# Patient Record
Sex: FEMALE | Race: OTHER | HISPANIC OR LATINO | Employment: OTHER | URBAN - METROPOLITAN AREA
[De-identification: names, ages, dates, MRNs, and addresses within clinical notes are randomized per-mention and may not be internally consistent; named-entity substitution may affect disease eponyms.]

---

## 2017-01-19 ENCOUNTER — OFFICE VISIT (OUTPATIENT)
Dept: OBSTETRICS AND GYNECOLOGY | Facility: CLINIC | Age: 57
End: 2017-01-19
Attending: OBSTETRICS & GYNECOLOGY
Payer: MEDICAID

## 2017-01-19 VITALS
SYSTOLIC BLOOD PRESSURE: 128 MMHG | BODY MASS INDEX: 48.69 KG/M2 | DIASTOLIC BLOOD PRESSURE: 76 MMHG | HEIGHT: 60 IN | WEIGHT: 248 LBS

## 2017-01-19 DIAGNOSIS — R35.0 URINARY FREQUENCY: ICD-10-CM

## 2017-01-19 DIAGNOSIS — R10.2 PELVIC PAIN IN FEMALE: ICD-10-CM

## 2017-01-19 DIAGNOSIS — Z12.39 BREAST CANCER SCREENING: ICD-10-CM

## 2017-01-19 DIAGNOSIS — Z78.0 POSTMENOPAUSE: ICD-10-CM

## 2017-01-19 DIAGNOSIS — N39.41 URGE INCONTINENCE OF URINE: ICD-10-CM

## 2017-01-19 DIAGNOSIS — N81.89 PELVIC RELAXATION: ICD-10-CM

## 2017-01-19 DIAGNOSIS — Z01.419 VISIT FOR GYNECOLOGIC EXAMINATION: Primary | ICD-10-CM

## 2017-01-19 LAB
BILIRUB UR QL STRIP: NEGATIVE
CLARITY UR REFRACT.AUTO: CLEAR
COLOR UR AUTO: YELLOW
GLUCOSE UR QL STRIP: NEGATIVE
HGB UR QL STRIP: ABNORMAL
KETONES UR QL STRIP: NEGATIVE
LEUKOCYTE ESTERASE UR QL STRIP: NEGATIVE
NITRITE UR QL STRIP: NEGATIVE
PH UR STRIP: >8 [PH] (ref 5–8)
PROT UR QL STRIP: ABNORMAL
SP GR UR STRIP: 1.02 (ref 1–1.03)
URN SPEC COLLECT METH UR: ABNORMAL
UROBILINOGEN UR STRIP-ACNC: NEGATIVE EU/DL

## 2017-01-19 PROCEDURE — 87077 CULTURE AEROBIC IDENTIFY: CPT

## 2017-01-19 PROCEDURE — 99999 PR PBB SHADOW E&M-EST. PATIENT-LVL V: CPT | Mod: PBBFAC,,, | Performed by: NURSE PRACTITIONER

## 2017-01-19 PROCEDURE — 87088 URINE BACTERIA CULTURE: CPT

## 2017-01-19 PROCEDURE — 99386 PREV VISIT NEW AGE 40-64: CPT | Mod: S$PBB,,, | Performed by: NURSE PRACTITIONER

## 2017-01-19 PROCEDURE — 99215 OFFICE O/P EST HI 40 MIN: CPT | Mod: PBBFAC | Performed by: NURSE PRACTITIONER

## 2017-01-19 PROCEDURE — 87086 URINE CULTURE/COLONY COUNT: CPT

## 2017-01-19 PROCEDURE — 87186 SC STD MICRODIL/AGAR DIL: CPT

## 2017-01-19 PROCEDURE — 81003 URINALYSIS AUTO W/O SCOPE: CPT

## 2017-01-19 PROCEDURE — 88175 CYTOPATH C/V AUTO FLUID REDO: CPT

## 2017-01-19 NOTE — PROGRESS NOTES
"HISTORY OF PRESENT ILLNESS:    Paras Smith is a 56 y.o. female , presents for a routine exam and c/o pelvic pain and urinary incontinence.  -c/o chronic abdominal - pelvic pain and "has been worked up by Gastro and they can't find anything".  -Pain is generalized, left side, then right side, stabbing with nausea, metal taste, heartburn, fatigue, bloating, belching, difficulty swallowing, radiating to her back, worse with activity, stress and worse with eating - can only eat a few bites and is full, but not losing weight; Bentyl helps..  -Difficulty swallowing started after thyroid cancer in  with radiation treatment.  -Has had NL abd US and UGI in  and NL EGD in ; Colonoscopy with benign polyps in .  -Also c/o urinary frequency, mixed incontinence, nocturia, pad use w/o fever, dysuria, hematuria.     Past Medical History   Diagnosis Date    Asthma     CAD (coronary artery disease)      stent     Cervical spine disease     Depression     Difficult intubation      POSSIBLE    GERD (gastroesophageal reflux disease)     Glaucoma      left eye    Helicobacter pylori (H. pylori) infection     History of stomach ulcers     HTN (hypertension)     Muscle weakness      LEFT    Neck problem      LIMITED ROM    Nuclear cataract 2014    Postsurgical hypothyroidism     Stroke      mini strokes    Thyroid cancer     Vitamin D deficiency disease        Past Surgical History   Procedure Laterality Date    Total thyroidectomy      Coronary angioplasty with stent placement       x 3    Cervical spine surgery       vocal cord damage    Thyroid surgery       total    Appendectomy      Patella surgery  1969    Cholecystectomy       section, low transverse       x3        MEDICATIONS AND ALLERGIES:      Current Outpatient Prescriptions:     albuterol (PROAIR HFA) 90 mcg/actuation inhaler, Inhale 2 puffs into the lungs every 6 (six) hours as needed., Disp: 8.5 g, Rfl: " 1    anthralin 1.2 % CmRR, , Disp: , Rfl: 0    diazepam (VALIUM) 10 MG Tab, Take 10 mg by mouth 2 (two) times daily as needed.  , Disp: , Rfl:     dicyclomine (BENTYL) 20 mg tablet, Take 1 tablet (20 mg total) by mouth 2 (two) times daily., Disp: 60 tablet, Rfl: 0    doxepin (SINEQUAN) 25 MG capsule, Take 25 mg by mouth every evening., Disp: , Rfl: 0    FLUVIRIN [id number], PF, 45 mcg (15 mcg x 3)/0.5 mL Syrg, , Disp: , Rfl: 0    gabapentin (NEURONTIN) 300 MG capsule, Take 600 mg by mouth 3 (three) times daily. , Disp: , Rfl: 0    lidocaine-prilocaine (EMLA) cream, , Disp: , Rfl: 0    lisinopril 10 MG tablet, Take 1 tablet (10 mg total) by mouth once daily., Disp: 30 tablet, Rfl: 6    LMX 4 4 % cream, , Disp: , Rfl: 0    naproxen (NAPROSYN) 375 MG tablet, take 1 tablet by mouth three times a day with food if needed, Disp: , Rfl: 0    nitroGLYCERIN 0.4 MG/HR TD PT24 (NITRODUR) 0.4 mg/hr, apply 1 patch once daily to SKIN, Disp: 30 patch, Rfl: 11    omeprazole magnesium 20 mg CpDR, Take 1 tablet by mouth 2 (two) times daily., Disp: 60 capsule, Rfl: 3    oxycodone (ROXICODONE) 30 MG Tab, Take 30 mg by mouth every 6 (six) hours as needed. , Disp: , Rfl:     promethazine (PHENERGAN) 25 MG tablet, take 1 tablet by mouth every 8 hours if needed for nausea and vomiting, Disp: , Rfl: 0    quetiapine (SEROQUEL) 100 MG Tab, , Disp: , Rfl: 0    risperidone (RISPERDAL) 2 MG tablet, Take 2 mg by mouth every evening., Disp: , Rfl: 0    sertraline (ZOLOFT) 100 MG tablet, Take 200 mg by mouth once daily., Disp: , Rfl: 0    sumatriptan (IMITREX) 100 MG tablet, take 1 tablet by mouth immediately may repeat after 2 hours if needed, Disp: , Rfl: 0    SYNTHROID 112 mcg tablet, Take 1 tablet (112 mcg total) by mouth before breakfast., Disp: 30 tablet, Rfl: 6    topiramate (TOPAMAX) 100 MG tablet, Take 100 mg by mouth 2 (two) times daily., Disp: , Rfl: 0    trazodone (DESYREL) 150 MG tablet, Take 150 mg by mouth every  evening., Disp: , Rfl: 0    VITAMIN D2 50,000 unit capsule, take 1 capsule by mouth EVERY 7 DAYS, Disp: 12 capsule, Rfl: 0    Review of patient's allergies indicates:   Allergen Reactions    Vioxx [rofecoxib] Rash       Family History   Problem Relation Age of Onset    Diabetes Father     Kidney failure Father     Cataracts Father     Asthma Father     Glaucoma Father     Heart disease Mother      has pacemaker    Hypertension Mother     Asthma Mother     Cancer Mother      lung stage 4    Hypertension Brother     Heart disease Brother     Cervical cancer Daughter     Scoliosis Son     Hypertension Son     Hypertension Daughter     Seizures Daughter     Lupus Daughter     Cervical cancer Daughter     Mental illness Son      bipolar    Cancer Sister     Liver disease Sister     No Known Problems Brother     No Known Problems Daughter     Stomach cancer Maternal Aunt     No Known Problems Maternal Uncle     No Known Problems Paternal Aunt     No Known Problems Paternal Uncle     Stomach cancer Maternal Grandmother     No Known Problems Maternal Grandfather     No Known Problems Paternal Grandmother     No Known Problems Paternal Grandfather     Stomach cancer Maternal Aunt     Colon cancer Neg Hx     Ovarian cancer Neg Hx        Social History     Social History    Marital status: Legally      Spouse name: N/A    Number of children: N/A    Years of education: N/A     Occupational History    Not on file.     Social History Main Topics    Smoking status: Never Smoker    Smokeless tobacco: Never Used      Comment: No cigarrettes, only marijuana occasionally    Alcohol use No      Comment: recovering alcoholic    Drug use: 6.00 per week     Special: Marijuana      Comment: ocasional    Sexual activity: Not Currently     Other Topics Concern    Not on file     Social History Narrative       OB HISTORY: Number of C/S:3    COMPREHENSIVE GYN HISTORY:  PAP History:  Denies  abnormal Paps. LAST PAP 3-1-13 NORMAL.  Infection History: Denies STDs. Denies PID.  Benign History: Denies uterine fibroids. Denies ovarian cysts. Denies endometriosis. Denies other conditions.  Cancer History: Denies cervical cancer. Denies uterine cancer or hyperplasia. Denies ovarian cancer. Denies vulvar cancer or pre-cancer. Denies vaginal cancer or pre-cancer. Denies breast cancer. Denies colon cancer.  Sexual Activity History: Reports currently being sexually active  Menstrual History: Denies menses. Pt is  not on HRT.     ROS:  GENERAL: + WEIGHT GAIN. + ABD BLOATING. + FATIGUE. No fever.  CARDIOVASCULAR: + OCC CHEST PAIN RELIEVED WITH NTG PATCH. No shortness of breath. No leg cramps. + CHRONIC PALPITATIONS.  NEUROLOGICAL: + CHRONIC HEADACHES.. No vision changes.  BREASTS: No pain. No lumps. No discharge.  ABDOMEN: + DIFFICULTY SWALLOWING, NAUSEA, HEARTBURN, EARLY SATIETY. No vomiting. No diarrhea. + CHRONIC CONSTIPATION.  REPRODUCTIVE: No abnormal bleeding.   VULVA: No pain. No lesions. No itching.  VAGINA: No relaxation. No itching. No odor. No discharge. No lesions.  URINARY: + U.I, PAD USE, NOCTURIA, FREQUENCY. No hematuria. No dysuria.    Visit Vitals    /76    Ht 5' (1.524 m)    Wt 112.5 kg (248 lb)    LMP 04/12/2014    BMI 48.43 kg/m2       PE:  APPEARANCE: Well nourished, well developed, in no acute distress.  AFFECT: WNL, alert and oriented x 3.  SKIN: No hirsutism or acne.  NECK: Neck symmetric without masses or thyromegaly.  NODES: No inguinal, cervical, axillary or femoral lymph node enlargement.  CHEST: Good respiratory effort.   ABDOMEN: Soft. There is GENERALIZED TENDERNESS without rebound or masses. No hepatosplenomegaly. No hernias.  BREASTS: Symmetrical, no skin changes or visible lesions. No palpable masses, nipple discharge bilaterally.  PELVIC: ATROPHIC EXTERNAL FEMALE GENITALIA without lesions. Normal hair distribution. Adequate perineal body, normal urethral meatus.  VAGINA DRY/ATROPHIC without lesions or discharge. CERVIX STENOTIC without lesions, discharge or tenderness. There is a GRADE 1 CYSTOCELE/ RECTOCELE present. Bimanual exam shows uterus to be normal size, regular, mobile and nontender. MILD UTERINE PROLAPSE PRESENT. Adnexa without masses or tenderness.  RECTAL: Rectovaginal exam confirms above with normal sphincter tone, no masses.  EXTREMITIES: No edema.    DIAGNOSIS:  1. Visit for gynecologic examination    2. Postmenopause    3. Chronic Abdominal - Pelvic pain in female    4. Urinary frequency    5. Urge incontinence of urine    6. Pelvic relaxation    7. Breast cancer screening        PLAN:    Orders Placed This Encounter    Urine culture    US Pelvis Comp with Transvag NON-OB (xpd    Mammo Digital Screening Bilat With CAD    Urinalysis    Ambulatory consult to Urogynecology    Liquid-based pap smear, screening       COUNSELING:  The patient was counseled today on:  -gyn and non gyn etiologies of CPP and will check a pelvic ultrasound as part of her work up;  -types of incontinence and management options including bladder training, timed voiding, Kegel exercises, and the avoidance of bladder irritants in managing mild symptoms conservatively as well as surgical options for correction of anatomic defects, the potential need for urodynamic testing by Dr. Villegas - appt made;  -osteoporosis prevention, calcium supplementation, regular weight bearing exercise;  -A.C.S. Pap and pelvic exam guidelines (pap every 3 years, no pap after age 65) and recommendations for yearly mammogram;  -to see her PCP for other health maintenance.    FOLLOW-UP with me pending test results and annually.

## 2017-01-21 LAB — BACTERIA UR CULT: NORMAL

## 2017-01-23 ENCOUNTER — TELEPHONE (OUTPATIENT)
Dept: OBSTETRICS AND GYNECOLOGY | Facility: CLINIC | Age: 57
End: 2017-01-23

## 2017-01-23 DIAGNOSIS — N39.0 URINARY TRACT INFECTION WITHOUT HEMATURIA, SITE UNSPECIFIED: Primary | ICD-10-CM

## 2017-01-23 RX ORDER — NITROFURANTOIN 25; 75 MG/1; MG/1
100 CAPSULE ORAL 2 TIMES DAILY
Qty: 14 CAPSULE | Refills: 0 | Status: SHIPPED | OUTPATIENT
Start: 2017-01-23 | End: 2017-01-30

## 2017-01-23 RX ORDER — PHENAZOPYRIDINE HYDROCHLORIDE 200 MG/1
200 TABLET, FILM COATED ORAL 3 TIMES DAILY PRN
Qty: 20 TABLET | Refills: 0 | Status: SHIPPED | OUTPATIENT
Start: 2017-01-23 | End: 2017-04-19

## 2017-01-23 NOTE — TELEPHONE ENCOUNTER
PHONE CALL: Advised of UTI and Rx x2 sent.   Discussed UTI prevention including   a. perineal hygiene, wipe front to back,  b. drink water prior to intercourse and urinate afterwards and avoid certain positions which could increase likelyhood of UTIs,  c. establish regular bladder habits,   d. avoid vulvovaginal irritants,   e. increase fluids and avoid caffeine and alcohol;  -signs of pylenephritis and to go to the ED if develops fever, chills, n/v, back pain, worsening dyuria, hematuria;   -pelvic rest until symptoms resolve.  -Discussed medication potential side effects.  -Discussed that if her symptoms resolve prior to the Urogynecologist's appt, should cancel.

## 2017-01-24 ENCOUNTER — HOSPITAL ENCOUNTER (OUTPATIENT)
Dept: RADIOLOGY | Facility: HOSPITAL | Age: 57
Discharge: HOME | End: 2017-01-24
Attending: NURSE PRACTITIONER
Payer: MEDICAID

## 2017-01-24 DIAGNOSIS — Z12.31 ENCOUNTER FOR SCREENING MAMMOGRAM FOR BREAST CANCER: ICD-10-CM

## 2017-01-24 DIAGNOSIS — R10.2 PELVIC PAIN IN FEMALE: ICD-10-CM

## 2017-01-24 PROCEDURE — 76856 US EXAM PELVIC COMPLETE: CPT | Mod: 26,,, | Performed by: RADIOLOGY

## 2017-01-24 PROCEDURE — 77067 SCR MAMMO BI INCL CAD: CPT | Mod: 26,,, | Performed by: RADIOLOGY

## 2017-01-24 PROCEDURE — 76856 US EXAM PELVIC COMPLETE: CPT | Mod: TC

## 2017-01-24 PROCEDURE — 76830 TRANSVAGINAL US NON-OB: CPT | Mod: 26,,, | Performed by: RADIOLOGY

## 2017-01-24 PROCEDURE — 77063 BREAST TOMOSYNTHESIS BI: CPT | Mod: 26,,, | Performed by: RADIOLOGY

## 2017-01-24 PROCEDURE — 77067 SCR MAMMO BI INCL CAD: CPT | Mod: TC

## 2017-02-01 ENCOUNTER — TELEPHONE (OUTPATIENT)
Dept: UROGYNECOLOGY | Facility: CLINIC | Age: 57
End: 2017-02-01

## 2017-02-01 NOTE — TELEPHONE ENCOUNTER
----- Message from Hernandez Davis sent at 2/1/2017  9:38 AM CST -----  Contact: pt  x_ 1st Request   _ 2nd Request   _ 3rd Request     Who: HAMILTON MIN [[id number]]    Why: pt is sick will not make consultation today.  She is requesting a call back to reschedule her appointment    What Number to Call Back: [phone]    When to Expect a call back: (Before the end of the day)   -- if call after 3:00 call back will be tomorrow.

## 2017-02-01 NOTE — TELEPHONE ENCOUNTER
Pt stated she needed to reschedule apt because she may have a stomach virus and have diarrhea. Apt reschedule to 2/7/17 at 9 am.

## 2017-02-07 ENCOUNTER — TELEPHONE (OUTPATIENT)
Dept: UROGYNECOLOGY | Facility: CLINIC | Age: 57
End: 2017-02-07

## 2017-02-22 DIAGNOSIS — C73 THYROID CANCER: ICD-10-CM

## 2017-02-22 DIAGNOSIS — E89.0 POSTSURGICAL HYPOTHYROIDISM: ICD-10-CM

## 2017-02-22 RX ORDER — LEVOTHYROXINE SODIUM 112 UG/1
112 TABLET ORAL
Qty: 30 TABLET | Refills: 6 | Status: SHIPPED | OUTPATIENT
Start: 2017-02-22 | End: 2017-04-20

## 2017-02-22 RX ORDER — LEVOTHYROXINE SODIUM 112 UG/1
112 TABLET ORAL
Qty: 30 TABLET | Refills: 6 | Status: SHIPPED | OUTPATIENT
Start: 2017-02-22 | End: 2017-02-22 | Stop reason: SDUPTHER

## 2017-03-14 ENCOUNTER — TELEPHONE (OUTPATIENT)
Dept: FAMILY MEDICINE | Facility: CLINIC | Age: 57
End: 2017-03-14

## 2017-03-14 NOTE — TELEPHONE ENCOUNTER
Spoke with patient. States that her grandson has the flu and would like to get prescription for Tamiflu sent to the pharmacy for herself to prevent her from getting the flu.     Advised that she can come in for flu vaccine or office visit for evaluation. Tamiflu does not prevent the flu.     Patient declined.     She has scheduled appointment for annual exam in April

## 2017-03-14 NOTE — TELEPHONE ENCOUNTER
----- Message from Isabel Posada sent at 3/13/2017  4:05 PM CDT -----  Contact: self  Pt is requesting a refill for tamiflu.please advise. [phone]

## 2017-03-16 RX ORDER — ALBUTEROL SULFATE 90 UG/1
2 AEROSOL, METERED RESPIRATORY (INHALATION) EVERY 6 HOURS PRN
Qty: 8.5 G | Refills: 0 | Status: ON HOLD | OUTPATIENT
Start: 2017-03-16 | End: 2017-08-23

## 2017-03-21 RX ORDER — ERGOCALCIFEROL 1.25 MG/1
50000 CAPSULE ORAL
Qty: 12 CAPSULE | Refills: 0 | Status: SHIPPED | OUTPATIENT
Start: 2017-03-21 | End: 2017-06-17 | Stop reason: SDUPTHER

## 2017-04-19 ENCOUNTER — OFFICE VISIT (OUTPATIENT)
Dept: FAMILY MEDICINE | Facility: CLINIC | Age: 57
End: 2017-04-19
Payer: MEDICAID

## 2017-04-19 ENCOUNTER — LAB VISIT (OUTPATIENT)
Dept: LAB | Facility: HOSPITAL | Age: 57
End: 2017-04-19
Payer: MEDICAID

## 2017-04-19 VITALS
SYSTOLIC BLOOD PRESSURE: 130 MMHG | RESPIRATION RATE: 17 BRPM | HEIGHT: 60 IN | DIASTOLIC BLOOD PRESSURE: 94 MMHG | BODY MASS INDEX: 47.61 KG/M2 | TEMPERATURE: 98 F | HEART RATE: 70 BPM | WEIGHT: 242.5 LBS | OXYGEN SATURATION: 96 %

## 2017-04-19 DIAGNOSIS — M54.50 ACUTE RIGHT-SIDED LOW BACK PAIN WITHOUT SCIATICA: ICD-10-CM

## 2017-04-19 DIAGNOSIS — I10 ESSENTIAL HYPERTENSION: ICD-10-CM

## 2017-04-19 DIAGNOSIS — E55.9 HYPOVITAMINOSIS D: ICD-10-CM

## 2017-04-19 DIAGNOSIS — R10.13 EPIGASTRIC PAIN: ICD-10-CM

## 2017-04-19 DIAGNOSIS — R35.0 URINARY FREQUENCY: ICD-10-CM

## 2017-04-19 DIAGNOSIS — F41.9 ANXIETY: ICD-10-CM

## 2017-04-19 DIAGNOSIS — N95.1 HOT FLASHES DUE TO MENOPAUSE: ICD-10-CM

## 2017-04-19 DIAGNOSIS — R10.13 EPIGASTRIC PAIN: Primary | ICD-10-CM

## 2017-04-19 DIAGNOSIS — E89.0 POST-SURGICAL HYPOTHYROIDISM: ICD-10-CM

## 2017-04-19 LAB
25(OH)D3+25(OH)D2 SERPL-MCNC: 26 NG/ML
ALBUMIN SERPL BCP-MCNC: 3.9 G/DL
ALP SERPL-CCNC: 119 U/L
ALT SERPL W/O P-5'-P-CCNC: 17 U/L
AMYLASE SERPL-CCNC: 36 U/L
ANION GAP SERPL CALC-SCNC: 9 MMOL/L
AST SERPL-CCNC: 20 U/L
BASOPHILS # BLD AUTO: 0.05 K/UL
BASOPHILS NFR BLD: 0.6 %
BILIRUB SERPL-MCNC: 0.6 MG/DL
BILIRUB SERPL-MCNC: NORMAL MG/DL
BILIRUB UR QL STRIP: NEGATIVE
BLOOD URINE, POC: 50
BUN SERPL-MCNC: 10 MG/DL
CALCIUM SERPL-MCNC: 9.4 MG/DL
CHLORIDE SERPL-SCNC: 106 MMOL/L
CHOLEST/HDLC SERPL: 3.5 {RATIO}
CLARITY UR: CLEAR
CO2 SERPL-SCNC: 26 MMOL/L
COLOR UR: YELLOW
COLOR, POC UA: YELLOW
CREAT SERPL-MCNC: 0.7 MG/DL
DIFFERENTIAL METHOD: ABNORMAL
EOSINOPHIL # BLD AUTO: 0.2 K/UL
EOSINOPHIL NFR BLD: 1.8 %
ERYTHROCYTE [DISTWIDTH] IN BLOOD BY AUTOMATED COUNT: 13.3 %
EST. GFR  (AFRICAN AMERICAN): >60 ML/MIN/1.73 M^2
EST. GFR  (NON AFRICAN AMERICAN): >60 ML/MIN/1.73 M^2
GLUCOSE SERPL-MCNC: 96 MG/DL
GLUCOSE UR QL STRIP: NEGATIVE
GLUCOSE UR QL STRIP: NORMAL
HCT VFR BLD AUTO: 44 %
HDL/CHOLESTEROL RATIO: 28.4 %
HDLC SERPL-MCNC: 208 MG/DL
HDLC SERPL-MCNC: 59 MG/DL
HGB BLD-MCNC: 14.9 G/DL
HGB UR QL STRIP: ABNORMAL
KETONES UR QL STRIP: NEGATIVE
KETONES UR QL STRIP: NORMAL
LDLC SERPL CALC-MCNC: 126.6 MG/DL
LEUKOCYTE ESTERASE UR QL STRIP: NEGATIVE
LEUKOCYTE ESTERASE URINE, POC: NORMAL
LIPASE SERPL-CCNC: 25 U/L
LYMPHOCYTES # BLD AUTO: 4 K/UL
LYMPHOCYTES NFR BLD: 48.8 %
MCH RBC QN AUTO: 30.8 PG
MCHC RBC AUTO-ENTMCNC: 33.9 %
MCV RBC AUTO: 91 FL
MICROSCOPIC COMMENT: NORMAL
MONOCYTES # BLD AUTO: 0.6 K/UL
MONOCYTES NFR BLD: 7.9 %
NEUTROPHILS # BLD AUTO: 3.3 K/UL
NEUTROPHILS NFR BLD: 40.8 %
NITRITE UR QL STRIP: NEGATIVE
NITRITE, POC UA: NORMAL
NONHDLC SERPL-MCNC: 149 MG/DL
PH UR STRIP: 6 [PH] (ref 5–8)
PH, POC UA: 6
PLATELET # BLD AUTO: 258 K/UL
PMV BLD AUTO: 12.5 FL
POTASSIUM SERPL-SCNC: 4.9 MMOL/L
PROT SERPL-MCNC: 7.5 G/DL
PROT UR QL STRIP: NEGATIVE
PROTEIN, POC: NORMAL
RBC # BLD AUTO: 4.83 M/UL
RBC #/AREA URNS HPF: 4 /HPF (ref 0–4)
SODIUM SERPL-SCNC: 141 MMOL/L
SP GR UR STRIP: 1.02 (ref 1–1.03)
SPECIFIC GRAVITY, POC UA: 1.01
SQUAMOUS #/AREA URNS HPF: 1 /HPF
T4 FREE SERPL-MCNC: 1.27 NG/DL
TRIGL SERPL-MCNC: 112 MG/DL
TSH SERPL DL<=0.005 MIU/L-ACNC: 0.02 UIU/ML
URN SPEC COLLECT METH UR: ABNORMAL
UROBILINOGEN UR STRIP-ACNC: NEGATIVE EU/DL
UROBILINOGEN, POC UA: 1
WBC # BLD AUTO: 8.13 K/UL
WBC #/AREA URNS HPF: 2 /HPF (ref 0–5)

## 2017-04-19 PROCEDURE — 80061 LIPID PANEL: CPT

## 2017-04-19 PROCEDURE — 99215 OFFICE O/P EST HI 40 MIN: CPT | Mod: S$PBB,,, | Performed by: NURSE PRACTITIONER

## 2017-04-19 PROCEDURE — 84443 ASSAY THYROID STIM HORMONE: CPT

## 2017-04-19 PROCEDURE — 83690 ASSAY OF LIPASE: CPT

## 2017-04-19 PROCEDURE — 85025 COMPLETE CBC W/AUTO DIFF WBC: CPT

## 2017-04-19 PROCEDURE — 99999 PR PBB SHADOW E&M-EST. PATIENT-LVL V: CPT | Mod: PBBFAC,,, | Performed by: NURSE PRACTITIONER

## 2017-04-19 PROCEDURE — 84439 ASSAY OF FREE THYROXINE: CPT

## 2017-04-19 PROCEDURE — 36415 COLL VENOUS BLD VENIPUNCTURE: CPT

## 2017-04-19 PROCEDURE — 80053 COMPREHEN METABOLIC PANEL: CPT

## 2017-04-19 PROCEDURE — 82150 ASSAY OF AMYLASE: CPT

## 2017-04-19 PROCEDURE — 82306 VITAMIN D 25 HYDROXY: CPT

## 2017-04-19 RX ORDER — HYDROXYZINE PAMOATE 50 MG/1
50 CAPSULE ORAL EVERY 6 HOURS PRN
Qty: 30 CAPSULE | Refills: 1 | Status: SHIPPED | OUTPATIENT
Start: 2017-04-19 | End: 2019-04-25 | Stop reason: SDUPTHER

## 2017-04-19 RX ORDER — ESCITALOPRAM OXALATE 10 MG/1
10 TABLET ORAL DAILY
Qty: 30 TABLET | Refills: 11 | Status: SHIPPED | OUTPATIENT
Start: 2017-04-19 | End: 2017-04-19

## 2017-04-19 RX ORDER — HYDROXYZINE PAMOATE 50 MG/1
CAPSULE ORAL
Refills: 0 | COMMUNITY
Start: 2017-03-20 | End: 2017-04-19 | Stop reason: SDUPTHER

## 2017-04-19 NOTE — PROGRESS NOTES
"Subjective:       Patient ID: Paras Smith is a 56 y.o. female.    Chief Complaint: Annual Exam    HPI Ms Smith is here for several reasons. She reports abdominal pain to the epigastric area, she has associated N/V, no diarrhea, no bloody or black stool, no fever, no constipation. She's been followed by GI who she states completed what sounds like an EDG and c-scope.  She's also had a CT in , without any real acute findings.  She also reports "kidney pain" to her back.  She is concerned because a family member  of kidney cancer.  She is also concerned because a different family member had "something to their abdominal area that just burst" (she points to her epigastric area where she also states she has pain. She is also followed by psychiatry for anxiety. She also reports menopausal symptoms. She also reports urinary frequency.  She also needs to be referred to a new endocrinologist because her provider no longer takes her insurance.  While reviewing her medications, she is concerned that she is on so many medications, but only takes them as needed.  She would also like something to "help her focus" like adderall or ritalin, though she does not want anything addictive, that is why she rarely takes Valium.  Review of Systems   Constitutional: Negative for fever.   Cardiovascular: Negative for chest pain.   Gastrointestinal: Positive for abdominal pain.   Genitourinary: Positive for frequency.   Musculoskeletal: Positive for back pain.       Objective:      Physical Exam   Constitutional: She is oriented to person, place, and time. She appears well-developed and well-nourished. She does not appear ill. No distress.   tangential  Morbidly obese   Cardiovascular: Normal rate, regular rhythm and normal heart sounds.  Exam reveals no friction rub.    No murmur heard.  Pulses:       Dorsalis pedis pulses are 2+ on the right side, and 2+ on the left side.        Posterior tibial pulses are 2+ on the right " side, and 2+ on the left side.   Pulmonary/Chest: Effort normal and breath sounds normal. No respiratory distress. She has no decreased breath sounds. She has no wheezes. She has no rhonchi. She has no rales.   Abdominal: Soft. Bowel sounds are normal. There is CVA tenderness.   Unable to complete abdominal exam, pt. States she cannot lie flat as she gets short of breath.  R CVA tenderness   Musculoskeletal: Normal range of motion. She exhibits no edema.   Neurological: She is alert and oriented to person, place, and time.   Skin: Skin is warm and dry. No erythema.   Psychiatric: Her mood appears anxious. Her speech is tangential. She is hyperactive. Thought content is not paranoid and not delusional. She expresses no homicidal and no suicidal ideation.   Vitals reviewed.      Assessment:       1. Epigastric pain    2. Acute right-sided low back pain without sciatica    3. Hypovitaminosis D    4. Post-surgical hypothyroidism    5. Essential hypertension    6. Hot flashes due to menopause    7. Anxiety    8. Urinary frequency        Plan:       Epigastric pain  -     CBC auto differential; Future; Expected date: 4/19/17  -     Comprehensive metabolic panel; Future; Expected date: 4/19/17  -     Lipase; Future; Expected date: 4/19/17  -     Amylase; Future; Expected date: 4/19/17  -     CT Abdomen Pelvis W Wo Contrast; Future; Expected date: 4/19/17    Acute right-sided low back pain without sciatica  -     POCT urinalysis, dipstick or tablet reag    Hypovitaminosis D  -     Vitamin D; Future; Expected date: 4/19/17    Post-surgical hypothyroidism  -     TSH; Future; Expected date: 4/19/17  -     Ambulatory referral to Endocrinology    Essential hypertension  -     CBC auto differential; Future; Expected date: 4/19/17  -     Comprehensive metabolic panel; Future; Expected date: 4/19/17  -     Lipid panel; Future; Expected date: 4/19/17    Hot flashes due to menopause  Black cohosh prn    Anxiety  -     hydrOXYzine  "pamoate (VISTARIL) 50 MG Cap; Take 1 capsule (50 mg total) by mouth every 6 (six) hours as needed (anxiety).  Dispense: 30 capsule; Refill: 1  -     Ambulatory referral to Psychiatry    Urinary frequency  -     Urinalysis  -     Urine culture    Ms Smith and I had a long discussion regarding her medications.  I have informed her that I will not write a stimulant medication for her, she has anxiety, if she can get this under control, she will be able to focus better.  She sees psychiatry every month, however, she states that the anti-anxiety medication that she was put on makes her more depressed, this includes Prozac, Zoloft, Celexa and Lexapro.  She takes multiple medications for insomnia, but she states the vistaril works best.  I will increase this for her to try during the day as well since she states she cannot tolerate SSRIs.    Regarding her abdominal issue, I have reassured her that her kidney functions have been normal for at least 2 years, also I'm unclear what she is describing in her family members that "burst".  Nonetheless, her pain is right to her epigastric area, and her Ct is almost 3 years old, I will get another one, however; ultimately, she may need to f/u with GI.  Also refer to establish with a new endocrine  Also catch up health maintenance.    F/u after testing complete  ER for new worse or concerning symptoms    Verbalized understanding        "

## 2017-04-19 NOTE — MR AVS SNAPSHOT
[facility]  [address] [place] [address]  Phone: [phone]                  Paras Gaines Sarah   2017 7:20 AM   Office Visit    Description:  Female : 1960   Provider:  Ekta Gaines, NP-C   Department:  [facility]           Reason for Visit     Annual Exam           Diagnoses this Visit        Comments    Epigastric pain    -  Primary     Acute right-sided low back pain without sciatica         Hypovitaminosis D         Post-surgical hypothyroidism         Essential hypertension         Hot flashes due to menopause         Anxiety         Urinary frequency                To Do List           Goals (5 Years of Data)     None       These Medications        Disp Refills Start End    hydrOXYzine pamoate (VISTARIL) 50 MG Cap 30 capsule 1 2017     Take 1 capsule (50 mg total) by mouth every 6 (six) hours as needed (anxiety). - Oral    Pharmacy: [facility] [place] [address]. - RAPHAEL CODY [address] Ph #: [phone]         [facility]     [facility] Nurse Care Line -  Assistance  Unless otherwise directed by your provider, please contact [facility], our nurse care line that is available for / assistance.     Registered nurses in the [facility] provide: appointment scheduling, clinical advisement, health education, and other advisory services.  Call: [phone] (toll free)               Medications           Message regarding Medications     Verify the changes and/or additions to your medication regime listed below are the same as discussed with your clinician today.  If any of these changes or additions are incorrect, please notify your healthcare provider.        START taking these NEW medications        Refills    hydrOXYzine pamoate (VISTARIL) 50 MG Cap 1    Sig: Take 1 capsule (50 mg total) by mouth every 6 (six) hours as needed (anxiety).    Class: Normal    Route: Oral      STOP  taking these medications     trazodone (DESYREL) 150 MG tablet Take 150 mg by mouth every evening.    sertraline (ZOLOFT) 100 MG tablet Take 200 mg by mouth once daily.    quetiapine (SEROQUEL) 100 MG Tab     phenazopyridine (PYRIDIUM) 200 MG tablet Take 1 tablet (200 mg total) by mouth 3 (three) times daily as needed for Pain.    FLUVIRIN [id number], PF, 45 mcg (15 mcg x 3)/0.5 mL Syrg     diazepam (VALIUM) 10 MG Tab Take 10 mg by mouth 2 (two) times daily as needed.             Verify that the below list of medications is an accurate representation of the medications you are currently taking.  If none reported, the list may be blank. If incorrect, please contact your healthcare provider. Carry this list with you in case of emergency.           Current Medications     albuterol (PROAIR HFA) 90 mcg/actuation inhaler Inhale 2 puffs into the lungs every 6 (six) hours as needed.    anthralin 1.2 % CmRR     dicyclomine (BENTYL) 20 mg tablet Take 1 tablet (20 mg total) by mouth 2 (two) times daily.    doxepin (SINEQUAN) 25 MG capsule Take 25 mg by mouth every evening.    ergocalciferol (VITAMIN D2) 50,000 unit Cap Take 1 capsule (50,000 Units total) by mouth every 7 days.    gabapentin (NEURONTIN) 300 MG capsule Take 600 mg by mouth 3 (three) times daily.     hydrOXYzine pamoate (VISTARIL) 50 MG Cap Take 1 capsule (50 mg total) by mouth every 6 (six) hours as needed (anxiety).    lidocaine-prilocaine (EMLA) cream     lisinopril 10 MG tablet Take 1 tablet (10 mg total) by mouth once daily.    LMX 4 4 % cream     naproxen (NAPROSYN) 375 MG tablet take 1 tablet by mouth three times a day with food if needed    nitroGLYCERIN 0.4 MG/HR TD PT24 (NITRODUR) 0.4 mg/hr apply 1 patch once daily to SKIN    omeprazole magnesium 20 mg CpDR Take 1 tablet by mouth 2 (two) times daily.    oxycodone (ROXICODONE) 30 MG Tab Take 30 mg by mouth every 6 (six) hours as needed.     promethazine (PHENERGAN) 25 MG tablet take 1 tablet by mouth  every 8 hours if needed for nausea and vomiting    risperidone (RISPERDAL) 2 MG tablet Take 2 mg by mouth every evening.    sumatriptan (IMITREX) 100 MG tablet take 1 tablet by mouth immediately may repeat after 2 hours if needed    SYNTHROID 112 mcg tablet Take 1 tablet (112 mcg total) by mouth before breakfast.    topiramate (TOPAMAX) 100 MG tablet Take 100 mg by mouth 2 (two) times daily.           Clinical Reference Information           Your Vitals Were     BP Pulse Temp Resp Height Weight    130/94 (BP Location: Right arm, Patient Position: Sitting, BP Method: Manual) 70 98 °F (36.7 °C) (Oral) 17 5' (1.524 m) 110 kg (242 lb 8.1 oz)    Last Period SpO2 BMI          04/12/2014 96% 47.36 kg/m2        Blood Pressure          Most Recent Value    BP  (!)  130/94      Allergies as of 4/19/2017     Vioxx [Rofecoxib]      Immunizations Administered on Date of Encounter - 4/19/2017     None      Orders Placed During Today's Visit      Normal Orders This Visit    Ambulatory referral to Endocrinology     Ambulatory referral to Psychiatry     POCT urinalysis, dipstick or tablet reag     Urinalysis     Urine culture     Future Labs/Procedures Expected by Expires    Amylase  4/19/2017 6/18/2018    CBC auto differential  4/19/2017 4/19/2018    Comprehensive metabolic panel  4/19/2017 4/19/2018    CT Abdomen Pelvis W Wo Contrast  4/19/2017 4/19/2018    Lipase  4/19/2017 4/19/2018    Lipid panel  4/19/2017 6/18/2018    TSH  4/19/2017 4/20/2018    Vitamin D  4/19/2017 6/18/2018 4/19/2017  8:19 AM - Haile Gongora MA      Component Results     Component    Color, UA    yellow    Comment:    dark    Spec Grav UA    1.015    pH, UA    6    WBC, UA    trace    Nitrite, UA    -    Protein    +    Glucose, UA    normal    Ketones, UA    -    Urobilinogen, UA    1    Bilirubin    -    Blood, UA    50            [facility] Sign-Up     Activating your [facility] account is as easy as 1-2-3!     1) Visit [email or web address], select Sign  "Up Now, enter this activation code and your date of birth, then select Next.  [email or web address]  Expires: 6/3/2017  8:06 AM      2) Create a username and password to use when you visit [facility] in the future and select a security question in case you lose your password and select Next.    3) Enter your e-mail address and click Sign Up!    Additional Information  If you have questions, please e-mail [email or web address] or call [phone] to talk to our [facility] staff. Remember, [facility] is NOT to be used for urgent needs. For medical emergencies, dial 911.         Instructions    Follow up in 6-12 months, sooner if necessary  ER for new worse or concerning symptoms    Eating Healthy on the Run     Many grocery stores stock pre-made salads for eating on the run.     Need to eat on the run? This often means grabbing "junk" or fast food full of fat, salt, sugar, and cholesterol. But being in a rush doesn't mean that you can't eat healthy.  "Fast" food made healthy  Try these ways to get good nutrition, fast.  · Go to a grocery or convenience market instead of a fast-food restaurant. Look for choices like sandwiches, yogurt, fresh fruit, and juices.  · Buy precut, prepackaged fresh or frozen fruits and vegetables. You can open the package for a snack, salad, smoothie, or stir-floyd.  · Microwave a frozen dinner that has less than 15 grams (g) of fat and less than 800 milligrams (mg) of sodium. Complete the meal with a wholegrain roll, vegetables, and fresh fruit.  · If you must have fast food, consider your options. Go for veggie burgers, broiled and skinless chicken breast sandwiches, or dinner salads with low-fat dressing. Avoid large (super-sized) portions.  · Blot the extra oil from food with a napkin before you eat it.  · Instead of french fries, choose a baked potato with salsa.  Tip  Fast-food restaurants often have printed nutrition information available. Ask for this information and look up your favorite " items before you order.   Date Last Reviewed: 6/2/2015  © 5418-5417 [employer]. [address] [place] [address], [place] [address]. All rights reserved. This information is not intended as a substitute for professional medical care. Always follow your healthcare professional's instructions.             Language Assistance Services     ATTENTION: Language assistance services are available, free of charge. Please call [phone].      ATENCIÓN: Si habla español, tiene a spaulding disposición servicios gratuitos de asistencia lingüística. Llame al [phone].     CHÚ Ý: N?u b?n nói Ti?ng Vi?t, có các d?ch v? h? tr? ngôn ng? mi?n phí dành cho b?n. G?i s? [phone].         [facility] complies with applicable Federal civil rights laws and does not discriminate on the basis of race, color, national origin, age, disability, or sex.

## 2017-04-19 NOTE — PATIENT INSTRUCTIONS
"Follow up in 6-12 months, sooner if necessary  ER for new worse or concerning symptoms    Eating Healthy on the Run     Many grocery stores stock pre-made salads for eating on the run.     Need to eat on the run? This often means grabbing "junk" or fast food full of fat, salt, sugar, and cholesterol. But being in a rush doesn't mean that you can't eat healthy.  "Fast" food made healthy  Try these ways to get good nutrition, fast.  · Go to a grocery or convenience market instead of a fast-food restaurant. Look for choices like sandwiches, yogurt, fresh fruit, and juices.  · Buy precut, prepackaged fresh or frozen fruits and vegetables. You can open the package for a snack, salad, smoothie, or stir-floyd.  · Microwave a frozen dinner that has less than 15 grams (g) of fat and less than 800 milligrams (mg) of sodium. Complete the meal with a wholegrain roll, vegetables, and fresh fruit.  · If you must have fast food, consider your options. Go for veggie burgers, broiled and skinless chicken breast sandwiches, or dinner salads with low-fat dressing. Avoid large (super-sized) portions.  · Blot the extra oil from food with a napkin before you eat it.  · Instead of french fries, choose a baked potato with salsa.  Tip  Fast-food restaurants often have printed nutrition information available. Ask for this information and look up your favorite items before you order.   Date Last Reviewed: 6/2/2015  © 7928-9690 [employer]. [address] [place] [address], [place] [address]. All rights reserved. This information is not intended as a substitute for professional medical care. Always follow your healthcare professional's instructions.        "

## 2017-04-20 ENCOUNTER — TELEPHONE (OUTPATIENT)
Dept: FAMILY MEDICINE | Facility: CLINIC | Age: 57
End: 2017-04-20

## 2017-04-20 DIAGNOSIS — E89.0 POSTSURGICAL HYPOTHYROIDISM: ICD-10-CM

## 2017-04-20 DIAGNOSIS — C73 THYROID CANCER: ICD-10-CM

## 2017-04-20 RX ORDER — LEVOTHYROXINE SODIUM 100 UG/1
100 TABLET ORAL DAILY
Qty: 30 TABLET | Refills: 1 | Status: SHIPPED | OUTPATIENT
Start: 2017-04-20 | End: 2017-06-20 | Stop reason: SDUPTHER

## 2017-04-20 NOTE — TELEPHONE ENCOUNTER
Her thyroid is off, I've changed her synthroid, repeat TSH in 2 months. all other labs wnl  I'll mail them all out

## 2017-04-20 NOTE — TELEPHONE ENCOUNTER
No answer at [phone]    Left message to voicemail [phone] to return call to clinic re: RESULTS

## 2017-04-20 NOTE — TELEPHONE ENCOUNTER
Spoke with patient and advised of results and recommendations below     Lab appointment scheduled 6-20-17    Appointment slip mailed to patient

## 2017-04-21 ENCOUNTER — TELEPHONE (OUTPATIENT)
Dept: FAMILY MEDICINE | Facility: CLINIC | Age: 57
End: 2017-04-21

## 2017-04-21 DIAGNOSIS — R31.9 URINARY TRACT INFECTION WITH HEMATURIA, SITE UNSPECIFIED: Primary | ICD-10-CM

## 2017-04-21 DIAGNOSIS — N39.0 URINARY TRACT INFECTION WITH HEMATURIA, SITE UNSPECIFIED: Primary | ICD-10-CM

## 2017-04-21 LAB — BACTERIA UR CULT: NORMAL

## 2017-04-21 RX ORDER — CIPROFLOXACIN 500 MG/1
500 TABLET ORAL 2 TIMES DAILY
Qty: 14 TABLET | Refills: 0 | Status: SHIPPED | OUTPATIENT
Start: 2017-04-21 | End: 2017-04-28

## 2017-04-21 NOTE — TELEPHONE ENCOUNTER
Orders and notes has been faxed to [facility] endocrinolgy department @ [phone].

## 2017-04-27 ENCOUNTER — TELEPHONE (OUTPATIENT)
Dept: FAMILY MEDICINE | Facility: CLINIC | Age: 57
End: 2017-04-27

## 2017-04-27 NOTE — TELEPHONE ENCOUNTER
Spoke with Ebony at [facility] and advised that Synthroid dose was changed to 100 mcg    See telephone note re: results  4-

## 2017-04-27 NOTE — TELEPHONE ENCOUNTER
----- Message from J Carlos Acosta sent at 4/27/2017  9:23 AM CDT -----  Contact: Ebony/[facility]/[phone]  Ebony has questions regarding patient's prescription for levothyroxine (SYNTHROID) 100 MCG tablet. She states that the patient informed her that the medication should've been increased.  Thank you.

## 2017-05-02 RX ORDER — ALBUTEROL SULFATE 90 UG/1
2 AEROSOL, METERED RESPIRATORY (INHALATION) EVERY 6 HOURS PRN
Qty: 18 G | Refills: 0 | Status: SHIPPED | OUTPATIENT
Start: 2017-05-02 | End: 2017-06-08 | Stop reason: SDUPTHER

## 2017-06-05 ENCOUNTER — NURSE TRIAGE (OUTPATIENT)
Dept: ADMINISTRATIVE | Facility: CLINIC | Age: 57
End: 2017-06-05

## 2017-06-05 NOTE — TELEPHONE ENCOUNTER
"    Reason for Disposition   [1] SEVERE asthma attack (e.g., very SOB at rest, speaks in single words, loud wheezes) AND [2] not resolved after 2 nebulizer or inhaler treatments    Answer Assessment - Initial Assessment Questions  1. RESPIRATORY STATUS: "Describe your breathing?" (e.g., wheezing, shortness of breath, unable to speak, severe coughing)      Asthma, heart disease, using pump for phlegm. SOB, Hx thyroid CA.   MDI helped some   2. ONSET: "When did this asthma attack begin?"      Coughing a lot on phone   3. TRIGGER: "What do you think triggered this attack?" (e.g., URI, exposure to pollen or other allergen, tobacco smoke)      Unsure   4. PEAK EXPIRATORY FLOW RATE (PEFR): "Do you use a peak flow meter?" If so, ask: "What's the current peak flow? What's your personal best peak flow?"      n/a  5. SEVERITY: "How bad is this attack?"     - MILD: No SOB at rest, mild SOB with walking, speaks normally in sentences, can lay down, no retractions, pulse < 100. (GREEN Zone: PEFR %)    - MODERATE: SOB at rest, SOB with minimal exertion and prefers to sit, cannot lie down flat, speaks in phrases, mild retractions, audible wheezing, pulse 100-120. (YELLOW Zone: PEFR 50-80%)     - SEVERE: Very SOB at rest, speaks in single words, struggling to breathe, sitting hunched forward, retractions, usually loud wheezing, sometimes minimal wheezing because of decreased air movement, pulse > 120. (RED Zone: PEFR < 50%).      Moderate SOB  6. MEDICATIONS (Inhaler or nebs): "What are your asthma medications?" and "What treatments have you given so far?"     - Quick-relief: albuterol, metaproterenol, salbutamol, or other inhaled or nebulized beta-agonist medicines    - Long-term-control: steroids, cromolyn, or other anti-inflammatory medicines.     MDI  7. OTHER SYMPTOMS: "Do you have any other symptoms? (e.g., runny nose, chest pain, fever)      Chest tightness    Protocols used: ST ASTHMA ATTACK-A-  pt denies severe " resp distress. Refused EMS. rec ED. Pt states that her son will take her to ED. Call back with questions.

## 2017-06-06 ENCOUNTER — HOSPITAL ENCOUNTER (EMERGENCY)
Facility: HOSPITAL | Age: 57
Discharge: HOME | End: 2017-06-07
Attending: EMERGENCY MEDICINE
Payer: MEDICAID

## 2017-06-06 DIAGNOSIS — F41.9 ANXIETY: ICD-10-CM

## 2017-06-06 DIAGNOSIS — J20.9 BRONCHITIS, ACUTE, WITH BRONCHOSPASM: Primary | ICD-10-CM

## 2017-06-06 DIAGNOSIS — R05.9 COUGH: ICD-10-CM

## 2017-06-06 LAB
BASOPHILS # BLD AUTO: 0.03 K/UL
BASOPHILS NFR BLD: 0.4 %
DIFFERENTIAL METHOD: ABNORMAL
EOSINOPHIL # BLD AUTO: 0.1 K/UL
EOSINOPHIL NFR BLD: 1.2 %
ERYTHROCYTE [DISTWIDTH] IN BLOOD BY AUTOMATED COUNT: 13 %
HCT VFR BLD AUTO: 42.1 %
HGB BLD-MCNC: 14.5 G/DL
LYMPHOCYTES # BLD AUTO: 1.7 K/UL
LYMPHOCYTES NFR BLD: 22.2 %
MCH RBC QN AUTO: 31.2 PG
MCHC RBC AUTO-ENTMCNC: 34.4 %
MCV RBC AUTO: 91 FL
MONOCYTES # BLD AUTO: 0.7 K/UL
MONOCYTES NFR BLD: 9 %
NEUTROPHILS # BLD AUTO: 5.1 K/UL
NEUTROPHILS NFR BLD: 67.1 %
PLATELET # BLD AUTO: 210 K/UL
PMV BLD AUTO: 11.8 FL
RBC # BLD AUTO: 4.65 M/UL
WBC # BLD AUTO: 7.58 K/UL

## 2017-06-06 PROCEDURE — 25000242 PHARM REV CODE 250 ALT 637 W/ HCPCS: Performed by: EMERGENCY MEDICINE

## 2017-06-06 PROCEDURE — 80053 COMPREHEN METABOLIC PANEL: CPT

## 2017-06-06 PROCEDURE — 96360 HYDRATION IV INFUSION INIT: CPT

## 2017-06-06 PROCEDURE — 83880 ASSAY OF NATRIURETIC PEPTIDE: CPT

## 2017-06-06 PROCEDURE — 99285 EMERGENCY DEPT VISIT HI MDM: CPT | Mod: 25

## 2017-06-06 PROCEDURE — 85025 COMPLETE CBC W/AUTO DIFF WBC: CPT

## 2017-06-06 PROCEDURE — 25000003 PHARM REV CODE 250: Performed by: EMERGENCY MEDICINE

## 2017-06-06 PROCEDURE — 93005 ELECTROCARDIOGRAM TRACING: CPT

## 2017-06-06 PROCEDURE — 84484 ASSAY OF TROPONIN QUANT: CPT

## 2017-06-06 PROCEDURE — 94640 AIRWAY INHALATION TREATMENT: CPT

## 2017-06-06 RX ORDER — IPRATROPIUM BROMIDE AND ALBUTEROL SULFATE 2.5; .5 MG/3ML; MG/3ML
3 SOLUTION RESPIRATORY (INHALATION)
Status: COMPLETED | OUTPATIENT
Start: 2017-06-06 | End: 2017-06-06

## 2017-06-06 RX ORDER — DIAZEPAM 10 MG/2ML
2 INJECTION INTRAMUSCULAR
Status: DISCONTINUED | OUTPATIENT
Start: 2017-06-06 | End: 2017-06-07 | Stop reason: HOSPADM

## 2017-06-06 RX ADMIN — SODIUM CHLORIDE 500 ML: 0.9 INJECTION, SOLUTION INTRAVENOUS at 11:06

## 2017-06-06 RX ADMIN — IPRATROPIUM BROMIDE AND ALBUTEROL SULFATE 3 ML: .5; 3 SOLUTION RESPIRATORY (INHALATION) at 11:06

## 2017-06-07 VITALS
TEMPERATURE: 99 F | SYSTOLIC BLOOD PRESSURE: 140 MMHG | HEIGHT: 66 IN | HEART RATE: 90 BPM | RESPIRATION RATE: 16 BRPM | OXYGEN SATURATION: 97 % | DIASTOLIC BLOOD PRESSURE: 75 MMHG | WEIGHT: 200 LBS | BODY MASS INDEX: 32.14 KG/M2

## 2017-06-07 LAB
ALBUMIN SERPL BCP-MCNC: 4 G/DL
ALP SERPL-CCNC: 109 U/L
ALT SERPL W/O P-5'-P-CCNC: 36 U/L
ANION GAP SERPL CALC-SCNC: 12 MMOL/L
AST SERPL-CCNC: 28 U/L
BILIRUB SERPL-MCNC: 0.8 MG/DL
BNP SERPL-MCNC: 25 PG/ML
BUN SERPL-MCNC: 9 MG/DL
CALCIUM SERPL-MCNC: 9.2 MG/DL
CHLORIDE SERPL-SCNC: 108 MMOL/L
CO2 SERPL-SCNC: 19 MMOL/L
CREAT SERPL-MCNC: 0.8 MG/DL
EST. GFR  (AFRICAN AMERICAN): >60 ML/MIN/1.73 M^2
EST. GFR  (NON AFRICAN AMERICAN): >60 ML/MIN/1.73 M^2
GLUCOSE SERPL-MCNC: 129 MG/DL
POTASSIUM SERPL-SCNC: 3.4 MMOL/L
PROT SERPL-MCNC: 7.5 G/DL
SODIUM SERPL-SCNC: 139 MMOL/L
TROPONIN I SERPL DL<=0.01 NG/ML-MCNC: <0.006 NG/ML

## 2017-06-07 PROCEDURE — 94640 AIRWAY INHALATION TREATMENT: CPT

## 2017-06-07 PROCEDURE — 25000003 PHARM REV CODE 250: Performed by: EMERGENCY MEDICINE

## 2017-06-07 PROCEDURE — 25000242 PHARM REV CODE 250 ALT 637 W/ HCPCS: Performed by: EMERGENCY MEDICINE

## 2017-06-07 RX ORDER — ACETAMINOPHEN 325 MG/1
650 TABLET ORAL
Status: COMPLETED | OUTPATIENT
Start: 2017-06-07 | End: 2017-06-07

## 2017-06-07 RX ORDER — PREDNISONE 20 MG/1
40 TABLET ORAL DAILY
Qty: 10 TABLET | Refills: 0 | Status: SHIPPED | OUTPATIENT
Start: 2017-06-07 | End: 2017-06-12

## 2017-06-07 RX ORDER — PROMETHAZINE HYDROCHLORIDE 6.25 MG/5ML
12.5 SYRUP ORAL
Status: COMPLETED | OUTPATIENT
Start: 2017-06-07 | End: 2017-06-07

## 2017-06-07 RX ORDER — BENZONATATE 100 MG/1
100 CAPSULE ORAL 3 TIMES DAILY PRN
Qty: 20 CAPSULE | Refills: 0 | Status: SHIPPED | OUTPATIENT
Start: 2017-06-07 | End: 2017-06-17

## 2017-06-07 RX ORDER — BENZONATATE 100 MG/1
100 CAPSULE ORAL 3 TIMES DAILY PRN
Status: DISCONTINUED | OUTPATIENT
Start: 2017-06-07 | End: 2017-06-07 | Stop reason: HOSPADM

## 2017-06-07 RX ORDER — PROMETHAZINE HYDROCHLORIDE AND CODEINE PHOSPHATE 6.25; 1 MG/5ML; MG/5ML
5 SOLUTION ORAL EVERY 4 HOURS PRN
Qty: 1 BOTTLE | Refills: 0 | Status: SHIPPED | OUTPATIENT
Start: 2017-06-07 | End: 2017-06-17

## 2017-06-07 RX ORDER — IPRATROPIUM BROMIDE AND ALBUTEROL SULFATE 2.5; .5 MG/3ML; MG/3ML
3 SOLUTION RESPIRATORY (INHALATION)
Status: COMPLETED | OUTPATIENT
Start: 2017-06-07 | End: 2017-06-07

## 2017-06-07 RX ADMIN — IPRATROPIUM BROMIDE AND ALBUTEROL SULFATE 3 ML: .5; 3 SOLUTION RESPIRATORY (INHALATION) at 01:06

## 2017-06-07 RX ADMIN — BENZONATATE 100 MG: 100 CAPSULE ORAL at 01:06

## 2017-06-07 RX ADMIN — PROMETHAZINE HYDROCHLORIDE 12.5 MG: 6.25 SOLUTION ORAL at 01:06

## 2017-06-07 RX ADMIN — ACETAMINOPHEN 650 MG: 325 TABLET, FILM COATED ORAL at 01:06

## 2017-06-07 RX ADMIN — IPRATROPIUM BROMIDE AND ALBUTEROL SULFATE 3 ML: .5; 3 SOLUTION RESPIRATORY (INHALATION) at 02:06

## 2017-06-07 NOTE — ED TRIAGE NOTES
Patient c/o shortness of breath and pain intermittent pain all over chest and back. Pain with breathing

## 2017-06-07 NOTE — ED TRIAGE NOTES
Pt's SOB, chest/back pain, and dry cough ongoing for 3 days; pain is with breathing and coughing; daughter at bedside; pt AAOx4; pt 99% on room air; no acute distress noted, pt lying calmly in bed; will continue to monitor.

## 2017-06-07 NOTE — ED PROVIDER NOTES
"Encounter Date: 6/6/2017    SCRIBE #1 NOTE: I, Qi Ryan, am scribing for, and in the presence of,  Lisa Garrett MD. I have scribed the following portions of the note - Other sections scribed: HPI, ROS and PE.       History     Chief Complaint   Patient presents with    Shortness of Breath     Chest cold x 3 days; hx COPD     Review of patient's allergies indicates:   Allergen Reactions    Vioxx [rofecoxib] Rash     CC: Shortness of Breath    HPI: 56 year old female with a PMHx of asthma, coronary artery disease, depression, GERD, H. Pylori infection, hypertension, stroke, thyroid cancer, coronary angioplasty with stent replacement (x3) and a total thyroidectomy presents to the ED via EMS c/o worsening shortness of breath. Patient reports associated rhinorrhea, dry cough, chest pain, low back pain, wheezing, and intermittent subjective fever and chills. Patient states symptoms started 3 days ago, but she "could hardly breathe when she laid on her side" tonight so she came to the ED because she "didn't want to die". Patient notes appetite loss due to difficulty swallowing. Patient reports she has taken Synthroid the past 4 days, but no other medication because she is "scared to die". Patient states she took Mucinex today. Patient reports receiving an unnamed steroid and albuterol en route with EMS. Patient denies sore throat. Patient notes similar symptoms before where she "passed out becauseI couldn't breathe".     Patient does not use oxygen at home, but is chronically short of breath on minimal exertion. Patient sleeps propped up with pillows due to chest pain and difficulty breathing. Patient has Albuterol at home. Patient states at her most recent visit with Dr. Ekta Gaines, her thyroid levels were low. Cardiologist is Dr. Patterson.               The history is provided by the patient. No  was used.     Past Medical History:   Diagnosis Date    Asthma     CAD (coronary artery " disease)     stent     Cervical spine disease     Depression     Difficult intubation     POSSIBLE    GERD (gastroesophageal reflux disease)     Glaucoma     left eye    Helicobacter pylori (H. pylori) infection     History of stomach ulcers     HTN (hypertension)     Muscle weakness     LEFT    Neck problem     LIMITED ROM    Nuclear cataract 2014    Postsurgical hypothyroidism     Stroke     mini strokes    Thyroid cancer     Vitamin D deficiency disease      Past Surgical History:   Procedure Laterality Date    APPENDECTOMY      CERVICAL SPINE SURGERY      vocal cord damage     SECTION, LOW TRANSVERSE      x3    CHOLECYSTECTOMY      CORONARY ANGIOPLASTY WITH STENT PLACEMENT      x 3    PATELLA SURGERY  1969    THYROID SURGERY      total    TOTAL THYROIDECTOMY       Family History   Problem Relation Age of Onset    Diabetes Father     Kidney failure Father     Cataracts Father     Asthma Father     Glaucoma Father     Heart disease Mother      has pacemaker    Hypertension Mother     Asthma Mother     Cancer Mother      lung stage 4    Hypertension Brother     Heart disease Brother     Cervical cancer Daughter     Scoliosis Son     Hypertension Son     Hypertension Daughter     Seizures Daughter     Lupus Daughter     Cervical cancer Daughter     Mental illness Son      bipolar    Cancer Sister     Liver disease Sister     No Known Problems Brother     No Known Problems Daughter     Stomach cancer Maternal Aunt     No Known Problems Maternal Uncle     No Known Problems Paternal Aunt     No Known Problems Paternal Uncle     Stomach cancer Maternal Grandmother     No Known Problems Maternal Grandfather     No Known Problems Paternal Grandmother     No Known Problems Paternal Grandfather     Stomach cancer Maternal Aunt     Colon cancer Neg Hx     Ovarian cancer Neg Hx      Social History   Substance Use Topics    Smoking status: Never Smoker     Smokeless tobacco: Never Used      Comment: No cigarrettes, only marijuana occasionally    Alcohol use No      Comment: recovering alcoholic     Review of Systems   Constitutional: Positive for appetite change (loss), chills and fever (subjective).   HENT: Positive for rhinorrhea. Negative for sore throat.    Eyes: Negative for pain.   Respiratory: Positive for cough (dry), shortness of breath and wheezing.    Cardiovascular: Positive for chest pain.   Gastrointestinal: Negative for abdominal pain, diarrhea and vomiting.   Genitourinary: Negative for dysuria.   Musculoskeletal: Positive for back pain (low).   Skin: Negative for rash.   Neurological: Negative for numbness and headaches.       Physical Exam     Initial Vitals [06/06/17 2307]   BP Pulse Resp Temp SpO2   (!) 150/78 78 (!) 22 97.9 °F (36.6 °C) 95 %     Physical Exam    Nursing note and vitals reviewed.  Constitutional: She appears well-developed and well-nourished. She is cooperative.  Non-toxic appearance. No distress.   HENT:   Head: Normocephalic and atraumatic.   Mouth/Throat: Oropharynx is clear and moist.   Eyes: Conjunctivae and EOM are normal. Pupils are equal, round, and reactive to light.   Neck: Normal range of motion and full passive range of motion without pain. Neck supple. No thyromegaly present. No JVD present.   Cardiovascular: Normal rate, regular rhythm, normal heart sounds and normal pulses.   Pulmonary/Chest: She is in respiratory distress.   Hyperventilating, faint L lower lung wheeze   Abdominal: Soft. Normal appearance and bowel sounds are normal. She exhibits no distension and no mass. There is no tenderness.   Musculoskeletal: Normal range of motion. She exhibits no edema.   Neurological: She is alert and oriented to person, place, and time. She has normal strength. No cranial nerve deficit or sensory deficit.   Skin: Skin is warm, dry and intact. No rash noted.   Psychiatric: Judgment normal. Her mood appears anxious. Her  "speech is tangential. Thought content is paranoid.         ED Course   Procedures  Labs Reviewed   CBC W/ AUTO DIFFERENTIAL - Abnormal; Notable for the following:        Result Value    MCH 31.2 (*)     All other components within normal limits   COMPREHENSIVE METABOLIC PANEL - Abnormal; Notable for the following:     Potassium 3.4 (*)     CO2 19 (*)     Glucose 129 (*)     All other components within normal limits   B-TYPE NATRIURETIC PEPTIDE   TROPONIN I   TROPONIN I     EKG Readings: (Independently Interpreted)   Initial Reading: No STEMI.   EKG nsr , nml axis, nml intervals, q waves in II, III, V4-v6 unchanged from NOV 2015       X-Rays:   Independently Interpreted Readings:   Chest X-Ray: Normal heart size.  No infiltrates.  No acute abnormalities.     Medical Decision Making:   Initial Assessment:     Emergency evaluation a 56-year-old female presenting with shortness of breath.  Patient has a history of thyroid dz now on Synthroid, cad, anxiety, and hypertension and a cough and worsening shortness of breath for last 3 days.  Pt reports worsening exercise intolerance and upon arising from laying position became acutely worse this evening. Also endorses decreased appetite and no meds x 4 days because "she's afraid of dying". No hx of known dvt/pe. On exam pt is hyperventilating, w diminished breath sound B lung bases, no tachycardia and minimal LE edema. Ddx pna, copd/asthma exaccerbation, new onset chf, anxiety attack, w lower suspicion for PE- no risk factors, nml VS- Wells 0        Clinical Tests:   Lab Tests: Ordered and Reviewed  Radiological Study: Ordered and Reviewed  Medical Tests: Ordered and Reviewed  ED Management:  1:49 AM  Pt treated with nebulizers, and steroids w improvement in symptoms. CXR without infiltrate or overt chf and labs without elevated trop nor acute anemia or bnp elevation.   Will plan to treat pt with steroids, likely bronchitis with reactive component with additional anxiety " reaction complicating her acute exacerbation.             Scribe Attestation:   Scribe #1: I performed the above scribed service and the documentation accurately describes the services I performed. I attest to the accuracy of the note.    Attending Attestation:           Physician Attestation for Scribe:  Physician Attestation Statement for Scribe #1: I, Lisa Garrett MD, reviewed documentation, as scribed by Qi Ryan in my presence, and it is both accurate and complete.                 ED Course     Clinical Impression:   The primary encounter diagnosis was Bronchitis, acute, with bronchospasm. Diagnoses of Cough and Anxiety were also pertinent to this visit.    Disposition:   Disposition: Discharged  Condition: Stable       Lisa Garrett MD  06/07/17 0155       Lisa Garrett MD  06/07/17 0158

## 2017-06-08 ENCOUNTER — OFFICE VISIT (OUTPATIENT)
Dept: FAMILY MEDICINE | Facility: CLINIC | Age: 57
End: 2017-06-08
Payer: MEDICAID

## 2017-06-08 VITALS
BODY MASS INDEX: 41.21 KG/M2 | RESPIRATION RATE: 16 BRPM | OXYGEN SATURATION: 98 % | HEART RATE: 74 BPM | WEIGHT: 241.38 LBS | DIASTOLIC BLOOD PRESSURE: 100 MMHG | HEIGHT: 64 IN | TEMPERATURE: 98 F | SYSTOLIC BLOOD PRESSURE: 140 MMHG

## 2017-06-08 DIAGNOSIS — J20.9 BRONCHITIS, ACUTE, WITH BRONCHOSPASM: Primary | ICD-10-CM

## 2017-06-08 DIAGNOSIS — F41.9 ANXIETY: ICD-10-CM

## 2017-06-08 DIAGNOSIS — C73 THYROID CANCER: ICD-10-CM

## 2017-06-08 DIAGNOSIS — E89.0 POSTSURGICAL HYPOTHYROIDISM: ICD-10-CM

## 2017-06-08 DIAGNOSIS — R10.13 EPIGASTRIC PAIN: ICD-10-CM

## 2017-06-08 PROCEDURE — 99999 PR PBB SHADOW E&M-EST. PATIENT-LVL IV: CPT | Mod: PBBFAC,,, | Performed by: NURSE PRACTITIONER

## 2017-06-08 PROCEDURE — 99214 OFFICE O/P EST MOD 30 MIN: CPT | Mod: PBBFAC,PN | Performed by: NURSE PRACTITIONER

## 2017-06-08 PROCEDURE — 99214 OFFICE O/P EST MOD 30 MIN: CPT | Mod: S$PBB,,, | Performed by: NURSE PRACTITIONER

## 2017-06-08 RX ORDER — DIAZEPAM 10 MG/1
10 TABLET ORAL 2 TIMES DAILY
Refills: 0 | COMMUNITY
Start: 2017-06-03 | End: 2018-07-03 | Stop reason: ALTCHOICE

## 2017-06-08 RX ORDER — SERTRALINE HYDROCHLORIDE 100 MG/1
200 TABLET, FILM COATED ORAL EVERY MORNING
Refills: 0 | COMMUNITY
Start: 2017-05-19 | End: 2018-02-21

## 2017-06-08 RX ORDER — BREXPIPRAZOLE 0.5 MG/1
0.5 TABLET ORAL DAILY
Refills: 0 | COMMUNITY
Start: 2017-05-22 | End: 2018-02-21

## 2017-06-08 RX ORDER — FLUTICASONE PROPIONATE 50 MCG
2 SPRAY, SUSPENSION (ML) NASAL DAILY
Qty: 1 BOTTLE | Refills: 2 | Status: SHIPPED | OUTPATIENT
Start: 2017-06-08 | End: 2017-07-08

## 2017-06-08 NOTE — PATIENT INSTRUCTIONS
Follow up if not improved  Go to ER for new worse or concerning symptoms  Drink plenty of fluids  Tylenol as needed for fever or pain  Delsym over the counter as needed    What Is Acute Bronchitis?  Acute or short-term bronchitis last for days or weeks. It occurs when the bronchial tubes (airways in the lungs) are irritated by a virus, bacteria, or allergen. This causes a cough that produces yellow or greenish mucus.  Inside healthy lungs    Air travels in and out of the lungs through the airways. The linings of these airways produce sticky mucus. This mucus traps particles that enter the lungs. Tiny structures called cilia then sweep the particles out of the airways.     Healthy airway: Airways are normally open. Air moves in and out easily.      Healthy cilia: Tiny, hairlike cilia sweep mucus and particles up and out of the airways.   Lungs with bronchitis  Bronchitis often occurs with a cold or the flu virus. The airways become inflamed (red and swollen). There is a deep hacking cough from the extra mucus. Other symptoms may include:  · Wheezing  · Chest discomfort  · Shortness of breath  · Mild fever  A second infection, this time due to bacteria, may then occur. And airways irritated by allergens or smoke are more likely to get infected.        Inflamed airway: Inflammation and extra mucus narrow the airway, causing shortness of breath.      Impaired cilia: Extra mucus impairs cilia, causing congestion and wheezing. Smoking makes the problem worse.   Making a diagnosis  A physical exam, health history, and certain tests help your healthcare provider make the diagnosis.  Health history  Your healthcare provider will ask you about your symptoms.  The exam  Your provider listens to your chest for signs of congestion. He or she may also check your ears, nose, and throat.  Possible tests  · A sputum test for bacteria. This requires a sample of mucus from the lungs.  · A nasal or throat swab for bacterial  infection.  · A chest X-ray if your healthcare provider thinks you have pneumonia.  · Tests to check for an underlying condition, such as allergies, asthma, or COPD. You may need to see a specialist for more lung function testing.  Treating a cough  The main treatment for bronchitis is easing symptoms. Avoiding smoke, allergens, and other things that trigger coughing can often help. If the infection is bacterial, you may be given antibiotics. During the illness, it's important to get plenty of sleep. To ease symptoms:  · Dont smoke, and avoid secondhand smoke.  · Use a humidifier, or breathe in steam from a hot shower. This may help loosen mucus.  · Drink a lot of water and juice. They can soothe the throat and may help thin mucus.  · Sit up or use extra pillows when in bed to help lessen coughing and congestion.  · Ask your provider about using cough medicine, pain and fever medicine, or a decongestant.  Antibiotics  Most cases of bronchitis are caused by cold or flu viruses. Antibiotics dont treat viral illness. Taking antibiotics when they are not needed increases your risk of getting an infection later that is antibiotic-resistant. Your provider will prescribe antibiotics if the infection is caused by bacteria. If they are prescribed:  · Take the medicine until it is used up, even if symptoms have improved. If you dont, the bronchitis may come back.  · Take them as directed. For instance, some medicines should be taken with food.  · Ask your provider or pharmacist what side effects are common, and what to do about them.  Follow-up care  You should see your provider again in 2 to 3 weeks. By this time, symptoms should have improved. An infection that lasts longer may mean you have a more serious problem.  Prevention  · Avoid tobacco smoke. If you smoke, quit. Stay away from smoky places. Ask friends and family not to smoke around you, or in your home or car.  · Get checked for allergies.  · Ask your provider  about getting a yearly flu shot, and pneumococcal or pneumonia shots.  · Wash your hands often. This helps reduce the chance of picking up viruses that cause colds and flu.  Call your healthcare provider if:  · Symptoms worsen, or new symptoms develop.  · Breathing problems worsen or  become severe.  · Symptoms dont get better within a week, or within 3 days of taking antibiotics.   Date Last Reviewed: 6/18/2014  © 2879-7589 [employer]. [address] [place] [address], [place] [address]. All rights reserved. This information is not intended as a substitute for professional medical care. Always follow your healthcare professional's instructions.
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
## 2017-06-16 ENCOUNTER — HOSPITAL ENCOUNTER (OUTPATIENT)
Dept: RADIOLOGY | Facility: HOSPITAL | Age: 57
Discharge: HOME | End: 2017-06-16
Attending: NURSE PRACTITIONER
Payer: MEDICAID

## 2017-06-16 DIAGNOSIS — C73 THYROID CANCER: ICD-10-CM

## 2017-06-16 DIAGNOSIS — E89.0 POSTSURGICAL HYPOTHYROIDISM: ICD-10-CM

## 2017-06-16 PROCEDURE — 74177 CT ABD & PELVIS W/CONTRAST: CPT | Mod: 26,,, | Performed by: RADIOLOGY

## 2017-06-16 PROCEDURE — 74177 CT ABD & PELVIS W/CONTRAST: CPT | Mod: TC

## 2017-06-16 PROCEDURE — 25500020 PHARM REV CODE 255: Performed by: NURSE PRACTITIONER

## 2017-06-16 RX ADMIN — IOHEXOL 15 ML: 300 INJECTION, SOLUTION INTRAVENOUS at 09:06

## 2017-06-16 RX ADMIN — IOHEXOL 100 ML: 350 INJECTION, SOLUTION INTRAVENOUS at 09:06

## 2017-06-19 ENCOUNTER — TELEPHONE (OUTPATIENT)
Dept: FAMILY MEDICINE | Facility: CLINIC | Age: 57
End: 2017-06-19

## 2017-06-19 DIAGNOSIS — K76.89 LIVER CYST: Primary | ICD-10-CM

## 2017-06-19 RX ORDER — LISINOPRIL 10 MG/1
TABLET ORAL
Qty: 30 TABLET | Refills: 6 | Status: SHIPPED | OUTPATIENT
Start: 2017-06-19 | End: 2017-10-25

## 2017-06-19 NOTE — TELEPHONE ENCOUNTER
It looks like she has a cyst in her liver, it may or may not be anything, but I'm referring her to GI.  She also has a kidney stone, but it looks like she's been having this.

## 2017-06-20 DIAGNOSIS — E89.0 POSTSURGICAL HYPOTHYROIDISM: ICD-10-CM

## 2017-06-20 RX ORDER — LEVOTHYROXINE SODIUM 100 UG/1
100 TABLET ORAL DAILY
Qty: 30 TABLET | Refills: 0 | Status: SHIPPED | OUTPATIENT
Start: 2017-06-20 | End: 2017-08-24 | Stop reason: SDUPTHER

## 2017-06-20 RX ORDER — ERGOCALCIFEROL 1.25 MG/1
CAPSULE ORAL
Qty: 12 CAPSULE | Refills: 0 | Status: SHIPPED | OUTPATIENT
Start: 2017-06-20 | End: 2017-09-09 | Stop reason: SDUPTHER

## 2017-06-20 NOTE — TELEPHONE ENCOUNTER
Component      Latest Ref Rng & Units 4/19/2017   TSH      0.400 - 4.000 uIU/mL 0.021 (L)     LOV 6-8-17

## 2017-06-20 NOTE — TELEPHONE ENCOUNTER
Left message to voicemail [phone] to return call to clinic re: RESULTS    No answer at [phone]

## 2017-06-21 ENCOUNTER — TELEPHONE (OUTPATIENT)
Dept: TRANSPLANT | Facility: CLINIC | Age: 57
End: 2017-06-21

## 2017-06-21 ENCOUNTER — TELEPHONE (OUTPATIENT)
Dept: FAMILY MEDICINE | Facility: CLINIC | Age: 57
End: 2017-06-21

## 2017-06-21 ENCOUNTER — DOCUMENTATION ONLY (OUTPATIENT)
Dept: TRANSPLANT | Facility: CLINIC | Age: 57
End: 2017-06-21

## 2017-06-21 DIAGNOSIS — K76.89 LIVER CYST: Primary | ICD-10-CM

## 2017-06-21 NOTE — TELEPHONE ENCOUNTER
----- Message from Jennifer Ledesma sent at 6/21/2017 11:33 AM CDT -----  Contact: pt  Calling to schedule appt please call

## 2017-06-21 NOTE — NURSING
Pt records reviewed.   Pt will be referred to Hepatology.    Initial referral received  from the [facility]que.   Referring Provider/diagnosis  Ekta Gaines NP-C  9mm hepatic cyst    Referral letter sent to provider and patient.

## 2017-06-21 NOTE — TELEPHONE ENCOUNTER
Spoke with patient. She will call [facility] to schedule with Hepatology. If they do not accept her insurance.

## 2017-06-21 NOTE — LETTER
June 21, 2017    Ekta Gaines, NP-C  [address] [facility] [address]      Dear Dr. Gaines    Patient: Paras Smith   MR Number: [id number]   YOB: 1960     Thank you for the referral of Paras Smith to the [facility]. An initial appointment will be scheduled for your patient with one of our Hepatologists.      Thank you again for your trust in our program.  If there is anything we can do for you or your staff, please feel free to contact us.        Sincerely,        [facility]  [address] [place] [id number]  [phone]

## 2017-06-21 NOTE — LETTER
June 21, 2017    Paras Smith  [address] [facility] [address]      Dear Paras Smith:    Your doctor has referred you to the [facility]. You will be contacted by our office and an initial appointment will then be scheduled for you.    We look forward to seeing you soon. If you have any further questions, please contact us at [phone].       Sincerely,        [facility]   [address] [place] [address]  [phone]

## 2017-06-21 NOTE — TELEPHONE ENCOUNTER
Spoke with patient and advised of results and recommendations below     Advised that she will be contacted re: appointment with Gastro

## 2017-07-25 ENCOUNTER — OFFICE VISIT (OUTPATIENT)
Dept: HEPATOLOGY | Facility: CLINIC | Age: 57
End: 2017-07-25
Payer: MEDICAID

## 2017-07-25 ENCOUNTER — TELEPHONE (OUTPATIENT)
Dept: HEPATOLOGY | Facility: CLINIC | Age: 57
End: 2017-07-25

## 2017-07-25 ENCOUNTER — LAB VISIT (OUTPATIENT)
Dept: LAB | Facility: HOSPITAL | Age: 57
End: 2017-07-25
Payer: MEDICAID

## 2017-07-25 ENCOUNTER — PROCEDURE VISIT (OUTPATIENT)
Dept: HEPATOLOGY | Facility: CLINIC | Age: 57
End: 2017-07-25
Attending: NURSE PRACTITIONER
Payer: MEDICAID

## 2017-07-25 VITALS
TEMPERATURE: 96 F | WEIGHT: 254.44 LBS | RESPIRATION RATE: 18 BRPM | BODY MASS INDEX: 43.44 KG/M2 | SYSTOLIC BLOOD PRESSURE: 124 MMHG | DIASTOLIC BLOOD PRESSURE: 76 MMHG | HEART RATE: 64 BPM | OXYGEN SATURATION: 97 % | HEIGHT: 64 IN

## 2017-07-25 DIAGNOSIS — K76.0 NAFLD (NONALCOHOLIC FATTY LIVER DISEASE): Primary | ICD-10-CM

## 2017-07-25 DIAGNOSIS — K76.89 LIVER CYST: ICD-10-CM

## 2017-07-25 DIAGNOSIS — K76.0 NAFLD (NONALCOHOLIC FATTY LIVER DISEASE): ICD-10-CM

## 2017-07-25 DIAGNOSIS — E66.01 MORBID OBESITY DUE TO EXCESS CALORIES: ICD-10-CM

## 2017-07-25 LAB
CERULOPLASMIN SERPL-MCNC: 27 MG/DL
FERRITIN SERPL-MCNC: 110 NG/ML
HBV CORE AB SERPL QL IA: NEGATIVE
HBV SURFACE AB SER-ACNC: NEGATIVE M[IU]/ML
HBV SURFACE AG SERPL QL IA: NEGATIVE
HEPATITIS A ANTIBODY, IGG: POSITIVE
IGG SERPL-MCNC: 1026 MG/DL
IRON SERPL-MCNC: 77 UG/DL
SATURATED IRON: 16 %
TOTAL IRON BINDING CAPACITY: 468 UG/DL
TRANSFERRIN SERPL-MCNC: 316 MG/DL

## 2017-07-25 PROCEDURE — 91200 LIVER ELASTOGRAPHY: CPT | Mod: PBBFAC | Performed by: NURSE PRACTITIONER

## 2017-07-25 PROCEDURE — 91200 LIVER ELASTOGRAPHY: CPT | Mod: 26,S$PBB,, | Performed by: NURSE PRACTITIONER

## 2017-07-25 PROCEDURE — 99214 OFFICE O/P EST MOD 30 MIN: CPT | Mod: S$PBB,,, | Performed by: NURSE PRACTITIONER

## 2017-07-25 PROCEDURE — 99999 PR PBB SHADOW E&M-EST. PATIENT-LVL V: CPT | Mod: PBBFAC,,, | Performed by: NURSE PRACTITIONER

## 2017-07-25 RX ORDER — PROPRANOLOL HYDROCHLORIDE 60 MG/1
60 TABLET ORAL DAILY
COMMUNITY
Start: 2017-07-17 | End: 2018-02-21

## 2017-07-25 RX ORDER — FLUTICASONE PROPIONATE 50 UG/1
SPRAY, METERED NASAL
COMMUNITY
Start: 2017-07-21 | End: 2018-05-15 | Stop reason: SDUPTHER

## 2017-07-25 NOTE — PROGRESS NOTES
"HEPATOLOGY CONSULTATION    Referring Physician: KARLIE VillaltaC  Current Corresponding Physician: KARLIE VillaltaC    Reason for Consultation: Consultation for evaluation of liver cyst    History of Present Illness: Paras Smith is a 56 y.o. femalewho presents for evaluation of   Chief Complaint   Patient presents with    liver cyst   Ms. Smith is a 57 yo female incidentally found to have a 9 mm cyst on imaging performed for c/o chronic RUQ pain. She was also found to have hepatic steatosis. She describes the pain is intermittent x 2+ years.  EGD in 10/2016 showed PUD.  She has additional PMH PUD, Hpylori, HTN, CAD, thyroid ca s/p thyroidectomy and surgical hypothyroidism, morbid obesity.     Denies previously known liver disease or abnormal serologies  Denies symptoms of decompensation  Denies IVDU, intranasal drug use, tattoos,   + blood transfusions 80s  Alcohol use, self described "alcoholic in the 80s", completed rehab, none now  Unknown family hx of liver disease    Review of available records reveal:  Intermittently mild elevation in transaminases    CT 6/16/17: There is a 9 mm cyst within the right lobe of the liver adjacent to the gallbladder fossa. The liver appears diffusely decreased in density suggestive of fatty changes of the liver. The gallbladder is surgically absent. There is no evidence for intrahepatic biliary dilatation. The spleen, stomach, and pancreas all appear grossly unremarkable. The adrenal glands are not enlarged    CT 6/2014: The liver enhances homogenously without evidence of focal enhancing lesions.  The gallbladder, adrenals, spleen, and pancreas are unremarkable. Small and large bowel is normal in appearance. The appendix is unremarkable. There is no free air or free   fluid.  The bladder and uterus are unremarkable    Other noted hx: HTN, CAD, thyroid cancer s/p surgical hypothyroidism, cataract, glaucoma, vit D difficiency, anxiety,    Past Medical History: "   Diagnosis Date    Asthma     CAD (coronary artery disease)     stent 2003    Cervical spine disease     Depression     Difficult intubation     POSSIBLE    GERD (gastroesophageal reflux disease)     Glaucoma     left eye    Helicobacter pylori (H. pylori) infection     History of stomach ulcers     HTN (hypertension)     Muscle weakness     LEFT    Neck problem     LIMITED ROM    Nuclear cataract 5/28/2014    Postsurgical hypothyroidism     Stroke     mini strokes    Thyroid cancer     Vitamin D deficiency disease      Outpatient Encounter Prescriptions as of 7/25/2017   Medication Sig Dispense Refill    albuterol (PROAIR HFA) 90 mcg/actuation inhaler Inhale 2 puffs into the lungs every 6 (six) hours as needed. 8.5 g 0    anthralin 1.2 % CmRR   0    diazePAM (VALIUM) 10 MG Tab Take 10 mg by mouth 2 (two) times daily.  0    dicyclomine (BENTYL) 20 mg tablet Take 1 tablet (20 mg total) by mouth 2 (two) times daily. 60 tablet 0    doxepin (SINEQUAN) 25 MG capsule Take 25 mg by mouth every evening.  0    ergocalciferol (ERGOCALCIFEROL) 50,000 unit Cap take 1 capsule by mouth EVERY 7 DAYS 12 capsule 0    FLONASE ALLERGY RELIEF 50 mcg/actuation nasal spray       gabapentin (NEURONTIN) 300 MG capsule Take 600 mg by mouth 3 (three) times daily.   0    hydrOXYzine pamoate (VISTARIL) 50 MG Cap Take 1 capsule (50 mg total) by mouth every 6 (six) hours as needed (anxiety). 30 capsule 1    levothyroxine (SYNTHROID) 100 MCG tablet Take 1 tablet (100 mcg total) by mouth once daily. 30 tablet 0    lidocaine-prilocaine (EMLA) cream   0    lisinopril 10 MG tablet take 1 tablet by mouth once daily 30 tablet 6    LMX 4 4 % cream   0    naproxen (NAPROSYN) 375 MG tablet take 1 tablet by mouth three times a day with food if needed  0    nitroGLYCERIN 0.4 MG/HR TD PT24 (NITRODUR) 0.4 mg/hr apply 1 patch once daily to SKIN 30 patch 11    oxycodone (ROXICODONE) 30 MG Tab Take 30 mg by mouth every 6 (six)  hours as needed.       promethazine (PHENERGAN) 25 MG tablet take 1 tablet by mouth every 8 hours if needed for nausea and vomiting  0    propranolol (INDERAL) 60 MG tablet Take 60 mg by mouth once daily.      REXULTI 0.5 mg Tab Take 0.5 mg by mouth once daily at 6am.  0    risperidone (RISPERDAL) 2 MG tablet Take 2 mg by mouth every evening.  0    sertraline (ZOLOFT) 100 MG tablet Take 200 mg by mouth every morning.  0    sumatriptan (IMITREX) 100 MG tablet take 1 tablet by mouth immediately may repeat after 2 hours if needed  0    topiramate (TOPAMAX) 100 MG tablet Take 100 mg by mouth 2 (two) times daily.  0    omeprazole magnesium 20 mg CpDR Take 1 tablet by mouth 2 (two) times daily. 60 capsule 3     No facility-administered encounter medications on file as of 7/25/2017.      Review of patient's allergies indicates:   Allergen Reactions    Vioxx [rofecoxib] Rash     Family History   Problem Relation Age of Onset    Diabetes Father     Kidney failure Father     Cataracts Father     Asthma Father     Glaucoma Father     Heart disease Mother      has pacemaker    Hypertension Mother     Asthma Mother     Cancer Mother      lung stage 4    Hypertension Brother     Heart disease Brother     Cervical cancer Daughter     Scoliosis Son     Hypertension Son     Hypertension Daughter     Seizures Daughter     Lupus Daughter     Cervical cancer Daughter     Mental illness Son      bipolar    Cancer Sister     Liver disease Sister     No Known Problems Brother     No Known Problems Daughter     Stomach cancer Maternal Aunt     No Known Problems Maternal Uncle     No Known Problems Paternal Aunt     No Known Problems Paternal Uncle     Stomach cancer Maternal Grandmother     No Known Problems Maternal Grandfather     No Known Problems Paternal Grandmother     No Known Problems Paternal Grandfather     Stomach cancer Maternal Aunt     Colon cancer Neg Hx     Ovarian cancer Neg Hx         Social History     Social History    Marital status: Legally      Spouse name: N/A    Number of children: N/A    Years of education: N/A     Occupational History    Not on file.     Social History Main Topics    Smoking status: Never Smoker    Smokeless tobacco: Never Used      Comment: No cigarrettes, only marijuana occasionally    Alcohol use No      Comment: recovering alcoholic    Drug use:      Frequency: 6.0 times per week     Types: Marijuana      Comment: ocasional    Sexual activity: Not Currently     Other Topics Concern    Not on file     Social History Narrative    No narrative on file     Review of Systems   Constitutional: Negative for activity change, appetite change, chills, diaphoresis, fatigue, fever and unexpected weight change.   HENT: Negative for facial swelling and nosebleeds.    Respiratory: Negative for cough, chest tightness and shortness of breath.    Cardiovascular: Negative for chest pain, palpitations and leg swelling.   Gastrointestinal: Negative for abdominal distention, abdominal pain, blood in stool, constipation, diarrhea, nausea and vomiting.   Musculoskeletal: Negative for neck pain and neck stiffness.   Skin: Negative for color change, pallor and rash.   Neurological: Negative for dizziness, tremors, syncope, weakness, light-headedness and headaches.   Hematological: Negative for adenopathy. Does not bruise/bleed easily.   Psychiatric/Behavioral: Negative for agitation, behavioral problems, confusion and decreased concentration.     Vitals:    07/25/17 0827   BP: 124/76   Pulse: 64   Resp: 18   Temp: 96.2 °F (35.7 °C)       Physical Exam   Constitutional: She is oriented to person, place, and time. She appears well-developed and well-nourished. No distress.   HENT:   Head: Normocephalic and atraumatic.   Eyes: Conjunctivae are normal. Pupils are equal, round, and reactive to light. No scleral icterus.   Neck: Normal range of motion. Neck supple.    Cardiovascular: Normal rate.    Pulmonary/Chest: Effort normal.   Abdominal: Soft. She exhibits no distension and no mass. There is no tenderness. There is no rebound and no guarding.   Musculoskeletal: Normal range of motion.   Neurological: She is alert and oriented to person, place, and time. No cranial nerve deficit. She exhibits normal muscle tone. Coordination normal.   No asterixis   Skin: Skin is warm and dry. No rash noted. She is not diaphoretic. No erythema. No pallor.   Psychiatric: She has a normal mood and affect. Her behavior is normal. Judgment and thought content normal.       Computed MELD-Na score unavailable. Necessary lab results were not found in the last year.  Computed MELD score unavailable. Necessary lab results were not found in the last year.    Lab Results   Component Value Date     (H) 06/06/2017    BUN 9 06/06/2017    CREATININE 0.8 06/06/2017    CALCIUM 9.2 06/06/2017     06/06/2017    K 3.4 (L) 06/06/2017     06/06/2017    PROT 7.5 06/06/2017    CO2 19 (L) 06/06/2017    ANIONGAP 12 06/06/2017    WBC 7.58 06/06/2017    RBC 4.65 06/06/2017    HGB 14.5 06/06/2017    HCT 42.1 06/06/2017    MCV 91 06/06/2017    MCH 31.2 (H) 06/06/2017    MCHC 34.4 06/06/2017     Lab Results   Component Value Date    RDW 13.0 06/06/2017     06/06/2017    MPV 11.8 06/06/2017    GRAN 5.1 06/06/2017    GRAN 67.1 06/06/2017    LYMPH 1.7 06/06/2017    LYMPH 22.2 06/06/2017    MONO 0.7 06/06/2017    MONO 9.0 06/06/2017    EOSINOPHIL 1.2 06/06/2017    BASOPHIL 0.4 06/06/2017    EOS 0.1 06/06/2017    BASO 0.03 06/06/2017    APTT 23.1 03/10/2014    BNP 25 06/06/2017    CHOL 208 (H) 04/19/2017    TRIG 112 04/19/2017    HDL 59 04/19/2017    CHOLHDL 28.4 04/19/2017    TOTALCHOLEST 3.5 04/19/2017    ALBUMIN 4.0 06/06/2017    AST 28 06/06/2017    ALT 36 06/06/2017    ALKPHOS 109 06/06/2017    LABPROT 9.8 06/10/2014    INR 0.9 06/10/2014       Assessment and Plan:  Patient seen in  collaboration with Dr. Wise  Liver cysts: benign, will repeat imaging with US in 6 months to verify stability  NAFLD: w/o clinical SS of advanced liver disease  -discussed diet and exercise for the goal of weight loss  -liver w/u serologies to r/o any additional underlying liver disease  -fibroscan today  -recommend Hep A/B vaccine, pending labs    Total duration of visit = 40 min, with > 50% spent counseling  RTC 4-6 weeks    Thank you for allowing me to participate in this patient's care.   Patient Active Problem List   Diagnosis    HTN (hypertension)    Postsurgical hypothyroidism    Thyroid cancer    CAD (coronary artery disease)    Chest pain, cardiac    Depression    Paronychia of fifth toe of left foot    Wound of toenail    Chest pain, atypical    Cervicalgia    Weakness of neck    Weakness of both arms    Stiffness of neck    Glaucoma suspect    Nuclear cataract    Dry eye syndrome    Vitamin D deficiency disease    Helicobacter pylori (H. pylori) infection    Dyspnea    Cholecystitis    Morbid obesity due to excess calories    Bronchitis, acute, with bronchospasm    Anxiety    NAFLD (nonalcoholic fatty liver disease)    Liver cyst     Paras Smith is a 56 y.o. female withliver cyst

## 2017-07-25 NOTE — LETTER
July 25, 2017      Ekta Gaines, NP-C  [address]  [place] [address]           [facility]  [address]  [place] [address]  Phone: [phone]  Fax: [phone]          Patient: Paras Smith   MR Number: [id number]   YOB: 1960   Date of Visit: 7/25/2017       Dear Ekta Gaines:    Thank you for referring aPras Smith to me for evaluation. Attached you will find relevant portions of my assessment and plan of care.    If you have questions, please do not hesitate to call me. I look forward to following Paras Smith along with you.    Sincerely,    Divine Crandall NP    Enclosure  CC:  No Recipients    If you would like to receive this communication electronically, please contact [email or web address] or [phone] to request more information on [employer] access.    For providers and/or their staff who would like to refer a patient to [facility], please contact us through our one-stop-shop provider referral line, [facility] , at [phone].    If you feel you have received this communication in error or would no longer like to receive these types of communications, please e-mail [email or web address]

## 2017-07-25 NOTE — PROCEDURES
Procedures   Fibroscan Procedure     Name: Paras Smith  Date of Procedure : 2017   :: Divine Crandall NP  Diagnosis: NAFLD    Probe: XL    Fibroscan readin.0 KPa    Fibrosis:F 0-1     CAP readin dB/m    Steatosis: :S2

## 2017-07-25 NOTE — TELEPHONE ENCOUNTER
----- Message from Divine Crandall NP sent at 7/25/2017 12:27 PM CDT -----  Notify that fibroscan was normal, no cirrhosis      thx

## 2017-07-25 NOTE — PROGRESS NOTES
I have personally performed a face to face diagnostic evaluation on this patient. I have reviewed and agree with today's findings and the care plan outlined by Divine Crandall NP      My findings are as follows:  Patient presents with an incidental hepatic cyst, measuring 9 mm. Also has fatty liver. Occasionally elevated liver enzymes, mild. Recommend serologic w/u for elevated liver tests and fibroscan. Cyst is nothing to worry about. She does have sever RUQ pain.      she will return to Divine Crandall NP  for follow-up.

## 2017-07-26 ENCOUNTER — OFFICE VISIT (OUTPATIENT)
Dept: GASTROENTEROLOGY | Facility: CLINIC | Age: 57
End: 2017-07-26
Payer: MEDICAID

## 2017-07-26 VITALS
HEART RATE: 66 BPM | BODY MASS INDEX: 44.92 KG/M2 | SYSTOLIC BLOOD PRESSURE: 115 MMHG | DIASTOLIC BLOOD PRESSURE: 77 MMHG | WEIGHT: 253.5 LBS | HEIGHT: 63 IN

## 2017-07-26 DIAGNOSIS — R19.5 DARK STOOLS: ICD-10-CM

## 2017-07-26 DIAGNOSIS — Z79.1 NSAID LONG-TERM USE: ICD-10-CM

## 2017-07-26 DIAGNOSIS — R10.9 ABDOMINAL PAIN, UNSPECIFIED LOCATION: ICD-10-CM

## 2017-07-26 DIAGNOSIS — Z12.11 SCREENING FOR COLORECTAL CANCER: ICD-10-CM

## 2017-07-26 DIAGNOSIS — R13.10 DYSPHAGIA, UNSPECIFIED TYPE: Primary | ICD-10-CM

## 2017-07-26 DIAGNOSIS — Z12.12 SCREENING FOR COLORECTAL CANCER: ICD-10-CM

## 2017-07-26 LAB
ANA SER QL IF: NORMAL
SMOOTH MUSCLE AB TITR SER IF: NORMAL {TITER}

## 2017-07-26 PROCEDURE — 99214 OFFICE O/P EST MOD 30 MIN: CPT | Mod: S$PBB,,, | Performed by: INTERNAL MEDICINE

## 2017-07-26 PROCEDURE — 99215 OFFICE O/P EST HI 40 MIN: CPT | Mod: PBBFAC | Performed by: INTERNAL MEDICINE

## 2017-07-26 PROCEDURE — 99999 PR PBB SHADOW E&M-EST. PATIENT-LVL V: CPT | Mod: PBBFAC,,, | Performed by: INTERNAL MEDICINE

## 2017-07-26 NOTE — PROGRESS NOTES
GASTROENTEROLOGY CLINIC NOTE    Reason for visit: The primary encounter diagnosis was Dysphagia, unspecified type. Diagnoses of Abdominal pain, unspecified location, Screening for colorectal cancer, NSAID long-term use, and Dark stools were also pertinent to this visit.  Referring provider/PCP: Marleen Steele MD    HPI:  Paras Smith is a 56 y.o. female. She is here for evaluation of multiple complaints. She has had EGD in 2016 with ulcers. No report available for review. She was also H.pylori positive and treated with abx (no documentation of eradication in our records). She had colonoscopy in 2010, fair prep, diverticulosis, no polyp.     Reflux: Denies, she reports not taking Omeprazole.   Dysphagia: Yes, worsening to over months, mostly solids. She has prior history of thyroid cancer and radiation to neck. Reports avoiding meat from the fear of stuck in her throat. Denies odynophagia.   Bowel habits - she denies having any change in her bowel habits. But reports dark stools over the weeks. No BRBPR.   NSAIDs: She takes Naproxen three times at least 4 times weekly.   Anticoagulation: No  Anti-platelet therapy: No  Abdominal pain - she reports chronic generalized abdominal pain but has recently worsening epigastric pain, no radiation, moderate in intensity, usually worse with eating. No associated nausea or vomiting.    Prior GI studies:  EGD: no prior EGD in our records.   Colonoscopy 2010.    Review of Systems:  Constitutional: no fever, chills.   Eyes: no visual changes    ENT: no sore throat or runny nose.   Respiratory: no cough or shortness of breath    Cardiovascular: no chest pain or palpitations    Gastrointestinal: as per HPI  Hematologic/Lymphatic: No petechia  Musculoskeletal: no arthralgias or myalgias    Neurological: no seizures, tremors or change in mental status  Behavioral/Psych: No suicidal ideation    Past Medical History: has a past medical history of Asthma; CAD (coronary artery  disease); Cervical spine disease; Depression; Difficult intubation; GERD (gastroesophageal reflux disease); Glaucoma; Helicobacter pylori (H. pylori) infection; History of stomach ulcers; HTN (hypertension); Muscle weakness; Neck problem; Nuclear cataract; Postsurgical hypothyroidism; Stroke; Thyroid cancer; and Vitamin D deficiency disease.    Past Surgical History: has a past surgical history that includes Total thyroidectomy; Coronary angioplasty with stent; Cervical spine surgery; Thyroid surgery; Appendectomy; Patella surgery (); Cholecystectomy; and  section, low transverse.    Family History:family history includes Asthma in her father and mother; Cancer in her mother and sister; Cataracts in her father; Cervical cancer in her daughter and daughter; Colon cancer in her maternal aunt; Diabetes in her father; Glaucoma in her father; Heart disease in her brother and mother; Hypertension in her brother, daughter, mother, and son; Kidney failure in her father; Liver disease in her sister; Lupus in her daughter; Mental illness in her son; No Known Problems in her brother, daughter, maternal grandfather, maternal uncle, paternal aunt, paternal grandfather, paternal grandmother, and paternal uncle; Scoliosis in her son; Seizures in her daughter; Stomach cancer in her maternal aunt, maternal aunt, and maternal grandmother.    Allergies: Reviewed in EPIC.     Social History: No smoking or alcohol. Lives with daughter.     Home medications:   Current Outpatient Prescriptions on File Prior to Visit   Medication Sig Dispense Refill    albuterol (PROAIR HFA) 90 mcg/actuation inhaler Inhale 2 puffs into the lungs every 6 (six) hours as needed. 8.5 g 0    anthralin 1.2 % CmRR   0    diazePAM (VALIUM) 10 MG Tab Take 10 mg by mouth 2 (two) times daily.  0    dicyclomine (BENTYL) 20 mg tablet Take 1 tablet (20 mg total) by mouth 2 (two) times daily. 60 tablet 0    doxepin (SINEQUAN) 25 MG capsule Take 25 mg by  "mouth every evening.  0    ergocalciferol (ERGOCALCIFEROL) 50,000 unit Cap take 1 capsule by mouth EVERY 7 DAYS 12 capsule 0    FLONASE ALLERGY RELIEF 50 mcg/actuation nasal spray       gabapentin (NEURONTIN) 300 MG capsule Take 600 mg by mouth 3 (three) times daily.   0    hydrOXYzine pamoate (VISTARIL) 50 MG Cap Take 1 capsule (50 mg total) by mouth every 6 (six) hours as needed (anxiety). 30 capsule 1    levothyroxine (SYNTHROID) 100 MCG tablet Take 1 tablet (100 mcg total) by mouth once daily. 30 tablet 0    lidocaine-prilocaine (EMLA) cream   0    lisinopril 10 MG tablet take 1 tablet by mouth once daily 30 tablet 6    LMX 4 4 % cream   0    naproxen (NAPROSYN) 375 MG tablet take 1 tablet by mouth three times a day with food if needed  0    nitroGLYCERIN 0.4 MG/HR TD PT24 (NITRODUR) 0.4 mg/hr apply 1 patch once daily to SKIN 30 patch 11    oxycodone (ROXICODONE) 30 MG Tab Take 30 mg by mouth every 6 (six) hours as needed.       promethazine (PHENERGAN) 25 MG tablet take 1 tablet by mouth every 8 hours if needed for nausea and vomiting  0    propranolol (INDERAL) 60 MG tablet Take 60 mg by mouth once daily.      REXULTI 0.5 mg Tab Take 0.5 mg by mouth once daily at 6am.  0    risperidone (RISPERDAL) 2 MG tablet Take 2 mg by mouth every evening.  0    sertraline (ZOLOFT) 100 MG tablet Take 200 mg by mouth every morning.  0    sumatriptan (IMITREX) 100 MG tablet take 1 tablet by mouth immediately may repeat after 2 hours if needed  0    topiramate (TOPAMAX) 100 MG tablet Take 100 mg by mouth 2 (two) times daily.  0    omeprazole magnesium 20 mg CpDR Take 1 tablet by mouth 2 (two) times daily. 60 capsule 3     No current facility-administered medications on file prior to visit.        Vital signs:  /77   Pulse 66   Ht 5' 3" (1.6 m)   Wt 115 kg (253 lb 8.5 oz)   LMP 04/12/2014   BMI 44.91 kg/m²     Physical exam:  General: NAD, obese  HEENT: Head: Normocephalic, atraumatic.   Eyes: " Sclera non-icteric  Neck: Supple  Heart: Regular rate and rythm  Lungs: Clear to auscultation  Abdomen: Bowel sounds normal, no organomegaly, masses, or hernia. No tenderness, no rebound or guarding.  Rectal: Deferred  Extremities: No deformities, no C/C/E  Neurologic: Nonfocal    Routine labs:  Lab Results   Component Value Date    WBC 7.58 06/06/2017    HGB 14.5 06/06/2017    HCT 42.1 06/06/2017    MCV 91 06/06/2017     06/06/2017     Lab Results   Component Value Date    INR 0.9 06/10/2014     Lab Results   Component Value Date    IRON 77 07/25/2017    FERRITIN 110 07/25/2017    TIBC 468 (H) 07/25/2017    FESATURATED 16 (L) 07/25/2017     Lab Results   Component Value Date     06/06/2017    K 3.4 (L) 06/06/2017     06/06/2017    CO2 19 (L) 06/06/2017    BUN 9 06/06/2017    CREATININE 0.8 06/06/2017     Lab Results   Component Value Date    ALBUMIN 4.0 06/06/2017    ALT 36 06/06/2017    AST 28 06/06/2017    ALKPHOS 109 06/06/2017    BILITOT 0.8 06/06/2017     No results found for: GLUCOSE    Imaging:  Imaging Results    None       Assessment:  Paras Smith is a 56 y.o. female with     # Dysphagia  # Abdominal pain  # Screening for colorectal cancer  # NSAID long-term use  # Dark stools    Recs:  EGD for evaluation of dysphagia and epigastric pain. Biopsies for celiac and H.pylori.   Colonoscopy - she is due for screening colonoscopy, last one in 2010 with fair prep.   She will stop taking Naproxen and start taking Omeprazole.   She will follow up with her endocrinologist for her thyroid eval.     The patient was advised that NSAID-type medications have two very important potential side effects: gastrointestinal irritation including hemorrhage and renal injuries. The patient expresses understanding of these issues and questions were answered.    We discussed the the long-term consequences of chronic PPI use include the potential increased risk of hypocalcemia, hypomagnesemia, Clostridium  difficile infections, pneumonia and dementia.      Pierre Wilson MD  Gastroenterology & Hepatology Fellow  Pager: [phone]

## 2017-07-26 NOTE — PATIENT INSTRUCTIONS
EGD and colonoscopy.   Stop taking Naproxen  Start taking Omeprazole 20 mg daily, prior to breakfast.

## 2017-07-27 ENCOUNTER — TELEPHONE (OUTPATIENT)
Dept: HEPATOLOGY | Facility: CLINIC | Age: 57
End: 2017-07-27

## 2017-07-27 LAB
A1AT PHENOTYP SERPL-IMP: NORMAL BANDS
A1AT SERPL NEPH-MCNC: 160 MG/DL

## 2017-07-27 RX ORDER — ALBUTEROL SULFATE 90 UG/1
2 AEROSOL, METERED RESPIRATORY (INHALATION) EVERY 6 HOURS PRN
Qty: 18 G | Refills: 0 | Status: ON HOLD | OUTPATIENT
Start: 2017-07-27 | End: 2017-08-23 | Stop reason: HOSPADM

## 2017-07-27 NOTE — TELEPHONE ENCOUNTER
----- Message from Divine Crandall NP sent at 7/27/2017  4:20 PM CDT -----  Notify labs did not show any additional underlying liver disease  Hep a antibody + which indicates vaccination and immunity

## 2017-07-28 NOTE — TELEPHONE ENCOUNTER
Spoke with pt results given. Explained to pt she does not need vaccine. Pt was told she need to get vaccine.     Clarified with Divine, pt is immune,  already vaccinated.   Message given to pt, She verbalized understanding.

## 2017-08-03 ENCOUNTER — TELEPHONE (OUTPATIENT)
Dept: GASTROENTEROLOGY | Facility: CLINIC | Age: 57
End: 2017-08-03

## 2017-08-03 NOTE — TELEPHONE ENCOUNTER
----- Message from Maria A Douglas sent at 8/2/2017  3:30 PM CDT -----  Contact: self - [phone]  steven - calling about her meds and her cscope and egd - please call patient at

## 2017-08-14 ENCOUNTER — OFFICE VISIT (OUTPATIENT)
Dept: CARDIOLOGY | Facility: CLINIC | Age: 57
End: 2017-08-14
Payer: MEDICAID

## 2017-08-14 VITALS
HEART RATE: 55 BPM | BODY MASS INDEX: 43.36 KG/M2 | WEIGHT: 254 LBS | DIASTOLIC BLOOD PRESSURE: 81 MMHG | OXYGEN SATURATION: 97 % | SYSTOLIC BLOOD PRESSURE: 133 MMHG | HEIGHT: 64 IN

## 2017-08-14 DIAGNOSIS — Z76.89 ENCOUNTER TO ESTABLISH CARE: ICD-10-CM

## 2017-08-14 DIAGNOSIS — R07.89 CHEST PAIN, ATYPICAL: ICD-10-CM

## 2017-08-14 DIAGNOSIS — I10 ESSENTIAL HYPERTENSION: ICD-10-CM

## 2017-08-14 DIAGNOSIS — R06.00 DYSPNEA, UNSPECIFIED TYPE: ICD-10-CM

## 2017-08-14 DIAGNOSIS — I25.10 CORONARY ARTERY DISEASE, ANGINA PRESENCE UNSPECIFIED, UNSPECIFIED VESSEL OR LESION TYPE, UNSPECIFIED WHETHER NATIVE OR TRANSPLANTED HEART: Primary | ICD-10-CM

## 2017-08-14 PROCEDURE — 93010 ELECTROCARDIOGRAM REPORT: CPT | Mod: S$PBB,,, | Performed by: INTERNAL MEDICINE

## 2017-08-14 PROCEDURE — 3079F DIAST BP 80-89 MM HG: CPT | Mod: ,,, | Performed by: INTERNAL MEDICINE

## 2017-08-14 PROCEDURE — 99214 OFFICE O/P EST MOD 30 MIN: CPT | Mod: S$PBB,25,, | Performed by: INTERNAL MEDICINE

## 2017-08-14 PROCEDURE — 93005 ELECTROCARDIOGRAM TRACING: CPT | Mod: PBBFAC | Performed by: INTERNAL MEDICINE

## 2017-08-14 PROCEDURE — 3008F BODY MASS INDEX DOCD: CPT | Mod: ,,, | Performed by: INTERNAL MEDICINE

## 2017-08-14 PROCEDURE — 99215 OFFICE O/P EST HI 40 MIN: CPT | Mod: PBBFAC,25 | Performed by: INTERNAL MEDICINE

## 2017-08-14 PROCEDURE — 3075F SYST BP GE 130 - 139MM HG: CPT | Mod: ,,, | Performed by: INTERNAL MEDICINE

## 2017-08-14 PROCEDURE — 99999 PR PBB SHADOW E&M-EST. PATIENT-LVL V: CPT | Mod: PBBFAC,,, | Performed by: INTERNAL MEDICINE

## 2017-08-14 RX ORDER — NITROGLYCERIN 0.4 MG/1
0.4 TABLET SUBLINGUAL EVERY 5 MIN PRN
Qty: 25 TABLET | Refills: 11 | Status: SHIPPED | OUTPATIENT
Start: 2017-08-14 | End: 2018-08-14 | Stop reason: SDUPTHER

## 2017-08-14 NOTE — PROGRESS NOTES
Subjective:    Patient ID:  Paras Smith is a 56 y.o. female who presents for follow-up of Pre-op Exam      HPI     Last saw me 4/2015  CP, CAD - stent 2003, HTN, depression, TIA    C 3/12/14 EDP 5, EF 60%, normal coronaries    Echo 10/31/13    1 - Normal left ventricular systolic function (EF 55-60%).     2 - Concentric remodeling.     3 - Trivial mitral regurgitation.     4 - Trivial tricuspid regurgitation.     Stress test 10/31/13  LVEF: 64 %  Impression: NORMAL MYOCARDIAL PERFUSION  1. The perfusion scan is free of evidence for myocardial ischemia or injury.   2. There is a mild intensity fixed defect in the anteroapical wall of the left ventricle, secondary to breast attenuation.   3. Resting wall motion is physiologic.   4. Resting LV function is normal.     Needs clearance prior to EGD and colonoscopy  Reports episodic sharp CP and some AGUIRRE  EKG NSR - ok    Review of Systems   Constitution: Negative for decreased appetite.   HENT: Negative for ear discharge.    Eyes: Negative for blurred vision.   Respiratory: Negative for hemoptysis.    Endocrine: Negative for polyphagia.   Hematologic/Lymphatic: Negative for adenopathy.   Skin: Negative for color change.   Musculoskeletal: Negative for joint swelling.   Neurological: Negative for brief paralysis.   Psychiatric/Behavioral: Negative for hallucinations.        Objective:    Physical Exam   Constitutional: She is oriented to person, place, and time. She appears well-developed and well-nourished.   HENT:   Head: Normocephalic and atraumatic.   Eyes: Conjunctivae are normal. Pupils are equal, round, and reactive to light.   Neck: Normal range of motion. Neck supple.   Cardiovascular: Normal rate, normal heart sounds and intact distal pulses.    Pulmonary/Chest: Effort normal and breath sounds normal.   Abdominal: Soft. Bowel sounds are normal.   Musculoskeletal: Normal range of motion.   Neurological: She is alert and oriented to person, place, and  time.   Skin: Skin is warm and dry.         Assessment:       1. Coronary artery disease, angina presence unspecified, unspecified vessel or lesion type, unspecified whether native or transplanted heart    2. Essential hypertension    3. Chest pain, atypical    4. Dyspnea, unspecified type         Plan:       Echo and lexiscan myoview - will clear for GI evaluation if ok

## 2017-08-16 ENCOUNTER — HOSPITAL ENCOUNTER (OUTPATIENT)
Dept: RADIOLOGY | Facility: HOSPITAL | Age: 57
Discharge: HOME | End: 2017-08-16
Attending: INTERNAL MEDICINE
Payer: MEDICAID

## 2017-08-16 ENCOUNTER — HOSPITAL ENCOUNTER (OUTPATIENT)
Dept: CARDIOLOGY | Facility: HOSPITAL | Age: 57
Discharge: HOME | End: 2017-08-16
Attending: INTERNAL MEDICINE
Payer: MEDICAID

## 2017-08-16 DIAGNOSIS — R07.89 CHEST PAIN, ATYPICAL: ICD-10-CM

## 2017-08-16 DIAGNOSIS — I10 ESSENTIAL HYPERTENSION: ICD-10-CM

## 2017-08-16 DIAGNOSIS — R06.00 DYSPNEA, UNSPECIFIED TYPE: ICD-10-CM

## 2017-08-16 DIAGNOSIS — I25.10 CORONARY ARTERY DISEASE, ANGINA PRESENCE UNSPECIFIED, UNSPECIFIED VESSEL OR LESION TYPE, UNSPECIFIED WHETHER NATIVE OR TRANSPLANTED HEART: ICD-10-CM

## 2017-08-16 LAB
DIASTOLIC DYSFUNCTION: NO
DIASTOLIC DYSFUNCTION: NO
GLOBAL PERICARDIAL EFFUSION: NORMAL
MITRAL VALVE MOBILITY: NORMAL
RETIRED EF AND QEF - SEE NOTES: 65 (ref 55–65)
TRICUSPID VALVE REGURGITATION: NORMAL

## 2017-08-16 PROCEDURE — A9502 TC99M TETROFOSMIN: HCPCS

## 2017-08-16 PROCEDURE — 93018 CV STRESS TEST I&R ONLY: CPT | Mod: ,,, | Performed by: INTERNAL MEDICINE

## 2017-08-16 PROCEDURE — 78452 HT MUSCLE IMAGE SPECT MULT: CPT | Mod: TC

## 2017-08-16 PROCEDURE — 63600175 PHARM REV CODE 636 W HCPCS

## 2017-08-16 PROCEDURE — 78452 HT MUSCLE IMAGE SPECT MULT: CPT | Mod: 26,,, | Performed by: INTERNAL MEDICINE

## 2017-08-16 PROCEDURE — 93306 TTE W/DOPPLER COMPLETE: CPT

## 2017-08-16 PROCEDURE — 93016 CV STRESS TEST SUPVJ ONLY: CPT | Mod: ,,, | Performed by: INTERNAL MEDICINE

## 2017-08-16 PROCEDURE — 93017 CV STRESS TEST TRACING ONLY: CPT

## 2017-08-16 PROCEDURE — 93306 TTE W/DOPPLER COMPLETE: CPT | Mod: 26,,, | Performed by: INTERNAL MEDICINE

## 2017-08-16 RX ORDER — REGADENOSON 0.08 MG/ML
INJECTION, SOLUTION INTRAVENOUS
Status: DISPENSED
Start: 2017-08-16 | End: 2017-08-16

## 2017-08-18 ENCOUNTER — OFFICE VISIT (OUTPATIENT)
Dept: CARDIOLOGY | Facility: CLINIC | Age: 57
End: 2017-08-18
Payer: MEDICAID

## 2017-08-18 VITALS
HEIGHT: 64 IN | SYSTOLIC BLOOD PRESSURE: 143 MMHG | HEART RATE: 56 BPM | OXYGEN SATURATION: 96 % | DIASTOLIC BLOOD PRESSURE: 66 MMHG | WEIGHT: 258.63 LBS | BODY MASS INDEX: 44.15 KG/M2

## 2017-08-18 DIAGNOSIS — I25.10 CORONARY ARTERY DISEASE, ANGINA PRESENCE UNSPECIFIED, UNSPECIFIED VESSEL OR LESION TYPE, UNSPECIFIED WHETHER NATIVE OR TRANSPLANTED HEART: Primary | ICD-10-CM

## 2017-08-18 DIAGNOSIS — R06.00 DYSPNEA, UNSPECIFIED TYPE: ICD-10-CM

## 2017-08-18 DIAGNOSIS — R07.89 CHEST PAIN, ATYPICAL: ICD-10-CM

## 2017-08-18 DIAGNOSIS — I10 ESSENTIAL HYPERTENSION: ICD-10-CM

## 2017-08-18 PROCEDURE — 99214 OFFICE O/P EST MOD 30 MIN: CPT | Mod: S$PBB,,, | Performed by: INTERNAL MEDICINE

## 2017-08-18 PROCEDURE — 3078F DIAST BP <80 MM HG: CPT | Mod: ,,, | Performed by: INTERNAL MEDICINE

## 2017-08-18 PROCEDURE — 3008F BODY MASS INDEX DOCD: CPT | Mod: ,,, | Performed by: INTERNAL MEDICINE

## 2017-08-18 PROCEDURE — 99999 PR PBB SHADOW E&M-EST. PATIENT-LVL III: CPT | Mod: PBBFAC,,, | Performed by: INTERNAL MEDICINE

## 2017-08-18 PROCEDURE — 99213 OFFICE O/P EST LOW 20 MIN: CPT | Mod: PBBFAC | Performed by: INTERNAL MEDICINE

## 2017-08-18 PROCEDURE — 3077F SYST BP >= 140 MM HG: CPT | Mod: ,,, | Performed by: INTERNAL MEDICINE

## 2017-08-18 RX ORDER — NAPROXEN SODIUM 220 MG/1
81 TABLET, FILM COATED ORAL DAILY
Refills: 0 | COMMUNITY
Start: 2017-08-18 | End: 2018-02-21

## 2017-08-18 RX ORDER — ISOSORBIDE MONONITRATE 30 MG/1
30 TABLET, EXTENDED RELEASE ORAL DAILY
Qty: 30 TABLET | Refills: 11 | Status: SHIPPED | OUTPATIENT
Start: 2017-08-18 | End: 2018-02-21

## 2017-08-18 RX ORDER — SODIUM CHLORIDE 9 MG/ML
INJECTION, SOLUTION INTRAVENOUS CONTINUOUS
Status: CANCELLED | OUTPATIENT
Start: 2017-08-18

## 2017-08-18 NOTE — H&P
[facility] - Cardiology  History & Physical    Subjective:      Chief Complaint/Reason for Admission: [facility]    Paras Smith is a 56 y.o. female.    CP, CAD - stent , HTN, depression, TIA     [facility] 3/12/14 EDP 5, EF 60%, normal coronaries     Echo 17    1 - Normal left ventricular systolic function (EF 60-65%).     2 - No wall motion abnormalities.      Stress test 17  LVEF: 65 %  Impression: ABNORMAL MYOCARDIAL PERFUSION  1. There is evidence for moderate myocardial ischemia in the anterior wall of the left ventricle.   2. The perfusion scan is free of evidence for myocardial injury.   3. Resting wall motion is physiologic.   4. Resting LV function is normal.   5. The ventricular volumes are normal at rest and stress.   6. The extracardiac distribution of radioactivity is normal.   7. When compared to the previous study from 10/31/2013, anterior ischemia now suggested.    Needs clearance prior to EGD and colonoscopy  Reports episodic sharp CP and some AGUIRRE  EKG NSR - ok        Past Medical History:   Diagnosis Date    Asthma     CAD (coronary artery disease)     stent     Cervical spine disease     Depression     Difficult intubation     POSSIBLE    GERD (gastroesophageal reflux disease)     Glaucoma     left eye    Helicobacter pylori (H. pylori) infection     History of stomach ulcers     HTN (hypertension)     Muscle weakness     LEFT    Neck problem     LIMITED ROM    Nuclear cataract 2014    Postsurgical hypothyroidism     Stroke     mini strokes    Thyroid cancer     Vitamin D deficiency disease      Past Surgical History:   Procedure Laterality Date    APPENDECTOMY      CERVICAL SPINE SURGERY      vocal cord damage     SECTION, LOW TRANSVERSE      x3    CHOLECYSTECTOMY      CORONARY ANGIOPLASTY WITH STENT PLACEMENT      x 3    PATELLA SURGERY  1969    THYROID SURGERY      total    TOTAL THYROIDECTOMY       Family History   Problem Relation Age of Onset     Diabetes Father     Kidney failure Father     Cataracts Father     Asthma Father     Glaucoma Father     Heart disease Mother      has pacemaker    Hypertension Mother     Asthma Mother     Cancer Mother      lung stage 4    Hypertension Brother     Heart disease Brother     Cervical cancer Daughter     Scoliosis Son     Hypertension Son     Hypertension Daughter     Seizures Daughter     Lupus Daughter     Cervical cancer Daughter     Mental illness Son      bipolar    Cancer Sister     Liver disease Sister     No Known Problems Brother     No Known Problems Daughter     Stomach cancer Maternal Aunt     Colon cancer Maternal Aunt     No Known Problems Maternal Uncle     No Known Problems Paternal Aunt     No Known Problems Paternal Uncle     Stomach cancer Maternal Grandmother     No Known Problems Maternal Grandfather     No Known Problems Paternal Grandmother     No Known Problems Paternal Grandfather     Stomach cancer Maternal Aunt     Ovarian cancer Neg Hx     Esophageal cancer Neg Hx      Social History   Substance Use Topics    Smoking status: Never Smoker    Smokeless tobacco: Never Used      Comment: No cigarrettes, only marijuana occasionally    Alcohol use No      Comment: recovering alcoholic         (Not in a hospital admission)  Review of patient's allergies indicates:   Allergen Reactions    Vioxx [rofecoxib] Rash        Review of Systems   All other systems reviewed and are negative.      Objective:      Vital Signs (Most Recent)  Pulse: (!) 56 (08/18/17 0941)  BP: (!) 143/66 (08/18/17 0941)  SpO2: 96 % (08/18/17 0941)    Vital Signs Range (Last 24H):  [unfilled]    Physical Exam   Constitutional: She is oriented to person, place, and time. She appears well-developed and well-nourished.   HENT:   Head: Normocephalic and atraumatic.   Eyes: EOM are normal. Pupils are equal, round, and reactive to light.   Neck: Normal range of motion. Neck supple.    Cardiovascular: Normal rate and regular rhythm.    Pulmonary/Chest: Effort normal and breath sounds normal.   Abdominal: Soft. Bowel sounds are normal.   Musculoskeletal: Normal range of motion.   Neurological: She is alert and oriented to person, place, and time.   Skin: Skin is warm and dry.       Data Review:    CBC:   Lab Results   Component Value Date    WBC 7.58 06/06/2017    RBC 4.65 06/06/2017    HGB 14.5 06/06/2017    HCT 42.1 06/06/2017     06/06/2017     BMP:   Lab Results   Component Value Date     (H) 06/06/2017     06/06/2017    K 3.4 (L) 06/06/2017     06/06/2017    CO2 19 (L) 06/06/2017    BUN 9 06/06/2017    CREATININE 0.8 06/06/2017    CALCIUM 9.2 06/06/2017      ECG: normal sinus rhythm, no blocks or conduction defects, no ischemic changes.     Assessment:      There are no hospital problems to display for this patient.    CP with stress test showing moderate anterior ischemia - on 2 anti-anginal medications    Plan:        [facility]

## 2017-08-18 NOTE — PROGRESS NOTES
Subjective:    Patient ID:  Paras Smith is a 56 y.o. female who presents for follow-up of Results      HPI     CP, CAD - stent 2003, HTN, depression, TIA     [facility] 3/12/14 EDP 5, EF 60%, normal coronaries     Echo 8/16/17    1 - Normal left ventricular systolic function (EF 60-65%).     2 - No wall motion abnormalities.      Stress test 8/16/17  LVEF: 65 %  Impression: ABNORMAL MYOCARDIAL PERFUSION  1. There is evidence for moderate myocardial ischemia in the anterior wall of the left ventricle.   2. The perfusion scan is free of evidence for myocardial injury.   3. Resting wall motion is physiologic.   4. Resting LV function is normal.   5. The ventricular volumes are normal at rest and stress.   6. The extracardiac distribution of radioactivity is normal.   7. When compared to the previous study from 10/31/2013, anterior ischemia now suggested.    Needs clearance prior to EGD and colonoscopy  Reports episodic sharp CP and some AGUIRRE  EKG NSR - ok    Review of Systems   Constitution: Negative for decreased appetite.   HENT: Negative for ear discharge.    Eyes: Negative for blurred vision.   Respiratory: Negative for hemoptysis.    Endocrine: Negative for polyphagia.   Hematologic/Lymphatic: Negative for adenopathy.   Skin: Negative for color change.   Musculoskeletal: Negative for joint swelling.   Neurological: Negative for brief paralysis.   Psychiatric/Behavioral: Negative for hallucinations.        Objective:    Physical Exam   Constitutional: She is oriented to person, place, and time. She appears well-developed and well-nourished.   HENT:   Head: Normocephalic and atraumatic.   Eyes: Conjunctivae are normal. Pupils are equal, round, and reactive to light.   Neck: Normal range of motion. Neck supple.   Cardiovascular: Normal rate, normal heart sounds and intact distal pulses.    Pulmonary/Chest: Effort normal and breath sounds normal.   Abdominal: Soft. Bowel sounds are normal.   Musculoskeletal: Normal  range of motion.   Neurological: She is alert and oriented to person, place, and time.   Skin: Skin is warm and dry.         Assessment:       1. Coronary artery disease, angina presence unspecified, unspecified vessel or lesion type, unspecified whether native or transplanted heart    2. Essential hypertension    3. Chest pain, atypical    4. Dyspnea, unspecified type         Plan:       Positive stress test with CP  [facility] - risks/benefits explained and she agrees to proceed

## 2017-08-20 ENCOUNTER — HOSPITAL ENCOUNTER (EMERGENCY)
Facility: HOSPITAL | Age: 57
Discharge: HOME | End: 2017-08-20
Attending: EMERGENCY MEDICINE
Payer: MEDICAID

## 2017-08-20 VITALS
BODY MASS INDEX: 43.54 KG/M2 | RESPIRATION RATE: 18 BRPM | WEIGHT: 255 LBS | DIASTOLIC BLOOD PRESSURE: 72 MMHG | SYSTOLIC BLOOD PRESSURE: 122 MMHG | OXYGEN SATURATION: 98 % | TEMPERATURE: 97 F | HEIGHT: 64 IN | HEART RATE: 70 BPM

## 2017-08-20 DIAGNOSIS — M79.604 RIGHT LEG PAIN: Primary | ICD-10-CM

## 2017-08-20 DIAGNOSIS — M79.18 MUSCULOSKELETAL PAIN: ICD-10-CM

## 2017-08-20 DIAGNOSIS — M79.604 LEG PAIN, POSTERIOR, RIGHT: ICD-10-CM

## 2017-08-20 LAB
ALBUMIN SERPL BCP-MCNC: 3.2 G/DL
ALP SERPL-CCNC: 97 U/L
ALT SERPL W/O P-5'-P-CCNC: 23 U/L
ANION GAP SERPL CALC-SCNC: 8 MMOL/L
APTT BLDCRRT: 26.5 SEC
AST SERPL-CCNC: 17 U/L
BASOPHILS # BLD AUTO: 0.03 K/UL
BASOPHILS NFR BLD: 0.4 %
BILIRUB SERPL-MCNC: 0.5 MG/DL
BUN SERPL-MCNC: 23 MG/DL
CALCIUM SERPL-MCNC: 8.8 MG/DL
CHLORIDE SERPL-SCNC: 109 MMOL/L
CO2 SERPL-SCNC: 24 MMOL/L
CREAT SERPL-MCNC: 0.7 MG/DL
DIFFERENTIAL METHOD: ABNORMAL
EOSINOPHIL # BLD AUTO: 0.2 K/UL
EOSINOPHIL NFR BLD: 2.5 %
ERYTHROCYTE [DISTWIDTH] IN BLOOD BY AUTOMATED COUNT: 12.9 %
EST. GFR  (AFRICAN AMERICAN): >60 ML/MIN/1.73 M^2
EST. GFR  (NON AFRICAN AMERICAN): >60 ML/MIN/1.73 M^2
GLUCOSE SERPL-MCNC: 88 MG/DL
HCT VFR BLD AUTO: 38.2 %
HGB BLD-MCNC: 12.8 G/DL
INR PPP: 0.9
LYMPHOCYTES # BLD AUTO: 2.5 K/UL
LYMPHOCYTES NFR BLD: 36.8 %
MCH RBC QN AUTO: 31.4 PG
MCHC RBC AUTO-ENTMCNC: 33.5 G/DL
MCV RBC AUTO: 94 FL
MONOCYTES # BLD AUTO: 0.7 K/UL
MONOCYTES NFR BLD: 9.7 %
NEUTROPHILS # BLD AUTO: 3.4 K/UL
NEUTROPHILS NFR BLD: 50.6 %
PLATELET # BLD AUTO: 210 K/UL
PMV BLD AUTO: 11.4 FL
POTASSIUM SERPL-SCNC: 4.4 MMOL/L
PROT SERPL-MCNC: 6.4 G/DL
PROTHROMBIN TIME: 9.8 SEC
RBC # BLD AUTO: 4.07 M/UL
SODIUM SERPL-SCNC: 141 MMOL/L
WBC # BLD AUTO: 6.8 K/UL

## 2017-08-20 PROCEDURE — 85025 COMPLETE CBC W/AUTO DIFF WBC: CPT

## 2017-08-20 PROCEDURE — 85610 PROTHROMBIN TIME: CPT

## 2017-08-20 PROCEDURE — 99284 EMERGENCY DEPT VISIT MOD MDM: CPT

## 2017-08-20 PROCEDURE — 85730 THROMBOPLASTIN TIME PARTIAL: CPT

## 2017-08-20 PROCEDURE — 80053 COMPREHEN METABOLIC PANEL: CPT

## 2017-08-20 RX ORDER — METHOCARBAMOL 500 MG/1
1000 TABLET, FILM COATED ORAL 3 TIMES DAILY
Qty: 30 TABLET | Refills: 0 | Status: SHIPPED | OUTPATIENT
Start: 2017-08-20 | End: 2017-08-20

## 2017-08-20 RX ORDER — METHOCARBAMOL 500 MG/1
1000 TABLET, FILM COATED ORAL 3 TIMES DAILY
Qty: 30 TABLET | Refills: 0 | Status: SHIPPED | OUTPATIENT
Start: 2017-08-20 | End: 2017-08-25

## 2017-08-20 NOTE — DISCHARGE INSTRUCTIONS
Please use crutches.  Use a heating pad.  Please use ibuprofen and Robaxin.  Please follow-up with your primary care doctor this week.

## 2017-08-20 NOTE — ED PROVIDER NOTES
Encounter Date: 8/20/2017    SCRIBE #1 NOTE: I, Garcia Santo, am scribing for, and in the presence of, Beau Roth MD. Other sections scribed: HPI, ROS.       History     Chief Complaint   Patient presents with    Leg Pain     R calf pain and swelling R foot. States she cannot bear weight on R leg and she fell this a.m. trying to walk. Injured neck when she fell against her bed this a.m.     CC: Leg Pain  HPI: This 56 y.o. female with Hx of HTN, CAD s/p stent x3, thyroid cancer s/p thyroidectomy, asthma, cervical spine disease, depression, glaucoma, GERD and Hx of TIA, H. Pylori, cervical spine surgery, appendectomy, cholecystectomy, presents to the ED for evaluation s/p fall getting out of bed this morning. Pt states that she woke up with severe pain in R calf, and her leg became weak and numb and gave out on her when she attempted to stand up and bear weight on the leg, causing her to fall forward and hit her chin. Pt c/o severe pain in her R calf and severe pain on the sides of her neck. She reports diarrhea and gassiness for the past few days, 4 episodes of diarrhea in the past day. Pt reports that she had an angiogram done 5 days ago, and is scheduled to have an angiogram done in 3 days. Pt denies fever, cough, SOB, chest pain, abdominal pain, N/V, arm pain, back pain.        The history is provided by the patient.     Review of patient's allergies indicates:   Allergen Reactions    Vioxx [rofecoxib] Rash     Past Medical History:   Diagnosis Date    Asthma     CAD (coronary artery disease)     stent 2003    Cervical spine disease     Depression     Difficult intubation     POSSIBLE    GERD (gastroesophageal reflux disease)     Glaucoma     left eye    Helicobacter pylori (H. pylori) infection     History of stomach ulcers     HTN (hypertension)     Muscle weakness     LEFT    Neck problem     LIMITED ROM    Nuclear cataract 5/28/2014    Postsurgical hypothyroidism     Stroke     mini  strokes    Thyroid cancer     Vitamin D deficiency disease      Past Surgical History:   Procedure Laterality Date    APPENDECTOMY      CERVICAL SPINE SURGERY      vocal cord damage     SECTION, LOW TRANSVERSE      x3    CHOLECYSTECTOMY      CORONARY ANGIOPLASTY WITH STENT PLACEMENT      x 3    PATELLA SURGERY  1969    THYROID SURGERY      total    TOTAL THYROIDECTOMY       Family History   Problem Relation Age of Onset    Diabetes Father     Kidney failure Father     Cataracts Father     Asthma Father     Glaucoma Father     Heart disease Mother      has pacemaker    Hypertension Mother     Asthma Mother     Cancer Mother      lung stage 4    Hypertension Brother     Heart disease Brother     Cervical cancer Daughter     Scoliosis Son     Hypertension Son     Hypertension Daughter     Seizures Daughter     Lupus Daughter     Cervical cancer Daughter     Mental illness Son      bipolar    Cancer Sister     Liver disease Sister     No Known Problems Brother     No Known Problems Daughter     Stomach cancer Maternal Aunt     Colon cancer Maternal Aunt     No Known Problems Maternal Uncle     No Known Problems Paternal Aunt     No Known Problems Paternal Uncle     Stomach cancer Maternal Grandmother     No Known Problems Maternal Grandfather     No Known Problems Paternal Grandmother     No Known Problems Paternal Grandfather     Stomach cancer Maternal Aunt     Ovarian cancer Neg Hx     Esophageal cancer Neg Hx      Social History   Substance Use Topics    Smoking status: Never Smoker    Smokeless tobacco: Never Used      Comment: No cigarrettes, only marijuana occasionally    Alcohol use No      Comment: recovering alcoholic     Review of Systems   Constitutional: Negative for chills and fever.   HENT: Negative for congestion, ear pain and sore throat.    Eyes: Negative for pain and visual disturbance.   Respiratory: Negative for cough and shortness of breath.     Cardiovascular: Negative for chest pain.   Gastrointestinal: Positive for diarrhea. Negative for abdominal pain, nausea and vomiting.   Genitourinary: Negative for difficulty urinating and dysuria.   Musculoskeletal: Positive for myalgias (R calf) and neck pain. Negative for back pain.   Skin: Negative for rash and wound.   Neurological: Positive for weakness (R leg) and numbness (R leg, resolved). Negative for syncope and headaches.       Physical Exam     Initial Vitals [08/20/17 1516]   BP Pulse Resp Temp SpO2   121/74 69 18 97.8 °F (36.6 °C) 95 %      MAP       89.67         Physical Exam  The patient was examined specifically for the following:   General:No significant distress, Good color, Warm and dry. Head and neck:Scalp atraumatic, Neck supple. Neurological:Appropriate conversation, Gross motor deficits. Eyes:Conjugate gaze, Clear corneas. ENT: No epistaxis. Cardiac: Regular rate and rhythm, Grossly normal heart tones. Pulmonary: Wheezing, Rales. Gastrointestinal: Abdominal tenderness, Abdominal distention. Musculoskeletal: Extremity deformity, Apparent pain with range of motion of the joints. Skin: Rash.   The findings on examination were normal except for the following: Patient has tenderness to the posterior calf of the right side.  Distal neurovascular and tendon function are intact.  The patient is a brisk 2008 his pulse.  There is no midline cervical tenderness.  There is some lateral neck tenderness bilaterally.  The patient has a third shin.  The mandible is stable to is no pain with range of motion of the lower jaw.  The abdomen is soft.  The patient is morbidly obese.  The lungs are clear.  The heart tones are normal.  ED Course   Procedures  Labs Reviewed   CBC W/ AUTO DIFFERENTIAL - Abnormal; Notable for the following:        Result Value    MCH 31.4 (*)     All other components within normal limits   COMPREHENSIVE METABOLIC PANEL - Abnormal; Notable for the following:     BUN, Bld 23 (*)      Albumin 3.2 (*)     All other components within normal limits   APTT   PROTIME-INR          X-Rays:   Independently Interpreted Readings:   Other Readings:  Ultrasound of the right lower extremity failed to reveal any significant abnormalities.    Medical decision making: Given the above, this patient presented emergency room getting out of bed and trying to bear weight on the right lower extremity.  The patient had numbness and pain and she collapsed the floor striking again.  The patient has no posterior neck tenderness I doubt cervical spine injuries.  She did not appear as there is no loss of consciousness there is no history of being weak or dizzy.  The patient has had persistent pain in the right leg.  Ultrasound fails to reveal any evidence of deep venous thrombosis.  The patient will be discharged to follow-up with primary care.  I will treat her with crutches.  I will treat her with Robaxin.             Scribe Attestation:   Scribe #1: I performed the above scribed service and the documentation accurately describes the services I performed. I attest to the accuracy of the note.    Attending Attestation:           Physician Attestation for Scribe:  Physician Attestation Statement for Scribe #1: I, Beau Roth MD, reviewed documentation, as scribed by Garcia Blanchard in my presence, and it is both accurate and complete.                 ED Course     Clinical Impression:   The primary encounter diagnosis was Right leg pain. Diagnoses of Leg pain, posterior, right and Musculoskeletal pain were also pertinent to this visit.                           Beau Roth MD  08/20/17 1953

## 2017-08-20 NOTE — ED TRIAGE NOTES
Pt having pain to the Rt lower leg.  Pt c/o numbness to the Rt lower leg.  Noted swelling to the Rt lower leg

## 2017-08-22 ENCOUNTER — HOSPITAL ENCOUNTER (OUTPATIENT)
Dept: PREADMISSION TESTING | Facility: HOSPITAL | Age: 57
Discharge: HOME | End: 2017-08-22
Attending: INTERNAL MEDICINE
Payer: MEDICAID

## 2017-08-22 VITALS
HEART RATE: 55 BPM | BODY MASS INDEX: 44.11 KG/M2 | OXYGEN SATURATION: 99 % | DIASTOLIC BLOOD PRESSURE: 74 MMHG | RESPIRATION RATE: 16 BRPM | WEIGHT: 258.38 LBS | SYSTOLIC BLOOD PRESSURE: 120 MMHG | HEIGHT: 64 IN | TEMPERATURE: 97 F

## 2017-08-22 DIAGNOSIS — R07.89 CHEST PAIN, ATYPICAL: ICD-10-CM

## 2017-08-22 DIAGNOSIS — I10 ESSENTIAL HYPERTENSION: ICD-10-CM

## 2017-08-22 DIAGNOSIS — R06.00 DYSPNEA, UNSPECIFIED TYPE: ICD-10-CM

## 2017-08-22 DIAGNOSIS — I25.10 CORONARY ARTERY DISEASE, ANGINA PRESENCE UNSPECIFIED, UNSPECIFIED VESSEL OR LESION TYPE, UNSPECIFIED WHETHER NATIVE OR TRANSPLANTED HEART: ICD-10-CM

## 2017-08-22 LAB
ANION GAP SERPL CALC-SCNC: 6 MMOL/L
APTT BLDCRRT: 26 SEC
BUN SERPL-MCNC: 17 MG/DL
CALCIUM SERPL-MCNC: 9.3 MG/DL
CHLORIDE SERPL-SCNC: 105 MMOL/L
CO2 SERPL-SCNC: 29 MMOL/L
CREAT SERPL-MCNC: 0.7 MG/DL
ERYTHROCYTE [DISTWIDTH] IN BLOOD BY AUTOMATED COUNT: 13 %
EST. GFR  (AFRICAN AMERICAN): >60 ML/MIN/1.73 M^2
EST. GFR  (NON AFRICAN AMERICAN): >60 ML/MIN/1.73 M^2
GLUCOSE SERPL-MCNC: 92 MG/DL
HCT VFR BLD AUTO: 41.1 %
HGB BLD-MCNC: 14 G/DL
INR PPP: 0.9
MCH RBC QN AUTO: 31.4 PG
MCHC RBC AUTO-ENTMCNC: 34.1 G/DL
MCV RBC AUTO: 92 FL
PLATELET # BLD AUTO: 253 K/UL
PMV BLD AUTO: 11.5 FL
POTASSIUM SERPL-SCNC: 4.7 MMOL/L
PROTHROMBIN TIME: 9.7 SEC
RBC # BLD AUTO: 4.46 M/UL
SODIUM SERPL-SCNC: 140 MMOL/L
WBC # BLD AUTO: 7.49 K/UL

## 2017-08-22 PROCEDURE — 85027 COMPLETE CBC AUTOMATED: CPT

## 2017-08-22 PROCEDURE — 36415 COLL VENOUS BLD VENIPUNCTURE: CPT

## 2017-08-22 PROCEDURE — 80048 BASIC METABOLIC PNL TOTAL CA: CPT

## 2017-08-22 PROCEDURE — 85730 THROMBOPLASTIN TIME PARTIAL: CPT

## 2017-08-22 PROCEDURE — 85610 PROTHROMBIN TIME: CPT

## 2017-08-22 NOTE — PRE-PROCEDURE INSTRUCTIONS
Pre-operative instructions, medication directives and pain scales reviewed with patient. Instructed to wash with Hibiclens prior to procedure. All questions the patient had  were answered. Re-assurance about surgical procedure and day of surgery routine given as needed. The patient verbalized understanding of the pre-op instructions.

## 2017-08-22 NOTE — DISCHARGE INSTRUCTIONS
Your surgery is scheduled for _Wednesday Aug 23, 2017___________________.    Please report to SAME DAY SURGERY UNIT on the 2nd FLOOR at _8:00__ a.m.  Use front door entrance. The doors open at 0530 am.          INSTRUCTIONS IMPORTANT!!!  ¨ Do not eat or drink after 12 midnight-including water. OK to brush teeth, no   gum, candy or mints!    ¨ Take only these medicines with a small swallow of water-morning of surgery.  Take Aspirin, Isosorbide, and Propanolol with swallow fo water morning of procedure.            _x___  Prep instructions:   SHOWER _  _x___  Please shower using Hibiclens soap the night before AND  the morning of  your surgery/procedure. Do not use Hibiclens on your face or genitals     _x___  No shaving of procedural area at least 4-5 days before surgery due to    increased risk of skin irritation and/or possible infection.  _x___  Do not wear makeup, including mascara. WEARING EYE MAKEUP MAY  LEAD TO SERIOUS EYE INJURY during surgery.  _x___  No powder, lotions or creams to your body.  _x___  You may wear only deodorant on the day of surgery.  _x___  Please remove all jewelry, including piercings and leave at home.  _x___  No money or valuables needed. Please leave at home.  You may bring your           cell phone.  ____  Please bring any documents given by your doctor.  __x__  If going home the same day, arrange for a ride home. You will not be able to drive if Anesthesia was used.  ____  Children under 18 years require a parent / guardian present the entire time   they are in surgery / recovery.  _x___  Wear loose fitting clothing. Allow for dressings, bandages.              You MAY use Tylenol/acetaminophen until day of surgery.  _x___  Call MD for temperature above 101 degrees.        ____ Stop taking any Fish Oil supplement or any Vitamins that contain Vitamin   E at least 5 days prior to surgery.          I have read or had read and explained to me, and understand the above  information.  Additional comments or instructions:Please call   [phone] if you have any questions regarding the instructions above.

## 2017-08-23 ENCOUNTER — SURGERY (OUTPATIENT)
Age: 57
End: 2017-08-23

## 2017-08-23 ENCOUNTER — HOSPITAL ENCOUNTER (OUTPATIENT)
Facility: HOSPITAL | Age: 57
Discharge: HOME | End: 2017-08-23
Attending: INTERNAL MEDICINE | Admitting: INTERNAL MEDICINE
Payer: MEDICAID

## 2017-08-23 VITALS
DIASTOLIC BLOOD PRESSURE: 72 MMHG | TEMPERATURE: 98 F | HEART RATE: 60 BPM | OXYGEN SATURATION: 95 % | SYSTOLIC BLOOD PRESSURE: 138 MMHG | WEIGHT: 258.38 LBS | HEIGHT: 64 IN | RESPIRATION RATE: 20 BRPM | BODY MASS INDEX: 44.11 KG/M2

## 2017-08-23 DIAGNOSIS — R07.89 CHEST PAIN, ATYPICAL: ICD-10-CM

## 2017-08-23 DIAGNOSIS — I10 ESSENTIAL HYPERTENSION: ICD-10-CM

## 2017-08-23 DIAGNOSIS — I25.10 CORONARY ARTERY DISEASE, ANGINA PRESENCE UNSPECIFIED, UNSPECIFIED VESSEL OR LESION TYPE, UNSPECIFIED WHETHER NATIVE OR TRANSPLANTED HEART: ICD-10-CM

## 2017-08-23 DIAGNOSIS — R06.00 DYSPNEA, UNSPECIFIED TYPE: ICD-10-CM

## 2017-08-23 PROCEDURE — 63600175 PHARM REV CODE 636 W HCPCS

## 2017-08-23 PROCEDURE — 25000003 PHARM REV CODE 250

## 2017-08-23 PROCEDURE — 25000003 PHARM REV CODE 250: Performed by: INTERNAL MEDICINE

## 2017-08-23 PROCEDURE — 99152 MOD SED SAME PHYS/QHP 5/>YRS: CPT | Mod: ,,, | Performed by: INTERNAL MEDICINE

## 2017-08-23 PROCEDURE — 99152 MOD SED SAME PHYS/QHP 5/>YRS: CPT

## 2017-08-23 PROCEDURE — C1760 CLOSURE DEV, VASC: HCPCS

## 2017-08-23 PROCEDURE — 93458 L HRT ARTERY/VENTRICLE ANGIO: CPT | Mod: 26,,, | Performed by: INTERNAL MEDICINE

## 2017-08-23 PROCEDURE — A4216 STERILE WATER/SALINE, 10 ML: HCPCS

## 2017-08-23 RX ORDER — ALBUTEROL SULFATE 90 UG/1
2 AEROSOL, METERED RESPIRATORY (INHALATION) EVERY 6 HOURS PRN
Qty: 8.5 G | Refills: 0 | Status: SHIPPED | OUTPATIENT
Start: 2017-08-23 | End: 2017-09-27 | Stop reason: SDUPTHER

## 2017-08-23 RX ORDER — SODIUM CHLORIDE 9 MG/ML
INJECTION, SOLUTION INTRAVENOUS CONTINUOUS
Status: DISCONTINUED | OUTPATIENT
Start: 2017-08-23 | End: 2017-08-24 | Stop reason: HOSPADM

## 2017-08-23 RX ORDER — SODIUM CHLORIDE 9 MG/ML
INJECTION, SOLUTION INTRAVENOUS CONTINUOUS
Status: ACTIVE | OUTPATIENT
Start: 2017-08-23 | End: 2017-08-23

## 2017-08-23 RX ORDER — NITROGLYCERIN 0.4 MG/1
0.4 TABLET SUBLINGUAL EVERY 5 MIN PRN
Status: DISCONTINUED | OUTPATIENT
Start: 2017-08-23 | End: 2017-08-24 | Stop reason: HOSPADM

## 2017-08-23 RX ORDER — ACETAMINOPHEN 325 MG/1
650 TABLET ORAL EVERY 4 HOURS PRN
Status: DISCONTINUED | OUTPATIENT
Start: 2017-08-23 | End: 2017-08-24 | Stop reason: HOSPADM

## 2017-08-23 RX ORDER — OXYCODONE HYDROCHLORIDE 5 MG/1
30 TABLET ORAL ONCE
Status: COMPLETED | OUTPATIENT
Start: 2017-08-23 | End: 2017-08-23

## 2017-08-23 RX ORDER — HYDROCODONE BITARTRATE AND ACETAMINOPHEN 5; 325 MG/1; MG/1
1 TABLET ORAL EVERY 4 HOURS PRN
Status: DISCONTINUED | OUTPATIENT
Start: 2017-08-23 | End: 2017-08-24 | Stop reason: HOSPADM

## 2017-08-23 RX ADMIN — SODIUM CHLORIDE: 9 INJECTION, SOLUTION INTRAVENOUS at 08:08

## 2017-08-23 RX ADMIN — OXYCODONE HYDROCHLORIDE 30 MG: 5 TABLET ORAL at 12:08

## 2017-08-23 NOTE — DISCHARGE SUMMARY
[facility]  Discharge Summary      Admit Date: 8/23/2017    Discharge Date and Time:  08/23/2017 11:04 AM    Attending Physician: Tae Patterson MD     Reason for Admission: [facility]    Procedures Performed: Procedure(s) (LRB):  HEART CATH-LEFT (Left)    Hospital Course (synopsis of major diagnoses, care, treatment, and services provided during the course of the hospital stay):        8/23/17 - [facility] - EDP 8, normal coronaries  Medical Rx, angioseal  Home today    Consults: none    Significant Diagnostic Studies: Labs:   BMP:   Recent Labs  Lab 08/22/17  0859   GLU 92      K 4.7      CO2 29   BUN 17   CREATININE 0.7   CALCIUM 9.3    and CMP   Recent Labs  Lab 08/22/17  0859      K 4.7      CO2 29   GLU 92   BUN 17   CREATININE 0.7   CALCIUM 9.3   ANIONGAP 6*   ESTGFRAFRICA >60   EGFRNONAA >60       Final Diagnoses:    Principal Problem: <principal problem not specified>   Secondary Diagnoses:   Active Hospital Problems    Diagnosis  POA    Coronary artery disease [I25.10]  Yes      Resolved Hospital Problems    Diagnosis Date Resolved POA   No resolved problems to display.       Discharged Condition: good    Disposition: Home or Self Care    Follow Up/Patient Instructions:     Medications:  Reconciled Home Medications:   Current Discharge Medication List      CONTINUE these medications which have CHANGED    Details   albuterol (PROAIR HFA) 90 mcg/actuation inhaler Inhale 2 puffs into the lungs every 6 (six) hours as needed.  Qty: 8.5 g, Refills: 0         CONTINUE these medications which have NOT CHANGED    Details   anthralin 1.2 % CmRR Refills: 0      aspirin 81 MG Chew Take 1 tablet (81 mg total) by mouth once daily.  Refills: 0      diazePAM (VALIUM) 10 MG Tab Take 10 mg by mouth 2 (two) times daily.  Refills: 0      dicyclomine (BENTYL) 20 mg tablet Take 1 tablet (20 mg total) by mouth 2 (two) times daily.  Qty: 60 tablet, Refills: 0    Associated Diagnoses: Right upper  quadrant abdominal pain      doxepin (SINEQUAN) 25 MG capsule Take 25 mg by mouth every evening.  Refills: 0      ergocalciferol (ERGOCALCIFEROL) 50,000 unit Cap take 1 capsule by mouth EVERY 7 DAYS  Qty: 12 capsule, Refills: 0      FLONASE ALLERGY RELIEF 50 mcg/actuation nasal spray       gabapentin (NEURONTIN) 300 MG capsule Take 600 mg by mouth 3 (three) times daily.   Refills: 0      hydrOXYzine pamoate (VISTARIL) 50 MG Cap Take 1 capsule (50 mg total) by mouth every 6 (six) hours as needed (anxiety).  Qty: 30 capsule, Refills: 1    Associated Diagnoses: Anxiety      isosorbide mononitrate (IMDUR) 30 MG 24 hr tablet Take 1 tablet (30 mg total) by mouth once daily.  Qty: 30 tablet, Refills: 11      levothyroxine (SYNTHROID) 100 MCG tablet Take 1 tablet (100 mcg total) by mouth once daily.  Qty: 30 tablet, Refills: 0    Associated Diagnoses: Postsurgical hypothyroidism      lidocaine-prilocaine (EMLA) cream Refills: 0      lisinopril 10 MG tablet take 1 tablet by mouth once daily  Qty: 30 tablet, Refills: 6      LMX 4 4 % cream Refills: 0      methocarbamol (ROBAXIN) 500 MG Tab Take 2 tablets (1,000 mg total) by mouth 3 (three) times daily.  Qty: 30 tablet, Refills: 0      naproxen (NAPROSYN) 375 MG tablet take 1 tablet by mouth three times a day with food if needed  Refills: 0      omeprazole magnesium 20 mg CpDR Take 1 tablet by mouth 2 (two) times daily.  Qty: 60 capsule, Refills: 3    Associated Diagnoses: Right upper quadrant abdominal pain      oxycodone (ROXICODONE) 30 MG Tab Take 30 mg by mouth every 6 (six) hours as needed.       promethazine (PHENERGAN) 25 MG tablet take 1 tablet by mouth every 8 hours if needed for nausea and vomiting  Refills: 0      propranolol (INDERAL) 60 MG tablet Take 60 mg by mouth once daily.      risperidone (RISPERDAL) 2 MG tablet Take 2 mg by mouth every evening.  Refills: 0      sertraline (ZOLOFT) 100 MG tablet Take 200 mg by mouth every morning.  Refills: 0       sumatriptan (IMITREX) 100 MG tablet take 1 tablet by mouth immediately may repeat after 2 hours if needed  Refills: 0      topiramate (TOPAMAX) 100 MG tablet Take 100 mg by mouth 2 (two) times daily.  Refills: 0      nitroGLYCERIN (NITROSTAT) 0.4 MG SL tablet Place 1 tablet (0.4 mg total) under the tongue every 5 (five) minutes as needed.  Qty: 25 tablet, Refills: 11      nitroGLYCERIN 0.4 MG/HR TD PT24 (NITRODUR) 0.4 mg/hr apply 1 patch once daily to SKIN  Qty: 30 patch, Refills: 11      REXULTI 0.5 mg Tab Take 0.5 mg by mouth once daily at 6am.  Refills: 0             Discharge Procedure Orders  Diet general     Call MD for:  temperature >100.4     Call MD for:  severe uncontrolled pain     Call MD for:  difficulty breathing, headache or visual disturbances     Call MD for:  redness, tenderness, or signs of infection (pain, swelling, redness, odor or green/yellow discharge around incision site)     Call MD for:  hives     Activity - no heavy lifting 4 days  Continue home medications  Cardiac diet  OV Dr Patterson 1 week

## 2017-08-23 NOTE — H&P
[facility] [place] - Cardiology  History & Physical    Subjective:      Chief Complaint/Reason for Admission: [facility]    Paras Smith is a 56 y.o. female.    CP, CAD - stent , HTN, depression, TIA     [facility] 3/12/14 EDP 5, EF 60%, normal coronaries     Echo 17    1 - Normal left ventricular systolic function (EF 60-65%).     2 - No wall motion abnormalities.      Stress test 17  LVEF: 65 %  Impression: ABNORMAL MYOCARDIAL PERFUSION  1. There is evidence for moderate myocardial ischemia in the anterior wall of the left ventricle.   2. The perfusion scan is free of evidence for myocardial injury.   3. Resting wall motion is physiologic.   4. Resting LV function is normal.   5. The ventricular volumes are normal at rest and stress.   6. The extracardiac distribution of radioactivity is normal.   7. When compared to the previous study from 10/31/2013, anterior ischemia now suggested.    Needs clearance prior to EGD and colonoscopy  Reports episodic sharp CP and some AGUIRRE  EKG NSR - ok        Past Medical History:   Diagnosis Date    Asthma     CAD (coronary artery disease)     stent     Cervical spine disease     Depression     Difficult intubation     POSSIBLE    GERD (gastroesophageal reflux disease)     Glaucoma     left eye    Helicobacter pylori (H. pylori) infection     History of stomach ulcers     HTN (hypertension)     Muscle weakness     LEFT    Neck problem     LIMITED ROM    Nuclear cataract 2014    Postsurgical hypothyroidism     Stroke     mini strokes    Thyroid cancer     Vitamin D deficiency disease      Past Surgical History:   Procedure Laterality Date    APPENDECTOMY      CERVICAL SPINE SURGERY      vocal cord damage     SECTION, LOW TRANSVERSE      x3    CHOLECYSTECTOMY      CORONARY ANGIOPLASTY WITH STENT PLACEMENT      x 3    PATELLA SURGERY  1969    THYROID SURGERY      total    TOTAL THYROIDECTOMY       Family History   Problem Relation Age of Onset     Diabetes Father     Kidney failure Father     Cataracts Father     Asthma Father     Glaucoma Father     Heart disease Mother      has pacemaker    Hypertension Mother     Asthma Mother     Cancer Mother      lung stage 4    Hypertension Brother     Heart disease Brother     Cervical cancer Daughter     Scoliosis Son     Hypertension Son     Hypertension Daughter     Seizures Daughter     Lupus Daughter     Cervical cancer Daughter     Mental illness Son      bipolar    Cancer Sister     Liver disease Sister     No Known Problems Brother     No Known Problems Daughter     Stomach cancer Maternal Aunt     Colon cancer Maternal Aunt     No Known Problems Maternal Uncle     No Known Problems Paternal Aunt     No Known Problems Paternal Uncle     Stomach cancer Maternal Grandmother     No Known Problems Maternal Grandfather     No Known Problems Paternal Grandmother     No Known Problems Paternal Grandfather     Stomach cancer Maternal Aunt     Ovarian cancer Neg Hx     Esophageal cancer Neg Hx      Social History   Substance Use Topics    Smoking status: Never Smoker    Smokeless tobacco: Never Used      Comment: No cigarrettes, only marijuana occasionally    Alcohol use No      Comment: recovering alcoholic       PTA Medications   Medication Sig    albuterol (PROAIR HFA) 90 mcg/actuation inhaler Inhale 2 puffs into the lungs every 6 (six) hours as needed.    albuterol 90 mcg/actuation inhaler Inhale 2 puffs into the lungs every 6 (six) hours as needed for Wheezing. Rescue    anthralin 1.2 % CmRR     aspirin 81 MG Chew Take 1 tablet (81 mg total) by mouth once daily.    diazePAM (VALIUM) 10 MG Tab Take 10 mg by mouth 2 (two) times daily.    dicyclomine (BENTYL) 20 mg tablet Take 1 tablet (20 mg total) by mouth 2 (two) times daily.    doxepin (SINEQUAN) 25 MG capsule Take 25 mg by mouth every evening.    ergocalciferol (ERGOCALCIFEROL) 50,000 unit Cap take 1 capsule by  mouth EVERY 7 DAYS    FLONASE ALLERGY RELIEF 50 mcg/actuation nasal spray     gabapentin (NEURONTIN) 300 MG capsule Take 600 mg by mouth 3 (three) times daily.     hydrOXYzine pamoate (VISTARIL) 50 MG Cap Take 1 capsule (50 mg total) by mouth every 6 (six) hours as needed (anxiety).    isosorbide mononitrate (IMDUR) 30 MG 24 hr tablet Take 1 tablet (30 mg total) by mouth once daily.    levothyroxine (SYNTHROID) 100 MCG tablet Take 1 tablet (100 mcg total) by mouth once daily.    lidocaine-prilocaine (EMLA) cream     lisinopril 10 MG tablet take 1 tablet by mouth once daily    LMX 4 4 % cream     methocarbamol (ROBAXIN) 500 MG Tab Take 2 tablets (1,000 mg total) by mouth 3 (three) times daily.    naproxen (NAPROSYN) 375 MG tablet take 1 tablet by mouth three times a day with food if needed    nitroGLYCERIN (NITROSTAT) 0.4 MG SL tablet Place 1 tablet (0.4 mg total) under the tongue every 5 (five) minutes as needed.    nitroGLYCERIN 0.4 MG/HR TD PT24 (NITRODUR) 0.4 mg/hr apply 1 patch once daily to SKIN    omeprazole magnesium 20 mg CpDR Take 1 tablet by mouth 2 (two) times daily.    oxycodone (ROXICODONE) 30 MG Tab Take 30 mg by mouth every 6 (six) hours as needed.     promethazine (PHENERGAN) 25 MG tablet take 1 tablet by mouth every 8 hours if needed for nausea and vomiting    propranolol (INDERAL) 60 MG tablet Take 60 mg by mouth once daily.    REXULTI 0.5 mg Tab Take 0.5 mg by mouth once daily at 6am.    risperidone (RISPERDAL) 2 MG tablet Take 2 mg by mouth every evening.    sertraline (ZOLOFT) 100 MG tablet Take 200 mg by mouth every morning.    sumatriptan (IMITREX) 100 MG tablet take 1 tablet by mouth immediately may repeat after 2 hours if needed    topiramate (TOPAMAX) 100 MG tablet Take 100 mg by mouth 2 (two) times daily.     Review of patient's allergies indicates:   Allergen Reactions    Vioxx [rofecoxib] Rash        Review of Systems   All other systems reviewed and are  negative.      Objective:      Vital Signs (Most Recent)       Vital Signs Range (Last 24H):  [unfilled]    Physical Exam   Constitutional: She is oriented to person, place, and time. She appears well-developed and well-nourished.   HENT:   Head: Normocephalic and atraumatic.   Eyes: EOM are normal. Pupils are equal, round, and reactive to light.   Neck: Normal range of motion. Neck supple.   Cardiovascular: Normal rate and regular rhythm.    Pulmonary/Chest: Effort normal and breath sounds normal.   Abdominal: Soft. Bowel sounds are normal.   Musculoskeletal: Normal range of motion.   Neurological: She is alert and oriented to person, place, and time.   Skin: Skin is warm and dry.       Data Review:    CBC:   Lab Results   Component Value Date    WBC 7.49 08/22/2017    RBC 4.46 08/22/2017    HGB 14.0 08/22/2017    HCT 41.1 08/22/2017     08/22/2017     BMP:   Lab Results   Component Value Date    GLU 92 08/22/2017     08/22/2017    K 4.7 08/22/2017     08/22/2017    CO2 29 08/22/2017    BUN 17 08/22/2017    CREATININE 0.7 08/22/2017    CALCIUM 9.3 08/22/2017      ECG: normal sinus rhythm, no blocks or conduction defects, no ischemic changes.     Assessment:      There are no hospital problems to display for this patient.    CP with stress test showing moderate anterior ischemia - on 2 anti-anginal medications    Plan:        [facility]

## 2017-08-23 NOTE — DISCHARGE INSTRUCTIONS
ANGIOGRAM INSTRUCTIONS                                           Drink plenty of fluids for the next 48 hours and follow your doctor's diet orders.    Rest for the next 72 hours.   Try not to keep the injected leg bent for a long period of time.  Remove the dressing in 48 hours, and you may shower. Clean the area with soap and water, and apply a band aid for the next 5 days.                                                                 No  Lifting over 5-10 lbs., that is, not more than 1 gallon of water, or straining for 72 hours.    No driving, no drinking alcohol, and no signing legal documents for the next 24 hours.    Call your doctor for elevated temperature, shortness of breath, chest pain, or cold discolored foot or leg.    If oozing occurs at the injections site, lie down.  Apply pressure with a clean wash cloth for 20 to 30 minutes and call your doctor.    If severe bleeding occurs, lie down, apply pressure.  Call 911 and request an ambulance to take you to the nearest hospital emergency room.    Continue to take your regular medications as instructed.              Follow up with the doctor that ordered the procedure.    Follow the instructions in the handout given to you.     Fall Prevention  Millions of people fall every year and injure themselves. You may have had anesthesia or sedation which may increase your risk of falling. You may have health issues that put you at an increased risk of falling.     Here are ways to reduce your risk of falling.  ·   · Make your home safe by keeping walkways clear of objects you may trip over.  · Use non-slip pads under rugs. Do not use area rugs or small throw rugs.  · Use non-slip mats in bathtubs and showers.  · Install handrails and lights on staircases.  · Do not walk in poorly lit areas.  · Do not stand on chairs or wobbly ladders.  · Use caution when reaching overhead or looking upward. This position can cause a loss of balance.  · Be sure your shoes fit  properly, have non-slip bottoms and are in good condition.   · Wear shoes both inside and out. Avoid going barefoot or wearing slippers.  · Be cautious when going up and down stairs, curbs, and when walking on uneven sidewalks.  · If your balance is poor, consider using a cane or walker.  · If your fall was related to alcohol use, stop or limit alcohol intake.   · If your fall was related to use of sleeping medicines, talk to your doctor about this. You may need to reduce your dosage at bedtime if you awaken during the night to go to the bathroom.    · To reduce the need for nighttime bathroom trips:  ¨ Avoid drinking fluids for several hours before going to bed  ¨ Empty your bladder before going to bed  ¨ Men can keep a urinal at the bedside  · Stay as active as you can. Balance, flexibility, strength, and endurance all come from exercise. They all play a role in preventing falls. Ask your healthcare provider which types of activity are right for you.  · Get your vision checked on a regular basis.  · If you have pets, know where they are before you stand up or walk so you don't trip over them.  · Use night lights.    You got  Pain medicine at 12:30 pm, Oxycodone.

## 2017-08-24 DIAGNOSIS — E89.0 POSTSURGICAL HYPOTHYROIDISM: ICD-10-CM

## 2017-08-24 RX ORDER — LEVOTHYROXINE SODIUM 100 UG/1
100 TABLET ORAL DAILY
Qty: 30 TABLET | Refills: 0 | Status: SHIPPED | OUTPATIENT
Start: 2017-08-24 | End: 2017-09-28 | Stop reason: SDUPTHER

## 2017-09-06 ENCOUNTER — CLINICAL SUPPORT (OUTPATIENT)
Dept: INFECTIOUS DISEASES | Facility: CLINIC | Age: 57
End: 2017-09-06
Payer: MEDICAID

## 2017-09-06 ENCOUNTER — OFFICE VISIT (OUTPATIENT)
Dept: HEPATOLOGY | Facility: CLINIC | Age: 57
End: 2017-09-06
Payer: MEDICAID

## 2017-09-06 VITALS
WEIGHT: 254.44 LBS | BODY MASS INDEX: 43.44 KG/M2 | HEART RATE: 58 BPM | HEIGHT: 64 IN | OXYGEN SATURATION: 97 % | SYSTOLIC BLOOD PRESSURE: 139 MMHG | DIASTOLIC BLOOD PRESSURE: 66 MMHG | RESPIRATION RATE: 18 BRPM

## 2017-09-06 DIAGNOSIS — K76.0 HEPATIC STEATOSIS: Primary | ICD-10-CM

## 2017-09-06 DIAGNOSIS — K76.0 HEPATIC STEATOSIS: ICD-10-CM

## 2017-09-06 PROCEDURE — 99999 PR PBB SHADOW E&M-EST. PATIENT-LVL III: CPT | Mod: PBBFAC,,, | Performed by: INTERNAL MEDICINE

## 2017-09-06 PROCEDURE — 3075F SYST BP GE 130 - 139MM HG: CPT | Mod: ,,, | Performed by: INTERNAL MEDICINE

## 2017-09-06 PROCEDURE — 99213 OFFICE O/P EST LOW 20 MIN: CPT | Mod: PBBFAC | Performed by: INTERNAL MEDICINE

## 2017-09-06 PROCEDURE — 90746 HEPB VACCINE 3 DOSE ADULT IM: CPT | Mod: PBBFAC

## 2017-09-06 PROCEDURE — 90471 IMMUNIZATION ADMIN: CPT | Mod: PBBFAC

## 2017-09-06 PROCEDURE — 3008F BODY MASS INDEX DOCD: CPT | Mod: ,,, | Performed by: INTERNAL MEDICINE

## 2017-09-06 PROCEDURE — 3078F DIAST BP <80 MM HG: CPT | Mod: ,,, | Performed by: INTERNAL MEDICINE

## 2017-09-06 PROCEDURE — 99214 OFFICE O/P EST MOD 30 MIN: CPT | Mod: S$PBB,,, | Performed by: INTERNAL MEDICINE

## 2017-09-06 NOTE — PROGRESS NOTES
Pt received the first dose of the Hepatitis B vaccination series. Pt already has return appts for the rest of the series. Pt tolerated injection well. Pt left unit in NAD. Vaccination handout provided.

## 2017-09-06 NOTE — PATIENT INSTRUCTIONS
You have fatty liver but there is a limited amount of scar tissue.    It is important to modify risk factors that include weight loss, diabetes, high blood pressure and cholesterol.    Your abdominal pain is not related to your liver.    Recommend hepatitis B vaccine and first injection today.    Return to clinic in 1 year.

## 2017-09-06 NOTE — PROGRESS NOTES
Hepatology Follow-up Note    Chief complaint:   Chief Complaint   Patient presents with    Follow-up       HPI:  Paras Smith is a 56 y.o. female that presents to hepatology clinic for consultation of hepatic steatosis.  She is accompanied by daughter and grandson.    Patient recently seen in hepatology clinic on 7/2017 with diagnosis of hepatic steatosis and incidentally noted liver cyst.  She was reassured regarding cyst.  Serologic w/u for other etiologies of liver disease along with fibroscan performed for fibrosis staging.  Serologic work-up was negative and fibroscan with F0-1 fibrosis.      Clinically the patient has no symptoms of chronic liver disease.  She is interested in taking a weight loss supplement and brings it into clinic today for review.     She continues to have abdominal pain and scheduled for EGD/colonoscopy.        Patient Active Problem List   Diagnosis    HTN (hypertension)    Postsurgical hypothyroidism    Thyroid cancer    CAD (coronary artery disease)    Chest pain, cardiac    Depression    Paronychia of fifth toe of left foot    Wound of toenail    Chest pain, atypical    Cervicalgia    Weakness of neck    Weakness of both arms    Stiffness of neck    Glaucoma suspect    Nuclear cataract    Dry eye syndrome    Vitamin D deficiency disease    Helicobacter pylori (H. pylori) infection    Dyspnea    Cholecystitis    Morbid obesity due to excess calories    Bronchitis, acute, with bronchospasm    Anxiety    NAFLD (nonalcoholic fatty liver disease)    Liver cyst    Coronary artery disease       Past Medical History:   Diagnosis Date    Asthma     CAD (coronary artery disease)     stent 2003    Cervical spine disease     Depression     Difficult intubation     POSSIBLE    GERD (gastroesophageal reflux disease)     Glaucoma     left eye    Helicobacter pylori (H. pylori) infection     History of stomach ulcers     HTN (hypertension)     Muscle  weakness     LEFT    Neck problem     LIMITED ROM    Nuclear cataract 2014    Postsurgical hypothyroidism     Stroke     mini strokes    Thyroid cancer     Vitamin D deficiency disease        Past Surgical History:   Procedure Laterality Date    APPENDECTOMY      CERVICAL SPINE SURGERY      vocal cord damage     SECTION, LOW TRANSVERSE      x3    CHOLECYSTECTOMY      CORONARY ANGIOPLASTY WITH STENT PLACEMENT      x 3    PATELLA SURGERY  1969    THYROID SURGERY      total    TOTAL THYROIDECTOMY         Family History   Problem Relation Age of Onset    Diabetes Father     Kidney failure Father     Cataracts Father     Asthma Father     Glaucoma Father     Heart disease Mother      has pacemaker    Hypertension Mother     Asthma Mother     Cancer Mother      lung stage 4    Hypertension Brother     Heart disease Brother     Cervical cancer Daughter     Scoliosis Son     Hypertension Son     Hypertension Daughter     Seizures Daughter     Lupus Daughter     Cervical cancer Daughter     Mental illness Son      bipolar    Cancer Sister     Liver disease Sister     No Known Problems Brother     No Known Problems Daughter     Stomach cancer Maternal Aunt     Colon cancer Maternal Aunt     No Known Problems Maternal Uncle     No Known Problems Paternal Aunt     No Known Problems Paternal Uncle     Stomach cancer Maternal Grandmother     No Known Problems Maternal Grandfather     No Known Problems Paternal Grandmother     No Known Problems Paternal Grandfather     Stomach cancer Maternal Aunt     Ovarian cancer Neg Hx     Esophageal cancer Neg Hx        Social History     Social History    Marital status: Legally      Spouse name: N/A    Number of children: N/A    Years of education: N/A     Social History Main Topics    Smoking status: Never Smoker    Smokeless tobacco: Never Used      Comment: No cigarrettes, only marijuana occasionally    Alcohol  use No      Comment: recovering alcoholic    Drug use: No      Comment: ocasional    Sexual activity: Not Currently     Partners: Male     Other Topics Concern    Not on file     Social History Narrative    No narrative on file       Current Outpatient Prescriptions   Medication Sig Dispense Refill    albuterol (PROAIR HFA) 90 mcg/actuation inhaler Inhale 2 puffs into the lungs every 6 (six) hours as needed. 8.5 g 0    anthralin 1.2 % CmRR   0    aspirin 81 MG Chew Take 1 tablet (81 mg total) by mouth once daily.  0    diazePAM (VALIUM) 10 MG Tab Take 10 mg by mouth 2 (two) times daily.  0    dicyclomine (BENTYL) 20 mg tablet Take 1 tablet (20 mg total) by mouth 2 (two) times daily. 60 tablet 0    doxepin (SINEQUAN) 25 MG capsule Take 25 mg by mouth every evening.  0    ergocalciferol (ERGOCALCIFEROL) 50,000 unit Cap take 1 capsule by mouth EVERY 7 DAYS 12 capsule 0    FLONASE ALLERGY RELIEF 50 mcg/actuation nasal spray       gabapentin (NEURONTIN) 300 MG capsule Take 600 mg by mouth 3 (three) times daily.   0    hydrOXYzine pamoate (VISTARIL) 50 MG Cap Take 1 capsule (50 mg total) by mouth every 6 (six) hours as needed (anxiety). 30 capsule 1    isosorbide mononitrate (IMDUR) 30 MG 24 hr tablet Take 1 tablet (30 mg total) by mouth once daily. 30 tablet 11    levothyroxine (SYNTHROID) 100 MCG tablet Take 1 tablet (100 mcg total) by mouth once daily. 30 tablet 0    lidocaine-prilocaine (EMLA) cream   0    lisinopril 10 MG tablet take 1 tablet by mouth once daily 30 tablet 6    LMX 4 4 % cream   0    naproxen (NAPROSYN) 375 MG tablet take 1 tablet by mouth three times a day with food if needed  0    nitroGLYCERIN (NITROSTAT) 0.4 MG SL tablet Place 1 tablet (0.4 mg total) under the tongue every 5 (five) minutes as needed. 25 tablet 11    nitroGLYCERIN 0.4 MG/HR TD PT24 (NITRODUR) 0.4 mg/hr apply 1 patch once daily to SKIN 30 patch 11    oxycodone (ROXICODONE) 30 MG Tab Take 30 mg by mouth every 6  "(six) hours as needed.       promethazine (PHENERGAN) 25 MG tablet take 1 tablet by mouth every 8 hours if needed for nausea and vomiting  0    propranolol (INDERAL) 60 MG tablet Take 60 mg by mouth once daily.      REXULTI 0.5 mg Tab Take 0.5 mg by mouth once daily at 6am.  0    risperidone (RISPERDAL) 2 MG tablet Take 2 mg by mouth every evening.  0    sertraline (ZOLOFT) 100 MG tablet Take 200 mg by mouth every morning.  0    sumatriptan (IMITREX) 100 MG tablet take 1 tablet by mouth immediately may repeat after 2 hours if needed  0    topiramate (TOPAMAX) 100 MG tablet Take 100 mg by mouth 2 (two) times daily.  0    omeprazole magnesium 20 mg CpDR Take 1 tablet by mouth 2 (two) times daily. 60 capsule 3     No current facility-administered medications for this visit.        Review of patient's allergies indicates:   Allergen Reactions    Vioxx [rofecoxib] Rash       Review of Systems   Constitutional: Negative for chills, fever, malaise/fatigue and weight loss.   Eyes: Negative.    Respiratory: Negative for cough and shortness of breath.    Cardiovascular: Negative for chest pain and leg swelling.   Gastrointestinal: Positive for abdominal pain. Negative for heartburn, nausea and vomiting.   Musculoskeletal: Negative for joint pain and myalgias.   Skin: Negative for itching and rash.   Neurological: Negative for dizziness, focal weakness and headaches.   Endo/Heme/Allergies: Does not bruise/bleed easily.   Psychiatric/Behavioral: Negative for depression.       Vitals:    09/06/17 0754   BP: 139/66   Pulse: (!) 58   Resp: 18   SpO2: 97%   Weight: 115.4 kg (254 lb 6.6 oz)   Height: 5' 4" (1.626 m)       Physical Exam   Constitutional: She is oriented to person, place, and time. She appears well-developed and well-nourished. No distress.   HENT:   Head: Normocephalic and atraumatic.   Mouth/Throat: Oropharynx is clear and moist.   Eyes: EOM are normal. Pupils are equal, round, and reactive to light. No " scleral icterus.   Neck: Normal range of motion. Neck supple. No thyromegaly present.   Cardiovascular: Normal rate, regular rhythm and normal heart sounds.  Exam reveals no gallop and no friction rub.    No murmur heard.  Pulmonary/Chest: Effort normal. No respiratory distress. She has no wheezes. She has no rales.   Abdominal: Soft. Bowel sounds are normal. She exhibits no distension. There is tenderness.   Musculoskeletal: Normal range of motion. She exhibits no edema.   Lymphadenopathy:     She has no cervical adenopathy.   Neurological: She is alert and oriented to person, place, and time.   Skin: Skin is warm and dry. No rash noted.   Psychiatric: She has a normal mood and affect. Her behavior is normal.   Vitals reviewed.      Lab Results   Component Value Date    ALT 23 08/20/2017    AST 17 08/20/2017    ALKPHOS 97 08/20/2017    BILITOT 0.5 08/20/2017       Lab Results   Component Value Date    WBC 7.49 08/22/2017    HGB 14.0 08/22/2017    HCT 41.1 08/22/2017    MCV 92 08/22/2017     08/22/2017       Lab Results   Component Value Date     08/22/2017    K 4.7 08/22/2017     08/22/2017    CO2 29 08/22/2017    BUN 17 08/22/2017    CREATININE 0.7 08/22/2017    CALCIUM 9.3 08/22/2017    ANIONGAP 6 (L) 08/22/2017    ESTGFRAFRICA >60 08/22/2017    EGFRNONAA >60 08/22/2017     Imaging: none   Fibroscan: 4.0 kPa F0-1     Assessment:  56 y.o. female presenting with hepatic steatosis and normal liver tests with negative serologic work-up most consistent with NAFLD.      Fatty liver:  Discussion with patient and daughter regarding the presence of risk factors for WRIGHT.  Needs increased physical activity and discussed use of a natural supplement for weight loss which was reviewed in clinic and acceptable.  Excessive alcohol use is remote and fibroscan reassuring.  Therefore encouraged healthy lifestyle measures for weight loss and modification of risk factors.  Initiation of hepatitis B vaccination for  immunity.  Hepatitis A immune.    Reassured that abdominal pain is not related to fatty liver and continuing to undergo GI evaluation.      RTC in 1 year if patient has improvement in risk factors and continued normalization of liver tests, may be followed by PCP

## 2017-09-08 ENCOUNTER — LAB VISIT (OUTPATIENT)
Dept: LAB | Facility: HOSPITAL | Age: 57
End: 2017-09-08
Payer: MEDICAID

## 2017-09-08 DIAGNOSIS — C73 THYROID CANCER: ICD-10-CM

## 2017-09-08 DIAGNOSIS — E89.0 POSTSURGICAL HYPOTHYROIDISM: ICD-10-CM

## 2017-09-08 LAB
T4 FREE SERPL-MCNC: 1.21 NG/DL
TSH SERPL DL<=0.005 MIU/L-ACNC: 0.24 UIU/ML

## 2017-09-08 PROCEDURE — 86800 THYROGLOBULIN ANTIBODY: CPT

## 2017-09-08 PROCEDURE — 84443 ASSAY THYROID STIM HORMONE: CPT

## 2017-09-08 PROCEDURE — 36415 COLL VENOUS BLD VENIPUNCTURE: CPT | Mod: PN

## 2017-09-08 PROCEDURE — 84439 ASSAY OF FREE THYROXINE: CPT

## 2017-09-09 RX ORDER — ERGOCALCIFEROL 1.25 MG/1
CAPSULE ORAL
Qty: 12 CAPSULE | Refills: 0 | Status: SHIPPED | OUTPATIENT
Start: 2017-09-09 | End: 2018-03-01 | Stop reason: SDUPTHER

## 2017-09-11 LAB
THRYOGLOBULIN INTERPRETATION: NORMAL
THYROGLOB AB SERPL-ACNC: <1.8 IU/ML
THYROGLOB SERPL-MCNC: <0.1 NG/ML

## 2017-09-12 ENCOUNTER — OFFICE VISIT (OUTPATIENT)
Dept: CARDIOLOGY | Facility: CLINIC | Age: 57
End: 2017-09-12
Payer: MEDICAID

## 2017-09-12 VITALS
SYSTOLIC BLOOD PRESSURE: 128 MMHG | HEIGHT: 64 IN | DIASTOLIC BLOOD PRESSURE: 73 MMHG | WEIGHT: 255.06 LBS | OXYGEN SATURATION: 95 % | HEART RATE: 89 BPM | BODY MASS INDEX: 43.54 KG/M2

## 2017-09-12 DIAGNOSIS — K76.0 NAFLD (NONALCOHOLIC FATTY LIVER DISEASE): ICD-10-CM

## 2017-09-12 DIAGNOSIS — Z12.11 SPECIAL SCREENING FOR MALIGNANT NEOPLASMS, COLON: Primary | ICD-10-CM

## 2017-09-12 DIAGNOSIS — I25.10 CORONARY ARTERY DISEASE, ANGINA PRESENCE UNSPECIFIED, UNSPECIFIED VESSEL OR LESION TYPE, UNSPECIFIED WHETHER NATIVE OR TRANSPLANTED HEART: ICD-10-CM

## 2017-09-12 DIAGNOSIS — R07.89 CHEST PAIN, ATYPICAL: Primary | ICD-10-CM

## 2017-09-12 DIAGNOSIS — I10 ESSENTIAL HYPERTENSION: ICD-10-CM

## 2017-09-12 PROCEDURE — 99215 OFFICE O/P EST HI 40 MIN: CPT | Mod: PBBFAC | Performed by: INTERNAL MEDICINE

## 2017-09-12 PROCEDURE — 3078F DIAST BP <80 MM HG: CPT | Mod: ,,, | Performed by: INTERNAL MEDICINE

## 2017-09-12 PROCEDURE — 3008F BODY MASS INDEX DOCD: CPT | Mod: ,,, | Performed by: INTERNAL MEDICINE

## 2017-09-12 PROCEDURE — 3074F SYST BP LT 130 MM HG: CPT | Mod: ,,, | Performed by: INTERNAL MEDICINE

## 2017-09-12 PROCEDURE — 99999 PR PBB SHADOW E&M-EST. PATIENT-LVL V: CPT | Mod: PBBFAC,,, | Performed by: INTERNAL MEDICINE

## 2017-09-12 PROCEDURE — 99213 OFFICE O/P EST LOW 20 MIN: CPT | Mod: S$PBB,,, | Performed by: INTERNAL MEDICINE

## 2017-09-12 RX ORDER — SODIUM, POTASSIUM,MAG SULFATES 17.5-3.13G
SOLUTION, RECONSTITUTED, ORAL ORAL
Qty: 1 BOTTLE | Refills: 0 | Status: ON HOLD | OUTPATIENT
Start: 2017-09-12 | End: 2018-12-31 | Stop reason: HOSPADM

## 2017-09-12 NOTE — PROGRESS NOTES
Subjective:    Patient ID:  Paras Smith is a 56 y.o. female who presents for follow-up of Post-op Evaluation      HPI     CP, CAD - stent 2003, HTN, depression, TIA     8/23/17 - LHC - EDP 8, normal coronaries     LHC 3/12/14 EDP 5, EF 60%, normal coronaries     Echo 8/16/17    1 - Normal left ventricular systolic function (EF 60-65%).     2 - No wall motion abnormalities.      Stress test 8/16/17  LVEF: 65 %  Impression: ABNORMAL MYOCARDIAL PERFUSION  1. There is evidence for moderate myocardial ischemia in the anterior wall of the left ventricle.   2. The perfusion scan is free of evidence for myocardial injury.   3. Resting wall motion is physiologic.   4. Resting LV function is normal.   5. The ventricular volumes are normal at rest and stress.   6. The extracardiac distribution of radioactivity is normal.   7. When compared to the previous study from 10/31/2013, anterior ischemia now suggested.     Needs clearance prior to EGD and colonoscopy  Reports episodic sharp CP and some AGUIRRE    Review of Systems   Constitution: Negative for decreased appetite.   HENT: Negative for ear discharge.    Eyes: Negative for blurred vision.   Respiratory: Negative for hemoptysis.    Endocrine: Negative for polyphagia.   Hematologic/Lymphatic: Negative for adenopathy.   Skin: Negative for color change.   Musculoskeletal: Negative for joint swelling.   Neurological: Negative for brief paralysis.   Psychiatric/Behavioral: Negative for hallucinations.        Objective:    Physical Exam   Constitutional: She is oriented to person, place, and time. She appears well-developed and well-nourished.   HENT:   Head: Normocephalic and atraumatic.   Eyes: Conjunctivae are normal. Pupils are equal, round, and reactive to light.   Neck: Normal range of motion. Neck supple.   Cardiovascular: Normal rate, normal heart sounds and intact distal pulses.    Pulmonary/Chest: Effort normal and breath sounds normal.   Abdominal: Soft. Bowel  sounds are normal.   Musculoskeletal: Normal range of motion.   Neurological: She is alert and oriented to person, place, and time.   Skin: Skin is warm and dry.         Assessment:       1. Chest pain, atypical    2. NAFLD (nonalcoholic fatty liver disease)    3. Coronary artery disease, angina presence unspecified, unspecified vessel or lesion type, unspecified whether native or transplanted heart    4. Essential hypertension         Plan:       CP is non-cardiac  Cleared for GI evaluation at  Low risk  OV 6 months

## 2017-09-27 RX ORDER — ALBUTEROL SULFATE 90 UG/1
AEROSOL, METERED RESPIRATORY (INHALATION)
Qty: 18 INHALER | Refills: 0 | Status: SHIPPED | OUTPATIENT
Start: 2017-09-27 | End: 2017-11-27 | Stop reason: SDUPTHER

## 2017-09-28 DIAGNOSIS — E89.0 POSTSURGICAL HYPOTHYROIDISM: ICD-10-CM

## 2017-09-28 RX ORDER — LEVOTHYROXINE SODIUM 100 UG/1
100 TABLET ORAL DAILY
Qty: 30 TABLET | Refills: 0 | Status: SHIPPED | OUTPATIENT
Start: 2017-09-28 | End: 2017-11-27 | Stop reason: SDUPTHER

## 2017-10-09 ENCOUNTER — TELEPHONE (OUTPATIENT)
Dept: FAMILY MEDICINE | Facility: CLINIC | Age: 57
End: 2017-10-09

## 2017-10-09 DIAGNOSIS — E89.0 POST-SURGICAL HYPOTHYROIDISM: Primary | ICD-10-CM

## 2017-10-09 NOTE — TELEPHONE ENCOUNTER
Patient states Dr. Watts is no longer apart of [facility] and that we were suppose to find her another thyroid doctor,but no one never got in contact with her.

## 2017-10-09 NOTE — TELEPHONE ENCOUNTER
----- Message from Yessenia Gasca sent at 10/9/2017 12:13 PM CDT -----  Contact: self  [phone]  Pt is requesting lab results. Lab were done on 9-8-17. Pls call pt [phone]. Thanks......[place]

## 2017-10-09 NOTE — TELEPHONE ENCOUNTER
Attempted to call patient re: referrals but daughter answered. Left message to have patient call our office.

## 2017-10-13 ENCOUNTER — CLINICAL SUPPORT (OUTPATIENT)
Dept: INFECTIOUS DISEASES | Facility: CLINIC | Age: 57
End: 2017-10-13
Payer: MEDICAID

## 2017-10-13 PROCEDURE — 90746 HEPB VACCINE 3 DOSE ADULT IM: CPT | Mod: PBBFAC

## 2017-10-13 PROCEDURE — 90471 IMMUNIZATION ADMIN: CPT | Mod: PBBFAC

## 2017-10-25 ENCOUNTER — OFFICE VISIT (OUTPATIENT)
Dept: FAMILY MEDICINE | Facility: CLINIC | Age: 57
End: 2017-10-25
Payer: MEDICAID

## 2017-10-25 VITALS
SYSTOLIC BLOOD PRESSURE: 144 MMHG | HEIGHT: 64 IN | RESPIRATION RATE: 20 BRPM | WEIGHT: 252.19 LBS | BODY MASS INDEX: 43.05 KG/M2 | DIASTOLIC BLOOD PRESSURE: 82 MMHG | HEART RATE: 75 BPM | OXYGEN SATURATION: 97 % | TEMPERATURE: 98 F

## 2017-10-25 DIAGNOSIS — R30.0 DYSURIA: Primary | ICD-10-CM

## 2017-10-25 DIAGNOSIS — R19.7 DIARRHEA OF PRESUMED INFECTIOUS ORIGIN: ICD-10-CM

## 2017-10-25 DIAGNOSIS — R06.00 DYSPNEA, UNSPECIFIED TYPE: ICD-10-CM

## 2017-10-25 DIAGNOSIS — E89.0 POSTSURGICAL HYPOTHYROIDISM: ICD-10-CM

## 2017-10-25 DIAGNOSIS — R05.8 PRODUCTIVE COUGH: ICD-10-CM

## 2017-10-25 DIAGNOSIS — I10 ESSENTIAL HYPERTENSION: ICD-10-CM

## 2017-10-25 LAB
BILIRUB SERPL-MCNC: NEGATIVE MG/DL
BLOOD URINE, POC: 50
COLOR, POC UA: YELLOW
GLUCOSE UR QL STRIP: NORMAL
KETONES UR QL STRIP: NORMAL
LEUKOCYTE ESTERASE URINE, POC: ABNORMAL
NITRITE, POC UA: NEGATIVE
PH, POC UA: 5
PROTEIN, POC: NEGATIVE
SPECIFIC GRAVITY, POC UA: 1.02
UROBILINOGEN, POC UA: ABNORMAL

## 2017-10-25 PROCEDURE — 81002 URINALYSIS NONAUTO W/O SCOPE: CPT | Mod: PBBFAC,PN | Performed by: NURSE PRACTITIONER

## 2017-10-25 PROCEDURE — 94640 AIRWAY INHALATION TREATMENT: CPT | Mod: PBBFAC,PN

## 2017-10-25 PROCEDURE — 99999 PR PBB SHADOW E&M-EST. PATIENT-LVL V: CPT | Mod: PBBFAC,,, | Performed by: NURSE PRACTITIONER

## 2017-10-25 PROCEDURE — 99215 OFFICE O/P EST HI 40 MIN: CPT | Mod: PBBFAC,PN | Performed by: NURSE PRACTITIONER

## 2017-10-25 PROCEDURE — 99214 OFFICE O/P EST MOD 30 MIN: CPT | Mod: S$PBB,,, | Performed by: NURSE PRACTITIONER

## 2017-10-25 RX ORDER — GUAIFENESIN 1200 MG/1
1200 TABLET, EXTENDED RELEASE ORAL EVERY 12 HOURS
COMMUNITY
Start: 2017-10-25 | End: 2017-10-25 | Stop reason: SDUPTHER

## 2017-10-25 RX ORDER — ESCITALOPRAM OXALATE 10 MG/1
10 TABLET ORAL
Refills: 1 | COMMUNITY
Start: 2017-09-27 | End: 2018-02-21

## 2017-10-25 RX ORDER — GUAIFENESIN 1200 MG/1
1200 TABLET, EXTENDED RELEASE ORAL EVERY 12 HOURS
Qty: 60 TABLET | Refills: 0 | Status: SHIPPED | OUTPATIENT
Start: 2017-10-25 | End: 2017-11-04

## 2017-10-25 RX ORDER — CIPROFLOXACIN 500 MG/1
500 TABLET ORAL 2 TIMES DAILY
Qty: 14 TABLET | Refills: 0 | Status: SHIPPED | OUTPATIENT
Start: 2017-10-25 | End: 2017-11-01

## 2017-10-25 RX ORDER — ALBUTEROL SULFATE 0.83 MG/ML
2.5 SOLUTION RESPIRATORY (INHALATION)
Status: COMPLETED | OUTPATIENT
Start: 2017-10-25 | End: 2017-10-25

## 2017-10-25 RX ORDER — AMLODIPINE BESYLATE 5 MG/1
5 TABLET ORAL DAILY
Qty: 30 TABLET | Refills: 1 | Status: SHIPPED | OUTPATIENT
Start: 2017-10-25 | End: 2018-01-03 | Stop reason: SDUPTHER

## 2017-10-25 RX ORDER — PREDNISONE 20 MG/1
20 TABLET ORAL DAILY
Qty: 10 TABLET | Refills: 0 | Status: SHIPPED | OUTPATIENT
Start: 2017-10-25 | End: 2018-02-21

## 2017-10-25 RX ADMIN — ALBUTEROL SULFATE 2.5 MG: 2.5 SOLUTION RESPIRATORY (INHALATION) at 09:10

## 2017-10-25 RX ADMIN — METHYLPREDNISOLONE SODIUM SUCCINATE 125 MG: 125 INJECTION, POWDER, FOR SOLUTION INTRAMUSCULAR; INTRAVENOUS at 09:10

## 2017-10-25 NOTE — PROGRESS NOTES
Patient tolerated Albuterol 2.5 mg/ 3 ml nebulizer treatment with no complications.Patient tolerate Solu- Medrol 125 mg IM injection . Patient advise to wait 15 min after injection  for assessment of any posssible side effects.

## 2017-10-25 NOTE — PATIENT INSTRUCTIONS
Follow up with primary care provider if not improved  Go to ER for new, worse or concerning symptoms  Drink plenty of fluids    What Is Acute Bronchitis?  Acute bronchitis is when the airways in your lungs (bronchial tubes) become red and swollen (inflamed). It is usually caused by a viral infection. But it can also occur because of a bacteria or allergen. Symptoms include a cough that produces yellow or greenish mucus and can last for days or sometimes weeks.  Inside healthy lungs    Air travels in and out of the lungs through the airways. The linings of these airways produce sticky mucus. This mucus traps particles that enter the lungs. Tiny structures called cilia then sweep the particles out of the airways.     Healthy airway: Airways are normally open. Air moves in and out easily.      Healthy cilia: Tiny, hairlike cilia sweep mucus and particles up and out of the airways.   Lungs with bronchitis  Bronchitis often occurs with a cold or the flu virus. The airways become inflamed (red and swollen). There is a deep hacking cough from the extra mucus. Other symptoms may include:  · Wheezing  · Chest discomfort  · Shortness of breath  · Mild fever  A second infection, this time due to bacteria, may then occur. And airways irritated by allergens or smoke are more likely to get infected.        Inflamed airway: Inflammation and extra mucus narrow the airway, causing shortness of breath.      Impaired cilia: Extra mucus impairs cilia, causing congestion and wheezing. Smoking makes the problem worse.   Making a diagnosis  A physical exam, health history, and certain tests help your healthcare provider make the diagnosis.  Health history  Your healthcare provider will ask you about your symptoms.  The exam  Your provider listens to your chest for signs of congestion. He or she may also check your ears, nose, and throat.  Possible tests  · A sputum test for bacteria. This requires a sample of mucus from your lungs.  · A  nasal or throat swab. This tests to see if you have a bacterial infection.  · A chest X-ray. This is done if your healthcare provider thinks you have pneumonia.  · Tests to check for an underlying condition. Other tests may be done to check for things such as allergies, asthma, or COPD (chronic obstructive pulmonary disease). You may need to see a specialist for more lung function testing.  Treating a cough  The main treatment for bronchitis is easing symptoms. Avoiding smoke, allergens, and other things that trigger coughing can often help. If the infection is bacterial, you may be given antibiotics. During the illness, it's important to get plenty of sleep. To ease symptoms:  · Dont smoke. Also avoid secondhand smoke.  · Use a humidifier. Or try breathing in steam from a hot shower. This may help loosen mucus.  · Drink a lot of water and juice. They can soothe the throat and may help thin mucus.  · Sit up or use extra pillows when in bed. This helps to lessen coughing and congestion.  · Ask your provider about using medicine. Ask about using cough medicine, pain and fever medicine, or a decongestant.  Antibiotics  Most cases of bronchitis are caused by cold or flu viruses. They dont need antibiotics to treat them, even if your mucus is thick and green or yellow. Antibiotics dont treat viral illness and antibiotics have not been shown to have any benefit in cases of acute bronchitis. Taking antibiotics when they are not needed increases your risk of getting an infection later that is antibiotic-resistant. Antibiotics can also cause severe cases of diarrhea that require other antibiotics to treat.  It is important that you accept your healthcare provider's opinion to not use antibiotics. Your provider will prescribe antibiotics if the infection is caused by bacteria. If they are prescribed:  · Take all of the medicine. Take the medicine until it is used up, even if symptoms have improved. If you dont, the  bronchitis may come back.  · Take the medicines as directed. For instance, some medicines should be taken with food.  · Ask about side effects. Ask your provider or pharmacist what side effects are common, and what to do about them.  Follow-up care  You should see your provider again in 2 to 3 weeks. By this time, symptoms should have improved. An infection that lasts longer may mean you have a more serious problem.  Prevention  · Avoid tobacco smoke. If you smoke, quit. Stay away from smoky places. Ask friends and family not to smoke around you, or in your home or car.  · Get checked for allergies.  · Ask your provider about getting a yearly flu shot. Also ask about pneumococcal or pneumonia shots.  · Wash your hands often. This helps reduce the chance of picking up viruses that cause colds and flu.  Call your healthcare provider if:  · Symptoms worsen, or you have new symptoms  · Breathing problems worsen or  become severe  · Symptoms dont get better within a week, or within 3 days of taking antibiotics   Date Last Reviewed: 2/1/2017  © 4955-3631 [employer] [address] [place] [address], [place] [address]. All rights reserved. This information is not intended as a substitute for professional medical care. Always follow your healthcare professional's instructions.

## 2017-10-25 NOTE — PROGRESS NOTES
"Subjective:       Patient ID: Paras Smtih is a 56 y.o. female.    Chief Complaint: Cough; Diarrhea; Fever (101.4 temp yesterday ); Shortness of Breath; Wheezing; Thyroid Problem; Dysuria; and Allergic Reaction (Lisinopril )    HPI Ms Smith is here for a few reasons.  She has had the following symptoms for about 4-5 days:  Diarrhea, SOB, nausea, vomiting, productive cough, fever 101 yesterday, weakness, chest congestion, "kidney pain" and dysuria.  Her grandson recently had a URI, she denies any food factors, she has a GI appt with EGD, but does not want to go because her "thyroid is not right".  SHe was previously seen by endocrine, needs to be referred to another endocrine MD.  She also reports chest pain, but "from her breathing, not her heart".    Review of Systems   Constitutional: Positive for fever.   Respiratory: Positive for cough and shortness of breath.    Cardiovascular: Positive for chest pain.   Gastrointestinal: Positive for diarrhea and vomiting.       Objective:      Physical Exam   Constitutional: She is oriented to person, place, and time. She appears well-developed and well-nourished. She appears ill. No distress.   HENT:   Head: Normocephalic and atraumatic.   Cardiovascular: Normal rate, regular rhythm and normal heart sounds.  Exam reveals no friction rub.    No murmur heard.  Pulmonary/Chest: Effort normal. No respiratory distress. She has decreased breath sounds. She has no wheezes. She has no rhonchi. She has rales.   Scattered rales throughout lungs, overall decreased.   Musculoskeletal: Normal range of motion.   Neurological: She is alert and oriented to person, place, and time.   Skin: Skin is warm and dry.   Psychiatric: She has a normal mood and affect. Her behavior is normal.   Vitals reviewed.      Assessment:       1. Dysuria    2. Productive cough    3. Essential hypertension    4. Dyspnea, unspecified type    5. Postsurgical hypothyroidism    6. Diarrhea of presumed " infectious origin        Plan:       Dysuria  -     POCT URINE DIPSTICK WITHOUT MICROSCOPE  -     Urinalysis  -     Urine culture  -     ciprofloxacin HCl (CIPRO) 500 MG tablet; Take 1 tablet (500 mg total) by mouth 2 (two) times daily.  Dispense: 14 tablet; Refill: 0  poct with trace WBC only    Productive cough  -     methylPREDNISolone sod suc(PF) injection 125 mg; Inject 125 mg into the muscle one time.  -     albuterol nebulizer solution 2.5 mg; Take 3 mLs (2.5 mg total) by nebulization one time.  -     ciprofloxacin HCl (CIPRO) 500 MG tablet; Take 1 tablet (500 mg total) by mouth 2 (two) times daily.  Dispense: 14 tablet; Refill: 0  -     predniSONE (DELTASONE) 20 MG tablet; Take 1 tablet (20 mg total) by mouth once daily.  Dispense: 10 tablet; Refill: 0  -     Discontinue: guaiFENesin 1,200 mg Ta12; Take 1,200 mg by mouth every 12 (twelve) hours.  -     guaiFENesin 1,200 mg Ta12; Take 1,200 mg by mouth every 12 (twelve) hours.  Dispense: 60 tablet; Refill: 0  Presumed infectious, will treat with Cipro to cover UTI and infectious diarrhea    Essential hypertension  -     amLODIPine (NORVASC) 5 MG tablet; Take 1 tablet (5 mg total) by mouth once daily.  Dispense: 30 tablet; Refill: 1  She states she had an allergic reaction to lisinopril, d/c and begin amlodipine.    Dyspnea, unspecified type  Prednisone as direceted    Postsurgical hypothyroidism  -     TSH; Future; Expected date: 10/25/2017  -     Ambulatory Referral to Endocrinology  We reviewed her most recent TSH last month, was low, will repeat next month    Diarrhea of presumed infectious origin  Encouraged to keep her appt with GI, she will not have general anesthesia for EGD, only twilight, this should not interfere with her thyroid.    F/u if not improved  ER for new worse or concerning symptoms

## 2017-11-02 ENCOUNTER — TELEPHONE (OUTPATIENT)
Dept: FAMILY MEDICINE | Facility: CLINIC | Age: 57
End: 2017-11-02

## 2017-11-02 NOTE — TELEPHONE ENCOUNTER
Ok to schedule.  Please obtain [facility] records before visit.

## 2017-11-02 NOTE — TELEPHONE ENCOUNTER
----- Message from Maria A Johns sent at 11/2/2017  2:31 PM CDT -----  Contact: self  Pt states she was diagnosed with Pneumonia which is not getting any better and a blood clot on her stomach by Buddy York. She is asking for an appointment tomorrow 11/03 as advised by Buddy York. There are no available appts until next week with any provider. Pt has Medicaid. Please contact pt call back at [phone] to discuss possibly adding her to the schedule.

## 2017-11-02 NOTE — TELEPHONE ENCOUNTER
Spoke to patient ,she states she was diagnosed with pneumonia and not getting any better.patient also states that a blood clot was found on her stomach, all diagnosed by [facility].Patient states she was told by [facility] to follow up 11/3/17. I advised patient that I didn't see where she had been seen by any of the providers in the [place] [facility], but she has been seen at the [facility].I advised  that there are no available apppointments until January . I advised her that I will forward her message to her PCP and staff and someone would return her call.

## 2017-11-03 ENCOUNTER — TELEPHONE (OUTPATIENT)
Dept: FAMILY MEDICINE | Facility: CLINIC | Age: 57
End: 2017-11-03

## 2017-11-03 ENCOUNTER — OFFICE VISIT (OUTPATIENT)
Dept: FAMILY MEDICINE | Facility: CLINIC | Age: 57
End: 2017-11-03
Payer: MEDICAID

## 2017-11-03 ENCOUNTER — APPOINTMENT (OUTPATIENT)
Dept: RADIOLOGY | Facility: HOSPITAL | Age: 57
End: 2017-11-03
Attending: INTERNAL MEDICINE
Payer: MEDICAID

## 2017-11-03 VITALS
WEIGHT: 264.56 LBS | DIASTOLIC BLOOD PRESSURE: 88 MMHG | TEMPERATURE: 98 F | HEART RATE: 64 BPM | OXYGEN SATURATION: 96 % | SYSTOLIC BLOOD PRESSURE: 134 MMHG | HEIGHT: 64 IN | BODY MASS INDEX: 45.17 KG/M2 | RESPIRATION RATE: 17 BRPM

## 2017-11-03 DIAGNOSIS — R30.0 DYSURIA: ICD-10-CM

## 2017-11-03 DIAGNOSIS — R63.5 WEIGHT GAIN: ICD-10-CM

## 2017-11-03 DIAGNOSIS — E66.01 MORBID OBESITY: ICD-10-CM

## 2017-11-03 DIAGNOSIS — R06.02 SOB (SHORTNESS OF BREATH): ICD-10-CM

## 2017-11-03 DIAGNOSIS — R06.02 SOB (SHORTNESS OF BREATH): Primary | ICD-10-CM

## 2017-11-03 DIAGNOSIS — E89.0 POSTSURGICAL HYPOTHYROIDISM: ICD-10-CM

## 2017-11-03 DIAGNOSIS — J20.9 ACUTE BRONCHITIS, UNSPECIFIED ORGANISM: Primary | ICD-10-CM

## 2017-11-03 LAB
BILIRUB SERPL-MCNC: NORMAL MG/DL
BLOOD URINE, POC: 50
COLOR, POC UA: NORMAL
GLUCOSE UR QL STRIP: NORMAL
KETONES UR QL STRIP: NORMAL
LEUKOCYTE ESTERASE URINE, POC: NORMAL
NITRITE, POC UA: NORMAL
PH, POC UA: 7
PROTEIN, POC: NORMAL
SPECIFIC GRAVITY, POC UA: 1015
UROBILINOGEN, POC UA: NORMAL

## 2017-11-03 PROCEDURE — 99999 PR PBB SHADOW E&M-EST. PATIENT-LVL III: CPT | Mod: PBBFAC,,, | Performed by: INTERNAL MEDICINE

## 2017-11-03 PROCEDURE — 99213 OFFICE O/P EST LOW 20 MIN: CPT | Mod: PBBFAC,25,PN | Performed by: INTERNAL MEDICINE

## 2017-11-03 PROCEDURE — 99214 OFFICE O/P EST MOD 30 MIN: CPT | Mod: S$PBB,,, | Performed by: INTERNAL MEDICINE

## 2017-11-03 PROCEDURE — 71020 XR CHEST PA AND LATERAL: CPT | Mod: TC,PN

## 2017-11-03 PROCEDURE — 81002 URINALYSIS NONAUTO W/O SCOPE: CPT | Mod: PBBFAC,PN | Performed by: INTERNAL MEDICINE

## 2017-11-03 PROCEDURE — 71020 XR CHEST PA AND LATERAL: CPT | Mod: 26,,, | Performed by: RADIOLOGY

## 2017-11-03 RX ORDER — DOXYCYCLINE HYCLATE 100 MG
100 TABLET ORAL 2 TIMES DAILY
Qty: 14 TABLET | Refills: 0 | Status: SHIPPED | OUTPATIENT
Start: 2017-11-03 | End: 2017-11-13

## 2017-11-03 NOTE — TELEPHONE ENCOUNTER
Notify the patient that her xray does not show pneumonia.  I believe she has bronchitis.  Will send script for doxycycline.  This will help with the inflammation in her lungs.  She should continue Mucinex.  Will call when her lab results are available.

## 2017-11-03 NOTE — PROGRESS NOTES
Subjective:       Patient ID: Paras Smith is a 56 y.o. female.    Chief Complaint: Pneumonia and Follow-up    She presents today for follow-up.  She complains of continued shortness of breath and dry cough as well as dysuria.  She was in emergency room 6 days ago and reports that she was so she has pneumonia however her discharge papers note contusion and no pneumonia.  She does report a bruise on her left flank.  She does not recall any recent injuries but did have a fall a month ago.  She also complains of continued burning with urination.  No frequency.  She has taken her medications as prescribed at her previous visit including prednisone and Cipro which she is almost completed.  She is also taking Mucinex DM Max.  She also has concerns about her weight gain.  She believes this is due to medications.  She reports that she does not eat much but is still gaining weight.  She will establish with a new endocrinologist next month.      Review of Systems   Constitutional: Positive for fever and unexpected weight change.   HENT: Positive for congestion. Negative for ear pain and sore throat.    Respiratory: Positive for cough and shortness of breath. Negative for wheezing.    Gastrointestinal: Negative for abdominal pain.   Genitourinary: Positive for dysuria. Negative for difficulty urinating and frequency.       Objective:      Physical Exam   Constitutional: She is oriented to person, place, and time. She appears well-developed and well-nourished. No distress.   HENT:   Head: Normocephalic and atraumatic.   Right Ear: Tympanic membrane, external ear and ear canal normal.   Left Ear: Tympanic membrane, external ear and ear canal normal.   Mouth/Throat: Oropharynx is clear and moist. No oropharyngeal exudate.   Eyes: Conjunctivae and EOM are normal. Pupils are equal, round, and reactive to light. No scleral icterus.   Cardiovascular: Normal rate, regular rhythm and normal heart sounds.  Exam reveals no gallop  and no friction rub.    No murmur heard.  Pulmonary/Chest: Effort normal and breath sounds normal. No respiratory distress. She has no wheezes. She has no rales.   Neurological: She is alert and oriented to person, place, and time. No cranial nerve deficit.   Skin: Skin is warm and dry.   Psychiatric: She has a normal mood and affect.   Vitals reviewed.      Assessment:       1. SOB (shortness of breath)    2. Dysuria    3. Weight gain    4. Morbid obesity    5. Postsurgical hypothyroidism        Plan:       Paras was seen today for pneumonia and follow-up.    Diagnoses and all orders for this visit:    SOB (shortness of breath) - c/o continued cough and sob since her previous visit 10 days ago.  She has completed her prednisone taper.  Continues to use her albuterol. She did present to  ER 6 days ago and reports she was diagnosed with pneumonia.  However, she did not have a CXR or receive any medications.  Her discharge papers only note a contusion.  Will obtain her records.  CXR today given her continued shortness of breath.  Of note clear on exam today.  She has had weight gain since her previous visit.  I did consider CHF.  Reviewed her echo from 2 months ago which was normal.  She has no other signs or symptoms consistent with CHF exacerbation.  -     CBC auto differential; Future  -     Comprehensive metabolic panel; Future  -     Brain natriuretic peptide; Future  -     X-Ray Chest PA And Lateral; Future    Dysuria - she has completed a course of Cipro with complaints of continued symptoms.  We'll follow-up her culture.  Her dip is negative.  -     Comprehensive metabolic panel; Future  -     Urine culture; Future  -     Urinalysis; Future  -     POCT URINE DIPSTICK WITHOUT MICROSCOPE    Weight gain - she has gained a significant amount of weight over the past couple of years.  TSH has been actually low.  Repeating today.  -     TSH; Future  -     Hemoglobin A1c; Future    Morbid obesity - encouraged  proper diet and exercise  -     Hemoglobin A1c; Future    Postsurgical hypothyroidism - she will establish with endocrinology next month.  We'll adjust her levothyroxine in the meantime as needed    -     TSH; Future        follow-up when necessary

## 2017-11-06 ENCOUNTER — TELEPHONE (OUTPATIENT)
Dept: FAMILY MEDICINE | Facility: CLINIC | Age: 57
End: 2017-11-06

## 2017-11-07 ENCOUNTER — CLINICAL SUPPORT (OUTPATIENT)
Dept: OPHTHALMOLOGY | Facility: CLINIC | Age: 57
End: 2017-11-07
Payer: MEDICAID

## 2017-11-07 DIAGNOSIS — H40.023 OAG (OPEN ANGLE GLAUCOMA) SUSPECT, HIGH RISK, BILATERAL: Primary | ICD-10-CM

## 2017-11-07 DIAGNOSIS — H40.023 OAG (OPEN ANGLE GLAUCOMA) SUSPECT, HIGH RISK, BILATERAL: ICD-10-CM

## 2017-11-07 PROCEDURE — 92083 EXTENDED VISUAL FIELD XM: CPT | Mod: 26,S$PBB,, | Performed by: OPTOMETRIST

## 2017-11-07 PROCEDURE — 92133 CPTRZD OPH DX IMG PST SGM ON: CPT | Mod: PBBFAC,PO

## 2017-11-07 PROCEDURE — 92133 CPTRZD OPH DX IMG PST SGM ON: CPT | Mod: 26,S$PBB,, | Performed by: OPTOMETRIST

## 2017-11-07 PROCEDURE — 92083 EXTENDED VISUAL FIELD XM: CPT | Mod: PBBFAC,PO

## 2017-11-09 ENCOUNTER — TELEPHONE (OUTPATIENT)
Dept: FAMILY MEDICINE | Facility: CLINIC | Age: 57
End: 2017-11-09

## 2017-11-27 DIAGNOSIS — E89.0 POSTSURGICAL HYPOTHYROIDISM: ICD-10-CM

## 2017-11-27 RX ORDER — ALBUTEROL SULFATE 90 UG/1
AEROSOL, METERED RESPIRATORY (INHALATION)
Qty: 18 INHALER | Refills: 0 | Status: SHIPPED | OUTPATIENT
Start: 2017-11-27 | End: 2018-01-30 | Stop reason: SDUPTHER

## 2017-11-28 ENCOUNTER — OFFICE VISIT (OUTPATIENT)
Dept: ENDOCRINOLOGY | Facility: CLINIC | Age: 57
End: 2017-11-28
Payer: MEDICAID

## 2017-11-28 VITALS
BODY MASS INDEX: 44.83 KG/M2 | WEIGHT: 262.56 LBS | RESPIRATION RATE: 16 BRPM | HEART RATE: 60 BPM | HEIGHT: 64 IN | SYSTOLIC BLOOD PRESSURE: 118 MMHG | DIASTOLIC BLOOD PRESSURE: 64 MMHG

## 2017-11-28 DIAGNOSIS — E89.0 POSTSURGICAL HYPOTHYROIDISM: Primary | ICD-10-CM

## 2017-11-28 DIAGNOSIS — C73 THYROID CANCER: ICD-10-CM

## 2017-11-28 DIAGNOSIS — E66.01 MORBID OBESITY DUE TO EXCESS CALORIES: ICD-10-CM

## 2017-11-28 PROCEDURE — 99999 PR PBB SHADOW E&M-EST. PATIENT-LVL IV: CPT | Mod: PBBFAC,,, | Performed by: INTERNAL MEDICINE

## 2017-11-28 PROCEDURE — 99214 OFFICE O/P EST MOD 30 MIN: CPT | Mod: PBBFAC | Performed by: INTERNAL MEDICINE

## 2017-11-28 PROCEDURE — 99214 OFFICE O/P EST MOD 30 MIN: CPT | Mod: S$PBB,,, | Performed by: INTERNAL MEDICINE

## 2017-11-28 RX ORDER — LEVOTHYROXINE SODIUM 100 UG/1
TABLET ORAL
Qty: 30 TABLET | Refills: 3 | Status: SHIPPED | OUTPATIENT
Start: 2017-11-28 | End: 2018-04-10 | Stop reason: SDUPTHER

## 2017-11-28 NOTE — PROGRESS NOTES
Subjective:      Patient ID: Paras Smith is a 57 y.o. female.    Chief Complaint:  Hypothyroidism    History of Present Illness  Ms. Smith returns to clinic today for Hypothyroidism follow up     She is a prior patient of Dr. Correa with last documented office visit in 11/2016     She is s/p total thyroidectomy for thyroid cancer  in 2008 followed by I-131 tx with 100 mCi on 11/18/2008     Post treatment scan showed no evidence of distant metastatic disease     Follow up TBS performed in 2010 and 2012 noted increase uptake within the neck which could represent residual thyroid tissue or malignancy. There is no evidence of distant metastatic disease     Thyroglobulin levels have been undetectable over time     Currently, she is taking Levothyroxine 100 mcg once daily   Patient states dosage adjusted per PCP in 09/2017     She endorses fatigue, constipation, dry skin and difficulty losing weight    Review of Systems   Constitutional: Positive for fatigue and unexpected weight change.   HENT: Positive for postnasal drip and trouble swallowing.    Eyes: Positive for visual disturbance.   Respiratory: Positive for shortness of breath.         + AGUIRRE   Cardiovascular: Positive for chest pain and palpitations. Negative for leg swelling.        Occasional   Gastrointestinal: Negative for constipation and diarrhea.   Endocrine: Negative for cold intolerance and heat intolerance.   Musculoskeletal: Positive for arthralgias and back pain.   Neurological: Positive for headaches.   Hematological: Bruises/bleeds easily.   Psychiatric/Behavioral: Positive for dysphoric mood and sleep disturbance.   All other systems reviewed and are negative.      Objective:   Physical Exam   Constitutional: She is oriented to person, place, and time. She appears well-developed. No distress.   Obese     Eyes: EOM are normal. No scleral icterus.   Neck: Normal range of motion. Neck supple.   No thyroid tissue palpable. No nodules or masses  palpated.    Cardiovascular: Normal rate and regular rhythm.    No murmur heard.  Pulmonary/Chest: Effort normal.   Musculoskeletal: Normal range of motion. She exhibits no edema.   Lymphadenopathy:     She has no cervical adenopathy.        Right: No supraclavicular adenopathy present.        Left: No supraclavicular adenopathy present.   Neurological: She is alert and oriented to person, place, and time. She has normal reflexes.   Skin: Skin is warm and dry.   Psychiatric: She has a normal mood and affect.   Nursing note and vitals reviewed.    Lab Review:  Results for HAMILTON MIN (MRN [id number]) as of 11/28/2017 15:32   Ref. Range 9/8/2017 09:42 11/3/2017 07:57   TSH Latest Ref Range: 0.400 - 4.000 uIU/mL 0.241 (L) 1.308   Free T4 Latest Ref Range: 0.71 - 1.51 ng/dL 1.21    Thyroglobulin Interpretation Unknown SEE BELOW    Thyroglobulin Antibody Screen Latest Ref Range: <4.0 IU/mL <1.8    Thyroglobulin, Tumor Marker Latest Units: ng/mL <0.1      Results for HAMILTON MIN (MRN [id number]) as of 11/28/2017 15:32   Ref. Range 11/3/2017 07:57   Hemoglobin A1C Latest Ref Range: 4.0 - 5.6 % 5.5   Estimated Avg Glucose Latest Ref Range: 68 - 131 mg/dL 111     Results for HAMILTON MIN (MRN [id number]) as of 11/28/2017 15:32   Ref. Range 11/3/2017 07:57   Sodium Latest Ref Range: 136 - 145 mmol/L 140   Potassium Latest Ref Range: 3.5 - 5.1 mmol/L 4.6   Chloride Latest Ref Range: 95 - 110 mmol/L 103   CO2 Latest Ref Range: 23 - 29 mmol/L 29   Anion Gap Latest Ref Range: 8 - 16 mmol/L 8   BUN, Bld Latest Ref Range: 6 - 20 mg/dL 19   Creatinine Latest Ref Range: 0.5 - 1.4 mg/dL 0.7   eGFR if non African American Latest Ref Range: >60 mL/min/1.73 m^2 >60   eGFR if  Latest Ref Range: >60 mL/min/1.73 m^2 >60   Glucose Latest Ref Range: 70 - 110 mg/dL 81   Calcium Latest Ref Range: 8.7 - 10.5 mg/dL 9.1   Alkaline Phosphatase Latest Ref Range: 55 - 135 U/L 101   Total Protein Latest Ref Range: 6.0  - 8.4 g/dL 7.1   Albumin Latest Ref Range: 3.5 - 5.2 g/dL 3.5   Total Bilirubin Latest Ref Range: 0.1 - 1.0 mg/dL 0.7   AST Latest Ref Range: 10 - 40 U/L 25   ALT Latest Ref Range: 10 - 44 U/L 37     Assessment:     1. Postsurgical hypothyroidism  TSH   2. Thyroid cancer  TSH   3. Morbid obesity due to excess calories  Ambulatory Referral to Bariatric Medicine      Plan:     1 and 2. Post surgical hypothyroidism / thyroid cancer - with undetectable thyroglobulin level   -- goal is low normal TSH   -- adjust Levothyroxine 100 mcg - take 1.5 tabs on Sunday and 1 tab the rest of the week   -- repeat TFTs in 6 weeks   -- reinforced to take as prescribed, on an empty stomach ideally an hour before eating or taking other medications   -- recommend yearly TG levels   -- neck ultrasound for surveillance due to prior history of elevated TG antibody     3. Morbid obesity   -- refer to bariatric medicine for further evaluation   -- continue dietary and lifestyle modifications   -- increase exercise as tolerated     Case discussed in consultation with Dr. Vincent   Recommendations were discussed with the patient in detail  The patient verbalized understanding and agrees with the treatment plan as outlined above.

## 2017-12-22 RX ORDER — NITROGLYCERIN 80 MG/1
PATCH TRANSDERMAL
Qty: 30 PATCH | Refills: 11 | Status: SHIPPED | OUTPATIENT
Start: 2017-12-22 | End: 2018-12-18 | Stop reason: SDUPTHER

## 2017-12-26 ENCOUNTER — OFFICE VISIT (OUTPATIENT)
Dept: OPTOMETRY | Facility: CLINIC | Age: 57
End: 2017-12-26
Payer: MEDICAID

## 2017-12-26 DIAGNOSIS — H52.203 HYPEROPIA WITH ASTIGMATISM AND PRESBYOPIA, BILATERAL: ICD-10-CM

## 2017-12-26 DIAGNOSIS — H25.13 NUCLEAR SCLEROSIS, BILATERAL: ICD-10-CM

## 2017-12-26 DIAGNOSIS — H52.4 HYPEROPIA WITH ASTIGMATISM AND PRESBYOPIA, BILATERAL: ICD-10-CM

## 2017-12-26 DIAGNOSIS — H40.023 OAG (OPEN ANGLE GLAUCOMA) SUSPECT, HIGH RISK, BILATERAL: Primary | ICD-10-CM

## 2017-12-26 DIAGNOSIS — R51.9 HEADACHE AROUND THE EYES: ICD-10-CM

## 2017-12-26 DIAGNOSIS — H52.03 HYPEROPIA WITH ASTIGMATISM AND PRESBYOPIA, BILATERAL: ICD-10-CM

## 2017-12-26 PROCEDURE — 92133 CPTRZD OPH DX IMG PST SGM ON: CPT | Mod: PBBFAC,PO | Performed by: OPTOMETRIST

## 2017-12-26 PROCEDURE — 92133 CPTRZD OPH DX IMG PST SGM ON: CPT | Mod: 26,S$PBB,, | Performed by: OPTOMETRIST

## 2017-12-26 PROCEDURE — 92014 COMPRE OPH EXAM EST PT 1/>: CPT | Mod: S$PBB,,, | Performed by: OPTOMETRIST

## 2017-12-26 PROCEDURE — 99999 PR PBB SHADOW E&M-EST. PATIENT-LVL IV: CPT | Mod: PBBFAC,,, | Performed by: OPTOMETRIST

## 2017-12-26 PROCEDURE — 99214 OFFICE O/P EST MOD 30 MIN: CPT | Mod: PBBFAC,PO | Performed by: OPTOMETRIST

## 2017-12-26 PROCEDURE — 92015 DETERMINE REFRACTIVE STATE: CPT | Mod: ,,, | Performed by: OPTOMETRIST

## 2017-12-26 NOTE — PROGRESS NOTES
HPI      is here for 6mo DFE. Pt sts pressure around eyes and headaches   OU.  History of open angle glaucoma no meds     (-)Flashes (-)Floaters  (+)Itch, (+)tear, (+)burn, (+)Dryness. (+) OTC Drops Systane  (-)Photophobia  (-)Glare (-)diplopia (--) headaches          Last edited by BAILEE Lott on 12/26/2017  1:53 PM. (History)            Assessment /Plan     For exam results, see Encounter Report.    OAG (open angle glaucoma) suspect, high risk, bilateral  -     OCT, Optic Nerve - OU - Both Eyes  -Superior borderline thinning OD only  -FHx father, normal IOP  -monitor as high risk    Headache around the eyes  -frontal HA associated with uncorrected Hyperopia  -strongly recommended sRx as FTW    Nuclear sclerosis, bilateral  -Educated patient on presence of cataracts at today's exam, monitor at annual dilated fundus exam. 5+ years surgical estimate.    Hyperopia with astigmatism and presbyopia, bilateral  Eyeglass Final Rx     Eyeglass Final Rx       Sphere Cylinder Axis Dist VA Add    Right +0.75 +0.75 010 20/25 +2.25    Left +0.75 +0.75 165 20/20 +2.25    Type:  PAL    Expiration Date:  12/27/2018                  RTC 1 yr

## 2018-01-03 DIAGNOSIS — I10 ESSENTIAL HYPERTENSION: ICD-10-CM

## 2018-01-04 RX ORDER — AMLODIPINE BESYLATE 5 MG/1
5 TABLET ORAL DAILY
Qty: 30 TABLET | Refills: 1 | Status: SHIPPED | OUTPATIENT
Start: 2018-01-04 | End: 2018-03-01 | Stop reason: SDUPTHER

## 2018-01-11 ENCOUNTER — TELEPHONE (OUTPATIENT)
Dept: FAMILY MEDICINE | Facility: CLINIC | Age: 58
End: 2018-01-11

## 2018-01-11 NOTE — TELEPHONE ENCOUNTER
----- Message from J Carlos Acosta sent at 1/10/2018  3:29 PM CST -----  Contact: Self/[phone]  Patient called stating that she's experiencing SOB because of chest congestion. Patient would like to be seen ASAP. Thank you.

## 2018-01-12 ENCOUNTER — OFFICE VISIT (OUTPATIENT)
Dept: FAMILY MEDICINE | Facility: CLINIC | Age: 58
End: 2018-01-12
Payer: MEDICAID

## 2018-01-12 VITALS
WEIGHT: 253.31 LBS | OXYGEN SATURATION: 94 % | DIASTOLIC BLOOD PRESSURE: 80 MMHG | BODY MASS INDEX: 43.25 KG/M2 | SYSTOLIC BLOOD PRESSURE: 126 MMHG | HEART RATE: 65 BPM | RESPIRATION RATE: 16 BRPM | HEIGHT: 64 IN | TEMPERATURE: 98 F

## 2018-01-12 DIAGNOSIS — J06.9 VIRAL URI WITH COUGH: ICD-10-CM

## 2018-01-12 DIAGNOSIS — H92.09 OTALGIA, UNSPECIFIED LATERALITY: ICD-10-CM

## 2018-01-12 DIAGNOSIS — R19.7 DIARRHEA, UNSPECIFIED TYPE: Primary | ICD-10-CM

## 2018-01-12 PROCEDURE — 99213 OFFICE O/P EST LOW 20 MIN: CPT | Mod: PBBFAC,PN | Performed by: NURSE PRACTITIONER

## 2018-01-12 PROCEDURE — 99999 PR PBB SHADOW E&M-EST. PATIENT-LVL III: CPT | Mod: PBBFAC,,, | Performed by: NURSE PRACTITIONER

## 2018-01-12 PROCEDURE — 99214 OFFICE O/P EST MOD 30 MIN: CPT | Mod: S$PBB,,, | Performed by: NURSE PRACTITIONER

## 2018-01-12 RX ORDER — BENZONATATE 200 MG/1
200 CAPSULE ORAL 3 TIMES DAILY PRN
Qty: 30 CAPSULE | Refills: 0 | Status: SHIPPED | OUTPATIENT
Start: 2018-01-12 | End: 2018-01-22

## 2018-01-12 RX ORDER — NEOMYCIN SULFATE, POLYMYXIN B SULFATE AND HYDROCORTISONE 10; 3.5; 1 MG/ML; MG/ML; [USP'U]/ML
3 SUSPENSION/ DROPS AURICULAR (OTIC) 3 TIMES DAILY
Qty: 10 ML | Refills: 0 | Status: SHIPPED | OUTPATIENT
Start: 2018-01-12 | End: 2020-06-08

## 2018-01-12 NOTE — PATIENT INSTRUCTIONS
Follow up with primary care provider if not improved  Go to ER for new, worse or concerning symptoms  Clear liquids advance to bland diet as tolerated  Use your inhaler as needed  Continue FLonase daily  Viral Upper Respiratory Illness (Adult)  You have a viral upper respiratory illness (URI), which is another term for the common cold. This illness is contagious during the first few days. It is spread through the air by coughing and sneezing. It may also be spread by direct contact (touching the sick person and then touching your own eyes, nose, or mouth). Frequent handwashing will decrease risk of spread. Most viral illnesses go away within 7 to 10 days with rest and simple home remedies. Sometimes the illness may last for several weeks. Antibiotics will not kill a virus, and they are generally not prescribed for this condition.    Home care  · If symptoms are severe, rest at home for the first 2 to 3 days. When you resume activity, don't let yourself get too tired.  · Avoid being exposed to cigarette smoke (yours or others).  · You may use acetaminophen or ibuprofen to control pain and fever, unless another medicine was prescribed. (Note: If you have chronic liver or kidney disease, have ever had a stomach ulcer or gastrointestinal bleeding, or are taking blood-thinning medicines, talk with your healthcare provider before using these medicines.) Aspirin should never be given to anyone under 18 years of age who is ill with a viral infection or fever. It may cause severe liver or brain damage.  · Your appetite may be poor, so a light diet is fine. Avoid dehydration by drinking 6 to 8 glasses of fluids per day (water, soft drinks, juices, tea, or soup). Extra fluids will help loosen secretions in the nose and lungs.  · Over-the-counter cold medicines will not shorten the length of time youre sick, but they may be helpful for the following symptoms: cough, sore throat, and nasal and sinus congestion. (Note: Do not  use decongestants if you have high blood pressure.)  Follow-up care  Follow up with your healthcare provider, or as advised.  When to seek medical advice  Call your healthcare provider right away if any of these occur:  · Cough with lots of colored sputum (mucus)  · Severe headache; face, neck, or ear pain  · Difficulty swallowing due to throat pain  · Fever of 100.4°F (38°C)  Call 911, or get immediate medical care  Call emergency services right away if any of these occur:  · Chest pain, shortness of breath, wheezing, or difficulty breathing  · Coughing up blood  · Inability to swallow due to throat pain  Date Last Reviewed: 9/13/2015  © 8813-1466 [employer]. [address] [place] [address], [place] [address]. All rights reserved. This information is not intended as a substitute for professional medical care. Always follow your healthcare professional's instructions.

## 2018-01-12 NOTE — PROGRESS NOTES
Subjective:       Patient ID: Paras Smith is a 57 y.o. female.    Chief Complaint: Diarrhea; Fatigue; and Chest Congestion    HPI Ms Smith is here for 2 weeks of diarrhea, NP cough, and subjective fever. She took mucinex and robitussin.  Denies any recent abx use, no bloody stool, her  has been sick.  She has been seen by GI, she has to have a colonocopy, but has been too sick.  She does not know what they told her about the diarrhea.    Review of Systems   Constitutional: Positive for fever.   Respiratory: Positive for cough.    Gastrointestinal: Positive for diarrhea.       Objective:      Physical Exam   Constitutional: She is oriented to person, place, and time. She appears well-developed and well-nourished. She does not appear ill. No distress.   HENT:   Head: Normocephalic and atraumatic.   Right Ear: A middle ear effusion is present.   Left Ear: A middle ear effusion is present.   Nose: Nose normal. Right sinus exhibits no maxillary sinus tenderness and no frontal sinus tenderness. Left sinus exhibits no maxillary sinus tenderness and no frontal sinus tenderness.   Mouth/Throat: Uvula is midline, oropharynx is clear and moist and mucous membranes are normal.   Cardiovascular: Normal rate, regular rhythm and normal heart sounds.  Exam reveals no friction rub.    No murmur heard.  Pulmonary/Chest: Effort normal. No respiratory distress. She has no decreased breath sounds. She has wheezes. She has no rhonchi. She has no rales.   Bibasilar wheezing noted.   Abdominal: There is no hepatosplenomegaly. There is generalized tenderness.   Musculoskeletal: Normal range of motion.   Neurological: She is alert and oriented to person, place, and time.   Skin: Skin is warm and dry.   Psychiatric: She has a normal mood and affect. Her behavior is normal.   Vitals reviewed.      Assessment:       1. Diarrhea, unspecified type    2. Viral URI with cough    3. Otalgia, unspecified laterality        Plan:        Diarrhea, unspecified type  -     Occult blood x 1, stool; Future; Expected date: 01/12/2018  -     H. pylori antigen, stool; Future; Expected date: 01/12/2018  -     Stool culture; Future; Expected date: 01/13/2018  Had same symptoms in October, will get sample, she was encouraged to f/u with GI.    Viral URI with cough  -     benzonatate (TESSALON) 200 MG capsule; Take 1 capsule (200 mg total) by mouth 3 (three) times daily as needed for Cough.  Dispense: 30 capsule; Refill: 0  Continue robitussin    Otalgia, unspecified laterality  -     neomycin-polymyxin-hydrocortisone (CORTISPORIN) 3.5-10,000-1 mg/mL-unit/mL-% otic suspension; Place 3 drops into both ears 3 (three) times daily.  Dispense: 10 mL; Refill: 0    Continue flonase, no red flags on exam.   F/u after stool studies

## 2018-01-18 ENCOUNTER — TELEPHONE (OUTPATIENT)
Dept: HEPATOLOGY | Facility: CLINIC | Age: 58
End: 2018-01-18

## 2018-01-18 DIAGNOSIS — Z12.31 SCREENING MAMMOGRAM, ENCOUNTER FOR: Primary | ICD-10-CM

## 2018-01-18 NOTE — TELEPHONE ENCOUNTER
MA called patient back schedule her follow up visit to see liver specialist. She accepted 2/21 mailed appt reminder to patient.NINI

## 2018-01-18 NOTE — TELEPHONE ENCOUNTER
----- Message from Breana Auguste sent at 1/18/2018 10:19 AM CST -----  Contact: Pt  Pt received a recall letter to schedule an appt    Pt contact number [phone]  Thanks

## 2018-01-19 ENCOUNTER — TELEPHONE (OUTPATIENT)
Dept: ENDOCRINOLOGY | Facility: CLINIC | Age: 58
End: 2018-01-19

## 2018-01-19 NOTE — TELEPHONE ENCOUNTER
----- Message from Marquita Hunt sent at 1/18/2018 10:07 AM CST -----  Contact: Paras   tel:   [phone]  Pt.says she and her family has the FLU and cannot keep her appt. .       Pls call w/another date for her to come in for this.  As per caller.

## 2018-01-23 RX ORDER — LISINOPRIL 10 MG/1
TABLET ORAL
Qty: 30 TABLET | Refills: 6 | Status: SHIPPED | OUTPATIENT
Start: 2018-01-23 | End: 2018-09-17 | Stop reason: SDUPTHER

## 2018-01-24 ENCOUNTER — TELEPHONE (OUTPATIENT)
Dept: FAMILY MEDICINE | Facility: CLINIC | Age: 58
End: 2018-01-24

## 2018-01-24 DIAGNOSIS — J06.9 VIRAL URI WITH COUGH: ICD-10-CM

## 2018-01-24 RX ORDER — BENZONATATE 200 MG/1
200 CAPSULE ORAL 3 TIMES DAILY PRN
Qty: 30 CAPSULE | Refills: 0 | Status: CANCELLED | OUTPATIENT
Start: 2018-01-24 | End: 2018-02-03

## 2018-01-25 ENCOUNTER — HOSPITAL ENCOUNTER (OUTPATIENT)
Dept: RADIOLOGY | Facility: HOSPITAL | Age: 58
Discharge: HOME | End: 2018-01-25
Attending: NURSE PRACTITIONER
Payer: MEDICAID

## 2018-01-25 ENCOUNTER — TELEPHONE (OUTPATIENT)
Dept: FAMILY MEDICINE | Facility: CLINIC | Age: 58
End: 2018-01-25

## 2018-01-25 VITALS — BODY MASS INDEX: 43.19 KG/M2 | WEIGHT: 253 LBS | HEIGHT: 64 IN

## 2018-01-25 DIAGNOSIS — Z12.31 SCREENING MAMMOGRAM, ENCOUNTER FOR: ICD-10-CM

## 2018-01-25 PROCEDURE — 77067 SCR MAMMO BI INCL CAD: CPT | Mod: 26,,, | Performed by: RADIOLOGY

## 2018-01-25 PROCEDURE — 77067 SCR MAMMO BI INCL CAD: CPT | Mod: TC

## 2018-01-25 PROCEDURE — 77063 BREAST TOMOSYNTHESIS BI: CPT | Mod: 26,,, | Performed by: RADIOLOGY

## 2018-01-30 NOTE — TELEPHONE ENCOUNTER
----- Message from Andrew Quintana sent at 1/30/2018  4:00 PM CST -----  Contact: self  Refill: benzonatate (TESSALON) 200 MG capsule            VENTOLIN HFA 90 mcg/actuation inhaler    Send to [facility] [address] [place], [place] [address] [id number] [phone]    Contact pt at [phone].    Thanks-

## 2018-01-31 RX ORDER — ALBUTEROL SULFATE 90 UG/1
AEROSOL, METERED RESPIRATORY (INHALATION)
Qty: 18 INHALER | Refills: 0 | Status: SHIPPED | OUTPATIENT
Start: 2018-01-31 | End: 2018-10-22 | Stop reason: SDUPTHER

## 2018-02-19 ENCOUNTER — TELEPHONE (OUTPATIENT)
Dept: FAMILY MEDICINE | Facility: CLINIC | Age: 58
End: 2018-02-19

## 2018-02-20 ENCOUNTER — TELEPHONE (OUTPATIENT)
Dept: ENDOCRINOLOGY | Facility: CLINIC | Age: 58
End: 2018-02-20

## 2018-02-20 DIAGNOSIS — C73 THYROID CANCER: Primary | ICD-10-CM

## 2018-02-20 DIAGNOSIS — E89.0 POSTSURGICAL HYPOTHYROIDISM: ICD-10-CM

## 2018-02-20 NOTE — TELEPHONE ENCOUNTER
----- Message from Marquita Hunt sent at 2/20/2018  9:32 AM CST -----  Contact: Praas     tel:   [phone]  Pt says she needs to speak to you ref. Getting in sooner.   Due to Throat pain.    Pls call to move up her testing date and to speak to a nurse, as per caller.

## 2018-02-20 NOTE — TELEPHONE ENCOUNTER
Patient notified via phone. She stated she is experiencing sore throat pain with mouth ulcers/blisters. She did not notify her PCP. She denies fever at this time. Advised to follow up with PCP / nearest urgent care center for further evaluation. Will arrange neck ultrasound for surveillance.   Pt voiced understanding of the above information

## 2018-02-21 ENCOUNTER — OFFICE VISIT (OUTPATIENT)
Dept: HEPATOLOGY | Facility: CLINIC | Age: 58
End: 2018-02-21
Payer: MEDICAID

## 2018-02-21 VITALS
RESPIRATION RATE: 18 BRPM | OXYGEN SATURATION: 95 % | HEART RATE: 112 BPM | TEMPERATURE: 98 F | DIASTOLIC BLOOD PRESSURE: 72 MMHG | SYSTOLIC BLOOD PRESSURE: 125 MMHG | WEIGHT: 255.94 LBS | HEIGHT: 61 IN | BODY MASS INDEX: 48.32 KG/M2

## 2018-02-21 DIAGNOSIS — K76.0 HEPATIC STEATOSIS: Primary | ICD-10-CM

## 2018-02-21 PROCEDURE — 3008F BODY MASS INDEX DOCD: CPT | Mod: ,,, | Performed by: INTERNAL MEDICINE

## 2018-02-21 PROCEDURE — 99999 PR PBB SHADOW E&M-EST. PATIENT-LVL IV: CPT | Mod: PBBFAC,,, | Performed by: INTERNAL MEDICINE

## 2018-02-21 PROCEDURE — 99214 OFFICE O/P EST MOD 30 MIN: CPT | Mod: PBBFAC | Performed by: INTERNAL MEDICINE

## 2018-02-21 PROCEDURE — 99214 OFFICE O/P EST MOD 30 MIN: CPT | Mod: S$PBB,,, | Performed by: INTERNAL MEDICINE

## 2018-02-21 NOTE — PATIENT INSTRUCTIONS
Please schedule liver US for same day as thyroid US next week.      [place] [facility] [place]    Patient reassured that abdominal pain not related to liver disease.    Return to clinic in 12 months

## 2018-02-21 NOTE — PROGRESS NOTES
"Hepatology Follow-up Note    Chief complaint:   Chief Complaint   Patient presents with    Hepatic Steatosis       HPI:  Paras Smith is a 57 y.o. female that presents to hepatology clinic for follow-up of hepatic steatosis.  She is alone.    The patient was last seen on 9/6/2017.  She has a history of hepatic steatosis and abdominal pain.  During prior evaluation, reassured that steatosis and liver cyst not likely associated with pain.  She returns for appointment due to worsening pain and contacted by reminder.    The patient states that she is continuing to have a "tearing" abdominal pain in the right upper quadrant.  It is intermittent and aggravated by po intake.  She reports particular foods make it worse including red meat, fried foods and breads.  She notes that adjustments in her diet do improvement pain but when indiscretions occur, pain can be significant.  She is planned for E/C for routine surveillance and family history of gastric cancer.  She has been holding on the procedure due to recent influenza infection.     The patient denies symptoms of chronic liver disease.        Patient Active Problem List   Diagnosis    HTN (hypertension)    Postsurgical hypothyroidism    Thyroid cancer    CAD (coronary artery disease)    Chest pain, cardiac    Depression    Paronychia of fifth toe of left foot    Wound of toenail    Chest pain, atypical    Cervicalgia    Weakness of neck    Weakness of both arms    Stiffness of neck    Glaucoma suspect    Nuclear cataract    Dry eye syndrome    Vitamin D deficiency disease    Helicobacter pylori (H. pylori) infection    Dyspnea    Cholecystitis    Morbid obesity due to excess calories    Bronchitis, acute, with bronchospasm    Anxiety    NAFLD (nonalcoholic fatty liver disease)    Liver cyst    Coronary artery disease    Diarrhea of presumed infectious origin       Past Medical History:   Diagnosis Date    Asthma     CAD (coronary " artery disease)     stent     Cervical spine disease     Depression     Difficult intubation     POSSIBLE    GERD (gastroesophageal reflux disease)     Glaucoma     left eye    Helicobacter pylori (H. pylori) infection     History of stomach ulcers     HTN (hypertension)     Muscle weakness     LEFT    Neck problem     LIMITED ROM    Nuclear cataract 2014    Postsurgical hypothyroidism     Stroke     mini strokes    Thyroid cancer     Vitamin D deficiency disease        Past Surgical History:   Procedure Laterality Date    APPENDECTOMY      CERVICAL SPINE SURGERY      vocal cord damage     SECTION, LOW TRANSVERSE      x3    CHOLECYSTECTOMY      CORONARY ANGIOPLASTY WITH STENT PLACEMENT      x 3    PATELLA SURGERY  1969    THYROID SURGERY      total    TOTAL THYROIDECTOMY         Family History   Problem Relation Age of Onset    Diabetes Father     Kidney failure Father     Cataracts Father     Asthma Father     Glaucoma Father     Heart disease Mother      has pacemaker    Hypertension Mother     Asthma Mother     Cancer Mother      lung stage 4    Ovarian cancer Mother     Breast cancer Mother     Hypertension Brother     Heart disease Brother     Cervical cancer Daughter     Scoliosis Son     Hypertension Son     Hypertension Daughter     Seizures Daughter     Lupus Daughter     Cervical cancer Daughter     Mental illness Son      bipolar    Cancer Sister     Liver disease Sister     No Known Problems Brother     No Known Problems Daughter     Stomach cancer Maternal Aunt     Breast cancer Maternal Aunt     No Known Problems Maternal Uncle     No Known Problems Paternal Aunt     No Known Problems Paternal Uncle     Stomach cancer Maternal Grandmother     Ovarian cancer Maternal Grandfather     No Known Problems Paternal Grandmother     No Known Problems Paternal Grandfather     Stomach cancer Maternal Aunt     Esophageal cancer Neg Hx         Social History     Social History    Marital status: Legally      Spouse name: N/A    Number of children: N/A    Years of education: N/A     Social History Main Topics    Smoking status: Never Smoker    Smokeless tobacco: Never Used      Comment: No cigarrettes, only marijuana occasionally    Alcohol use No      Comment: recovering alcoholic    Drug use: No      Comment: ocasional    Sexual activity: Not Currently     Partners: Male     Other Topics Concern    None     Social History Narrative    None       Current Outpatient Prescriptions   Medication Sig Dispense Refill    albuterol (VENTOLIN HFA) 90 mcg/actuation inhaler inhale 2 puffs by mouth every 6 hours if needed 18 Inhaler 0    anthralin 1.2 % CmRR   0    diazePAM (VALIUM) 10 MG Tab Take 10 mg by mouth 2 (two) times daily.  0    dicyclomine (BENTYL) 20 mg tablet Take 1 tablet (20 mg total) by mouth 2 (two) times daily. 60 tablet 0    doxepin (SINEQUAN) 25 MG capsule Take 25 mg by mouth every evening.  0    FLONASE ALLERGY RELIEF 50 mcg/actuation nasal spray       gabapentin (NEURONTIN) 300 MG capsule Take 600 mg by mouth 3 (three) times daily.   0    hydrOXYzine pamoate (VISTARIL) 50 MG Cap Take 1 capsule (50 mg total) by mouth every 6 (six) hours as needed (anxiety). 30 capsule 1    levothyroxine (SYNTHROID) 100 MCG tablet take 1 tablet by mouth once daily 30 tablet 3    lidocaine-prilocaine (EMLA) cream   0    lisinopril 10 MG tablet take 1 tablet by mouth once daily 30 tablet 6    LMX 4 4 % cream   0    nitroGLYCERIN (NITROSTAT) 0.4 MG SL tablet Place 1 tablet (0.4 mg total) under the tongue every 5 (five) minutes as needed. 25 tablet 11    nitroGLYCERIN 0.4 MG/HR TD PT24 (NITRODUR) 0.4 mg/hr apply 1 patch ONTO THE SKIN ONCE DAILY 30 patch 11    oxycodone (ROXICODONE) 30 MG Tab Take 30 mg by mouth every 6 (six) hours as needed.       promethazine (PHENERGAN) 25 MG tablet take 1 tablet by mouth every 8 hours if  "needed for nausea and vomiting  0    sodium,potassium,mag sulfates (SUPREP BOWEL PREP KIT) 17.5-3.13-1.6 gram SolR As directed, dispense 1 kit. 1 Bottle 0    topiramate (TOPAMAX) 100 MG tablet Take 100 mg by mouth 2 (two) times daily.  0    VITAMIN D2 50,000 unit capsule take 1 capsule by mouth every week (EVERY 7 DAYS) 12 capsule 0    amLODIPine (NORVASC) 5 MG tablet Take 1 tablet (5 mg total) by mouth once daily. 30 tablet 1    neomycin-polymyxin-hydrocortisone (CORTISPORIN) 3.5-10,000-1 mg/mL-unit/mL-% otic suspension Place 3 drops into both ears 3 (three) times daily. 10 mL 0    omeprazole magnesium 20 mg CpDR Take 1 tablet by mouth 2 (two) times daily. 60 capsule 3     No current facility-administered medications for this visit.        Review of patient's allergies indicates:   Allergen Reactions    Vioxx [rofecoxib] Rash       Review of Systems   Constitutional: Negative for chills, fever, malaise/fatigue and weight loss.   Eyes: Negative.    Respiratory: Negative for cough and shortness of breath.    Cardiovascular: Negative for chest pain and leg swelling.   Gastrointestinal: Positive for abdominal pain. Negative for heartburn, nausea and vomiting.   Musculoskeletal: Negative for joint pain and myalgias.   Skin: Negative for itching and rash.   Neurological: Negative for dizziness, focal weakness and headaches.   Endo/Heme/Allergies: Does not bruise/bleed easily.   Psychiatric/Behavioral: Negative for depression.       Vitals:    02/21/18 1424   BP: 125/72   Pulse: (!) 112   Resp: 18   Temp: 97.5 °F (36.4 °C)   TempSrc: Oral   SpO2: 95%   Weight: 116.1 kg (255 lb 15.3 oz)   Height: 5' 1" (1.549 m)       Physical Exam   Constitutional: She is oriented to person, place, and time. She appears well-developed and well-nourished. No distress.   HENT:   Head: Normocephalic and atraumatic.   Mouth/Throat: Oropharynx is clear and moist.   Eyes: EOM are normal. Pupils are equal, round, and reactive to light. No " scleral icterus.   Neck: Normal range of motion. Neck supple. No thyromegaly present.   Cardiovascular: Normal rate, regular rhythm and normal heart sounds.  Exam reveals no gallop and no friction rub.    No murmur heard.  Pulmonary/Chest: Effort normal. No respiratory distress. She has no wheezes. She has no rales.   Abdominal: Soft. Bowel sounds are normal. She exhibits no distension. There is tenderness.   Musculoskeletal: Normal range of motion. She exhibits no edema.   Lymphadenopathy:     She has no cervical adenopathy.   Neurological: She is alert and oriented to person, place, and time.   Skin: Skin is warm and dry. No rash noted.   Psychiatric: She has a normal mood and affect. Her behavior is normal.   Vitals reviewed.      Lab Results   Component Value Date    ALT 37 11/03/2017    AST 25 11/03/2017    ALKPHOS 101 11/03/2017    BILITOT 0.7 11/03/2017       Lab Results   Component Value Date    WBC 11.58 11/03/2017    HGB 14.6 11/03/2017    HCT 43.3 11/03/2017    MCV 95 11/03/2017     11/03/2017       Lab Results   Component Value Date     11/03/2017    K 4.6 11/03/2017     11/03/2017    CO2 29 11/03/2017    BUN 19 11/03/2017    CREATININE 0.7 11/03/2017    CALCIUM 9.1 11/03/2017    ANIONGAP 8 11/03/2017    ESTGFRAFRICA >60 11/03/2017    EGFRNONAA >60 11/03/2017     Imaging: none   Fibroscan: 4.0 kPa F0-1     Assessment:  57 y.o. female presenting with hepatic steatosis and normal liver tests with negative serologic work-up most consistent with benign NAFLD.      Fatty liver:  The patient was once again reassured that the presence of benign fatty liver is unlikely to be related to chronic abdominal pain.  We will perform liver US to rule out any detrimental changes in hepatic cyst or biliary issues.  If this is normal, the patient was encouraged to follow-up with GI and E/C for further assessment and management of pain.  Will also obtain repeat liver tests and if remain normal in the  setting of no fibrosis, the patient will been followed up in 1 year and then realized to PCP.  Lifestyle modifications for weight loss should be attempted to prevent progression from NAFLD to WRIGHT over time.     RTC in 12 months

## 2018-02-26 ENCOUNTER — TELEPHONE (OUTPATIENT)
Dept: FAMILY MEDICINE | Facility: CLINIC | Age: 58
End: 2018-02-26

## 2018-02-26 DIAGNOSIS — G89.29 OTHER CHRONIC PAIN: Primary | ICD-10-CM

## 2018-02-26 NOTE — TELEPHONE ENCOUNTER
----- Message from Agustin Wilkinson sent at 2/26/2018 10:34 AM CST -----  Contact: [phone]/PT  Prev pt of Dr Steele and calling to get establish care soon appt with Dr Cooper to get medication refill.  Pt will go into seizure if med dont get refill sooner than next avail appt ,. Pt medicaid ins

## 2018-02-26 NOTE — TELEPHONE ENCOUNTER
Referral sent for pain management.  She should f/u with PCP in the meantime, I am not licensed to treat chronic pain.  Go to ER if she has a seizure.

## 2018-02-26 NOTE — TELEPHONE ENCOUNTER
Patient informed that medications will be addressed during appointment with PCP that her medication for Gabapentin can't be addressed until then.

## 2018-02-26 NOTE — TELEPHONE ENCOUNTER
Patient called stating her Pain management doctor office closed down on Friday and she is requesting a sooner appt to get refills on her pain medication and a referral to another pain doctor.

## 2018-02-28 ENCOUNTER — HOSPITAL ENCOUNTER (OUTPATIENT)
Dept: RADIOLOGY | Facility: HOSPITAL | Age: 58
Discharge: HOME | End: 2018-02-28
Attending: INTERNAL MEDICINE
Payer: MEDICAID

## 2018-02-28 ENCOUNTER — TELEPHONE (OUTPATIENT)
Dept: FAMILY MEDICINE | Facility: CLINIC | Age: 58
End: 2018-02-28

## 2018-02-28 ENCOUNTER — TELEPHONE (OUTPATIENT)
Dept: ENDOCRINOLOGY | Facility: CLINIC | Age: 58
End: 2018-02-28

## 2018-02-28 DIAGNOSIS — K76.0 HEPATIC STEATOSIS: ICD-10-CM

## 2018-02-28 DIAGNOSIS — C73 THYROID CANCER: ICD-10-CM

## 2018-02-28 DIAGNOSIS — E89.0 POSTSURGICAL HYPOTHYROIDISM: ICD-10-CM

## 2018-02-28 PROCEDURE — 76536 US EXAM OF HEAD AND NECK: CPT | Mod: TC

## 2018-02-28 PROCEDURE — 93975 VASCULAR STUDY: CPT | Mod: 26,,, | Performed by: RADIOLOGY

## 2018-02-28 PROCEDURE — 76705 ECHO EXAM OF ABDOMEN: CPT | Mod: 26,59,, | Performed by: RADIOLOGY

## 2018-02-28 PROCEDURE — 76536 US EXAM OF HEAD AND NECK: CPT | Mod: 26,,, | Performed by: RADIOLOGY

## 2018-02-28 PROCEDURE — 93975 VASCULAR STUDY: CPT | Mod: TC

## 2018-03-01 ENCOUNTER — TELEPHONE (OUTPATIENT)
Dept: HEPATOLOGY | Facility: CLINIC | Age: 58
End: 2018-03-01

## 2018-03-01 DIAGNOSIS — I10 ESSENTIAL HYPERTENSION: ICD-10-CM

## 2018-03-01 NOTE — TELEPHONE ENCOUNTER
Last office visit 1/12/18 with blood pressure reading of 126/80. Last Vit D level 4/19/17 with reading of 26.

## 2018-03-01 NOTE — TELEPHONE ENCOUNTER
----- Message from Luma Gaines MD sent at 2/28/2018  5:46 PM CST -----  Please inform patient that liver ultrasound remains stable.  There is evidence of fatty liver and a cyst but these are not causes of pain and liver tests remain normal.  Recommend following up with GI for ongoing abdominal pain.  Return to liver clinic in 1 year as previously recommended.

## 2018-03-06 ENCOUNTER — CLINICAL SUPPORT (OUTPATIENT)
Dept: INFECTIOUS DISEASES | Facility: CLINIC | Age: 58
End: 2018-03-06
Payer: MEDICAID

## 2018-03-06 DIAGNOSIS — Z23 NEED FOR VACCINATION: Primary | ICD-10-CM

## 2018-03-06 PROCEDURE — 90471 IMMUNIZATION ADMIN: CPT | Mod: PBBFAC

## 2018-03-06 NOTE — PROGRESS NOTES
Pt received her final dose of her Hepatitis B vaccination. Pt tolerated the injection well. Pt left the unit in NAD.

## 2018-03-07 RX ORDER — AMLODIPINE BESYLATE 5 MG/1
5 TABLET ORAL DAILY
Qty: 30 TABLET | Refills: 1 | Status: SHIPPED | OUTPATIENT
Start: 2018-03-07 | End: 2018-05-10 | Stop reason: SDUPTHER

## 2018-03-07 RX ORDER — ERGOCALCIFEROL 1.25 MG/1
CAPSULE ORAL
Qty: 12 CAPSULE | Refills: 0 | Status: SHIPPED | OUTPATIENT
Start: 2018-03-07 | End: 2018-06-29 | Stop reason: SDUPTHER

## 2018-03-23 ENCOUNTER — LAB VISIT (OUTPATIENT)
Dept: LAB | Facility: HOSPITAL | Age: 58
End: 2018-03-23
Attending: INTERNAL MEDICINE
Payer: MEDICAID

## 2018-03-23 DIAGNOSIS — K76.0 HEPATIC STEATOSIS: ICD-10-CM

## 2018-03-23 LAB
ALBUMIN SERPL BCP-MCNC: 3.8 G/DL
ALP SERPL-CCNC: 132 U/L
ALT SERPL W/O P-5'-P-CCNC: 25 U/L
AST SERPL-CCNC: 21 U/L
BILIRUB DIRECT SERPL-MCNC: 0.2 MG/DL
BILIRUB SERPL-MCNC: 0.4 MG/DL
HCV AB SERPL QL IA: NEGATIVE
PROT SERPL-MCNC: 7.1 G/DL

## 2018-03-23 PROCEDURE — 86803 HEPATITIS C AB TEST: CPT

## 2018-03-23 PROCEDURE — 80076 HEPATIC FUNCTION PANEL: CPT

## 2018-03-23 PROCEDURE — 36415 COLL VENOUS BLD VENIPUNCTURE: CPT | Mod: PO

## 2018-03-26 ENCOUNTER — TELEPHONE (OUTPATIENT)
Dept: HEPATOLOGY | Facility: CLINIC | Age: 58
End: 2018-03-26

## 2018-03-26 NOTE — TELEPHONE ENCOUNTER
----- Message from Luma Gaines MD sent at 3/24/2018  8:24 AM CDT -----  Please inform patient, liver tests remain normal.  Hepatitis C antibody is negative.  No concerns and plan for 1 year f/u as discussed.

## 2018-03-28 ENCOUNTER — TELEPHONE (OUTPATIENT)
Dept: ENDOCRINOLOGY | Facility: CLINIC | Age: 58
End: 2018-03-28

## 2018-03-28 NOTE — TELEPHONE ENCOUNTER
----- Message from Jak Hernandez sent at 3/28/2018 11:38 AM CDT -----  Contact: Pt  Pt would like to be called back regarding having an error pt states she did not cancel her appt. For to  3/29/18 for 9:45am    Pt can be reached at [phone].    Thank You.

## 2018-04-10 DIAGNOSIS — E89.0 POSTSURGICAL HYPOTHYROIDISM: ICD-10-CM

## 2018-04-10 RX ORDER — LEVOTHYROXINE SODIUM 100 UG/1
100 TABLET ORAL DAILY
Qty: 30 TABLET | Refills: 3 | Status: SHIPPED | OUTPATIENT
Start: 2018-04-10 | End: 2018-08-16 | Stop reason: SDUPTHER

## 2018-04-11 ENCOUNTER — TELEPHONE (OUTPATIENT)
Dept: NEUROLOGY | Facility: CLINIC | Age: 58
End: 2018-04-11

## 2018-04-11 ENCOUNTER — OFFICE VISIT (OUTPATIENT)
Dept: NEUROLOGY | Facility: CLINIC | Age: 58
End: 2018-04-11
Payer: MEDICAID

## 2018-04-11 VITALS
BODY MASS INDEX: 48.93 KG/M2 | SYSTOLIC BLOOD PRESSURE: 125 MMHG | HEIGHT: 62 IN | DIASTOLIC BLOOD PRESSURE: 88 MMHG | HEART RATE: 67 BPM | WEIGHT: 265.88 LBS

## 2018-04-11 DIAGNOSIS — G62.82 POLYNEUROPATHY DUE TO RADIATION: Primary | ICD-10-CM

## 2018-04-11 DIAGNOSIS — E55.9 VITAMIN D DEFICIENCY DISEASE: ICD-10-CM

## 2018-04-11 DIAGNOSIS — G44.89 OTHER HEADACHE SYNDROME: ICD-10-CM

## 2018-04-11 DIAGNOSIS — G40.209 PARTIAL SYMPTOMATIC EPILEPSY WITH COMPLEX PARTIAL SEIZURES, NOT INTRACTABLE, WITHOUT STATUS EPILEPTICUS: ICD-10-CM

## 2018-04-11 DIAGNOSIS — C73 THYROID CANCER: ICD-10-CM
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
## 2018-04-11 DIAGNOSIS — G43.019 INTRACTABLE MIGRAINE WITHOUT AURA AND WITHOUT STATUS MIGRAINOSUS: ICD-10-CM

## 2018-04-11 DIAGNOSIS — G44.40 MEDICATION OVERUSE HEADACHE: ICD-10-CM

## 2018-04-11 DIAGNOSIS — F41.9 ANXIETY: ICD-10-CM

## 2018-04-11 PROBLEM — G40.909 NONINTRACTABLE EPILEPSY WITHOUT STATUS EPILEPTICUS: Status: ACTIVE | Noted: 2018-04-11

## 2018-04-11 PROCEDURE — 99999 PR PBB SHADOW E&M-EST. PATIENT-LVL V: CPT | Mod: PBBFAC,,, | Performed by: STUDENT IN AN ORGANIZED HEALTH CARE EDUCATION/TRAINING PROGRAM

## 2018-04-11 PROCEDURE — 99205 OFFICE O/P NEW HI 60 MIN: CPT | Mod: S$PBB,,, | Performed by: STUDENT IN AN ORGANIZED HEALTH CARE EDUCATION/TRAINING PROGRAM

## 2018-04-11 PROCEDURE — 99215 OFFICE O/P EST HI 40 MIN: CPT | Mod: PBBFAC | Performed by: STUDENT IN AN ORGANIZED HEALTH CARE EDUCATION/TRAINING PROGRAM

## 2018-04-11 RX ORDER — PROPRANOLOL HYDROCHLORIDE 60 MG/1
60 TABLET ORAL
Refills: 0 | COMMUNITY
Start: 2018-02-25 | End: 2019-08-09

## 2018-04-11 RX ORDER — CHLORPROMAZINE HYDROCHLORIDE 25 MG/1
TABLET, FILM COATED ORAL
Qty: 90 TABLET | Refills: 3 | Status: SHIPPED | OUTPATIENT
Start: 2018-04-11 | End: 2019-04-23

## 2018-04-11 RX ORDER — NAPROXEN 375 MG/1
375 TABLET ORAL
Refills: 0 | Status: ON HOLD | COMMUNITY
Start: 2018-04-05 | End: 2018-04-25 | Stop reason: HOSPADM

## 2018-04-11 RX ORDER — BREXPIPRAZOLE 0.5 MG/1
0.5 TABLET ORAL
Refills: 0 | COMMUNITY
Start: 2018-04-06 | End: 2023-05-11 | Stop reason: CLARIF

## 2018-04-11 NOTE — ASSESSMENT & PLAN NOTE
Please see intractable migraine, above, for abortives wean.    Overall goal:  No more than 2-3 doses/week.

## 2018-04-11 NOTE — ASSESSMENT & PLAN NOTE
Patient is currently on Topamax 100mg bid without relief and with S/E (depression, anxiety, abdominal discomfort), and is taking approximately 12 doses of BC Powder/ibuprofen a week.      - Wean topamax OFF:   Week one: 50mg daily + 100mg qhs   Week two: 50mg bid   Week three: 50mg qhs   Week four: STOP    - Wean abortive medications:   First week (starting now): 8 doses/week   Second week: 6 doses/week   Third week: 4 doses/week   Fourth week: 2 doses/week   Fifth week and beyond: stop    - MRI Brain WWO (done also given new, suspected complex seizures); with contrast 2/2 h/o thryoid CA    If severe HA, can use thorazine: 25mg pills, 1-2 at a time, qhs prn.    Botox approval pending / application started    Will reevaluate abortives at next visit.  Avoid triptans, given cardiac hx.

## 2018-04-11 NOTE — ASSESSMENT & PLAN NOTE
Suspected, given onset of symptoms shortly after radiation in approx 2010, as well as diffuse and severe presence.  Last EMG, in 2013, was normal.  Likely component of carpal tunnel bilaterally, L>R    - On DDx is severe cervical stenosis causing atrophy, weakness, numbness/tingling, and pain.  Cspine MRI WWO ordered.  Consider Lumbar spine WWO in the future, given severe (but less severe) back and BLE pain, numbness, and tingling.      - Repeat EMG.  If again normal, consider muscle biopsy   If EMG is consistent with carpal tunnel, will refer to ortho for possible decompression versus injections.  In the meantime, I recommended wrist splints qhs as well as during the day, if she is able to tolerate them.    - Patient is currently on gabapentin 300mg tid.  She feels that it helps, but she does not like the S/E, including drowsiness.  Ok to stop.  However, options discussed, including taking 2-3 pills qhs before bed.  Will need pain management - referral placed.

## 2018-04-11 NOTE — ASSESSMENT & PLAN NOTE
?cardiac in etiology  Not thought to represent seizures, but EEG has been ordered for other episodes thought to be consistent with complex partial seizures    F/u with cardiology - patient has appointment this month.  Consider Holter +/- tilt
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
## 2018-04-11 NOTE — PATIENT INSTRUCTIONS
1. Labs today  2. MRI Brain and Cspine  3. EEG  4. Wean topamax as follows:   First week (starting now): 1/2 pill in the am, 1 pill pm   Second week: 1/2 pill twice a day   Third week: 1/2 pill at night only   Fourth week and beyond: stop  5. Wean abortive medications.  Currently taking approximately 12 doses/week of BC Powder/ibuprofen   First week (starting now): 8 doses/week   Second week: 6 doses/week   Third week: 4 doses/week   Fourth week: 2 doses/week   Fifth week and beyond: stop  6. F/u with cardiology.  Please discuss falling episodes.  7. F/u with psychiatry.  Consider increasing (versus changing) antidepressant medication.  However, your mood may improve as topamax is weaned.    8. Please obtain wrist splints.  Wear them on both arms at night, every night.  Consider wearing them during the day too, as tolerated.    9. EMG  10. Seizure precautions: no driving x6 mo.  Avoid heights, cooking alone, bathing alone (showers are ok), operating heavy machinery.

## 2018-04-11 NOTE — ASSESSMENT & PLAN NOTE
"Patient reports Zoloft ("25 or 50"mg daily), and psychiatrist, whom she has a great relationship with, and whom she is seeing Friday.   The possibility of increasing Zoloft versus switching to alternative medication was discussed.  However, would hold off for now, given that Topamax, with time, can also cause depression/teariness, and we are weaning this medication, starting now.    F/u with psychiatry.  "

## 2018-04-12 NOTE — PROGRESS NOTES
I have personally seen and evaluated this patient. I have confirmed key portions of the history and examination. I concurred with the residents findings and documentation

## 2018-04-13 ENCOUNTER — TELEPHONE (OUTPATIENT)
Dept: FAMILY MEDICINE | Facility: CLINIC | Age: 58
End: 2018-04-13

## 2018-04-13 NOTE — TELEPHONE ENCOUNTER
Patient very upset that original appointment with Dr Cooper was cancelled and she was pushed to 5/1/18 with Dr Lizarraga and she is in pain and also claims she saw a Neurologist that refused to refill Rx. Patients has referrals to radiology/psych/endo/pain mgmt and cardiology and none have been addressed.After speaking with patient she expressed she is really upset and in pain and I explained that the NP can't prescribe chronic pain medication and that she needs to keep all appointments to diagnosis and treat her symptoms but haste and understanding she released call.

## 2018-04-18 ENCOUNTER — HOSPITAL ENCOUNTER (OUTPATIENT)
Dept: RADIOLOGY | Facility: HOSPITAL | Age: 58
Discharge: HOME | End: 2018-04-18
Attending: STUDENT IN AN ORGANIZED HEALTH CARE EDUCATION/TRAINING PROGRAM
Payer: MEDICAID

## 2018-04-18 ENCOUNTER — HOSPITAL ENCOUNTER (OUTPATIENT)
Dept: NEUROLOGY | Facility: CLINIC | Age: 58
Discharge: HOME | End: 2018-04-18
Payer: MEDICAID

## 2018-04-18 DIAGNOSIS — G40.209 PARTIAL SYMPTOMATIC EPILEPSY WITH COMPLEX PARTIAL SEIZURES, NOT INTRACTABLE, WITHOUT STATUS EPILEPTICUS: ICD-10-CM

## 2018-04-18 DIAGNOSIS — G62.82 POLYNEUROPATHY DUE TO RADIATION: ICD-10-CM

## 2018-04-18 PROCEDURE — 95816 EEG AWAKE AND DROWSY: CPT | Mod: PBBFAC | Performed by: PSYCHIATRY & NEUROLOGY

## 2018-04-18 PROCEDURE — 25500020 PHARM REV CODE 255: Performed by: STUDENT IN AN ORGANIZED HEALTH CARE EDUCATION/TRAINING PROGRAM

## 2018-04-18 PROCEDURE — 72156 MRI NECK SPINE W/O & W/DYE: CPT | Mod: TC

## 2018-04-18 PROCEDURE — 95816 EEG AWAKE AND DROWSY: CPT | Mod: 26,S$PBB,, | Performed by: PSYCHIATRY & NEUROLOGY

## 2018-04-18 PROCEDURE — 70553 MRI BRAIN STEM W/O & W/DYE: CPT | Mod: 26,,, | Performed by: RADIOLOGY

## 2018-04-18 PROCEDURE — 95816 PR EEG,W/AWAKE & DROWSY RECORD: ICD-10-PCS | Mod: 26,S$PBB,, | Performed by: PSYCHIATRY & NEUROLOGY

## 2018-04-18 PROCEDURE — A9585 GADOBUTROL INJECTION: HCPCS | Performed by: STUDENT IN AN ORGANIZED HEALTH CARE EDUCATION/TRAINING PROGRAM

## 2018-04-18 PROCEDURE — 72156 MRI NECK SPINE W/O & W/DYE: CPT | Mod: 26,,, | Performed by: RADIOLOGY

## 2018-04-18 PROCEDURE — 70553 MRI BRAIN STEM W/O & W/DYE: CPT | Mod: TC

## 2018-04-18 RX ORDER — GADOBUTROL 604.72 MG/ML
10 INJECTION INTRAVENOUS
Status: COMPLETED | OUTPATIENT
Start: 2018-04-18 | End: 2018-04-18

## 2018-04-18 RX ADMIN — GADOBUTROL 10 ML: 604.72 INJECTION INTRAVENOUS at 08:04

## 2018-04-18 NOTE — PROCEDURES
DATE OF SERVICE:  04/18/2018    EEG NUMBER:  OC     REQUESTING PHYSICIAN:  Dr. Aundrea Pathak.    ELECTROENCEPHALOGRAM REPORT     METHODOLOGY:  Electroencephalographic (EEG) recording is recorded with   electrodes placed according to the International 10-20 placement system.  Thirty   two (32) channels of digital signal (sampling rate of 512/sec), including T1   and T2, were simultaneously recorded from the scalp and may include EKG, EMG,   and/or eye monitors.  Recording band pass was 0.1 to 512 Hz.  Digital video   recording of the patient is simultaneously recorded with the EEG.  The patient   is instructed to report clinical symptoms which may occur during the recording   session.  EEG and video recording are stored and archived in digital format.    Activation procedures, which include photic stimulation, hyperventilation and   instructing patients to perform simple tasks, are done in selected patients  The EEG is displayed on a monitor screen and can be reviewed using different   montages.  Computer assisted-analysis is employed to detect spike and   electrographic seizure activity.   The entire record is submitted for computer   analysis.  The entire recording is visually reviewed, and the times identified   by computer analysis as being spikes or seizures are reviewed again.    Compressed spectral analysis (CSA) is also performed on the activity recorded   from each individual channel.  This is displayed as a power display of   frequencies from 0 to 30 Hz over time.   The CSA is reviewed looking for   asymmetries in power between homologous areas of the scalp, then compared with   the original EEG recording.    Sma[employer] software was also utilized in the review of this study.  This software   suite analyzes the EEG recording in multiple domains.  Coherence and rhythmicity   are computed to identify EEG sections which may contain organized seizures.    Each channel undergoes analysis to detect the  presence of spike and sharp waves   which have special and morphological characteristics of epileptic activity.  The   routine EEG recording is converted from special into frequency domain.  This is   then displayed comparing homologous areas to identify areas of significant   asymmetry.  Algorithm to identify non-cortically generated artifact is used to   separate artifact from the EEG.        EEG FINDINGS:  The patient was awake throughout the recording.  With the eyes   closed and resting, very rhythmic posterior dominant rhythm of 9 Hz was noted in   the occipital lead with an occasional spread to the parietal posterior temporal   area.  Low voltage irregular beta was present diffusely.  Posterior dominant   rhythm attenuated on a few occasions, but the patient did not go into the   sleeping state.  Background was symmetrical throughout and no lateralized or   focal findings were noted and no spike or sharp wave activity was seen.    Intermittent photic stimulation was carried out, which produced moderate driving   response, but no pathological discharges were provoked.  Hyperventilation was   not performed.    IMPRESSION:  Normal waking EEG.    CLINICAL CORRELATION:  The patient is a 57-year-old female with history of   perioral numbness and tingling associated with aphasia, which lasts seconds and   followed by confusion.  This tracing shows no evidence for either cortical   dysfunction nor an epileptic process.      RR/HN  dd: 04/18/2018 13:15:38 (CDT)  td: 04/18/2018 13:38:08 (CDT)  Doc ID   #[id number]  Job ID #[id number]    CC:

## 2018-04-20 ENCOUNTER — TELEPHONE (OUTPATIENT)
Dept: NEUROLOGY | Facility: CLINIC | Age: 58
End: 2018-04-20

## 2018-04-20 NOTE — TELEPHONE ENCOUNTER
Nayely orders on your desk please sign and give them to me so I can have them mailed off----- Message from Vanessa Gonzalez sent at 4/19/2018  3:30 PM CDT -----  Contact: self @ [phone]  Pt is calling to see if she can get a referral from Dr Pathak to see an outside pain management Dr.  She says she is not able to wait until her appt here on 7-5-18.  Pls call.

## 2018-04-24 PROBLEM — G95.20 CERVICAL SPINAL CORD COMPRESSION: Status: ACTIVE | Noted: 2018-04-24

## 2018-04-25 ENCOUNTER — HOSPITAL ENCOUNTER (OUTPATIENT)
Facility: HOSPITAL | Age: 58
Discharge: HOME | DRG: 093 | End: 2018-04-25
Attending: PSYCHIATRY & NEUROLOGY | Admitting: HOSPITALIST
Payer: MEDICAID

## 2018-04-25 VITALS
HEIGHT: 64 IN | OXYGEN SATURATION: 95 % | BODY MASS INDEX: 45.24 KG/M2 | SYSTOLIC BLOOD PRESSURE: 131 MMHG | DIASTOLIC BLOOD PRESSURE: 72 MMHG | TEMPERATURE: 98 F | HEART RATE: 55 BPM | RESPIRATION RATE: 16 BRPM | WEIGHT: 265 LBS

## 2018-04-25 DIAGNOSIS — C80.1 MALIGNANT NEOPLASM METASTATIC TO CERVICAL VERTEBRAL COLUMN WITH UNKNOWN PRIMARY SITE: Primary | ICD-10-CM

## 2018-04-25 DIAGNOSIS — G43.019 INTRACTABLE MIGRAINE WITHOUT AURA AND WITHOUT STATUS MIGRAINOSUS: ICD-10-CM

## 2018-04-25 DIAGNOSIS — G62.82 POLYNEUROPATHY DUE TO RADIATION: ICD-10-CM

## 2018-04-25 DIAGNOSIS — G95.20 CERVICAL SPINAL CORD COMPRESSION: ICD-10-CM

## 2018-04-25 DIAGNOSIS — C79.51 MALIGNANT NEOPLASM METASTATIC TO CERVICAL VERTEBRAL COLUMN WITH UNKNOWN PRIMARY SITE: Primary | ICD-10-CM

## 2018-04-25 DIAGNOSIS — N72 CERVICAL INFLAMMATION: Primary | ICD-10-CM

## 2018-04-25 PROBLEM — G89.29 CHRONIC PAIN: Status: ACTIVE | Noted: 2018-04-25

## 2018-04-25 PROBLEM — G95.89 CERVICAL SPINAL MASS: Status: ACTIVE | Noted: 2018-04-24

## 2018-04-25 LAB
ALBUMIN SERPL BCP-MCNC: 3.7 G/DL
ALP SERPL-CCNC: 109 U/L
ALT SERPL W/O P-5'-P-CCNC: 30 U/L
ANION GAP SERPL CALC-SCNC: 11 MMOL/L
AST SERPL-CCNC: 26 U/L
BILIRUB SERPL-MCNC: 1 MG/DL
BUN SERPL-MCNC: 14 MG/DL
CALCIUM SERPL-MCNC: 9.1 MG/DL
CHLORIDE SERPL-SCNC: 105 MMOL/L
CO2 SERPL-SCNC: 22 MMOL/L
CREAT SERPL-MCNC: 0.7 MG/DL
EST. GFR  (AFRICAN AMERICAN): >60 ML/MIN/1.73 M^2
EST. GFR  (NON AFRICAN AMERICAN): >60 ML/MIN/1.73 M^2
GLUCOSE SERPL-MCNC: 104 MG/DL
INR PPP: 0.9
MAGNESIUM SERPL-MCNC: 2 MG/DL
PHOSPHATE SERPL-MCNC: 4 MG/DL
POTASSIUM SERPL-SCNC: 3.9 MMOL/L
PROT SERPL-MCNC: 6.9 G/DL
PROTHROMBIN TIME: 9.8 SEC
SODIUM SERPL-SCNC: 138 MMOL/L
T4 FREE SERPL-MCNC: 1.24 NG/DL
TSH SERPL DL<=0.005 MIU/L-ACNC: 0.07 UIU/ML

## 2018-04-25 PROCEDURE — G8978 MOBILITY CURRENT STATUS: HCPCS | Mod: CI

## 2018-04-25 PROCEDURE — 25000003 PHARM REV CODE 250: Performed by: INTERNAL MEDICINE

## 2018-04-25 PROCEDURE — 97162 PT EVAL MOD COMPLEX 30 MIN: CPT

## 2018-04-25 PROCEDURE — 84443 ASSAY THYROID STIM HORMONE: CPT

## 2018-04-25 PROCEDURE — 11000001 HC ACUTE MED/SURG PRIVATE ROOM

## 2018-04-25 PROCEDURE — 85610 PROTHROMBIN TIME: CPT

## 2018-04-25 PROCEDURE — G0378 HOSPITAL OBSERVATION PER HR: HCPCS

## 2018-04-25 PROCEDURE — 84439 ASSAY OF FREE THYROXINE: CPT

## 2018-04-25 PROCEDURE — G0379 DIRECT REFER HOSPITAL OBSERV: HCPCS

## 2018-04-25 PROCEDURE — 25500020 PHARM REV CODE 255: Performed by: HOSPITALIST

## 2018-04-25 PROCEDURE — 83735 ASSAY OF MAGNESIUM: CPT

## 2018-04-25 PROCEDURE — G8979 MOBILITY GOAL STATUS: HCPCS | Mod: CI

## 2018-04-25 PROCEDURE — 36415 COLL VENOUS BLD VENIPUNCTURE: CPT

## 2018-04-25 PROCEDURE — 97530 THERAPEUTIC ACTIVITIES: CPT

## 2018-04-25 PROCEDURE — 84432 ASSAY OF THYROGLOBULIN: CPT

## 2018-04-25 PROCEDURE — 84100 ASSAY OF PHOSPHORUS: CPT

## 2018-04-25 PROCEDURE — 80053 COMPREHEN METABOLIC PANEL: CPT

## 2018-04-25 PROCEDURE — 97165 OT EVAL LOW COMPLEX 30 MIN: CPT

## 2018-04-25 PROCEDURE — 99219 PR INITIAL OBSERVATION CARE,LEVL II: CPT | Mod: ,,, | Performed by: HOSPITALIST

## 2018-04-25 PROCEDURE — G8980 MOBILITY D/C STATUS: HCPCS | Mod: CI

## 2018-04-25 RX ORDER — ALBUTEROL SULFATE 90 UG/1
2 AEROSOL, METERED RESPIRATORY (INHALATION) EVERY 4 HOURS PRN
Status: DISCONTINUED | OUTPATIENT
Start: 2018-04-25 | End: 2018-04-25 | Stop reason: HOSPADM

## 2018-04-25 RX ORDER — NITROGLYCERIN 0.4 MG/1
0.4 TABLET SUBLINGUAL EVERY 5 MIN PRN
Status: DISCONTINUED | OUTPATIENT
Start: 2018-04-25 | End: 2018-04-25 | Stop reason: HOSPADM

## 2018-04-25 RX ORDER — TOPIRAMATE 25 MG/1
50 TABLET ORAL NIGHTLY
Status: DISCONTINUED | OUTPATIENT
Start: 2018-04-25 | End: 2018-04-25 | Stop reason: HOSPADM

## 2018-04-25 RX ORDER — IBUPROFEN 800 MG/1
800 TABLET ORAL 3 TIMES DAILY
Qty: 15 TABLET | Refills: 0 | Status: SHIPPED | OUTPATIENT
Start: 2018-04-25 | End: 2018-04-30

## 2018-04-25 RX ORDER — LEVOTHYROXINE SODIUM 100 UG/1
100 TABLET ORAL DAILY
Status: DISCONTINUED | OUTPATIENT
Start: 2018-04-25 | End: 2018-04-25 | Stop reason: HOSPADM

## 2018-04-25 RX ORDER — AMLODIPINE BESYLATE 5 MG/1
5 TABLET ORAL DAILY
Status: DISCONTINUED | OUTPATIENT
Start: 2018-04-25 | End: 2018-04-25 | Stop reason: HOSPADM

## 2018-04-25 RX ORDER — PROPRANOLOL HYDROCHLORIDE 20 MG/1
60 TABLET ORAL 2 TIMES DAILY
Status: DISCONTINUED | OUTPATIENT
Start: 2018-04-25 | End: 2018-04-25 | Stop reason: HOSPADM

## 2018-04-25 RX ORDER — SODIUM CHLORIDE 0.9 % (FLUSH) 0.9 %
5 SYRINGE (ML) INJECTION
Status: DISCONTINUED | OUTPATIENT
Start: 2018-04-25 | End: 2018-04-25 | Stop reason: HOSPADM

## 2018-04-25 RX ORDER — LISINOPRIL 10 MG/1
10 TABLET ORAL DAILY
Status: DISCONTINUED | OUTPATIENT
Start: 2018-04-25 | End: 2018-04-25 | Stop reason: HOSPADM

## 2018-04-25 RX ORDER — ERGOCALCIFEROL 1.25 MG/1
50000 CAPSULE ORAL
Status: DISCONTINUED | OUTPATIENT
Start: 2018-04-29 | End: 2018-04-25 | Stop reason: HOSPADM

## 2018-04-25 RX ORDER — HYDROXYZINE PAMOATE 25 MG/1
50 CAPSULE ORAL EVERY 6 HOURS PRN
Status: DISCONTINUED | OUTPATIENT
Start: 2018-04-25 | End: 2018-04-25 | Stop reason: HOSPADM

## 2018-04-25 RX ORDER — IBUPROFEN 200 MG
24 TABLET ORAL
Status: DISCONTINUED | OUTPATIENT
Start: 2018-04-25 | End: 2018-04-25 | Stop reason: HOSPADM

## 2018-04-25 RX ORDER — GLUCAGON 1 MG
1 KIT INJECTION
Status: DISCONTINUED | OUTPATIENT
Start: 2018-04-25 | End: 2018-04-25 | Stop reason: HOSPADM

## 2018-04-25 RX ORDER — IPRATROPIUM BROMIDE AND ALBUTEROL SULFATE 2.5; .5 MG/3ML; MG/3ML
3 SOLUTION RESPIRATORY (INHALATION) EVERY 4 HOURS PRN
Status: DISCONTINUED | OUTPATIENT
Start: 2018-04-25 | End: 2018-04-25 | Stop reason: HOSPADM

## 2018-04-25 RX ORDER — OXYCODONE HYDROCHLORIDE 5 MG/1
30 TABLET ORAL EVERY 6 HOURS PRN
Status: DISCONTINUED | OUTPATIENT
Start: 2018-04-25 | End: 2018-04-25 | Stop reason: HOSPADM

## 2018-04-25 RX ORDER — RAMELTEON 8 MG/1
8 TABLET ORAL NIGHTLY PRN
Status: DISCONTINUED | OUTPATIENT
Start: 2018-04-25 | End: 2018-04-25 | Stop reason: HOSPADM

## 2018-04-25 RX ORDER — DOXEPIN HYDROCHLORIDE 25 MG/1
25 CAPSULE ORAL NIGHTLY
Status: DISCONTINUED | OUTPATIENT
Start: 2018-04-25 | End: 2018-04-25 | Stop reason: HOSPADM

## 2018-04-25 RX ORDER — DICYCLOMINE HYDROCHLORIDE 20 MG/1
20 TABLET ORAL 2 TIMES DAILY
Status: DISCONTINUED | OUTPATIENT
Start: 2018-04-25 | End: 2018-04-25 | Stop reason: HOSPADM

## 2018-04-25 RX ORDER — LIDOCAINE AND PRILOCAINE 25; 25 MG/G; MG/G
CREAM TOPICAL
Status: DISCONTINUED | OUTPATIENT
Start: 2018-04-25 | End: 2018-04-25 | Stop reason: HOSPADM

## 2018-04-25 RX ORDER — GABAPENTIN 100 MG/1
300 CAPSULE ORAL NIGHTLY
Status: DISCONTINUED | OUTPATIENT
Start: 2018-04-25 | End: 2018-04-25 | Stop reason: HOSPADM

## 2018-04-25 RX ORDER — IBUPROFEN 200 MG
16 TABLET ORAL
Status: DISCONTINUED | OUTPATIENT
Start: 2018-04-25 | End: 2018-04-25 | Stop reason: HOSPADM

## 2018-04-25 RX ORDER — ACETAMINOPHEN 325 MG/1
650 TABLET ORAL EVERY 4 HOURS PRN
Status: DISCONTINUED | OUTPATIENT
Start: 2018-04-25 | End: 2018-04-25 | Stop reason: HOSPADM

## 2018-04-25 RX ORDER — PROMETHAZINE HYDROCHLORIDE 25 MG/1
25 TABLET ORAL EVERY 6 HOURS PRN
Status: DISCONTINUED | OUTPATIENT
Start: 2018-04-25 | End: 2018-04-25 | Stop reason: HOSPADM

## 2018-04-25 RX ORDER — CHLORPROMAZINE HYDROCHLORIDE 25 MG/1
25 TABLET, FILM COATED ORAL 3 TIMES DAILY PRN
Status: DISCONTINUED | OUTPATIENT
Start: 2018-04-25 | End: 2018-04-25 | Stop reason: HOSPADM

## 2018-04-25 RX ADMIN — CHLORPROMAZINE HYDROCHLORIDE 25 MG: 25 TABLET, SUGAR COATED ORAL at 01:04

## 2018-04-25 RX ADMIN — AMLODIPINE BESYLATE 5 MG: 5 TABLET ORAL at 09:04

## 2018-04-25 RX ADMIN — DICYCLOMINE HYDROCHLORIDE 20 MG: 20 TABLET ORAL at 09:04

## 2018-04-25 RX ADMIN — IOHEXOL 100 ML: 350 INJECTION, SOLUTION INTRAVENOUS at 08:04

## 2018-04-25 RX ADMIN — LISINOPRIL 10 MG: 10 TABLET ORAL at 09:04

## 2018-04-25 RX ADMIN — PROPRANOLOL HYDROCHLORIDE 60 MG: 20 TABLET ORAL at 09:04

## 2018-04-25 RX ADMIN — LEVOTHYROXINE SODIUM 100 MCG: 100 TABLET ORAL at 09:04

## 2018-04-25 NOTE — PT/OT/SLP EVAL
Physical Therapy Evaluation    Patient Name:  Paras Smith   MRN:  [id number]    Recommendations:     Discharge Recommendations:  outpatient PT   Discharge Equipment Recommendations:  (quad cane)   Barriers to discharge: None    Assessment:     Paras Smith is a 57 y.o. female admitted with a medical diagnosis of Cervical spinal mass.  She presents with the following impairments/functional limitations:  impaired sensation Patient tolerated evaluation  well. Patient limited at this time by impaired sensation and reported episodes of falling/unsteady gait. Pt reported feeling neck pain, having difficulty with speech, nauseated, dizziness, metallic taste in her mouth, tremors, facial and UE numbness and tingling. Pt close to functional baseline at this time but would benefit from monitoring from PT 2/2 symptoms described above and h/x of weakness/falls. Pt required supervision with all mobility at this time but noted increase numbness/tinglingin RLE compared to LLE   Patient will continue to require skilled PT services to address the above impairments to return to prior level of function as independent as possible. D/c home with OP per pt progression of medical status.       Rehab Prognosis:  good; patient would benefit from acute skilled PT services to address these deficits and reach maximum level of function.      Recent Surgery: * No surgery found *      Plan:     During this hospitalization, patient to be seen 2 x/week to address the above listed problems via gait training, therapeutic activities, therapeutic exercises, neuromuscular re-education  · Plan of Care Expires:  05/25/18   Plan of Care Reviewed with: patient    Subjective     Communicated with RN prior to session.  Patient found with HOB elevated upon PT entry to room, agreeable to evaluation.      Chief Complaint: Pt reported feeling neck pain, having difficulty with speech, nauseated, dizziness, metallic taste in her mouth, tremors, facial  "and UE numbness and tingling  Patient comments/goals: pt reports that she does not want any more medications and just " wants to be fixed"; pt also reports having several falls as well and can usually tell when they are coming on.   Pain/Comfort:  · Pain Rating 1:  (no value given)  · Location - Side 1: Left  · Location - Orientation 1:  (thumb/wrist)  · Pain Addressed 1: Distraction    Patients cultural, spiritual, Yarsani conflicts given the current situation:      Living Environment:  Pt lives with 17 y/o daughter and grandchildren. Pt adult children live close and can help as need.   Prior to admission, patients level of function was ( I ) on disability, caring for grand children.  Patient has the following equipment:  .  DME owned (not currently used): quad cane.  Upon discharge, patient will have assistance from daughter  .    Objective:     Patient found with:  (all lines intact)     General Precautions: Standard, fall   Orthopedic Precautions:N/A   Braces: N/A     Exams:  Cognition:  Patient is Oriented X 4, Alert and Cooperative  Patient Follows multistep  commands 100 % of the time.       Coordination  · WFL  · Other noted coordination deficits: none    Sensation  · Patient's sensation was Diminished to light touch a left LE to all LE dermatomes  · Pt tender to palpation at C1-C7 transverse process and paraspinals (B )    Skin integrity    · -       Skin integrity: Visible skin intact    Posture  · -       Rounded shoulders  · -       Forward head  · -       Posterior pelvic tilt  · -       Kyphosis  · -       Dowager hump at C7    Strength and ROM    LLE ROM RLE ROM   WFL WFL   LLE Strength RLE Strength   WFL except 3+/5 hip flexion BLE WFL except 3+/5  hip flexion       Functional Mobilty    Bed Mobility:  Rolling Left:  supervision  Scooting: supervision  Supine to Sit: supervision  Sit to Supine: supervision  Transfers:  Sit to Stand:  supervision with rolling walker  Toilet Transfer: supervision " with  rolling walker  using  Stand Pivot    Gait  ·  Patient ambulated 86  ft  Supervision or Set-up Assistance with no AD x 65 feet supervsion, remaining distance with no AD, no increase in gait abnormalities or LOB.  · Gait deviations noted: pt stated thats she had RLE numbness and tingling with extended gait      Sitting Balance Static  Dynamic     GOOD-: Takes MODERATE challenges from all directions but inconsistently GOOD: Maintains balance through MODERATE excursions of active trunk movement   Standing Balance GOOD-: Takes MODERATE challenges from all directions inconsistently GOOD-: Needs SUPERVISION only during gait and able to self right with moderate            AM-PAC 6 CLICK MOBILITY  Total Score:23       Therapeutic Activities and Exercises:   Patient education  · Patient educated on the role of PT and POC  · Whiteboard updated in patients room to current assistance level  · All of patients questions were answered within the scope of PT  · Patient educated on importance of out of bed activity while in the hosptial per tolerance  · Patient educated on safe transfers with nursing as appropriate    Therapeutic activity  See above for TA performed        Patient left up in chair with all lines intact and call button in reach.    GOALS:    Physical Therapy Goals        Problem: Physical Therapy Goal    Goal Priority Disciplines Outcome Goal Variances Interventions   Physical Therapy Goal     PT/OT, PT Ongoing (interventions implemented as appropriate)     Description:  Goals to be met by: 18     Patient will increase functional independence with mobility by performin. Gait  x 150 feet with Supervision using LRD with no episodes of knee buckling or increased sway.   2. Lower extremity exercise program x30 reps per handout, with independence                      History:     Past Medical History:   Diagnosis Date    Asthma     CAD (coronary artery disease)     stent     Cervical spine disease      Depression     Difficult intubation     POSSIBLE    Falls 2018    GERD (gastroesophageal reflux disease)     Glaucoma     left eye    Helicobacter pylori (H. pylori) infection     History of stomach ulcers     HTN (hypertension)     Muscle weakness     LEFT    Neck problem     LIMITED ROM    Nuclear cataract 2014    Postsurgical hypothyroidism     Stroke     mini strokes    Thyroid cancer     Vitamin D deficiency disease        Past Surgical History:   Procedure Laterality Date    APPENDECTOMY      CERVICAL SPINE SURGERY      vocal cord damage     SECTION, LOW TRANSVERSE      x3    CHOLECYSTECTOMY      CORONARY ANGIOPLASTY WITH STENT PLACEMENT      x 3    PATELLA SURGERY  1969    THYROID SURGERY      total    TOTAL THYROIDECTOMY         Clinical Decision Making:     History  Co-morbidities and personal factors that may impact the plan of care Examination  Body Structures and Functions, activity limitations and participation restrictions that may impact the plan of care Clinical Presentation   Decision Making/ Complexity Score   Co-morbidities:   [] Time since onset of injury / illness / exacerbation  [x] Status of current condition  []Patient's cognitive status and safety concerns    [x] Multiple Medical Problems (see med hx)  Personal Factors:   [] Patient's age  [] Prior Level of function   [] Patient's home situation (environment and family support)  [] Patient's level of motivation  [] Expected progression of patient      HISTORY:(criteria)    [] 92665 - no personal factors/history    [x] 02398 - has 1-2 personal factor/comorbidity     [] 40742 - has >3 personal factor/comorbidity     Body Regions:  [] Objective examination findings  [] Head     [x]  Neck  [] Trunk   [x] Upper Extremity  [x] Lower Extremity    Body Systems:  [x] For communication ability, affect, cognition, language, and learning style: the assessment of the ability to make needs known, consciousness,  orientation (person, place, and time), expected emotional /behavioral responses, and learning preferences (eg, learning barriers, education  needs)  [x] For the neuromuscular system: a general assessment of gross coordinated movement (eg, balance, gait, locomotion, transfers, and transitions) and motor function  (motor control and motor learning)  [x] For the musculoskeletal system: the assessment of gross symmetry, gross range of motion, gross strength, height, and weight  [] For the integumentary system: the assessment of pliability(texture), presence of scar formation, skin color, and skin integrity  [] For cardiovascular/pulmonary system: the assessment of heart rate, respiratory rate, blood pressure, and edema     Activity limitations:    [] Patient's cognitive status and saf ety concerns          [] Status of current condition      [] Weight bearing restriction  [] Cardiopulmunary Restriction    Participation Restrictions:   [] Goals and goal agreement with the patient     [] Rehab potential (prognosis) and probable outcome      Examination of Body System: (criteria)    [] 57784 - addressing 1-2 elements    [x] 15991 - addressing a total of 3 or more elements     [] 85531 -  Addressing a total of 4 or more elements         Clinical Presentation: (criteria)  Evolving - 95269     On examination of body system using standardized tests and measures patient presents with 1-2 elements from any of the following: body structures and functions, activity limitations, and/or participation restrictions.  Leading to a clinical presentation that is considered evolving with changing characteristics                              Clinical Decision Making  (Eval Complexity):  Moderate - 04950     Time Tracking:     PT Received On: 04/25/18  PT Start Time: 1000     PT Stop Time: 1036  PT Total Time (min): 36 min     Billable Minutes: Evaluation 15 min and Therapeutic Activity 15 min      Feroz Chopra, PT  04/25/2018

## 2018-04-25 NOTE — CONSULTS
"Inpatient Radiology Consult Note    History of Present Illness:  Paras Smith is a 57 y.o. female patient with history of follicular thyroid CA s/p radiation and thyroidectomy was admitted to hospital with concern for spinal cord compression related to C7 spinous process lesion seen on MRI 18.    She has chronic numbness and weakness for which she sees neurology as an outpatient.  She states this has not worsened recently.  She does complain of pain in the back of her neck which she has had for several months.  She reports hitting her head this past December.    She is worried that this "spot" on the back of her neck is from her thyroid cancer and would like to pursue a biopsy, though she believes she does not need to be in the hospital for this.      Admission H&P reviewed.  Past Medical History:   Diagnosis Date    Asthma     CAD (coronary artery disease)     stent     Cervical spine disease     Depression     Difficult intubation     POSSIBLE    Falls 2018    GERD (gastroesophageal reflux disease)     Glaucoma     left eye    Helicobacter pylori (H. pylori) infection     History of stomach ulcers     HTN (hypertension)     Muscle weakness     LEFT    Neck problem     LIMITED ROM    Nuclear cataract 2014    Postsurgical hypothyroidism     Stroke     mini strokes    Thyroid cancer     Vitamin D deficiency disease      Past Surgical History:   Procedure Laterality Date    APPENDECTOMY      CERVICAL SPINE SURGERY      vocal cord damage     SECTION, LOW TRANSVERSE      x3    CHOLECYSTECTOMY      CORONARY ANGIOPLASTY WITH STENT PLACEMENT      x 3    PATELLA SURGERY  1969    THYROID SURGERY      total    TOTAL THYROIDECTOMY         Review of Systems:   As documented in primary team H&P    Home Meds:   Prior to Admission medications    Medication Sig Start Date End Date Taking? Authorizing Provider   albuterol (VENTOLIN HFA) 90 mcg/actuation inhaler inhale 2 " puffs by mouth every 6 hours if needed 1/31/18   ANSLEY Villalta   amLODIPine (NORVASC) 5 MG tablet Take 1 tablet (5 mg total) by mouth once daily. 3/7/18 3/7/19  ANSLEY Villalta   anthralin 1.2 % CmRR  7/18/14   Historical Provider, MD   chlorproMAZINE (THORAZINE) 25 MG tablet Ok to take 1-2 pills qhs prn. 4/11/18   Aundrea Pathak MD   diazePAM (VALIUM) 10 MG Tab Take 10 mg by mouth 2 (two) times daily. 6/3/17   Historical Provider, MD   dicyclomine (BENTYL) 20 mg tablet Take 1 tablet (20 mg total) by mouth 2 (two) times daily. 3/7/16   Marleen Steele MD   doxepin (SINEQUAN) 25 MG capsule Take 25 mg by mouth every evening. 9/12/14   Historical Provider, MD   ergocalciferol (VITAMIN D2) 50,000 unit Cap take 1 capsule by mouth every week (EVERY 7 DAYS) 3/7/18   ANSLEY Villalta   FLONASE ALLERGY RELIEF 50 mcg/actuation nasal spray  7/21/17   Historical Provider, MD   gabapentin (NEURONTIN) 300 MG capsule Take 600 mg by mouth 3 (three) times daily.  4/29/14   Historical Provider, MD   hydrOXYzine pamoate (VISTARIL) 50 MG Cap Take 1 capsule (50 mg total) by mouth every 6 (six) hours as needed (anxiety). 4/19/17   ANSLEY Villalta   ibuprofen (ADVIL,MOTRIN) 800 MG tablet Take 1 tablet (800 mg total) by mouth 3 (three) times daily. 4/25/18 4/30/18  Abdulaziz Gardiner MD   levothyroxine (SYNTHROID) 100 MCG tablet Take 1 tablet (100 mcg total) by mouth once daily. 4/10/18   ANSLEY Villalta   lidocaine-prilocaine (EMLA) cream  10/5/15   Historical Provider, MD   lisinopril 10 MG tablet take 1 tablet by mouth once daily 1/23/18   Chelsie Oneill MD   LMX 4 4 % cream  5/13/14   Historical Provider, MD   neomycin-polymyxin-hydrocortisone (CORTISPORIN) 3.5-10,000-1 mg/mL-unit/mL-% otic suspension Place 3 drops into both ears 3 (three) times daily. 1/12/18   Ekta Gaines NP-C   nitroGLYCERIN (NITROSTAT) 0.4 MG SL tablet Place 1 tablet (0.4 mg total) under the tongue every 5 (five)  minutes as needed. 8/14/17 8/14/18  Tae Patterson MD   nitroGLYCERIN 0.4 MG/HR TD PT24 (NITRODUR) 0.4 mg/hr apply 1 patch ONTO THE SKIN ONCE DAILY 12/22/17   Tae Patterson MD   omeprazole magnesium 20 mg CpDR Take 1 tablet by mouth 2 (two) times daily. 3/7/16 1/12/18  Marleen Steele MD   oxycodone (ROXICODONE) 30 MG Tab Take 30 mg by mouth every 6 (six) hours as needed.     Historical Provider, MD   promethazine (PHENERGAN) 25 MG tablet take 1 tablet by mouth every 8 hours if needed for nausea and vomiting 9/6/16   Historical Provider, MD   propranolol (INDERAL) 60 MG tablet 60 mg. 2/25/18   Historical Provider, MD   REXULTI 0.5 mg Tab 0.5 mg. 4/6/18   Historical Provider, MD   sodium,potassium,mag sulfates (SUPREP BOWEL PREP KIT) 17.5-3.13-1.6 gram SolR As directed, dispense 1 kit. 9/12/17   Chavo Wray MD   topiramate (TOPAMAX) 100 MG tablet Take 100 mg by mouth 2 (two) times daily. 12/5/16   Historical Provider, MD   naproxen (NAPROSYN) 375 MG tablet 375 mg. 4/5/18 4/25/18  Historical Provider, MD     Scheduled Meds:    amLODIPine  5 mg Oral Daily    dicyclomine  20 mg Oral BID    doxepin  25 mg Oral QHS    [START ON 4/29/2018] ergocalciferol  50,000 Units Oral Q7 Days    gabapentin  300 mg Oral QHS    levothyroxine  100 mcg Oral Daily    lisinopril  10 mg Oral Daily    propranolol  60 mg Oral BID    topiramate  50 mg Oral QHS     Continuous Infusions:   PRN Meds:acetaminophen, albuterol, albuterol-ipratropium 2.5mg-0.5mg/3mL, chlorproMAZINE, dextrose 50%, dextrose 50%, glucagon (human recombinant), glucose, glucose, hydrOXYzine pamoate, lidocaine-prilocaine, nitroGLYCERIN, oxyCODONE, promethazine, ramelteon, sodium chloride 0.9%  Anticoagulants/Antiplatelets: no anticoagulation    Allergies:   Review of patient's allergies indicates:   Allergen Reactions    Lisinopril Hives    Vioxx [rofecoxib] Rash     Sedation Hx: have not been any systemic reactions    Labs:    Recent Labs  Lab  04/25/18  0640   INR 0.9     No results for input(s): WBC, HGB, HCT, MCV, PLT in the last 168 hours.   Recent Labs  Lab 04/25/18  0640         K 3.9      CO2 22*   BUN 14   CREATININE 0.7   CALCIUM 9.1   MG 2.0   ALT 30   AST 26   ALBUMIN 3.7   BILITOT 1.0         Vitals:  Temp: 97.8 °F (36.6 °C) (04/25/18 1101)  Pulse: (!) 55 (04/25/18 1410)  Resp: 16 (04/25/18 1410)  BP: 131/72 (04/25/18 1410)  SpO2: 95 % (04/25/18 1410)     Physical Exam:  ASA: 3  Mallampati: 2    General: no acute distress  Mental Status: alert and oriented to person, place and time  HEENT: normocephalic, atraumatic  Chest: unlabored breathing  Heart: regular heart rate  Abdomen: nondistended  Extremity: moves all extremities    Review of imaging - MRI C SPINE: No cord compression.  ACDF C6-7. Inflammatory appearing lesion C7 spinous process.    Assessment / Plan: 57 year old female with history of follicular thyroid cancer and inflammatory appearing lesion of the C7 spinous process.    Patient has no MRI evidence of cervical spinal cord compression on recent MRI 4/18/18 and no new symptoms since that time.  From IR standpoint patient does not need to be in hospital for this malignancy workup.    Will tentatively plan on biopsy of C7 spinous process as an outpatient.    Matt Marquez MD  Neurointerventional Fellow  Department of Radiology  [phone]

## 2018-04-25 NOTE — ASSESSMENT & PLAN NOTE
- history of follicular thyroid cancer s/p radiation and thyroidectomy concerning for malignancy   - needs tissue diagnosis pending consult to neurosurgery  - no signs of spinal cord compression noted on imaging so will hold off on steroids at this time  - pending CT chest and abdomen   - pending thyroglobulin

## 2018-04-25 NOTE — ASSESSMENT & PLAN NOTE
- currently on topamax with plans to de-escalate as per neurology given concerns that this may be driving her headache symptoms. Has weaned down to 1/2 tab daily  - will consult neuro for further management   - neuro recs: Wean topamax as follows:              First week (starting now): 1/2 pill in the am, 1 pill pm              Second week: 1/2 pill twice a day              Third week: 1/2 pill at night only              Fourth week and beyond: stop  Wean abortive medications.  Currently taking approximately 12 doses/week of BC Powder/ibuprofen              First week (starting now): 8 doses/week              Second week: 6 doses/week              Third week: 4 doses/week              Fourth week: 2 doses/week              Fifth week and beyond: stop

## 2018-04-25 NOTE — H&P
[facility] Hospital Medicine  History & Physical    Patient Name: Paras Smith  MRN: [id number]  Admission Date: 4/25/2018  Attending Physician: Yesika Mayo MD   Primary Care Provider: Ekta Gaines NP-TAWNY    [facility] Medicine Team: [facility] 2 Taylor Neves MD     Patient information was obtained from patient and past medical records.     Subjective:     Principal Problem:Cervical spinal mass    Chief Complaint: No chief complaint on file.       HPI: Ms. Smith is a 56 yo F with a h/o follicular thyroid CA s/p radiation and thyroidectomy, HTN, CAD, anxiety, and NAFLD who was sent from neurology clinic after discovering a mass within her cervical spine. She was seen by Dr. Pathak for worsening numbness and weakness in her extremities.  Regarding her pain/numbness/tingling, she feels that she has been experiencing these symptoms since her radiation (approx 2010) but has worsened acutely over the last 6 months or so.  It is worse in her LUE, but is present LUE>RUE>BLE.  She cannot feel anything with her fingertips at all, and has baseline numbness that radiates up past her elbow on her left, and to midline forearm on the right. She underwent MRI of the spine which revealed an abnormal signal and enhancement within the posterior C7 spinous process with surrounding abnormal signal in the adjacent paraspinous soft tissues concerning for osseous metastasis in this patient with a history of thyroid carcinoma status post thyroidectomy. MRI of the brain was unremarkable as was an awake & drowsy EEG. She has an EMG scheduled in the future.     She was directly admitted by Dr. Pathak in neurology for inpatient work up of cervical mass given concerns for compression and malignancy.     Past Medical History:   Diagnosis Date    Asthma     CAD (coronary artery disease)     stent 2003    Cervical spine disease     Depression     Difficult intubation     POSSIBLE    Falls 4/11/2018     GERD (gastroesophageal reflux disease)     Glaucoma     left eye    Helicobacter pylori (H. pylori) infection     History of stomach ulcers     HTN (hypertension)     Muscle weakness     LEFT    Neck problem     LIMITED ROM    Nuclear cataract 2014    Postsurgical hypothyroidism     Stroke     mini strokes    Thyroid cancer     Vitamin D deficiency disease        Past Surgical History:   Procedure Laterality Date    APPENDECTOMY      CERVICAL SPINE SURGERY      vocal cord damage     SECTION, LOW TRANSVERSE      x3    CHOLECYSTECTOMY      CORONARY ANGIOPLASTY WITH STENT PLACEMENT      x 3    PATELLA SURGERY  1969    THYROID SURGERY      total    TOTAL THYROIDECTOMY         Review of patient's allergies indicates:   Allergen Reactions    Lisinopril Hives    Vioxx [rofecoxib] Rash       No current facility-administered medications on file prior to encounter.      Current Outpatient Prescriptions on File Prior to Encounter   Medication Sig    albuterol (VENTOLIN HFA) 90 mcg/actuation inhaler inhale 2 puffs by mouth every 6 hours if needed    amLODIPine (NORVASC) 5 MG tablet Take 1 tablet (5 mg total) by mouth once daily.    anthralin 1.2 % CmRR     chlorproMAZINE (THORAZINE) 25 MG tablet Ok to take 1-2 pills qhs prn.    diazePAM (VALIUM) 10 MG Tab Take 10 mg by mouth 2 (two) times daily.    dicyclomine (BENTYL) 20 mg tablet Take 1 tablet (20 mg total) by mouth 2 (two) times daily.    doxepin (SINEQUAN) 25 MG capsule Take 25 mg by mouth every evening.    ergocalciferol (VITAMIN D2) 50,000 unit Cap take 1 capsule by mouth every week (EVERY 7 DAYS)    FLONASE ALLERGY RELIEF 50 mcg/actuation nasal spray     gabapentin (NEURONTIN) 300 MG capsule Take 600 mg by mouth 3 (three) times daily.     hydrOXYzine pamoate (VISTARIL) 50 MG Cap Take 1 capsule (50 mg total) by mouth every 6 (six) hours as needed (anxiety).    levothyroxine (SYNTHROID) 100 MCG tablet Take 1 tablet (100 mcg  total) by mouth once daily.    lidocaine-prilocaine (EMLA) cream     lisinopril 10 MG tablet take 1 tablet by mouth once daily    LMX 4 4 % cream     naproxen (NAPROSYN) 375 MG tablet 375 mg.    neomycin-polymyxin-hydrocortisone (CORTISPORIN) 3.5-10,000-1 mg/mL-unit/mL-% otic suspension Place 3 drops into both ears 3 (three) times daily.    nitroGLYCERIN (NITROSTAT) 0.4 MG SL tablet Place 1 tablet (0.4 mg total) under the tongue every 5 (five) minutes as needed.    nitroGLYCERIN 0.4 MG/HR TD PT24 (NITRODUR) 0.4 mg/hr apply 1 patch ONTO THE SKIN ONCE DAILY    omeprazole magnesium 20 mg CpDR Take 1 tablet by mouth 2 (two) times daily.    oxycodone (ROXICODONE) 30 MG Tab Take 30 mg by mouth every 6 (six) hours as needed.     promethazine (PHENERGAN) 25 MG tablet take 1 tablet by mouth every 8 hours if needed for nausea and vomiting    propranolol (INDERAL) 60 MG tablet 60 mg.    REXULTI 0.5 mg Tab 0.5 mg.    sodium,potassium,mag sulfates (SUPREP BOWEL PREP KIT) 17.5-3.13-1.6 gram SolR As directed, dispense 1 kit.    topiramate (TOPAMAX) 100 MG tablet Take 100 mg by mouth 2 (two) times daily.     Family History     Problem Relation (Age of Onset)    Asthma Father, Mother    Breast cancer Mother, Maternal Aunt    Cancer Mother, Sister    Cataracts Father    Cervical cancer Daughter, Daughter    Diabetes Father    Glaucoma Father    Heart disease Mother, Brother    Hypertension Mother, Brother, Son, Daughter    Kidney failure Father    Liver disease Sister    Lupus Daughter    Mental illness Son    No Known Problems Brother, Daughter, Maternal Uncle, Paternal Aunt, Paternal Uncle, Paternal Grandmother, Paternal Grandfather    Ovarian cancer Mother, Maternal Grandfather    Scoliosis Son    Seizures Daughter    Stomach cancer Maternal Aunt, Maternal Grandmother, Maternal Aunt        Social History Main Topics    Smoking status: Never Smoker    Smokeless tobacco: Never Used      Comment: No cigarrettes, only  marijuana occasionally    Alcohol use No      Comment: recovering alcoholic    Drug use: No      Comment: ocasional    Sexual activity: Not Currently     Partners: Male     Review of Systems   Constitutional: Negative for chills and fever.   HENT: Negative for congestion.    Respiratory: Negative for cough and shortness of breath.    Cardiovascular: Negative for chest pain.   Gastrointestinal: Positive for abdominal pain (chronic), constipation, nausea and vomiting.   Genitourinary: Negative for dysuria.   Musculoskeletal: Negative for arthralgias.   Skin: Negative for rash.   Neurological: Negative for headaches.   Psychiatric/Behavioral: Negative for confusion.     Objective:     Vital Signs (Most Recent):    Vital Signs (24h Range):  BP: ()/()   Arterial Line BP: ()/()         There is no height or weight on file to calculate BMI.    Physical Exam   Constitutional: She is oriented to person, place, and time. She appears well-developed and well-nourished.   HENT:   Head: Normocephalic and atraumatic.   Mouth/Throat: No oropharyngeal exudate.   Eyes: Pupils are equal, round, and reactive to light.   Neck: Normal range of motion. No JVD present. No thyromegaly present.   Cardiovascular: Normal rate and regular rhythm.    Pulmonary/Chest: Effort normal.   Decreased breath sounds secondary to body habitus   Abdominal: Soft. Bowel sounds are normal. She exhibits distension. There is no tenderness.   Musculoskeletal: Normal range of motion. She exhibits no edema.   Lymphadenopathy:     She has no cervical adenopathy.   Neurological: She is alert and oriented to person, place, and time.   Skin: Skin is warm and dry.   Psychiatric: She has a normal mood and affect.   Somewhat tearful and anxious regarding medical diagnosis     Vitals reviewed.        CRANIAL NERVES     CN III, IV, VI   Pupils are equal, round, and reactive to light.       Significant Labs: All pertinent labs within the past 24 hours have been  reviewed.    Significant Imaging: I have reviewed all pertinent imaging results/findings within the past 24 hours.    Assessment/Plan:     * Cervical spinal mass    - history of follicular thyroid cancer s/p radiation and thyroidectomy concerning for malignancy   - needs tissue diagnosis pending consult to neurosurgery  - no signs of spinal cord compression noted on imaging so will hold off on steroids at this time  - pending CT chest and abdomen   - pending thyroglobulin        Chronic pain    - takes oxycodone 30 mg for cervical pain s/p thyroid radiation          Polyneuropathy due to radiation    - continue gabapentin 300 mg scheduled and as needed          Intractable migraine without aura and without status migrainosus    - currently on topamax with plans to de-escalate as per neurology given concerns that this may be driving her headache symptoms. Has weaned down to 1/2 tab daily  - will consult neuro for further management   - neuro recs: Wean topamax as follows:              First week (starting now): 1/2 pill in the am, 1 pill pm              Second week: 1/2 pill twice a day              Third week: 1/2 pill at night only              Fourth week and beyond: stop  Wean abortive medications.  Currently taking approximately 12 doses/week of BC Powder/ibuprofen              First week (starting now): 8 doses/week              Second week: 6 doses/week              Third week: 4 doses/week              Fourth week: 2 doses/week              Fifth week and beyond: stop          Anxiety    - given dependence on valium, will continue given possibility of withdrawal seizure  - continue prn hydroxyzine          Vitamin D deficiency disease    - continue ergocalciferol 50,000 weekly   - educated patient not to take daily as she has been since she received her lab results          Depression    - continue doxepin          Postsurgical hypothyroidism    - pending TSH  - continue levothyroxine 100 mcg          HTN  (hypertension)    - currently well controlled  - continue norvasc 5 mg and lisinopril 10 mg            VTE Risk Mitigation     None             Taylor Neves MD  Department of Hospital Medicine   [facility]

## 2018-04-25 NOTE — PROGRESS NOTES
Per my discussion with IR, patient approved for outpatient IR bone marrow biopsy but she will need to be referred back to neuro clinic before the biopsy will be done.

## 2018-04-25 NOTE — NURSING
Pt. Arrived on the unit direct admit, pt. Doctor called her and told her to admit herself   Through the ED for a suspicious mass on her spine ....

## 2018-04-25 NOTE — SUBJECTIVE & OBJECTIVE
Past Medical History:   Diagnosis Date    Asthma     CAD (coronary artery disease)     stent     Cervical spine disease     Depression     Difficult intubation     POSSIBLE    Falls 2018    GERD (gastroesophageal reflux disease)     Glaucoma     left eye    Helicobacter pylori (H. pylori) infection     History of stomach ulcers     HTN (hypertension)     Muscle weakness     LEFT    Neck problem     LIMITED ROM    Nuclear cataract 2014    Postsurgical hypothyroidism     Stroke     mini strokes    Thyroid cancer     Vitamin D deficiency disease        Past Surgical History:   Procedure Laterality Date    APPENDECTOMY      CERVICAL SPINE SURGERY      vocal cord damage     SECTION, LOW TRANSVERSE      x3    CHOLECYSTECTOMY      CORONARY ANGIOPLASTY WITH STENT PLACEMENT      x 3    PATELLA SURGERY  1969    THYROID SURGERY      total    TOTAL THYROIDECTOMY         Review of patient's allergies indicates:   Allergen Reactions    Lisinopril Hives    Vioxx [rofecoxib] Rash       No current facility-administered medications on file prior to encounter.      Current Outpatient Prescriptions on File Prior to Encounter   Medication Sig    albuterol (VENTOLIN HFA) 90 mcg/actuation inhaler inhale 2 puffs by mouth every 6 hours if needed    amLODIPine (NORVASC) 5 MG tablet Take 1 tablet (5 mg total) by mouth once daily.    anthralin 1.2 % CmRR     chlorproMAZINE (THORAZINE) 25 MG tablet Ok to take 1-2 pills qhs prn.    diazePAM (VALIUM) 10 MG Tab Take 10 mg by mouth 2 (two) times daily.    dicyclomine (BENTYL) 20 mg tablet Take 1 tablet (20 mg total) by mouth 2 (two) times daily.    doxepin (SINEQUAN) 25 MG capsule Take 25 mg by mouth every evening.    ergocalciferol (VITAMIN D2) 50,000 unit Cap take 1 capsule by mouth every week (EVERY 7 DAYS)    FLONASE ALLERGY RELIEF 50 mcg/actuation nasal spray     gabapentin (NEURONTIN) 300 MG capsule Take 600 mg by mouth 3 (three)  times daily.     hydrOXYzine pamoate (VISTARIL) 50 MG Cap Take 1 capsule (50 mg total) by mouth every 6 (six) hours as needed (anxiety).    levothyroxine (SYNTHROID) 100 MCG tablet Take 1 tablet (100 mcg total) by mouth once daily.    lidocaine-prilocaine (EMLA) cream     lisinopril 10 MG tablet take 1 tablet by mouth once daily    LMX 4 4 % cream     naproxen (NAPROSYN) 375 MG tablet 375 mg.    neomycin-polymyxin-hydrocortisone (CORTISPORIN) 3.5-10,000-1 mg/mL-unit/mL-% otic suspension Place 3 drops into both ears 3 (three) times daily.    nitroGLYCERIN (NITROSTAT) 0.4 MG SL tablet Place 1 tablet (0.4 mg total) under the tongue every 5 (five) minutes as needed.    nitroGLYCERIN 0.4 MG/HR TD PT24 (NITRODUR) 0.4 mg/hr apply 1 patch ONTO THE SKIN ONCE DAILY    omeprazole magnesium 20 mg CpDR Take 1 tablet by mouth 2 (two) times daily.    oxycodone (ROXICODONE) 30 MG Tab Take 30 mg by mouth every 6 (six) hours as needed.     promethazine (PHENERGAN) 25 MG tablet take 1 tablet by mouth every 8 hours if needed for nausea and vomiting    propranolol (INDERAL) 60 MG tablet 60 mg.    REXULTI 0.5 mg Tab 0.5 mg.    sodium,potassium,mag sulfates (SUPREP BOWEL PREP KIT) 17.5-3.13-1.6 gram SolR As directed, dispense 1 kit.    topiramate (TOPAMAX) 100 MG tablet Take 100 mg by mouth 2 (two) times daily.     Family History     Problem Relation (Age of Onset)    Asthma Father, Mother    Breast cancer Mother, Maternal Aunt    Cancer Mother, Sister    Cataracts Father    Cervical cancer Daughter, Daughter    Diabetes Father    Glaucoma Father    Heart disease Mother, Brother    Hypertension Mother, Brother, Son, Daughter    Kidney failure Father    Liver disease Sister    Lupus Daughter    Mental illness Son    No Known Problems Brother, Daughter, Maternal Uncle, Paternal Aunt, Paternal Uncle, Paternal Grandmother, Paternal Grandfather    Ovarian cancer Mother, Maternal Grandfather    Scoliosis Son    Seizures Daughter     Stomach cancer Maternal Aunt, Maternal Grandmother, Maternal Aunt        Social History Main Topics    Smoking status: Never Smoker    Smokeless tobacco: Never Used      Comment: No cigarrettes, only marijuana occasionally    Alcohol use No      Comment: recovering alcoholic    Drug use: No      Comment: ocasional    Sexual activity: Not Currently     Partners: Male     Review of Systems   Constitutional: Negative for chills and fever.   HENT: Negative for congestion.    Respiratory: Negative for cough and shortness of breath.    Cardiovascular: Negative for chest pain.   Gastrointestinal: Positive for abdominal pain (chronic), constipation, nausea and vomiting.   Genitourinary: Negative for dysuria.   Musculoskeletal: Negative for arthralgias.   Skin: Negative for rash.   Neurological: Negative for headaches.   Psychiatric/Behavioral: Negative for confusion.     Objective:     Vital Signs (Most Recent):    Vital Signs (24h Range):  BP: ()/()   Arterial Line BP: ()/()         There is no height or weight on file to calculate BMI.    Physical Exam   Constitutional: She is oriented to person, place, and time. She appears well-developed and well-nourished.   HENT:   Head: Normocephalic and atraumatic.   Mouth/Throat: No oropharyngeal exudate.   Eyes: Pupils are equal, round, and reactive to light.   Neck: Normal range of motion. No JVD present. No thyromegaly present.   Cardiovascular: Normal rate and regular rhythm.    Pulmonary/Chest: Effort normal.   Decreased breath sounds secondary to body habitus   Abdominal: Soft. Bowel sounds are normal. She exhibits distension. There is no tenderness.   Musculoskeletal: Normal range of motion. She exhibits no edema.   Lymphadenopathy:     She has no cervical adenopathy.   Neurological: She is alert and oriented to person, place, and time.   Skin: Skin is warm and dry.   Psychiatric: She has a normal mood and affect.   Somewhat tearful and anxious regarding medical  diagnosis     Vitals reviewed.        CRANIAL NERVES     CN III, IV, VI   Pupils are equal, round, and reactive to light.       Significant Labs: All pertinent labs within the past 24 hours have been reviewed.    Significant Imaging: I have reviewed all pertinent imaging results/findings within the past 24 hours.

## 2018-04-25 NOTE — PLAN OF CARE
Problem: Physical Therapy Goal  Goal: Physical Therapy Goal  Goals to be met by: 18     Patient will increase functional independence with mobility by performin. Gait  x 150 feet with Supervision using LRD with no episodes of knee buckling or increased sway.   2. Lower extremity exercise program x30 reps per handout, with independence    Outcome: Ongoing (interventions implemented as appropriate)  Patient evaluated today. All goals established are appropriate for patient progression at this time.   Feroz Chopra, PT  2018

## 2018-04-25 NOTE — PLAN OF CARE
Problem: Occupational Therapy Goal  Goal: Occupational Therapy Goal  Outcome: Outcome(s) achieved Date Met: 04/25/18  Eval and D/C - no needs.    WINNIE Thakkar

## 2018-04-25 NOTE — HPI
Ms. Smith is a 56 yo F with a h/o follicular thyroid CA s/p radiation and thyroidectomy, HTN, CAD, anxiety, and NAFLD who was sent from neurology clinic after discovering a mass within her cervical spine. She was seen by Dr. Pathak for worsening numbness and weakness in her extremities.  Regarding her pain/numbness/tingling, she feels that she has been experiencing these symptoms since her radiation (approx 2010) but has worsened acutely over the last 6 months or so.  It is worse in her LUE, but is present LUE>RUE>BLE.  She cannot feel anything with her fingertips at all, and has baseline numbness that radiates up past her elbow on her left, and to midline forearm on the right. She underwent MRI of the spine which revealed an abnormal signal and enhancement within the posterior C7 spinous process with surrounding abnormal signal in the adjacent paraspinous soft tissues concerning for osseous metastasis in this patient with a history of thyroid carcinoma status post thyroidectomy. MRI of the brain was unremarkable as was an awake & drowsy EEG. She has an EMG scheduled in the future.     She was directly admitted by Dr. Pathak in neurology for inpatient work up of cervical mass given concerns for compression and malignancy.

## 2018-04-25 NOTE — PT/OT/SLP EVAL
"Occupational Therapy   Evaluation and Discharge Note    Name: Paras Smith  MRN: [id number]  Admitting Diagnosis:  Cervical spinal mass      Recommendations:     Discharge Recommendations: home  Discharge Equipment Recommendations:  none  Barriers to discharge:  None    History:     Occupational Profile:  Living Environment: Pt lives with her 15 y/o daughter/grandkids in a H.  Previous level of function: (I) but with recent decline. Walks with a QC. Cares for grandkids.  Equipment Owned:  cane, quad  Assistance upon Discharge: Family - older daughter lives nearby.    Past Medical History:   Diagnosis Date    Asthma     CAD (coronary artery disease)     stent     Cervical spine disease     Depression     Difficult intubation     POSSIBLE    Falls 2018    GERD (gastroesophageal reflux disease)     Glaucoma     left eye    Helicobacter pylori (H. pylori) infection     History of stomach ulcers     HTN (hypertension)     Muscle weakness     LEFT    Neck problem     LIMITED ROM    Nuclear cataract 2014    Postsurgical hypothyroidism     Stroke     mini strokes    Thyroid cancer     Vitamin D deficiency disease        Past Surgical History:   Procedure Laterality Date    APPENDECTOMY      CERVICAL SPINE SURGERY      vocal cord damage     SECTION, LOW TRANSVERSE      x3    CHOLECYSTECTOMY      CORONARY ANGIOPLASTY WITH STENT PLACEMENT      x 3    PATELLA SURGERY  1969    THYROID SURGERY      total    TOTAL THYROIDECTOMY         Subjective     Patient/Family stated goals: "To get fixed."  Communicated with: RN prior to session.  Pain/Comfort:  · Pain Rating 1: 0/10    Patients cultural, spiritual, Hindu conflicts given the current situation:      Objective:     Patient found with:      General Precautions: Standard,     Orthopedic Precautions:    Braces:       Occupational Performance:    Bed Mobility:    · Patient completed Rolling/Turning to Left with  " "supervision  · Patient completed Scooting/Bridging with supervision  · Patient completed Supine to Sit with supervision    Functional Mobility/Transfers:  · Patient completed Sit <> Stand Transfer with supervision  with  rolling walker   · Patient completed Bed <> Chair Transfer using Step Transfer technique with supervision with rolling walker  · Patient completed Toilet Transfer Step Transfer technique with supervision with  rolling walker  · Functional Mobility: SBA with a RW.    Activities of Daily Living:  · UB Dressing: supervision   · LB Dressing: supervision     Cognitive/Visual Perceptual:  Cognitive/Psychosocial Skills:     -       Oriented to: Person, Place, Time and Situation   -       Safety awareness/insight to disability: intact     Physical Exam:  Sensation:    -       Impaired  light/touch L hand > R hand  Upper Extremity Range of Motion:  -       Right Upper Extremity: WNL  -       Left Upper Extremity: WNL  Upper Extremity Strength:    -       Right Upper Extremity: WNL  -       Left Upper Extremity: WNL    Patient left up in chair with all lines intact and call button in reach    [facility] 6 Click:  [facility] Total Score: 24    Treatment & Education:  UE ROM/MMT  Bed mobility training / assessment  Functional mobility assessment  Sit/standing balance assessment  Discussed D/C of OT  Education:    Assessment:     Paras Smith is a 57 y.o. female with a medical diagnosis of Cervical spinal mass. At this time, patient is functioning at their prior level of function and does not require further acute OT services. Of note, pt has c/o change in her speech, seeing black dots, sensation changes in her hands/feet and a fall 3 weeks ago. PT to follow for gait.     Clinical Decision Makin.  OT Low:  "Pt evaluation falls under low complexity for evaluation coding due to performance deficits noted in 1-3 areas as stated above and 0 co-morbities affecting current functional status. Data obtained from " "problem focused assessments. No modifications or assistance was required for completion of evaluation. Only brief occupational profile and history review completed."     Plan:     During this hospitalization, patient does not require further acute OT services.  Please re-consult if situation changes.    · Plan of Care Reviewed with: patient    This Plan of care has been discussed with the patient who was involved in its development and understands and is in agreement with the identified goals and treatment plan    GOALS:    Occupational Therapy Goals     Not on file          Multidisciplinary Problems (Resolved)        Problem: Occupational Therapy Goal    Goal Priority Disciplines Outcome Interventions   Occupational Therapy Goal   (Resolved)     OT, PT/OT Outcome(s) achieved                    Time Tracking:     OT Date of Treatment: 04/25/18  OT Start Time: 1003  OT Stop Time: 1036  OT Total Time (min): 33 min    Billable Minutes:Evaluation 20  Therapeutic Activity 13    WINNIE Thakkar  4/25/2018    "

## 2018-04-25 NOTE — ASSESSMENT & PLAN NOTE
- given dependence on valium, will continue given possibility of withdrawal seizure  - continue prn hydroxyzine

## 2018-04-25 NOTE — PLAN OF CARE
Problem: Patient Care Overview  Goal: Plan of Care Review  Outcome: Ongoing (interventions implemented as appropriate)  Current plan of care reviewed with pt. , pt. Allowed to ask questions, pt. Verbalized understanding of information   Given

## 2018-04-25 NOTE — ASSESSMENT & PLAN NOTE
- continue ergocalciferol 50,000 weekly   - educated patient not to take daily as she has been since she received her lab results

## 2018-04-25 NOTE — TELEPHONE ENCOUNTER
Patient called, and MRI results discussed.  MRI Cspine shows lesion suspicious for possible mets, particularly given h/o known thryoid CA (years ago, now s/p radiation).  Options of close PCP f/u versus inpatient w/u discussed; patient wishes to be admitted for expedited workup, particularly given stressors at home.    Patient was last seen by me on 4/11 for progressive weakness and paresthesias (BUE>BLE), as well as possible complex partial seizures and HA.  EEG wnl.  MRI Brain WWO wnl.      Admit office called for report; awaiting callback.    I recommend admission to hospital medicine.    - Consider CT C/A/P to look for further lesions  - Consider NSGY consult for biopsy/surgery  - H/O consult  - Seizure precautions  - Continue topamax wean.  (Started for HA, but patient is now experiencing S/E without significant benefits.)  Of note, outpatient Botox pending.    Above plan briefly discussed with Dr. Spencer, neurology attending, via phone.    Aundrea Pathak MD  [facility]  PGY-3

## 2018-04-25 NOTE — NURSING
Discharge instructions given to and reviewed with patient and patients daughter. Patient and patients daughter verbalized understanding. Patients IV removed free of complications. Patient awaiting transportation. Will continue to monitor.

## 2018-04-26 ENCOUNTER — TELEPHONE (OUTPATIENT)
Dept: NEUROLOGY | Facility: CLINIC | Age: 58
End: 2018-04-26

## 2018-04-26 NOTE — TELEPHONE ENCOUNTER
Spoke to the patient and she states you were going to order a biopsy. It looks like is needs another EMG and if normal she would have a biopsy? Please advise.

## 2018-04-26 NOTE — ASSESSMENT & PLAN NOTE
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  - pending thyroglobulin  - pt MRI was reviewed by IR lesions found on C7 correlate with inflammation, and recommended to have bone biopsy done as an out pt.

## 2018-04-26 NOTE — DISCHARGE SUMMARY
[facility] Hospital Medicine  Discharge Summary      Patient Name: Paras Smith  MRN: [id number]  Admission Date: 4/25/2018  Hospital Length of Stay: 0 days  Discharge Date and Time:  04/26/2018 3:46 PM  Attending Physician: No att. providers found   Discharging Provider: Abdulaziz Gardiner MD  Primary Care Provider: Ekta Gaines NP-C  Hospital Medicine Team: [facility] Abdulaziz Gardiner MD    HPI:   Ms. Smith is a 56 yo F with a h/o follicular thyroid CA s/p radiation and thyroidectomy, HTN, CAD, anxiety, and NAFLD who was sent from neurology clinic after discovering a mass within her cervical spine. She was seen by Dr. Pathak for worsening numbness and weakness in her extremities.  Regarding her pain/numbness/tingling, she feels that she has been experiencing these symptoms since her radiation (approx 2010) but has worsened acutely over the last 6 months or so.  It is worse in her LUE, but is present LUE>RUE>BLE.  She cannot feel anything with her fingertips at all, and has baseline numbness that radiates up past her elbow on her left, and to midline forearm on the right. She underwent MRI of the spine which revealed an abnormal signal and enhancement within the posterior C7 spinous process with surrounding abnormal signal in the adjacent paraspinous soft tissues concerning for osseous metastasis in this patient with a history of thyroid carcinoma status post thyroidectomy. MRI of the brain was unremarkable as was an awake & drowsy EEG. She has an EMG scheduled in the future.     She was directly admitted by Dr. Pathak in neurology for inpatient work up of cervical mass given concerns for compression and malignancy.     * No surgery found *      Hospital Course:   No notes on file     Consults:   Consults         Status Ordering Provider     Inpatient consult to Interventional Radiology  Once     Provider:  (Not yet assigned)    Completed RAFAELA DON AUGUST      Inpatient consult to Neurology  Once     Provider:  (Not yet assigned)    Completed MAKENZIE RUIZ          * Cervical spinal mass    - history of follicular thyroid cancer s/p radiation and thyroidectomy concerning for malignancy   - no signs of spinal cord compression noted on imaging so will hold off on steroids at this time  - CT chest and abdomen done did not show any masses or mets.  - pending thyroglobulin  - pt MRI was reviewed by IR lesions found on C7 correlate with inflammation, and recommended to have bone biopsy done as an out pt.        Chronic pain    - takes oxycodone 30 mg for cervical pain s/p thyroid radiation          Polyneuropathy due to radiation    - continue gabapentin 300 mg scheduled and as needed          Intractable migraine without aura and without status migrainosus    - currently on topamax with plans to de-escalate as per neurology given concerns that this may be driving her headache symptoms. Has weaned down to 1/2 tab daily  - neuro recs: Wean topamax as follows:              First week (starting now): 1/2 pill in the am, 1 pill pm              Second week: 1/2 pill twice a day              Third week: 1/2 pill at night only              Fourth week and beyond: stop  Wean abortive medications.  Currently taking approximately 12 doses/week of BC Powder/ibuprofen              First week (starting now): 8 doses/week              Second week: 6 doses/week              Third week: 4 doses/week              Fourth week: 2 doses/week              Fifth week and beyond: stop          Anxiety    - given dependence on valium, will continue given possibility of withdrawal seizure  - continue prn hydroxyzine          Vitamin D deficiency disease    - continue ergocalciferol 50,000 weekly   - educated patient not to take daily as she has been since she received her lab results          Depression    - continue doxepin          Postsurgical hypothyroidism    - continue levothyroxine 100 mcg             Final Active Diagnoses:    Diagnosis Date Noted POA    PRINCIPAL PROBLEM:  Cervical spinal mass [G95.9] 04/24/2018 Yes    Chronic pain [G89.29] 04/25/2018 Yes    Polyneuropathy due to radiation [G62.82] 04/11/2018 Yes    Intractable migraine without aura and without status migrainosus [G43.019] 04/11/2018 Yes    Anxiety [F41.9] 06/08/2017 Yes    Morbid obesity due to excess calories [E66.01] 07/11/2016 Yes    Vitamin D deficiency disease [E55.9] 06/27/2014 Yes    Weakness of both arms [R29.898] 04/25/2014 Yes    Depression [F32.9] 11/14/2013 Yes    HTN (hypertension) [I10]  Yes    Postsurgical hypothyroidism [E89.0]  Yes      Problems Resolved During this Admission:    Diagnosis Date Noted Date Resolved POA    CAD (coronary artery disease) [I25.10]  04/25/2018 Yes       Discharged Condition: good    Disposition: Home or Self Care    Follow Up:    Patient Instructions:     Ambulatory Referral to Neurology   Referral Priority: Urgent Referral Type: Consultation   Referral Reason: Specialty Services Required    Referred to Provider: VINNIE VASQUEZ Requested Specialty: Neurology   Number of Visits Requested: 1          Significant Diagnostic Studies: Labs:   CMP   Recent Labs  Lab 04/25/18  0640      K 3.9      CO2 22*      BUN 14   CREATININE 0.7   CALCIUM 9.1   PROT 6.9   ALBUMIN 3.7   BILITOT 1.0   ALKPHOS 109   AST 26   ALT 30   ANIONGAP 11   ESTGFRAFRICA >60.0   EGFRNONAA >60.0   , CBC No results for input(s): WBC, HGB, HCT, PLT in the last 48 hours., INR   Lab Results   Component Value Date    INR 0.9 04/25/2018    INR 0.9 08/22/2017    INR 0.9 08/20/2017   , Troponin No results for input(s): TROPONINI in the last 168 hours., A1C:   Recent Labs  Lab 11/03/17  0757 04/11/18  1115   HGBA1C 5.5 5.4    and All labs within the past 24 hours have been reviewed  Radiology: CT scan: CT ABDOMEN PELVIS WITH CONTRAST:   Results for orders placed or performed during the hospital  encounter of 04/25/18   CT Abdomen Pelvis With Contrast    Narrative    EXAMINATION:  CT ABDOMEN PELVIS WITH CONTRAST    CLINICAL HISTORY:  hx of thyroid cancer with new C7 mass concerning for mets;    TECHNIQUE:  Axial images of the chest, abdomen, and pelvis were acquired after the use of 100 cc Rnic432 IV contrast. No oral contrast was given.  Coronal and sagittal reconstructions were also obtained.  Delayed phase images of the abdomen and pelvis were also obtained.    COMPARISON:  Liver ultrasound 02/20/2018, CT abdomen pelvis 06/16/2017    FINDINGS:  Thoracic soft tissues: Postsurgical changes status post total thyroidectomy.    Aorta: Normal in caliber, course, and contour.  Aorta displays normal 3 branch vessel pattern.  Minimal calcific atherosclerosis of the arch.    Heart: Normal in size. No pericardial effusion.    Roshni/Mediastinum: No significant lymphadenopathy    Lungs: Well aerated without consolidation, pleural effusion, or mass.  Bandlike opacity within the left lung base likely representing subsegmental atelectasis.    Liver: Mild hepatomegaly without focal hepatic lesions.    Gallbladder: Surgically absent.    Bile Ducts: No evidence of dilated ducts.    Pancreas: No mass or peripancreatic fat stranding.    Spleen: Unremarkable. Small splenule present.    Adrenals: Unremarkable.    Kidneys/ Ureters: Normal in size and location. Normal concentration and excretion of contrast.  Punctate calcification within the inferior aspect of the left kidney likely representing nonobstructive nephrolith versus vascular calcification.  No ureteral dilatation.    Bladder: Not fully evaluated secondary to suboptimal distension.    Reproductive organs: Unremarkable.    GI Tract/Mesentery: No evidence of bowel obstruction or inflammation.    Peritoneal Space: No ascites. No free air.    Retroperitoneum:  No significant adenopathy.    Abdominal wall:  Unremarkable.    Vasculature: No significant atherosclerosis or  aneurysm.    Bones: Partially visualized C6-C7 anterior fusion.  Mild degenerative changes without acute fracture or destructive osseous process.  Previously identified lesion within C7 spinous process not well appreciated on today's examination.      Impression    In this patient with history of thyroid cancer status post total thyroidectomy, there is no evidence of recurrence or metastatic disease.    Mild hepatomegaly.    Postsurgical changes status post cholecystectomy and C6-C7 anterior fusion.    Punctate calcification within the inferior aspect of the left kidney likely representing nonobstructive nephrolith versus vascular calcification.    There are no measurable lesions per RECIST criteria.    Electronically signed by resident: Brock Shah  Date:    04/25/2018  Time:    08:38    Electronically signed by: Beau Lopes MD  Date:    04/25/2018  Time:    09:47    and CT ABDOMEN PELVIS WITHOUT CONTRAST: No results found for this visit on 04/25/18.    Pending Diagnostic Studies:     Procedure Component Value Units Date/Time    CT Chest With Contrast [[id number]] Updated:  04/25/18 0812    Order Status:  Sent Lab Status:  In process          Medications:  Reconciled Home Medications:      Medication List      START taking these medications    ibuprofen 800 MG tablet  Commonly known as:  ADVIL,MOTRIN  Take 1 tablet (800 mg total) by mouth 3 (three) times daily.        CONTINUE taking these medications    albuterol 90 mcg/actuation inhaler  Commonly known as:  VENTOLIN HFA  inhale 2 puffs by mouth every 6 hours if needed     amLODIPine 5 MG tablet  Commonly known as:  NORVASC  Take 1 tablet (5 mg total) by mouth once daily.     anthralin 1.2 % Cmrr     chlorproMAZINE 25 MG tablet  Commonly known as:  THORAZINE  Ok to take 1-2 pills qhs prn.     diazePAM 10 MG Tab  Commonly known as:  VALIUM  Take 10 mg by mouth 2 (two) times daily.     dicyclomine 20 mg tablet  Commonly known as:  BENTYL  Take 1 tablet (20 mg total)  by mouth 2 (two) times daily.     doxepin 25 MG capsule  Commonly known as:  SINEQUAN  Take 25 mg by mouth every evening.     ergocalciferol 50,000 unit Cap  Commonly known as:  VITAMIN D2  take 1 capsule by mouth every week (EVERY 7 DAYS)     FLONASE ALLERGY RELIEF 50 mcg/actuation nasal spray  Generic drug:  fluticasone     gabapentin 300 MG capsule  Commonly known as:  NEURONTIN  Take 600 mg by mouth 3 (three) times daily.     hydrOXYzine pamoate 50 MG Cap  Commonly known as:  VISTARIL  Take 1 capsule (50 mg total) by mouth every 6 (six) hours as needed (anxiety).     levothyroxine 100 MCG tablet  Commonly known as:  SYNTHROID  Take 1 tablet (100 mcg total) by mouth once daily.     lidocaine-prilocaine cream  Commonly known as:  EMLA     lisinopril 10 MG tablet  take 1 tablet by mouth once daily     LMX 4 4 % cream  Generic drug:  lidocaine     neomycin-polymyxin-hydrocortisone 3.5-10,000-1 mg/mL-unit/mL-% otic suspension  Commonly known as:  CORTISPORIN  Place 3 drops into both ears 3 (three) times daily.     * nitroGLYCERIN 0.4 MG SL tablet  Commonly known as:  NITROSTAT  Place 1 tablet (0.4 mg total) under the tongue every 5 (five) minutes as needed.     * nitroGLYCERIN 0.4 MG/HR TD PT24 0.4 mg/hr  Commonly known as:  NITRODUR  apply 1 patch ONTO THE SKIN ONCE DAILY     omeprazole magnesium 20 mg Cpdr  Take 1 tablet by mouth 2 (two) times daily.     oxyCODONE 30 MG Tab  Commonly known as:  ROXICODONE  Take 30 mg by mouth every 6 (six) hours as needed.     promethazine 25 MG tablet  Commonly known as:  PHENERGAN  take 1 tablet by mouth every 8 hours if needed for nausea and vomiting     propranolol 60 MG tablet  Commonly known as:  INDERAL  60 mg.     REXULTI 0.5 mg Tab  Generic drug:  brexpiprazole  0.5 mg.     sodium,potassium,mag sulfates 17.5-3.13-1.6 gram Solr  Commonly known as:  SUPREP BOWEL PREP KIT  As directed, dispense 1 kit.     topiramate 100 MG tablet  Commonly known as:  TOPAMAX  Take 100 mg by  mouth 2 (two) times daily.        * This list has 2 medication(s) that are the same as other medications prescribed for you. Read the directions carefully, and ask your doctor or other care provider to review them with you.            STOP taking these medications    naproxen 375 MG tablet  Commonly known as:  NAPROSYN            Indwelling Lines/Drains at time of discharge:   Lines/Drains/Airways          No matching active lines, drains, or airways               Hind Aide Gardiner MD  Department of Hospital Medicine  [facility]

## 2018-04-26 NOTE — ASSESSMENT & PLAN NOTE
- currently on topamax with plans to de-escalate as per neurology given concerns that this may be driving her headache symptoms. Has weaned down to 1/2 tab daily  - neuro recs: Wean topamax as follows:              First week (starting now): 1/2 pill in the am, 1 pill pm              Second week: 1/2 pill twice a day              Third week: 1/2 pill at night only              Fourth week and beyond: stop  Wean abortive medications.  Currently taking approximately 12 doses/week of BC Powder/ibuprofen              First week (starting now): 8 doses/week              Second week: 6 doses/week              Third week: 4 doses/week              Fourth week: 2 doses/week              Fifth week and beyond: stop

## 2018-04-26 NOTE — TELEPHONE ENCOUNTER
----- Message from Korina Davila sent at 4/26/2018 12:52 PM CDT -----  Contact: Pt. [phone]  The patient would like to speak to someone regarding her biopsy. Please contact the patient to discuss further.

## 2018-05-01 ENCOUNTER — LAB VISIT (OUTPATIENT)
Dept: LAB | Facility: HOSPITAL | Age: 58
End: 2018-05-01
Attending: INTERNAL MEDICINE
Payer: MEDICAID

## 2018-05-01 ENCOUNTER — OFFICE VISIT (OUTPATIENT)
Dept: FAMILY MEDICINE | Facility: CLINIC | Age: 58
End: 2018-05-01
Payer: MEDICAID

## 2018-05-01 VITALS
WEIGHT: 257.94 LBS | HEIGHT: 64 IN | BODY MASS INDEX: 44.04 KG/M2 | DIASTOLIC BLOOD PRESSURE: 76 MMHG | SYSTOLIC BLOOD PRESSURE: 110 MMHG | HEART RATE: 62 BPM | RESPIRATION RATE: 17 BRPM | TEMPERATURE: 98 F | OXYGEN SATURATION: 96 %

## 2018-05-01 DIAGNOSIS — F11.90 CHRONIC, CONTINUOUS USE OF OPIOIDS: ICD-10-CM

## 2018-05-01 DIAGNOSIS — G95.89 CERVICAL SPINAL MASS: ICD-10-CM

## 2018-05-01 DIAGNOSIS — Z98.1 S/P CERVICAL SPINAL FUSION: ICD-10-CM

## 2018-05-01 DIAGNOSIS — M54.2 CHRONIC NECK PAIN: Primary | ICD-10-CM

## 2018-05-01 DIAGNOSIS — G89.29 CHRONIC NECK PAIN: Primary | ICD-10-CM

## 2018-05-01 LAB
ANION GAP SERPL CALC-SCNC: 7 MMOL/L
APTT BLDCRRT: 25.3 SEC
BASOPHILS # BLD AUTO: 0.03 K/UL
BASOPHILS NFR BLD: 0.4 %
BUN SERPL-MCNC: 18 MG/DL
CALCIUM SERPL-MCNC: 9.7 MG/DL
CHLORIDE SERPL-SCNC: 106 MMOL/L
CO2 SERPL-SCNC: 28 MMOL/L
CREAT SERPL-MCNC: 0.8 MG/DL
DIFFERENTIAL METHOD: NORMAL
EOSINOPHIL # BLD AUTO: 0.2 K/UL
EOSINOPHIL NFR BLD: 3 %
ERYTHROCYTE [DISTWIDTH] IN BLOOD BY AUTOMATED COUNT: 12.9 %
EST. GFR  (AFRICAN AMERICAN): >60 ML/MIN/1.73 M^2
EST. GFR  (NON AFRICAN AMERICAN): >60 ML/MIN/1.73 M^2
GLUCOSE SERPL-MCNC: 104 MG/DL
HCT VFR BLD AUTO: 42.1 %
HGB BLD-MCNC: 14.6 G/DL
INR PPP: 0.9
LYMPHOCYTES # BLD AUTO: 2.4 K/UL
LYMPHOCYTES NFR BLD: 34.7 %
MCH RBC QN AUTO: 31 PG
MCHC RBC AUTO-ENTMCNC: 34.7 G/DL
MCV RBC AUTO: 89 FL
MONOCYTES # BLD AUTO: 0.7 K/UL
MONOCYTES NFR BLD: 10 %
NEUTROPHILS # BLD AUTO: 3.6 K/UL
NEUTROPHILS NFR BLD: 51.6 %
PLATELET # BLD AUTO: 254 K/UL
PMV BLD AUTO: 12.2 FL
POTASSIUM SERPL-SCNC: 4.8 MMOL/L
PROTHROMBIN TIME: 9.5 SEC
RBC # BLD AUTO: 4.71 M/UL
SODIUM SERPL-SCNC: 141 MMOL/L
WBC # BLD AUTO: 6.91 K/UL

## 2018-05-01 PROCEDURE — 80048 BASIC METABOLIC PNL TOTAL CA: CPT

## 2018-05-01 PROCEDURE — 99214 OFFICE O/P EST MOD 30 MIN: CPT | Mod: S$PBB,,, | Performed by: INTERNAL MEDICINE

## 2018-05-01 PROCEDURE — 85610 PROTHROMBIN TIME: CPT

## 2018-05-01 PROCEDURE — 85025 COMPLETE CBC W/AUTO DIFF WBC: CPT

## 2018-05-01 PROCEDURE — 36415 COLL VENOUS BLD VENIPUNCTURE: CPT | Mod: PN

## 2018-05-01 PROCEDURE — 99214 OFFICE O/P EST MOD 30 MIN: CPT | Mod: PBBFAC,PN | Performed by: INTERNAL MEDICINE

## 2018-05-01 PROCEDURE — 85730 THROMBOPLASTIN TIME PARTIAL: CPT

## 2018-05-01 PROCEDURE — 99999 PR PBB SHADOW E&M-EST. PATIENT-LVL IV: CPT | Mod: PBBFAC,,, | Performed by: INTERNAL MEDICINE

## 2018-05-01 RX ORDER — AMITRIPTYLINE HYDROCHLORIDE 25 MG/1
25 TABLET, FILM COATED ORAL NIGHTLY PRN
Qty: 30 TABLET | Refills: 0 | Status: SHIPPED | OUTPATIENT
Start: 2018-05-01 | End: 2018-06-20 | Stop reason: SDUPTHER

## 2018-05-01 RX ORDER — METHOCARBAMOL 500 MG/1
500 TABLET, FILM COATED ORAL EVERY 8 HOURS
Qty: 60 TABLET | Refills: 0 | Status: SHIPPED | OUTPATIENT
Start: 2018-05-01 | End: 2018-05-11

## 2018-05-01 NOTE — Clinical Note
Please contact [facility] interventional radiology department and ensure referral has been received (had been sent previously while patient was inpatient)

## 2018-05-01 NOTE — PROGRESS NOTES
"Subjective:       Patient ID: Paras Smith is a 57 y.o. female.    Chief Complaint: Establish Care    HPI  Review of Systems    Objective:     Vitals:    05/01/18 0851   BP: 110/76   BP Location: Right arm   Patient Position: Sitting   BP Method: Large (Manual)   Pulse: 62   Resp: 17   Temp: 97.8 °F (36.6 °C)   TempSrc: Oral   SpO2: 96%   Weight: 117 kg (257 lb 15 oz)   Height: 5' 4" (1.626 m)          Physical Exam    Assessment and Plan   1. Chronic neck pain  ***  - methocarbamol (ROBAXIN) 500 MG Tab; Take 1 tablet (500 mg total) by mouth every 8 (eight) hours.  Dispense: 60 tablet; Refill: 0  - amitriptyline (ELAVIL) 25 MG tablet; Take 1 tablet (25 mg total) by mouth nightly as needed for Insomnia.  Dispense: 30 tablet; Refill: 0    2. Cervical spinal mass  ***  - Ambulatory Referral to Interventional Radiology  - Protime-INR; Future  - APTT; Future  - CBC auto differential; Future  - Basic metabolic panel; Future    3. S/P cervical spinal fusion  ***  - methocarbamol (ROBAXIN) 500 MG Tab; Take 1 tablet (500 mg total) by mouth every 8 (eight) hours.  Dispense: 60 tablet; Refill: 0    4. Chronic, continuous use of opioids  ***  "

## 2018-05-02 ENCOUNTER — TELEPHONE (OUTPATIENT)
Dept: NEUROLOGY | Facility: CLINIC | Age: 58
End: 2018-05-02

## 2018-05-02 NOTE — TELEPHONE ENCOUNTER
IR biopsy was ordered by hospital medicine, but has not yet been scheduled.  Ambulatory referral to IR has been ordered by patient's PCP.  The biopsy is more important than the EMG at this point.  I am not sure why the biopsy has not yet been scheduled, but perhaps we could help her with setting this up; message to Anabel/Dom sent.    Aundrea Pathak MD  [facility]  PGY-3

## 2018-05-02 NOTE — PT/OT/SLP DISCHARGE
Physical Therapy Discharge Summary    Name: Paras Smith  MRN: [id number]   Principal Problem: Cervical spinal mass     Patient Discharged from acute Physical Therapy on 18 .  Please refer to prior PT noted date on 18 for functional status.     Assessment:     Patient was discharged unexpectedly.  Information required to complete an accurate discharge summary is unknown.  Refer to therapy initial evaluation and last progress note for initial and most recent functional status and goal achievement.  Recommendations made may be found in medical record.    Objective:     GOALS:    Physical Therapy Goals        Problem: Physical Therapy Goal    Goal Priority Disciplines Outcome Goal Variances Interventions   Physical Therapy Goal     PT/OT, PT Ongoing (interventions implemented as appropriate)     Description:  Goals to be met by: 18     Patient will increase functional independence with mobility by performin. Gait  x 150 feet with Supervision using LRD with no episodes of knee buckling or increased sway.   2. Lower extremity exercise program x30 reps per handout, with independence                      Reasons for Discontinuation of Therapy Services  Transfer to alternate level of care.      Plan:     Patient Discharged to: home.    Feroz Chopra, PT  2018

## 2018-05-03 ENCOUNTER — TELEPHONE (OUTPATIENT)
Dept: FAMILY MEDICINE | Facility: CLINIC | Age: 58
End: 2018-05-03

## 2018-05-03 ENCOUNTER — TELEPHONE (OUTPATIENT)
Dept: INTERVENTIONAL RADIOLOGY/VASCULAR | Facility: HOSPITAL | Age: 58
End: 2018-05-03

## 2018-05-03 DIAGNOSIS — G95.89 CERVICAL SPINAL MASS: Primary | ICD-10-CM

## 2018-05-03 NOTE — TELEPHONE ENCOUNTER
----- Message from Yessenia Gasca sent at 5/3/2018  8:37 AM CDT -----  Contact: daughter Meliza   [phone]  Mercedez Haider is requesting to speak to you ASAP regarding the doctor could not do a biopsy because of the location the mass is. Pls call mercedez Haider [phone]. Thanks.......[place]

## 2018-05-07 DIAGNOSIS — C80.1 MALIGNANT NEOPLASM METASTATIC TO CERVICAL VERTEBRAL COLUMN WITH UNKNOWN PRIMARY SITE: Primary | ICD-10-CM

## 2018-05-07 DIAGNOSIS — C79.51 MALIGNANT NEOPLASM METASTATIC TO CERVICAL VERTEBRAL COLUMN WITH UNKNOWN PRIMARY SITE: Primary | ICD-10-CM

## 2018-05-10 DIAGNOSIS — I10 ESSENTIAL HYPERTENSION: ICD-10-CM

## 2018-05-10 RX ORDER — AMLODIPINE BESYLATE 5 MG/1
5 TABLET ORAL DAILY
Qty: 30 TABLET | Refills: 1 | Status: SHIPPED | OUTPATIENT
Start: 2018-05-10 | End: 2018-06-18 | Stop reason: SDUPTHER

## 2018-05-14 ENCOUNTER — SURGERY (OUTPATIENT)
Age: 58
End: 2018-05-14

## 2018-05-14 ENCOUNTER — HOSPITAL ENCOUNTER (OUTPATIENT)
Facility: HOSPITAL | Age: 58
Discharge: HOME | End: 2018-05-14
Attending: RADIOLOGY | Admitting: RADIOLOGY
Payer: MEDICAID

## 2018-05-14 VITALS
TEMPERATURE: 98 F | HEIGHT: 62 IN | BODY MASS INDEX: 48.76 KG/M2 | WEIGHT: 265 LBS | DIASTOLIC BLOOD PRESSURE: 64 MMHG | HEART RATE: 58 BPM | OXYGEN SATURATION: 100 % | RESPIRATION RATE: 18 BRPM | SYSTOLIC BLOOD PRESSURE: 106 MMHG

## 2018-05-14 DIAGNOSIS — C80.1 MALIGNANT NEOPLASM METASTATIC TO CERVICAL VERTEBRAL COLUMN WITH UNKNOWN PRIMARY SITE: ICD-10-CM

## 2018-05-14 DIAGNOSIS — C79.51 MALIGNANT NEOPLASM METASTATIC TO CERVICAL VERTEBRAL COLUMN WITH UNKNOWN PRIMARY SITE: ICD-10-CM

## 2018-05-14 PROCEDURE — 88311 DECALCIFY TISSUE: CPT | Mod: 26,,, | Performed by: PATHOLOGY

## 2018-05-14 PROCEDURE — 88305 TISSUE EXAM BY PATHOLOGIST: CPT | Performed by: PATHOLOGY

## 2018-05-14 PROCEDURE — 88305 TISSUE EXAM BY PATHOLOGIST: CPT | Mod: 26,,, | Performed by: PATHOLOGY

## 2018-05-14 PROCEDURE — 63600175 PHARM REV CODE 636 W HCPCS: Performed by: FAMILY MEDICINE

## 2018-05-14 PROCEDURE — 63600175 PHARM REV CODE 636 W HCPCS: Performed by: RADIOLOGY

## 2018-05-14 RX ORDER — FENTANYL CITRATE 50 UG/ML
INJECTION, SOLUTION INTRAMUSCULAR; INTRAVENOUS CODE/TRAUMA/SEDATION MEDICATION
Status: COMPLETED | OUTPATIENT
Start: 2018-05-14 | End: 2018-05-14

## 2018-05-14 RX ORDER — MIDAZOLAM HYDROCHLORIDE 1 MG/ML
INJECTION INTRAMUSCULAR; INTRAVENOUS CODE/TRAUMA/SEDATION MEDICATION
Status: COMPLETED | OUTPATIENT
Start: 2018-05-14 | End: 2018-05-14

## 2018-05-14 RX ORDER — SODIUM CHLORIDE 9 MG/ML
500 INJECTION, SOLUTION INTRAVENOUS ONCE
Status: DISCONTINUED | OUTPATIENT
Start: 2018-05-14 | End: 2018-05-14 | Stop reason: HOSPADM

## 2018-05-14 RX ORDER — ONDANSETRON 2 MG/ML
INJECTION INTRAMUSCULAR; INTRAVENOUS CODE/TRAUMA/SEDATION MEDICATION
Status: DISCONTINUED | OUTPATIENT
Start: 2018-05-14 | End: 2018-05-14 | Stop reason: HOSPADM

## 2018-05-14 RX ORDER — MIDAZOLAM HYDROCHLORIDE 1 MG/ML
1 INJECTION INTRAMUSCULAR; INTRAVENOUS
Status: DISCONTINUED | OUTPATIENT
Start: 2018-05-14 | End: 2018-05-14 | Stop reason: HOSPADM

## 2018-05-14 RX ORDER — FENTANYL CITRATE 50 UG/ML
50 INJECTION, SOLUTION INTRAMUSCULAR; INTRAVENOUS
Status: DISCONTINUED | OUTPATIENT
Start: 2018-05-14 | End: 2018-05-14 | Stop reason: HOSPADM

## 2018-05-14 RX ADMIN — MIDAZOLAM HYDROCHLORIDE 1 MG: 1 INJECTION, SOLUTION INTRAMUSCULAR; INTRAVENOUS at 08:05

## 2018-05-14 RX ADMIN — ONDANSETRON 4 MG: 2 INJECTION INTRAMUSCULAR; INTRAVENOUS at 08:05

## 2018-05-14 RX ADMIN — FENTANYL CITRATE 50 MCG: 50 INJECTION, SOLUTION INTRAMUSCULAR; INTRAVENOUS at 08:05

## 2018-05-14 NOTE — PROGRESS NOTES
Ok to discharge per Dr KRYSTINA Guevara. Discharge instructions reviewed with patient and family. Understanding verbalized. Dressing CDI. VSS. IV discontinued with cannula intact, dressing applied. Patient refused transport. Patient to garage via wheelchair by family.

## 2018-05-14 NOTE — DISCHARGE SUMMARY
Radiology Discharge Summary      Hospital Course: No complications    Admit Date: 5/14/2018  Discharge Date: 05/14/2018     Instructions Given to Patient: Yes  Diet: Resume prior diet  Activity: activity as tolerated    Description of Condition on Discharge: Stable  Vital Signs (Most Recent): Temp: 97.6 °F (36.4 °C) (05/14/18 0754)  Pulse: (!) 57 (05/14/18 0853)  Resp: 17 (05/14/18 0853)  BP: (!) 155/70 (05/14/18 0853)  SpO2: 98 % (05/14/18 0853)    Discharge Disposition: Home    Discharge Diagnosis: C7 spinous process abnormal signal on MRI.    No lytic or sclerotic lesion on CT. 2 core sample biopsies were obtained.      Follow-up: per primary team.

## 2018-05-14 NOTE — H&P
Radiology History & Physical      SUBJECTIVE:     Chief Complaint: soft tissue mass in post aspect of C7 vertebrae.     History of Present Illness:  Paras Smith is a 57 y.o. female who presents for biopsy of ST mass on C7 vertebrae.     Past Medical History:   Diagnosis Date    Asthma     CAD (coronary artery disease)     stent     Cervical spine disease     Depression     Difficult intubation     POSSIBLE    Falls 2018    GERD (gastroesophageal reflux disease)     Glaucoma     left eye    Helicobacter pylori (H. pylori) infection     History of stomach ulcers     HTN (hypertension)     Muscle weakness     LEFT    Neck problem     LIMITED ROM    Nuclear cataract 2014    Postsurgical hypothyroidism     Stroke     mini strokes    Thyroid cancer     Vitamin D deficiency disease      Past Surgical History:   Procedure Laterality Date    APPENDECTOMY      CERVICAL SPINE SURGERY      vocal cord damage     SECTION, LOW TRANSVERSE      x3    CHOLECYSTECTOMY      CORONARY ANGIOPLASTY WITH STENT PLACEMENT      x 3    PATELLA SURGERY  1969    THYROID SURGERY      total    TOTAL THYROIDECTOMY         Home Meds:   Prior to Admission medications    Medication Sig Start Date End Date Taking? Authorizing Provider   albuterol (VENTOLIN HFA) 90 mcg/actuation inhaler inhale 2 puffs by mouth every 6 hours if needed 18  Yes Ekta Gaines, NP-C   amitriptyline (ELAVIL) 25 MG tablet Take 1 tablet (25 mg total) by mouth nightly as needed for Insomnia. 18 Yes Guicho Lizarraga MD   amLODIPine (NORVASC) 5 MG tablet Take 1 tablet (5 mg total) by mouth once daily. 5/10/18 5/10/19 Yes Guicho Lizarraga MD   anthralin 1.2 % CmRR  14  Yes Historical Provider, MD   chlorproMAZINE (THORAZINE) 25 MG tablet Ok to take 1-2 pills qhs prn. 18  Yes Aundrea Pathak MD   diazePAM (VALIUM) 10 MG Tab Take 10 mg by mouth 2 (two) times daily. 6/3/17  Yes Historical  Provider, MD   dicyclomine (BENTYL) 20 mg tablet Take 1 tablet (20 mg total) by mouth 2 (two) times daily. 3/7/16  Yes Marleen Steele MD   ergocalciferol (VITAMIN D2) 50,000 unit Cap take 1 capsule by mouth every week (EVERY 7 DAYS) 3/7/18  Yes ANSLEY Villalta   FLONASE ALLERGY RELIEF 50 mcg/actuation nasal spray  7/21/17  Yes Historical Provider, MD   gabapentin (NEURONTIN) 300 MG capsule Take 600 mg by mouth 3 (three) times daily.  4/29/14  Yes Historical Provider, MD   hydrOXYzine pamoate (VISTARIL) 50 MG Cap Take 1 capsule (50 mg total) by mouth every 6 (six) hours as needed (anxiety). 4/19/17  Yes ANSLEY Villalta   levothyroxine (SYNTHROID) 100 MCG tablet Take 1 tablet (100 mcg total) by mouth once daily. 4/10/18  Yes ANSLEY Villalta   lidocaine-prilocaine (EMLA) cream  10/5/15  Yes Historical Provider, MD   lisinopril 10 MG tablet take 1 tablet by mouth once daily 1/23/18  Yes Chelsie Oneill MD   LMX 4 4 % cream  5/13/14  Yes Historical Provider, MD   neomycin-polymyxin-hydrocortisone (CORTISPORIN) 3.5-10,000-1 mg/mL-unit/mL-% otic suspension Place 3 drops into both ears 3 (three) times daily. 1/12/18  Yes ANSLEY Villalta   oxycodone (ROXICODONE) 30 MG Tab Take 30 mg by mouth every 6 (six) hours as needed.    Yes Historical Provider, MD   promethazine (PHENERGAN) 25 MG tablet take 1 tablet by mouth every 8 hours if needed for nausea and vomiting 9/6/16  Yes Historical Provider, MD   propranolol (INDERAL) 60 MG tablet 60 mg. 2/25/18  Yes Historical Provider, MD   REXULTI 0.5 mg Tab 0.5 mg. 4/6/18  Yes Historical Provider, MD   sodium,potassium,mag sulfates (SUPREP BOWEL PREP KIT) 17.5-3.13-1.6 gram SolR As directed, dispense 1 kit. 9/12/17  Yes Chavo Wray MD   topiramate (TOPAMAX) 100 MG tablet Take 50 mg by mouth once daily. 12/5/16  Yes Historical Provider, MD   nitroGLYCERIN (NITROSTAT) 0.4 MG SL tablet Place 1 tablet (0.4 mg total) under the tongue every 5 (five) minutes as  needed. 8/14/17 8/14/18  Tae Patterson MD   nitroGLYCERIN 0.4 MG/HR TD PT24 (NITRODUR) 0.4 mg/hr apply 1 patch ONTO THE SKIN ONCE DAILY 12/22/17   Tae Patterson MD   omeprazole magnesium 20 mg CpDR Take 1 tablet by mouth 2 (two) times daily. 3/7/16 1/12/18  Marleen Steele MD     Anticoagulants/Antiplatelets: no anticoagulation    Allergies:   Review of patient's allergies indicates:   Allergen Reactions    Lisinopril Hives    Vioxx [rofecoxib] Rash     Sedation History:  have not been any systemic reactions    Review of Systems:   Hematological: no known coagulopathies  Respiratory: no shortness of breath  Cardiovascular: no chest pain  Gastrointestinal: no abdominal pain  Genito-Urinary: no dysuria  Musculoskeletal: negative  Neurological: no TIA or stroke symptoms         OBJECTIVE:     Vital Signs (Most Recent)  Temp: 97.6 °F (36.4 °C) (05/14/18 0754)  Pulse: 81 (05/14/18 0754)  Resp: 16 (05/14/18 0754)  BP: 121/72 (05/14/18 0754)  SpO2: 95 % (05/14/18 0754)    Physical Exam:  ASA: 2  Mallampati: 2    General: no acute distress  Mental Status: alert and oriented to person, place and time  HEENT: normocephalic, atraumatic  Chest: unlabored breathing  Abdomen: nondistended  Extremity: moves all extremities    Laboratory  Lab Results   Component Value Date    INR 0.9 05/01/2018       Lab Results   Component Value Date    WBC 6.91 05/01/2018    HGB 14.6 05/01/2018    HCT 42.1 05/01/2018    MCV 89 05/01/2018     05/01/2018      Lab Results   Component Value Date     05/01/2018     05/01/2018    K 4.8 05/01/2018     05/01/2018    CO2 28 05/01/2018    BUN 18 05/01/2018    CREATININE 0.8 05/01/2018    CALCIUM 9.7 05/01/2018    MG 2.0 04/25/2018    ALT 30 04/25/2018    AST 26 04/25/2018    ALBUMIN 3.7 04/25/2018    BILITOT 1.0 04/25/2018    BILIDIR 0.2 03/23/2018       ASSESSMENT/PLAN:     Sedation Plan: Local  Patient will undergo biopsy of C7 vertebral mass incidentally found on  MRI.    Isreal Epps MD  PGY - 2  Department of Radiology

## 2018-05-14 NOTE — DISCHARGE INSTRUCTIONS
Please call with any questions or concerns.      Monday thru Friday 8:00 am - 4:30 pm    Interventional Radiology   [phone]    After Hours    Ask for the Radiology Intern on call  [phone]

## 2018-05-14 NOTE — PROCEDURES
Radiology Post-Procedure Note    Pre Op Diagnosis: C7 spinous process lesion    Post Op Diagnosis:  Same    Procedure: C7 spinous process bone biopsy    Procedure performed by: Lakia Carrera    Written Informed Consent Obtained: Yes    Specimen Removed: NO    Estimated Blood Loss: Minimal    Procedure:  Under CT guidance, an 11G needle was advanced and 2 core biopsy were obtained.     Specimen sent to pathology. Patient tolerated procedure well.    Caleb Carrera M.D. 9:04 AM 5/14/2018

## 2018-05-14 NOTE — PROGRESS NOTES
Pt arrived to [facility] bed 2 for 2 hour post bone biopsy recovery. Report received from LINDSAY Castro. Pt denies pain/discomfort. Dressing CDI. VSS. No acute events. Family to bedside. See flow sheets for post procedure monitoring.

## 2018-05-15 ENCOUNTER — OFFICE VISIT (OUTPATIENT)
Dept: FAMILY MEDICINE | Facility: CLINIC | Age: 58
End: 2018-05-15
Payer: MEDICAID

## 2018-05-15 VITALS
BODY MASS INDEX: 44.41 KG/M2 | WEIGHT: 260.13 LBS | RESPIRATION RATE: 17 BRPM | TEMPERATURE: 98 F | HEART RATE: 70 BPM | OXYGEN SATURATION: 98 % | DIASTOLIC BLOOD PRESSURE: 80 MMHG | HEIGHT: 64 IN | SYSTOLIC BLOOD PRESSURE: 110 MMHG

## 2018-05-15 DIAGNOSIS — E89.0 POSTSURGICAL HYPOTHYROIDISM: ICD-10-CM

## 2018-05-15 DIAGNOSIS — J30.89 NON-SEASONAL ALLERGIC RHINITIS DUE TO OTHER ALLERGIC TRIGGER: ICD-10-CM

## 2018-05-15 DIAGNOSIS — M43.6 STIFFNESS OF NECK: Primary | ICD-10-CM

## 2018-05-15 PROCEDURE — 99213 OFFICE O/P EST LOW 20 MIN: CPT | Mod: PBBFAC,PN | Performed by: INTERNAL MEDICINE

## 2018-05-15 PROCEDURE — 99214 OFFICE O/P EST MOD 30 MIN: CPT | Mod: S$PBB,,, | Performed by: INTERNAL MEDICINE

## 2018-05-15 PROCEDURE — 99999 PR PBB SHADOW E&M-EST. PATIENT-LVL III: CPT | Mod: PBBFAC,,, | Performed by: INTERNAL MEDICINE

## 2018-05-15 RX ORDER — NAPROXEN 375 MG/1
TABLET ORAL
Refills: 0 | COMMUNITY
Start: 2018-05-05 | End: 2023-06-21 | Stop reason: CLARIF

## 2018-05-15 RX ORDER — METHOCARBAMOL 500 MG/1
500 TABLET, FILM COATED ORAL 3 TIMES DAILY
Qty: 90 TABLET | Refills: 0 | Status: SHIPPED | OUTPATIENT
Start: 2018-05-15 | End: 2018-05-25

## 2018-05-15 RX ORDER — FLUTICASONE PROPIONATE 50 MCG
1 SPRAY, SUSPENSION (ML) NASAL 2 TIMES DAILY
Qty: 16 G | Refills: 3 | Status: SHIPPED | OUTPATIENT
Start: 2018-05-15 | End: 2019-01-26 | Stop reason: SDUPTHER

## 2018-05-15 NOTE — PROGRESS NOTES
Subjective:       Patient ID: Paras Smith is a 57 y.o. female.    Chief Complaint: Neck Pain and Follow-up    Neck Pain    This is a chronic problem. The current episode started more than 1 year ago. The problem occurs constantly. The problem has been unchanged. The pain is present in the midline. The quality of the pain is described as cramping. The pain is at a severity of 8/10. The pain is severe. The symptoms are aggravated by position. The pain is same all the time. Associated symptoms include numbness. Pertinent negatives include no chest pain, fever, headaches, pain with swallowing, photophobia, tingling, trouble swallowing, weakness or weight loss. She has tried heat, muscle relaxants and oral narcotics (since last visit started nightly TCA with improved sleep using muscle relaxer intermittently none in last two days) for the symptoms. The treatment provided mild relief.     Review of Systems   Constitutional: Negative for activity change, appetite change, fatigue, fever, unexpected weight change and weight loss.   HENT: Negative for ear pain, rhinorrhea, sore throat and trouble swallowing.    Eyes: Negative for photophobia, discharge and visual disturbance.   Respiratory: Negative for chest tightness, shortness of breath and wheezing.    Cardiovascular: Negative for chest pain, palpitations and leg swelling.   Gastrointestinal: Negative for abdominal pain, constipation and diarrhea.   Endocrine: Negative for cold intolerance and heat intolerance.   Genitourinary: Negative for dysuria and hematuria.   Musculoskeletal: Positive for neck pain. Negative for joint swelling and neck stiffness.   Skin: Negative for rash.   Neurological: Positive for numbness. Negative for dizziness, tingling, syncope, weakness and headaches.   Psychiatric/Behavioral: Negative for suicidal ideas.       Objective:     Vitals:    05/15/18 0916   BP: 110/80   BP Location: Right arm   Patient Position: Sitting   BP Method:  "Large (Manual)   Pulse: 70   Resp: 17   Temp: 97.7 °F (36.5 °C)   TempSrc: Oral   SpO2: 98%   Weight: 118 kg (260 lb 2.3 oz)   Height: 5' 4" (1.626 m)          Physical Exam   Constitutional: She is oriented to person, place, and time. She appears well-developed and well-nourished.   HENT:   Head: Normocephalic and atraumatic.   Eyes: Conjunctivae are normal. No scleral icterus.   Neck: Normal range of motion.   Cardiovascular: Normal rate and regular rhythm.  Exam reveals no gallop and no friction rub.    No murmur heard.  Pulmonary/Chest: Effort normal and breath sounds normal. She has no wheezes. She has no rales.   Abdominal: Soft. Bowel sounds are normal. There is no tenderness. There is no rebound and no guarding.   Musculoskeletal: Normal range of motion. She exhibits no edema or tenderness.   Neurological: She is alert and oriented to person, place, and time. No cranial nerve deficit.   Skin: Skin is warm and dry.   Psychiatric: She has a normal mood and affect.       Assessment and Plan   1. Stiffness of neck  Discussed using muscle relaxer more often up to three times per day. She is completely off opioids in light of chronic benzo use would not restart this. She has follow up with both neurology for EMG for her chronic parathesia and pain management. Continue TCA for pain modulation and to target improved sleep  - methocarbamol (ROBAXIN) 500 MG Tab; Take 1 tablet (500 mg total) by mouth 3 (three) times daily.  Dispense: 90 tablet; Refill: 0    2. Postsurgical hypothyroidism  On tsh at goal     3. Non-seasonal allergic rhinitis due to other allergic trigger  Restart nasal steroid  - fluticasone (FLONASE) 50 mcg/actuation nasal spray; 1 spray (50 mcg total) by Each Nare route 2 (two) times daily.  Dispense: 16 g; Refill: 3  "

## 2018-05-17 ENCOUNTER — TELEPHONE (OUTPATIENT)
Dept: FAMILY MEDICINE | Facility: CLINIC | Age: 58
End: 2018-05-17

## 2018-05-17 NOTE — TELEPHONE ENCOUNTER
"Patient contacted and ID confirmed by name and   Reviewed results of recent biopys negative for malignancy. Has increased muscle relaxer to twice per day some improvement in stiffness. She is concerned about taking it more frequently because she "doesn't want to get addicted". Discussed appropriate use. Importance of keep appointment for EMG/NCS. All questions answered  "

## 2018-05-31 ENCOUNTER — TELEPHONE (OUTPATIENT)
Dept: NEUROLOGY | Facility: CLINIC | Age: 58
End: 2018-05-31

## 2018-05-31 ENCOUNTER — PROCEDURE VISIT (OUTPATIENT)
Dept: NEUROLOGY | Facility: CLINIC | Age: 58
End: 2018-05-31
Payer: MEDICAID

## 2018-05-31 DIAGNOSIS — G62.82 POLYNEUROPATHY DUE TO RADIATION: ICD-10-CM

## 2018-05-31 PROCEDURE — 95912 NRV CNDJ TEST 11-12 STUDIES: CPT | Mod: PBBFAC | Performed by: PSYCHIATRY & NEUROLOGY

## 2018-05-31 PROCEDURE — 95912 NRV CNDJ TEST 11-12 STUDIES: CPT | Mod: 26,S$PBB,, | Performed by: PSYCHIATRY & NEUROLOGY

## 2018-06-05 ENCOUNTER — TELEPHONE (OUTPATIENT)
Dept: NEUROLOGY | Facility: CLINIC | Age: 58
End: 2018-06-05

## 2018-06-05 NOTE — TELEPHONE ENCOUNTER
----- Message from Tonio Garner sent at 6/5/2018 10:15 AM CDT -----  Needs Advice    Reason for call: Pt is calling to confirm if the doctor still needs to her tomorrow, due to her EMG coming back negative.      Communication Preference: [phone]  Additional Information:

## 2018-06-05 NOTE — TELEPHONE ENCOUNTER
Patient states her headaches are really bad. Peer to peer  Is needed for botox. Patient states she has had a headache for the past 2 days. Please advise the patient.

## 2018-06-06 ENCOUNTER — TELEPHONE (OUTPATIENT)
Dept: NEUROLOGY | Facility: CLINIC | Age: 58
End: 2018-06-06

## 2018-06-06 NOTE — TELEPHONE ENCOUNTER
----- Message from Vanessa Gonzalez sent at 6/6/2018  3:13 PM CDT -----  Contact: self @ [phone]  Pt missed her appt this morning.  She is calling to reschedule but there is nothing available.  Pls call pt with an appt.

## 2018-06-14 ENCOUNTER — TELEPHONE (OUTPATIENT)
Dept: GASTROENTEROLOGY | Facility: CLINIC | Age: 58
End: 2018-06-14

## 2018-06-14 LAB
HPV16+18+H RISK 12 DNA CVX-IMP: NOT DETECTED
PAP SMEAR: NORMAL

## 2018-06-14 NOTE — TELEPHONE ENCOUNTER
----- Message from Maria A Douglas sent at 6/14/2018  3:54 PM CDT -----  Contact: [phone]  [place] - calling to schedule upper and lower gi - please call patient at

## 2018-06-15 ENCOUNTER — TELEPHONE (OUTPATIENT)
Dept: ENDOSCOPY | Facility: HOSPITAL | Age: 58
End: 2018-06-15

## 2018-06-15 DIAGNOSIS — Z12.12 SCREENING FOR COLORECTAL CANCER: ICD-10-CM

## 2018-06-15 DIAGNOSIS — Z79.1 NSAID LONG-TERM USE: Primary | ICD-10-CM

## 2018-06-15 DIAGNOSIS — R10.9 ABDOMINAL PAIN, UNSPECIFIED ABDOMINAL LOCATION: ICD-10-CM

## 2018-06-15 DIAGNOSIS — Z12.11 SCREENING FOR COLORECTAL CANCER: ICD-10-CM

## 2018-06-15 DIAGNOSIS — R19.5 DARK STOOLS: ICD-10-CM

## 2018-06-15 DIAGNOSIS — R13.10 DYSPHAGIA, UNSPECIFIED TYPE: ICD-10-CM

## 2018-06-18 ENCOUNTER — TELEPHONE (OUTPATIENT)
Dept: ENDOSCOPY | Facility: HOSPITAL | Age: 58
End: 2018-06-18

## 2018-06-18 DIAGNOSIS — I10 ESSENTIAL HYPERTENSION: ICD-10-CM

## 2018-06-18 DIAGNOSIS — Z12.11 SPECIAL SCREENING FOR MALIGNANT NEOPLASMS, COLON: Primary | ICD-10-CM

## 2018-06-18 RX ORDER — AMLODIPINE BESYLATE 5 MG/1
TABLET ORAL
Qty: 30 TABLET | Refills: 1 | Status: SHIPPED | OUTPATIENT
Start: 2018-06-18 | End: 2018-09-18 | Stop reason: SDUPTHER

## 2018-06-18 RX ORDER — POLYETHYLENE GLYCOL 3350, SODIUM SULFATE ANHYDROUS, SODIUM BICARBONATE, SODIUM CHLORIDE, POTASSIUM CHLORIDE 236; 22.74; 6.74; 5.86; 2.97 G/4L; G/4L; G/4L; G/4L; G/4L
4 POWDER, FOR SOLUTION ORAL ONCE
Qty: 4000 ML | Refills: 0 | Status: SHIPPED | OUTPATIENT
Start: 2018-06-18 | End: 2018-06-18

## 2018-06-18 NOTE — TELEPHONE ENCOUNTER
Called patient and scheduled EGD/Colonoscopy on 7/31/18 @8:00am. Reviewed Gol[facility] prep instructions with patient. Patient verbalized understanding. Written instructions mailed.

## 2018-06-20 DIAGNOSIS — M54.2 CHRONIC NECK PAIN: ICD-10-CM

## 2018-06-20 DIAGNOSIS — G89.29 CHRONIC NECK PAIN: ICD-10-CM

## 2018-06-20 RX ORDER — AMITRIPTYLINE HYDROCHLORIDE 25 MG/1
TABLET, FILM COATED ORAL
Qty: 30 TABLET | Refills: 0 | Status: SHIPPED | OUTPATIENT
Start: 2018-06-20 | End: 2018-07-03 | Stop reason: SDUPTHER

## 2018-07-02 RX ORDER — ERGOCALCIFEROL 1.25 MG/1
CAPSULE ORAL
Qty: 12 CAPSULE | Refills: 0 | Status: SHIPPED | OUTPATIENT
Start: 2018-07-02 | End: 2018-12-13 | Stop reason: SDUPTHER

## 2018-07-03 ENCOUNTER — TELEPHONE (OUTPATIENT)
Dept: PAIN MEDICINE | Facility: CLINIC | Age: 58
End: 2018-07-03

## 2018-07-03 ENCOUNTER — OFFICE VISIT (OUTPATIENT)
Dept: FAMILY MEDICINE | Facility: CLINIC | Age: 58
End: 2018-07-03
Payer: MEDICAID

## 2018-07-03 VITALS
RESPIRATION RATE: 17 BRPM | OXYGEN SATURATION: 97 % | SYSTOLIC BLOOD PRESSURE: 130 MMHG | HEIGHT: 64 IN | HEART RATE: 65 BPM | TEMPERATURE: 98 F | WEIGHT: 271.19 LBS | DIASTOLIC BLOOD PRESSURE: 90 MMHG | BODY MASS INDEX: 46.3 KG/M2

## 2018-07-03 DIAGNOSIS — E66.01 MORBID OBESITY DUE TO EXCESS CALORIES: ICD-10-CM

## 2018-07-03 DIAGNOSIS — M54.2 CHRONIC NECK PAIN: ICD-10-CM

## 2018-07-03 DIAGNOSIS — M54.2 CERVICALGIA: Primary | ICD-10-CM

## 2018-07-03 DIAGNOSIS — E89.0 POSTSURGICAL HYPOTHYROIDISM: ICD-10-CM

## 2018-07-03 DIAGNOSIS — G89.29 CHRONIC NECK PAIN: ICD-10-CM

## 2018-07-03 PROCEDURE — 99213 OFFICE O/P EST LOW 20 MIN: CPT | Mod: PBBFAC,PN | Performed by: INTERNAL MEDICINE

## 2018-07-03 PROCEDURE — 99214 OFFICE O/P EST MOD 30 MIN: CPT | Mod: S$PBB,,, | Performed by: INTERNAL MEDICINE

## 2018-07-03 PROCEDURE — 99999 PR PBB SHADOW E&M-EST. PATIENT-LVL III: CPT | Mod: PBBFAC,,, | Performed by: INTERNAL MEDICINE

## 2018-07-03 RX ORDER — AMITRIPTYLINE HYDROCHLORIDE 50 MG/1
50 TABLET, FILM COATED ORAL NIGHTLY PRN
Qty: 30 TABLET | Refills: 5 | Status: SHIPPED | OUTPATIENT
Start: 2018-07-03 | End: 2019-01-26 | Stop reason: SDUPTHER

## 2018-07-03 RX ORDER — BACLOFEN 10 MG/1
10 TABLET ORAL 3 TIMES DAILY
Qty: 90 TABLET | Refills: 2 | Status: SHIPPED | OUTPATIENT
Start: 2018-07-03 | End: 2018-10-05 | Stop reason: SDUPTHER

## 2018-07-03 NOTE — TELEPHONE ENCOUNTER
Message left reminding patient of Pain Management appointment scheduled for Thursday at 10a with Dr. Blas.  Location information also included.

## 2018-07-03 NOTE — PROGRESS NOTES
"Subjective:       Patient ID: Paras Smith is a 57 y.o. female.    Chief Complaint: Neck Pain and Follow-up    F/u chronic neck pain and headaches    HPI: 58 y/o w/ h/o of cervical fusion surgery (Dr. Burt at [facility]) chronic neck and shoulder pain, migraine headaces morbid obesity here for follow up. Primary complaint today is right sided neck pain. Pain radiates to right toes and right fingers. Pain is constant. No relieving position. She admits to buying opioid medications on street ("15mgs:") which she has been taking one to two times per day for the last week. She uses marijuana daily for pain control as well. She has been taking nightly TCA which she feels does help with ehr sleep. At last visit we add a muscle relaxer (methocarbenol) she reports no improvement in her pain withthis medication. She tells me her psychiatrsit is no  Longer prescribing her valium because of her THC use. She reports taking diazepam 5mg up to twice per day (but not every day). She denies vision changes no parathesia.       Review of Systems   Constitutional: Negative for activity change, appetite change, fatigue, fever and unexpected weight change.   HENT: Negative for ear pain, rhinorrhea and sore throat.    Eyes: Negative for discharge and visual disturbance.   Respiratory: Negative for chest tightness, shortness of breath and wheezing.    Cardiovascular: Negative for chest pain, palpitations and leg swelling.   Gastrointestinal: Negative for abdominal pain, constipation and diarrhea.   Endocrine: Negative for cold intolerance and heat intolerance.   Genitourinary: Negative for dysuria and hematuria.   Musculoskeletal: Positive for arthralgias, back pain, gait problem and neck pain. Negative for joint swelling and neck stiffness.   Skin: Negative for rash.   Neurological: Negative for dizziness, syncope, weakness and headaches.   Psychiatric/Behavioral: Negative for suicidal ideas.       Objective:     Vitals:    " "07/03/18 1001   BP: (!) 130/90   BP Location: Right arm   Patient Position: Sitting   BP Method: Large (Manual)   Pulse: 65   Resp: 17   Temp: 97.9 °F (36.6 °C)   TempSrc: Oral   SpO2: 97%   Weight: 123 kg (271 lb 2.7 oz)   Height: 5' 4" (1.626 m)          Physical Exam   Constitutional: She is oriented to person, place, and time. She appears well-developed and well-nourished.   Obese abdomen   HENT:   Head: Normocephalic and atraumatic.   Eyes: Conjunctivae are normal. No scleral icterus.   Neck: Normal range of motion.   Posterior neck no spinous process tenderness full forward flexion skin intact without erythema   Cardiovascular: Normal rate and regular rhythm.  Exam reveals no gallop and no friction rub.    No murmur heard.  Pulmonary/Chest: Effort normal and breath sounds normal. She has no wheezes. She has no rales.   Abdominal: Soft. Bowel sounds are normal. There is no tenderness. There is no rebound and no guarding.   Musculoskeletal: Normal range of motion. She exhibits no edema or tenderness.   Neurological: She is alert and oriented to person, place, and time. No cranial nerve deficit.   Skin: Skin is warm and dry.   Psychiatric: She has a normal mood and affect.       Assessment and Plan   1. Cervicalgia  Discussed dangers of illictly obtaining pain medications. Stop opioid medication. Will increase TCA and trial baclofen for muscle pain  - amitriptyline (ELAVIL) 50 MG tablet; Take 1 tablet (50 mg total) by mouth nightly as needed.  Dispense: 30 tablet; Refill: 5  - baclofen (LIORESAL) 10 MG tablet; Take 1 tablet (10 mg total) by mouth 3 (three) times daily.  Dispense: 90 tablet; Refill: 2    2. Postsurgical hypothyroidism  tsh at goal continue synthroid    3. Morbid obesity due to excess calories  The patient is asked to make an attempt to improve diet and exercise patterns to aid in medical management of this problem.      4. Chronic neck pain  To see interventional pain this week. Reviewed goal is " not complete abscence of pain rather to improve functionality and improve pain to point she would be able to participation in physical therapy  - amitriptyline (ELAVIL) 50 MG tablet; Take 1 tablet (50 mg total) by mouth nightly as needed.  Dispense: 30 tablet; Refill: 5

## 2018-07-05 ENCOUNTER — OFFICE VISIT (OUTPATIENT)
Dept: PAIN MEDICINE | Facility: CLINIC | Age: 58
End: 2018-07-05
Payer: MEDICAID

## 2018-07-05 VITALS
HEIGHT: 64 IN | OXYGEN SATURATION: 97 % | HEART RATE: 68 BPM | BODY MASS INDEX: 45.67 KG/M2 | DIASTOLIC BLOOD PRESSURE: 83 MMHG | SYSTOLIC BLOOD PRESSURE: 133 MMHG | WEIGHT: 267.5 LBS

## 2018-07-05 DIAGNOSIS — G89.4 CHRONIC PAIN DISORDER: Primary | ICD-10-CM

## 2018-07-05 PROCEDURE — 99499 UNLISTED E&M SERVICE: CPT | Mod: S$PBB,,, | Performed by: PAIN MEDICINE

## 2018-07-05 PROCEDURE — 99213 OFFICE O/P EST LOW 20 MIN: CPT | Mod: PBBFAC,PN | Performed by: PAIN MEDICINE

## 2018-07-05 PROCEDURE — 99999 PR PBB SHADOW E&M-EST. PATIENT-LVL III: CPT | Mod: PBBFAC,,, | Performed by: PAIN MEDICINE

## 2018-07-05 NOTE — PROGRESS NOTES
56 y/o female here for evaluation of chronic low back pain. She has had this pain for years. She was previously a patient of Dr. Saunders and was managed with chronic opioid medications. In her chart, it is documented that she has bought pain medications off of the street and that she smokes marijuana. We discussed that her insurance does not reimburse interventional procedures and therefore I cannot perform them for her. She was given information for [facility] and advised to call them as they may be able to perform interventional procedures for her.

## 2018-07-05 NOTE — PROGRESS NOTES
Subjective:     Patient ID: Paras Smith is a 57 y.o. female    Chief Complaint: Neck Pain; Low-back Pain; and Leg Pain      Referred by: Ekta Gaines, KARLIEC      HPI:    Initial Encounter (***/***/***):  Paras Smith is a 57 y.o. female who presents today with ***. ***.   This pain is described in detail below.    Physical Therapy: ***    Non-pharmacologic Treatment: ***         · TENS? ***    Pain Medications:         · Currently taking: ***    · Has tried in the past:  ***    · Has not tried: *** Opioids, NSAIDs, Tylenol, Muscle relaxants, TCAs, SNRIs, anticonvulsants, topical creams    Blood thinners: ***    Interventional Therapies: ***    Relevant Surgeries: ***    Affecting sleep? ***    Affecting daily activities? {YES/NO/WILD CARDS:55503}    Depressive symptoms? {YES/NO:}          · SI/HI? No    Work status: {Work Status:74160}    Pain Scores:    Best:       10  Worst:     10/10  Usually:   5/10  Today:    4/10    Review of Systems    Past Medical History:   Diagnosis Date    Asthma     CAD (coronary artery disease)     stent     Cervical spine disease     Depression     Difficult intubation     POSSIBLE    Falls 2018    GERD (gastroesophageal reflux disease)     Glaucoma     left eye    Helicobacter pylori (H. pylori) infection     History of stomach ulcers     HTN (hypertension)     Muscle weakness     LEFT    Neck problem     LIMITED ROM    Nuclear cataract 2014    Postsurgical hypothyroidism     Stroke     mini strokes    Thyroid cancer     Vitamin D deficiency disease        Past Surgical History:   Procedure Laterality Date    APPENDECTOMY      CERVICAL SPINE SURGERY      vocal cord damage     SECTION, LOW TRANSVERSE      x3    CHOLECYSTECTOMY      CORONARY ANGIOPLASTY WITH STENT PLACEMENT      x 3    PATELLA SURGERY  1969    THYROID SURGERY      total    TOTAL THYROIDECTOMY         Social History     Social History    Marital  status: Legally      Spouse name: N/A    Number of children: N/A    Years of education: N/A     Occupational History    Not on file.     Social History Main Topics    Smoking status: Never Smoker    Smokeless tobacco: Never Used      Comment: No cigarrettes, only marijuana occasionally    Alcohol use No      Comment: recovering alcoholic    Drug use: No      Comment: ocasional    Sexual activity: Not Currently     Partners: Male     Other Topics Concern    Not on file     Social History Narrative    No narrative on file       Review of patient's allergies indicates:   Allergen Reactions    Vioxx [rofecoxib] Rash       Current Outpatient Prescriptions on File Prior to Visit   Medication Sig Dispense Refill    albuterol (VENTOLIN HFA) 90 mcg/actuation inhaler inhale 2 puffs by mouth every 6 hours if needed 18 Inhaler 0    amitriptyline (ELAVIL) 50 MG tablet Take 1 tablet (50 mg total) by mouth nightly as needed. 30 tablet 5    amLODIPine (NORVASC) 5 MG tablet take 1 tablet by mouth once daily 30 tablet 1    anthralin 1.2 % CmRR   0    baclofen (LIORESAL) 10 MG tablet Take 1 tablet (10 mg total) by mouth 3 (three) times daily. 90 tablet 2    chlorproMAZINE (THORAZINE) 25 MG tablet Ok to take 1-2 pills qhs prn. 90 tablet 3    dicyclomine (BENTYL) 20 mg tablet Take 1 tablet (20 mg total) by mouth 2 (two) times daily. 60 tablet 0    ergocalciferol (VITAMIN D2) 50,000 unit Cap take 1 capsule by mouth every week (EVERY 7 DAYS) 12 capsule 0    fluticasone (FLONASE) 50 mcg/actuation nasal spray 1 spray (50 mcg total) by Each Nare route 2 (two) times daily. 16 g 3    gabapentin (NEURONTIN) 300 MG capsule Take 600 mg by mouth 3 (three) times daily.   0    hydrOXYzine pamoate (VISTARIL) 50 MG Cap Take 1 capsule (50 mg total) by mouth every 6 (six) hours as needed (anxiety). 30 capsule 1    levothyroxine (SYNTHROID) 100 MCG tablet Take 1 tablet (100 mcg total) by mouth once daily. 30 tablet 3     "lidocaine-prilocaine (EMLA) cream   0    lisinopril 10 MG tablet take 1 tablet by mouth once daily 30 tablet 6    LMX 4 4 % cream   0    naproxen (NAPROSYN) 375 MG tablet   0    neomycin-polymyxin-hydrocortisone (CORTISPORIN) 3.5-10,000-1 mg/mL-unit/mL-% otic suspension Place 3 drops into both ears 3 (three) times daily. 10 mL 0    nitroGLYCERIN (NITROSTAT) 0.4 MG SL tablet Place 1 tablet (0.4 mg total) under the tongue every 5 (five) minutes as needed. 25 tablet 11    nitroGLYCERIN 0.4 MG/HR TD PT24 (NITRODUR) 0.4 mg/hr apply 1 patch ONTO THE SKIN ONCE DAILY 30 patch 11    promethazine (PHENERGAN) 25 MG tablet take 1 tablet by mouth every 8 hours if needed for nausea and vomiting  0    propranolol (INDERAL) 60 MG tablet 60 mg.  0    REXULTI 0.5 mg Tab 0.5 mg.  0    sodium,potassium,mag sulfates (SUPREP BOWEL PREP KIT) 17.5-3.13-1.6 gram SolR As directed, dispense 1 kit. 1 Bottle 0    topiramate (TOPAMAX) 100 MG tablet Take 50 mg by mouth once daily.  0    omeprazole magnesium 20 mg CpDR Take 1 tablet by mouth 2 (two) times daily. 60 capsule 3     Current Facility-Administered Medications on File Prior to Visit   Medication Dose Route Frequency Provider Last Rate Last Dose    onabotulinumtoxina injection 200 Units  200 Units Intramuscular Q90 Days Aundrea Pathak MD           Objective:      /83   Pulse 68   Ht 5' 4" (1.626 m)   Wt 121.3 kg (267 lb 8 oz)   LMP 04/12/2014   SpO2 97%   BMI 45.92 kg/m²     Exam:  ***    Imaging:  ***    Assessment:       No diagnosis found.      Plan:       There are no diagnoses linked to this encounter.    Paras Smith is a 57 y.o. female with ***.    ***  "

## 2018-07-05 NOTE — LETTER
July 5, 2018      Ekta Gaines, NP-C  [address] [facility]  [place] [address]           [facility]  [address] [facility] [place] [address]  Phone: [phone]  Fax: [phone]          Patient: Paras Smith   MR Number: [id number]   YOB: 1960   Date of Visit: 7/5/2018       Dear Ekta Gaines:    Thank you for referring Paras Smith to me for evaluation. Attached you will find relevant portions of my assessment and plan of care.    If you have questions, please do not hesitate to call me. I look forward to following Paras Smith along with you.    Sincerely,    Angelo Blas Jr., MD    Enclosure  CC:  No Recipients    If you would like to receive this communication electronically, please contact [email or web address] or [phone] to request more information on [facility] [employer] [facility] access.    For providers and/or their staff who would like to refer a patient to [facility], please contact us through our one-stop-shop provider referral line, [facility], at [phone].    If you feel you have received this communication in error or would no longer like to receive these types of communications, please e-mail [email or web address]

## 2018-07-29 RX ORDER — SODIUM CHLORIDE 9 MG/ML
INJECTION, SOLUTION INTRAVENOUS CONTINUOUS
Status: CANCELLED | OUTPATIENT
Start: 2018-07-29

## 2018-07-29 RX ORDER — SODIUM CHLORIDE 0.9 % (FLUSH) 0.9 %
3 SYRINGE (ML) INJECTION
Status: CANCELLED | OUTPATIENT
Start: 2018-07-29

## 2018-07-31 ENCOUNTER — ANESTHESIA (OUTPATIENT)
Dept: ENDOSCOPY | Facility: HOSPITAL | Age: 58
End: 2018-07-31

## 2018-07-31 ENCOUNTER — ANESTHESIA EVENT (OUTPATIENT)
Dept: ENDOSCOPY | Facility: HOSPITAL | Age: 58
End: 2018-07-31

## 2018-07-31 DIAGNOSIS — R19.5 DARK STOOLS: Primary | ICD-10-CM

## 2018-07-31 DIAGNOSIS — R13.10 DYSPHAGIA, UNSPECIFIED TYPE: ICD-10-CM

## 2018-07-31 DIAGNOSIS — R10.9 ABDOMINAL PAIN, UNSPECIFIED ABDOMINAL LOCATION: ICD-10-CM

## 2018-07-31 DIAGNOSIS — Z79.1 NSAID LONG-TERM USE: ICD-10-CM

## 2018-07-31 DIAGNOSIS — Z12.11 COLON CANCER SCREENING: ICD-10-CM

## 2018-07-31 RX ORDER — POLYETHYLENE GLYCOL 3350, SODIUM SULFATE ANHYDROUS, SODIUM BICARBONATE, SODIUM CHLORIDE, POTASSIUM CHLORIDE 236; 22.74; 6.74; 5.86; 2.97 G/4L; G/4L; G/4L; G/4L; G/4L
4 POWDER, FOR SOLUTION ORAL ONCE
Qty: 4000 ML | Refills: 0 | Status: SHIPPED | OUTPATIENT
Start: 2018-07-31 | End: 2018-07-31

## 2018-07-31 NOTE — ANESTHESIA PREPROCEDURE EVALUATION
07/31/2018  Paras Smith is a 57 y.o., female with a pre-operative diagnosis of Dark stools [R19.5]  NSAID long-term use [Z79.1]  Screening for colorectal cancer [Z12.11, Z12.12]  Abdominal pain, unspecified abdominal location [R10.9]  Dysphagia, unspecified type [R13.10] who is scheduled for Procedure(s) (LRB):  EGD (ESOPHAGOGASTRODUODENOSCOPY) (N/A)  COLONOSCOPY (N/A).       PRIOR AIRWAY MANAGEMENT:    Placement Date: 11/12/15; Placement Time: 1829; Method of Intubation: Glidescope, Rapid Sequence Induction; Inserted by: Anesthesia MD (Chris); Airway Device: Endotracheal Tube-Hi/Lo; Mask Ventilation: Not Attempted; Intubated: Postinduction; Blade:  (Glidescope 3 ); Airway Device Size: 7.0; Style: Cuffed; Cuff Inflation: Minimal occlusive pressure; Placement Verified By: Auscultation, Capnometry, Other (Comment) (visualization); Grade: Grade I; Complicating Factors: Poor neck/head extension, Anterior larynx (in line c spine stabilization to limit head extension); Intubation Findings: Positive EtCO2, Bilateral breath sounds, Atraumatic/Condition of teeth unchanged, Other (see comments) (ETT passed easily through VC with glidescope stylet drawn back to avoid any potential trauma, neck maintained in neutral position);  Depth of Insertion: 19; Securment: Lips; Complications: None; Breath Sounds: Equal Bilateral; Insertion Attempts: 1; Removal Date: 11/12/15;  Removal Time: 1934    Requested anesthesia type: General  Surgeon: Chavo Wray MD  Allergies:   Review of patient's allergies indicates:   Allergen Reactions    Vioxx [rofecoxib] Rash     Vital Sign Range: BP: ()/()   Arterial Line BP: ()/()   Chronic Medications:   No prescriptions prior to admission.     Current Medications:   Current Facility-Administered Medications   Medication Dose Route Frequency Provider Last Rate Last Dose     onabotulinumtoxina injection 200 Units  200 Units Intramuscular Q90 Days Aundrea Pathak MD         Current Outpatient Prescriptions   Medication Sig Dispense Refill    albuterol (VENTOLIN HFA) 90 mcg/actuation inhaler inhale 2 puffs by mouth every 6 hours if needed 18 Inhaler 0    amitriptyline (ELAVIL) 50 MG tablet Take 1 tablet (50 mg total) by mouth nightly as needed. 30 tablet 5    amLODIPine (NORVASC) 5 MG tablet take 1 tablet by mouth once daily 30 tablet 1    anthralin 1.2 % CmRR   0    baclofen (LIORESAL) 10 MG tablet Take 1 tablet (10 mg total) by mouth 3 (three) times daily. 90 tablet 2    chlorproMAZINE (THORAZINE) 25 MG tablet Ok to take 1-2 pills qhs prn. 90 tablet 3    dicyclomine (BENTYL) 20 mg tablet Take 1 tablet (20 mg total) by mouth 2 (two) times daily. 60 tablet 0    ergocalciferol (VITAMIN D2) 50,000 unit Cap take 1 capsule by mouth every week (EVERY 7 DAYS) 12 capsule 0    fluticasone (FLONASE) 50 mcg/actuation nasal spray 1 spray (50 mcg total) by Each Nare route 2 (two) times daily. 16 g 3    gabapentin (NEURONTIN) 300 MG capsule Take 600 mg by mouth 3 (three) times daily.   0    hydrOXYzine pamoate (VISTARIL) 50 MG Cap Take 1 capsule (50 mg total) by mouth every 6 (six) hours as needed (anxiety). 30 capsule 1    levothyroxine (SYNTHROID) 100 MCG tablet Take 1 tablet (100 mcg total) by mouth once daily. 30 tablet 3    lidocaine-prilocaine (EMLA) cream   0    lisinopril 10 MG tablet take 1 tablet by mouth once daily 30 tablet 6    LMX 4 4 % cream   0    naproxen (NAPROSYN) 375 MG tablet   0    neomycin-polymyxin-hydrocortisone (CORTISPORIN) 3.5-10,000-1 mg/mL-unit/mL-% otic suspension Place 3 drops into both ears 3 (three) times daily. 10 mL 0    nitroGLYCERIN (NITROSTAT) 0.4 MG SL tablet Place 1 tablet (0.4 mg total) under the tongue every 5 (five) minutes as needed. 25 tablet 11    nitroGLYCERIN 0.4 MG/HR TD PT24 (NITRODUR) 0.4 mg/hr apply 1 patch ONTO THE SKIN ONCE  DAILY 30 patch 11    omeprazole magnesium 20 mg CpDR Take 1 tablet by mouth 2 (two) times daily. 60 capsule 3    promethazine (PHENERGAN) 25 MG tablet take 1 tablet by mouth every 8 hours if needed for nausea and vomiting  0    propranolol (INDERAL) 60 MG tablet 60 mg.  0    REXULTI 0.5 mg Tab 0.5 mg.  0    sodium,potassium,mag sulfates (SUPREP BOWEL PREP KIT) 17.5-3.13-1.6 gram SolR As directed, dispense 1 kit. 1 Bottle 0    topiramate (TOPAMAX) 100 MG tablet Take 50 mg by mouth once daily.  0     Medical History:   Past Medical History:   Diagnosis Date    Asthma     CAD (coronary artery disease)     stent 2003    Cervical spine disease     Depression     Difficult intubation     POSSIBLE    Falls 4/11/2018    GERD (gastroesophageal reflux disease)     Glaucoma     left eye    Helicobacter pylori (H. pylori) infection     History of stomach ulcers     HTN (hypertension)     Muscle weakness     LEFT    Neck problem     LIMITED ROM    Nuclear cataract 5/28/2014    Postsurgical hypothyroidism     Stroke     mini strokes    Thyroid cancer     Vitamin D deficiency disease      .    Pre-op Assessment    I have reviewed the Patient Summary Reports.     I have reviewed the Nursing Notes.   I have reviewed the Medications.     Review of Systems  Anesthesia Hx:  No problems with previous Anesthesia  Denies Family Hx of Anesthesia complications.   Denies Personal Hx of Anesthesia complications.

## 2018-08-01 ENCOUNTER — OFFICE VISIT (OUTPATIENT)
Dept: NEUROLOGY | Facility: CLINIC | Age: 58
End: 2018-08-01
Payer: MEDICAID

## 2018-08-01 VITALS
HEIGHT: 64 IN | SYSTOLIC BLOOD PRESSURE: 111 MMHG | DIASTOLIC BLOOD PRESSURE: 72 MMHG | HEART RATE: 63 BPM | BODY MASS INDEX: 45.81 KG/M2 | WEIGHT: 268.31 LBS

## 2018-08-01 DIAGNOSIS — G40.209 PARTIAL SYMPTOMATIC EPILEPSY WITH COMPLEX PARTIAL SEIZURES, NOT INTRACTABLE, WITHOUT STATUS EPILEPTICUS: ICD-10-CM

## 2018-08-01 DIAGNOSIS — F41.9 ANXIETY: Primary | ICD-10-CM

## 2018-08-01 DIAGNOSIS — E55.9 VITAMIN D DEFICIENCY DISEASE: ICD-10-CM

## 2018-08-01 DIAGNOSIS — G44.40 MEDICATION OVERUSE HEADACHE: ICD-10-CM

## 2018-08-01 DIAGNOSIS — G43.019 INTRACTABLE MIGRAINE WITHOUT AURA AND WITHOUT STATUS MIGRAINOSUS: ICD-10-CM
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
## 2018-08-01 DIAGNOSIS — F32.A DEPRESSION, UNSPECIFIED DEPRESSION TYPE: ICD-10-CM

## 2018-08-01 PROCEDURE — 99215 OFFICE O/P EST HI 40 MIN: CPT | Mod: PBBFAC | Performed by: STUDENT IN AN ORGANIZED HEALTH CARE EDUCATION/TRAINING PROGRAM

## 2018-08-01 PROCEDURE — 99999 PR PBB SHADOW E&M-EST. PATIENT-LVL V: CPT | Mod: PBBFAC,,, | Performed by: STUDENT IN AN ORGANIZED HEALTH CARE EDUCATION/TRAINING PROGRAM

## 2018-08-01 PROCEDURE — 99214 OFFICE O/P EST MOD 30 MIN: CPT | Mod: S$PBB,,, | Performed by: PSYCHIATRY & NEUROLOGY

## 2018-08-01 RX ORDER — LANOLIN ALCOHOL/MO/W.PET/CERES
400 CREAM (GRAM) TOPICAL 2 TIMES DAILY
Refills: 0 | COMMUNITY
Start: 2018-08-01 | End: 2020-04-16
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
## 2018-08-01 NOTE — ASSESSMENT & PLAN NOTE
Continue Zoloft.  Patient is currently on Zoloft 50mg bid.  Consider increasing total dose.  Will defer to psychiatry.    Recommend psychology + psychiatry.  Referrals placed.

## 2018-08-01 NOTE — PATIENT INSTRUCTIONS
1. Wean topamax as follows:              First week (starting now): 1/2 pill in the am, 1 pill pm              Second week: 1/2 pill twice a day              Third week: 1/2 pill at night only              Fourth week and beyond: stop  2. Wean abortive medications.  Currently taking approximately 12 doses/week of BC Powder/ibuprofen              First week (starting now): 8 doses/week              Second week: 6 doses/week              Third week: 4 doses/week              Fourth week: 2 doses/week              Fifth week and beyond: stop  3. F/u with cardiology.  Please discuss falling episodes.  4. F/u with psychiatry.  Consider increasing (versus changing) antidepressant medication.  However, your mood may improve as topamax is weaned.    5. Add magnesium oxide 400mg twice a day.  6. Consider wearing wrist splints during the day, in addition to at night.

## 2018-08-01 NOTE — ASSESSMENT & PLAN NOTE
Normal EEG.  Consider repeat EEG s/p Topamax wean.  (Topamax for HA, but may be providing partial relief of possible seizures.)  Continue seizure precautions

## 2018-08-01 NOTE — ASSESSMENT & PLAN NOTE
Wean Topamax  Wean abortives  Medrol dose pack offered, but refused  Magnesium oxide 400mg bid    1. Wean topamax as follows:              First week (starting now): 1/2 pill in the am, 1 pill pm              Second week: 1/2 pill twice a day              Third week: 1/2 pill at night only              Fourth week and beyond: stop  2. Wean abortive medications.  Currently taking approximately 12 doses/week of BC Powder/ibuprofen              First week (starting now): 8 doses/week              Second week: 6 doses/week              Third week: 4 doses/week              Fourth week: 2 doses/week              Fifth week and beyond: stop    Limit all rescue medications to no more than 2x/week (max 3x/week).  Stay well-hydrated.  Eat regularly.  Avoid stress when possible, and try your best to get enough sleep.    Limit caffeine intake, particularly in the afternoon/evenings.    MRI reviewed

## 2018-08-14 RX ORDER — NITROGLYCERIN 0.4 MG/1
TABLET SUBLINGUAL
Qty: 25 TABLET | Refills: 0 | Status: SHIPPED | OUTPATIENT
Start: 2018-08-14 | End: 2018-09-17 | Stop reason: SDUPTHER

## 2018-08-16 DIAGNOSIS — E89.0 POSTSURGICAL HYPOTHYROIDISM: ICD-10-CM

## 2018-08-16 RX ORDER — LEVOTHYROXINE SODIUM 100 UG/1
100 TABLET ORAL DAILY
Qty: 30 TABLET | Refills: 3 | Status: SHIPPED | OUTPATIENT
Start: 2018-08-16 | End: 2018-12-17 | Stop reason: SDUPTHER

## 2018-09-10 ENCOUNTER — ANESTHESIA (OUTPATIENT)
Dept: ENDOSCOPY | Facility: HOSPITAL | Age: 58
End: 2018-09-10
Payer: MEDICAID

## 2018-09-10 ENCOUNTER — ANESTHESIA EVENT (OUTPATIENT)
Dept: ENDOSCOPY | Facility: HOSPITAL | Age: 58
End: 2018-09-10
Payer: MEDICAID

## 2018-09-10 RX ORDER — SODIUM CHLORIDE 0.9 % (FLUSH) 0.9 %
3 SYRINGE (ML) INJECTION
Status: CANCELLED | OUTPATIENT
Start: 2018-09-10

## 2018-09-10 RX ORDER — HYDROMORPHONE HYDROCHLORIDE 1 MG/ML
0.2 INJECTION, SOLUTION INTRAMUSCULAR; INTRAVENOUS; SUBCUTANEOUS EVERY 5 MIN PRN
Status: CANCELLED | OUTPATIENT
Start: 2018-09-10

## 2018-09-17 DIAGNOSIS — I10 HYPERTENSION, ESSENTIAL: Primary | ICD-10-CM

## 2018-09-17 RX ORDER — LISINOPRIL 10 MG/1
TABLET ORAL
Qty: 30 TABLET | Refills: 2 | Status: SHIPPED | OUTPATIENT
Start: 2018-09-17 | End: 2018-12-17 | Stop reason: SDUPTHER

## 2018-09-17 RX ORDER — NITROGLYCERIN 0.4 MG/1
TABLET SUBLINGUAL
Qty: 25 TABLET | Refills: 0 | Status: SHIPPED | OUTPATIENT
Start: 2018-09-17 | End: 2018-10-23 | Stop reason: SDUPTHER

## 2018-09-18 DIAGNOSIS — I10 ESSENTIAL HYPERTENSION: ICD-10-CM

## 2018-09-18 RX ORDER — AMLODIPINE BESYLATE 5 MG/1
5 TABLET ORAL DAILY
Qty: 30 TABLET | Refills: 1 | Status: SHIPPED | OUTPATIENT
Start: 2018-09-18 | End: 2018-12-13 | Stop reason: SDUPTHER

## 2018-10-05 DIAGNOSIS — M54.2 CERVICALGIA: ICD-10-CM

## 2018-10-05 RX ORDER — BACLOFEN 10 MG/1
TABLET ORAL
Qty: 90 TABLET | Refills: 0 | Status: SHIPPED | OUTPATIENT
Start: 2018-10-05 | End: 2018-10-22 | Stop reason: SDUPTHER

## 2018-10-18 ENCOUNTER — TELEPHONE (OUTPATIENT)
Dept: FAMILY MEDICINE | Facility: CLINIC | Age: 58
End: 2018-10-18

## 2018-10-18 NOTE — TELEPHONE ENCOUNTER
Talked to daughter regarding message on pt. Medication refills. Pt was advised to call pharmacy and see what medication is needed to be refilled and then give us a call back so we can put in request.

## 2018-10-18 NOTE — TELEPHONE ENCOUNTER
----- Message from Christi Lindsay sent at 10/17/2018  4:43 PM CDT -----  Contact: Pharmacy  Pts phakristin is calling to speak with staff regarding refills for pt. Please call [phone]  Doesn't know which medications.     [facility]

## 2018-10-22 ENCOUNTER — OFFICE VISIT (OUTPATIENT)
Dept: FAMILY MEDICINE | Facility: CLINIC | Age: 58
End: 2018-10-22
Payer: MEDICAID

## 2018-10-22 VITALS
BODY MASS INDEX: 45.54 KG/M2 | DIASTOLIC BLOOD PRESSURE: 82 MMHG | TEMPERATURE: 98 F | HEIGHT: 64 IN | WEIGHT: 266.75 LBS | HEART RATE: 65 BPM | SYSTOLIC BLOOD PRESSURE: 132 MMHG | OXYGEN SATURATION: 97 %

## 2018-10-22 DIAGNOSIS — R10.11 RIGHT UPPER QUADRANT ABDOMINAL PAIN: Primary | ICD-10-CM

## 2018-10-22 DIAGNOSIS — Z23 NEED FOR PNEUMOCOCCAL VACCINE: ICD-10-CM

## 2018-10-22 DIAGNOSIS — Z23 NEED FOR INFLUENZA VACCINATION: ICD-10-CM

## 2018-10-22 DIAGNOSIS — M54.2 CERVICALGIA: ICD-10-CM

## 2018-10-22 DIAGNOSIS — B35.4 TINEA CORPORIS: ICD-10-CM

## 2018-10-22 PROCEDURE — 90471 IMMUNIZATION ADMIN: CPT | Mod: PBBFAC,PN

## 2018-10-22 PROCEDURE — 90670 PCV13 VACCINE IM: CPT | Mod: PBBFAC,PN

## 2018-10-22 PROCEDURE — 99999 PR PBB SHADOW E&M-EST. PATIENT-LVL III: CPT | Mod: PBBFAC,,, | Performed by: INTERNAL MEDICINE

## 2018-10-22 PROCEDURE — 99213 OFFICE O/P EST LOW 20 MIN: CPT | Mod: PBBFAC,PN,25 | Performed by: INTERNAL MEDICINE

## 2018-10-22 PROCEDURE — 99214 OFFICE O/P EST MOD 30 MIN: CPT | Mod: S$PBB,,, | Performed by: INTERNAL MEDICINE

## 2018-10-22 RX ORDER — LIDOCAINE 40 MG/G
CREAM TOPICAL
Qty: 30 G | Refills: 1 | Status: SHIPPED | OUTPATIENT
Start: 2018-10-22 | End: 2023-06-21 | Stop reason: CLARIF

## 2018-10-22 RX ORDER — CHOLECALCIFEROL (VITAMIN D3) 50 MCG
1 CAPSULE ORAL 2 TIMES DAILY
Qty: 60 CAPSULE | Refills: 3 | Status: SHIPPED | OUTPATIENT
Start: 2018-10-22 | End: 2019-01-14 | Stop reason: SDUPTHER

## 2018-10-22 RX ORDER — ALBUTEROL SULFATE 90 UG/1
AEROSOL, METERED RESPIRATORY (INHALATION)
Qty: 18 G | Refills: 2 | Status: SHIPPED | OUTPATIENT
Start: 2018-10-22 | End: 2019-01-26 | Stop reason: SDUPTHER

## 2018-10-22 RX ORDER — CLOTRIMAZOLE 1 %
CREAM (GRAM) TOPICAL 2 TIMES DAILY
Qty: 24 G | Refills: 1 | Status: SHIPPED | OUTPATIENT
Start: 2018-10-22 | End: 2019-02-22 | Stop reason: SDUPTHER

## 2018-10-22 RX ORDER — BACLOFEN 10 MG/1
TABLET ORAL
Qty: 90 TABLET | Refills: 3 | Status: SHIPPED | OUTPATIENT
Start: 2018-10-22 | End: 2018-11-05 | Stop reason: SDUPTHER

## 2018-10-22 RX ORDER — DICYCLOMINE HYDROCHLORIDE 10 MG/1
10 CAPSULE ORAL
Qty: 120 CAPSULE | Refills: 3 | Status: SHIPPED | OUTPATIENT
Start: 2018-10-22 | End: 2018-11-21

## 2018-10-23 RX ORDER — NITROGLYCERIN 0.4 MG/1
TABLET SUBLINGUAL
Qty: 25 TABLET | Refills: 0 | Status: SHIPPED | OUTPATIENT
Start: 2018-10-23 | End: 2018-10-28 | Stop reason: SDUPTHER

## 2018-10-23 NOTE — PROGRESS NOTES
"Subjective:       Patient ID: Paras Smith is a 57 y.o. female.    Chief Complaint: Abdominal Pain; Medication Refill; and Rash    Skin rash, neck pain    HPI: 56 y/o with chronic neck lowe back pain presents for scheduled follow up. Reports two months of scaled itching rash to right hand no involvement of trunk or feet no discharge not using any topical creams or lotions. Using muscle relaxer (baclofen) one to three times per day with minimal relief. Has not yet scheduled with psychiatry as referred from her neurologist. Weight unchanged since last visit      Review of Systems   Constitutional: Negative for activity change, appetite change, fatigue, fever and unexpected weight change.   HENT: Negative for ear pain, rhinorrhea and sore throat.    Eyes: Negative for discharge and visual disturbance.   Respiratory: Negative for chest tightness, shortness of breath and wheezing.    Cardiovascular: Negative for chest pain, palpitations and leg swelling.   Gastrointestinal: Negative for abdominal pain, constipation and diarrhea.   Endocrine: Negative for cold intolerance and heat intolerance.   Genitourinary: Negative for dysuria and hematuria.   Musculoskeletal: Positive for back pain and neck pain. Negative for joint swelling and neck stiffness.   Skin: Negative for rash.   Neurological: Negative for dizziness, syncope, weakness and headaches.   Psychiatric/Behavioral: Negative for suicidal ideas.       Objective:     Vitals:    10/22/18 1348   BP: 132/82   BP Location: Right arm   Patient Position: Sitting   BP Method: Large (Manual)   Pulse: 65   Temp: 97.8 °F (36.6 °C)   TempSrc: Oral   SpO2: 97%   Weight: 121 kg (266 lb 12.1 oz)   Height: 5' 4" (1.626 m)          Physical Exam   Constitutional: She is oriented to person, place, and time. She appears well-developed and well-nourished.   HENT:   Head: Normocephalic and atraumatic.   Eyes: Conjunctivae are normal. No scleral icterus.   Neck: Normal range of " motion.   Cardiovascular: Normal rate and regular rhythm. Exam reveals no gallop and no friction rub.   No murmur heard.  Pulmonary/Chest: Effort normal and breath sounds normal. She has no wheezes. She has no rales.   Abdominal: Soft. Bowel sounds are normal. There is no tenderness. There is no rebound and no guarding.   Musculoskeletal: Normal range of motion. She exhibits no edema or tenderness.   Neurological: She is alert and oriented to person, place, and time. No cranial nerve deficit.   Skin: Skin is warm and dry. Rash noted.   Right anterior hypothenar area with 2cm well demarcated round rash with central scaling   Psychiatric: She has a normal mood and affect.       Assessment and Plan   1. Right upper quadrant abdominal pain  Stable on ppi  - omeprazole magnesium 20 mg CpDR; Take 1 tablet by mouth 2 (two) times daily.  Dispense: 60 capsule; Refill: 3  - dicyclomine (BENTYL) 10 MG capsule; Take 1 capsule (10 mg total) by mouth 4 (four) times daily before meals and nightly.  Dispense: 120 capsule; Refill: 3    2. Cervicalgia  Muscle relaxer prn  - baclofen (LIORESAL) 10 MG tablet; TAKE 1 TABLET(10 MG) BY MOUTH THREE TIMES DAILY  Dispense: 90 tablet; Refill: 3    3. Need for influenza vaccination  Flu vaccine today  - Influenza - Quadrivalent (3 years & older) (PF)    4. Need for pneumococcal vaccine  pcv13 #2    - (In Office Administered) Pneumococcal Conjugate Vaccine (13 Valent) (IM)    5. Tinea corporis  Topical antifungal BID x 14 days keep skin dry  - clotrimazole (LOTRIMIN) 1 % cream; Apply topically 2 (two) times daily.  Dispense: 24 g; Refill: 1

## 2018-10-28 RX ORDER — NITROGLYCERIN 0.4 MG/1
TABLET SUBLINGUAL
Qty: 25 TABLET | Refills: 0 | Status: SHIPPED | OUTPATIENT
Start: 2018-10-28 | End: 2019-04-23 | Stop reason: ALTCHOICE

## 2018-11-05 DIAGNOSIS — M54.2 CERVICALGIA: ICD-10-CM

## 2018-11-05 RX ORDER — BACLOFEN 10 MG/1
TABLET ORAL
Qty: 90 TABLET | Refills: 0 | Status: SHIPPED | OUTPATIENT
Start: 2018-11-05 | End: 2019-02-22 | Stop reason: SDUPTHER

## 2018-11-14 ENCOUNTER — TELEPHONE (OUTPATIENT)
Dept: NEUROLOGY | Facility: CLINIC | Age: 58
End: 2018-11-14

## 2018-11-15 NOTE — TELEPHONE ENCOUNTER
Patient arrived for appointment today, but had to leave in order to make another appointment elsewhere before she was able to be seen (as our clinic was running late).    Patient called and symptoms briefly discussed.  She is still having HA, and has restarted her Topamax - on 11/7 - and is currently taking 1/2 tab bid.  She is also having significant jaw pain and slurred speech, which worsens the more she talks; this is associated with her HA.  She has an upcoming appointment with her dentist.  Of note, she is no longer taking Goody's Powder.  She has not yet started magnesium oxide.      Recommendations (discussed over the phone):  - Stop Topamax.  As she has only recently restarted it, and is only taking 1/2 tab bid, ok to stop, but consider 1/2 tab daily x3 days and then stop.  Although she will certainly need an alternative for better HA prevention, Topamax has been ineffective, and may be exacerbating her low mood.  - Start magnesium oxide 400mg bid  - Great job on not taking regular Goody's Powder!!  - Discuss the possibility of TMJ with dentist at upcoming appointment; will further eval at our next appointment in December.    Aundrea Pathak MD  [facility]  PGY-4

## 2018-12-06 ENCOUNTER — TELEPHONE (OUTPATIENT)
Dept: NEUROLOGY | Facility: CLINIC | Age: 58
End: 2018-12-06

## 2018-12-13 DIAGNOSIS — I10 ESSENTIAL HYPERTENSION: ICD-10-CM

## 2018-12-14 RX ORDER — ERGOCALCIFEROL 1.25 MG/1
CAPSULE ORAL
Qty: 4 CAPSULE | Refills: 1 | Status: SHIPPED | OUTPATIENT
Start: 2018-12-14 | End: 2019-01-11 | Stop reason: SDUPTHER

## 2018-12-14 RX ORDER — AMLODIPINE BESYLATE 5 MG/1
TABLET ORAL
Qty: 30 TABLET | Refills: 1 | Status: SHIPPED | OUTPATIENT
Start: 2018-12-14 | End: 2019-01-26 | Stop reason: SDUPTHER

## 2018-12-17 DIAGNOSIS — I10 HYPERTENSION, ESSENTIAL: ICD-10-CM

## 2018-12-17 DIAGNOSIS — E89.0 POSTSURGICAL HYPOTHYROIDISM: ICD-10-CM

## 2018-12-17 RX ORDER — LISINOPRIL 10 MG/1
TABLET ORAL
Qty: 30 TABLET | Refills: 3 | Status: SHIPPED | OUTPATIENT
Start: 2018-12-17 | End: 2019-04-17 | Stop reason: SDUPTHER

## 2018-12-17 RX ORDER — LEVOTHYROXINE SODIUM 100 UG/1
TABLET ORAL
Qty: 30 TABLET | Refills: 3 | Status: SHIPPED | OUTPATIENT
Start: 2018-12-17 | End: 2019-04-17 | Stop reason: SDUPTHER

## 2018-12-18 RX ORDER — NITROGLYCERIN 80 MG/1
PATCH TRANSDERMAL
Qty: 30 PATCH | Refills: 11 | Status: SHIPPED | OUTPATIENT
Start: 2018-12-18 | End: 2023-06-21 | Stop reason: CLARIF

## 2018-12-31 ENCOUNTER — HOSPITAL ENCOUNTER (OUTPATIENT)
Facility: HOSPITAL | Age: 58
Discharge: HOME | End: 2018-12-31
Attending: INTERNAL MEDICINE | Admitting: INTERNAL MEDICINE
Payer: MEDICAID

## 2018-12-31 VITALS
BODY MASS INDEX: 50.6 KG/M2 | SYSTOLIC BLOOD PRESSURE: 152 MMHG | TEMPERATURE: 98 F | HEART RATE: 57 BPM | HEIGHT: 61 IN | DIASTOLIC BLOOD PRESSURE: 76 MMHG | WEIGHT: 268 LBS | RESPIRATION RATE: 18 BRPM | OXYGEN SATURATION: 98 %

## 2018-12-31 DIAGNOSIS — A04.8 HELICOBACTER PYLORI (H. PYLORI) INFECTION: ICD-10-CM

## 2018-12-31 DIAGNOSIS — R19.7 DIARRHEA: ICD-10-CM

## 2018-12-31 DIAGNOSIS — R19.7 DIARRHEA OF PRESUMED INFECTIOUS ORIGIN: Primary | ICD-10-CM

## 2018-12-31 PROCEDURE — 45378 PR COLONOSCOPY,DIAGNOSTIC: ICD-10-PCS | Mod: ,,, | Performed by: INTERNAL MEDICINE

## 2018-12-31 PROCEDURE — D9220A PRA ANESTHESIA: Mod: CRNA,,, | Performed by: NURSE ANESTHETIST, CERTIFIED REGISTERED

## 2018-12-31 PROCEDURE — 43239 EGD BIOPSY SINGLE/MULTIPLE: CPT | Performed by: INTERNAL MEDICINE

## 2018-12-31 PROCEDURE — D9220A PRA ANESTHESIA: Mod: ANES,,, | Performed by: ANESTHESIOLOGY

## 2018-12-31 PROCEDURE — 88305 TISSUE EXAM BY PATHOLOGIST: CPT | Performed by: PATHOLOGY

## 2018-12-31 PROCEDURE — 00812 ANES LWR INTST SCR COLSC: CPT | Performed by: INTERNAL MEDICINE

## 2018-12-31 PROCEDURE — 27201012 HC FORCEPS, HOT/COLD, DISP: Performed by: INTERNAL MEDICINE

## 2018-12-31 PROCEDURE — 25000003 PHARM REV CODE 250: Performed by: INTERNAL MEDICINE

## 2018-12-31 PROCEDURE — G0121 COLON CA SCRN NOT HI RSK IND: HCPCS | Performed by: INTERNAL MEDICINE

## 2018-12-31 PROCEDURE — 45378 DIAGNOSTIC COLONOSCOPY: CPT | Mod: ,,, | Performed by: INTERNAL MEDICINE

## 2018-12-31 PROCEDURE — 37000008 HC ANESTHESIA 1ST 15 MINUTES: Performed by: INTERNAL MEDICINE

## 2018-12-31 PROCEDURE — 37000009 HC ANESTHESIA EA ADD 15 MINS: Performed by: INTERNAL MEDICINE

## 2018-12-31 PROCEDURE — 43239 PR EGD, FLEX, W/BIOPSY, SGL/MULTI: ICD-10-PCS | Mod: 51,,, | Performed by: INTERNAL MEDICINE

## 2018-12-31 PROCEDURE — 63600175 PHARM REV CODE 636 W HCPCS: Performed by: NURSE ANESTHETIST, CERTIFIED REGISTERED

## 2018-12-31 PROCEDURE — 43239 EGD BIOPSY SINGLE/MULTIPLE: CPT | Mod: 51,,, | Performed by: INTERNAL MEDICINE

## 2018-12-31 PROCEDURE — 25000003 PHARM REV CODE 250: Performed by: NURSE ANESTHETIST, CERTIFIED REGISTERED

## 2018-12-31 PROCEDURE — 88305 TISSUE SPECIMEN TO PATHOLOGY - SURGERY: ICD-10-PCS | Mod: 26,,, | Performed by: PATHOLOGY

## 2018-12-31 PROCEDURE — 88305 TISSUE EXAM BY PATHOLOGIST: CPT | Mod: 26,,, | Performed by: PATHOLOGY

## 2018-12-31 RX ORDER — SODIUM CHLORIDE 0.9 % (FLUSH) 0.9 %
3 SYRINGE (ML) INJECTION
Status: DISCONTINUED | OUTPATIENT
Start: 2018-12-31 | End: 2021-09-14

## 2018-12-31 RX ORDER — MIDAZOLAM HYDROCHLORIDE 1 MG/ML
INJECTION, SOLUTION INTRAMUSCULAR; INTRAVENOUS
Status: DISCONTINUED | OUTPATIENT
Start: 2018-12-31 | End: 2018-12-31

## 2018-12-31 RX ORDER — HYDROMORPHONE HYDROCHLORIDE 1 MG/ML
0.2 INJECTION, SOLUTION INTRAMUSCULAR; INTRAVENOUS; SUBCUTANEOUS EVERY 5 MIN PRN
Status: DISCONTINUED | OUTPATIENT
Start: 2018-12-31 | End: 2018-12-31 | Stop reason: HOSPADM

## 2018-12-31 RX ORDER — MEPERIDINE HYDROCHLORIDE 50 MG/ML
12.5 INJECTION INTRAMUSCULAR; INTRAVENOUS; SUBCUTANEOUS ONCE AS NEEDED
Status: DISCONTINUED | OUTPATIENT
Start: 2018-12-31 | End: 2018-12-31 | Stop reason: HOSPADM

## 2018-12-31 RX ORDER — ONDANSETRON 2 MG/ML
4 INJECTION INTRAMUSCULAR; INTRAVENOUS ONCE AS NEEDED
Status: DISCONTINUED | OUTPATIENT
Start: 2018-12-31 | End: 2018-12-31 | Stop reason: HOSPADM

## 2018-12-31 RX ORDER — PROPOFOL 10 MG/ML
VIAL (ML) INTRAVENOUS CONTINUOUS PRN
Status: DISCONTINUED | OUTPATIENT
Start: 2018-12-31 | End: 2018-12-31

## 2018-12-31 RX ORDER — SODIUM CHLORIDE 9 MG/ML
INJECTION, SOLUTION INTRAVENOUS CONTINUOUS
Status: DISCONTINUED | OUTPATIENT
Start: 2018-12-31 | End: 2018-12-31 | Stop reason: HOSPADM

## 2018-12-31 RX ORDER — LIDOCAINE HCL/PF 100 MG/5ML
SYRINGE (ML) INTRAVENOUS
Status: DISCONTINUED | OUTPATIENT
Start: 2018-12-31 | End: 2018-12-31

## 2018-12-31 RX ORDER — SODIUM CHLORIDE 0.9 % (FLUSH) 0.9 %
3 SYRINGE (ML) INJECTION
Status: DISCONTINUED | OUTPATIENT
Start: 2018-12-31 | End: 2018-12-31 | Stop reason: HOSPADM

## 2018-12-31 RX ORDER — PROPOFOL 10 MG/ML
VIAL (ML) INTRAVENOUS
Status: DISCONTINUED | OUTPATIENT
Start: 2018-12-31 | End: 2018-12-31

## 2018-12-31 RX ORDER — FENTANYL CITRATE 50 UG/ML
25 INJECTION, SOLUTION INTRAMUSCULAR; INTRAVENOUS EVERY 5 MIN PRN
Status: DISCONTINUED | OUTPATIENT
Start: 2018-12-31 | End: 2018-12-31 | Stop reason: HOSPADM

## 2018-12-31 RX ORDER — DIPHENHYDRAMINE HYDROCHLORIDE 50 MG/ML
25 INJECTION INTRAMUSCULAR; INTRAVENOUS EVERY 6 HOURS PRN
Status: DISCONTINUED | OUTPATIENT
Start: 2018-12-31 | End: 2018-12-31 | Stop reason: HOSPADM

## 2018-12-31 RX ORDER — DIAZEPAM 10 MG/1
10 TABLET ORAL
COMMUNITY
End: 2020-09-23 | Stop reason: ALTCHOICE

## 2018-12-31 RX ORDER — SODIUM CHLORIDE 9 MG/ML
INJECTION, SOLUTION INTRAVENOUS CONTINUOUS
Status: DISCONTINUED | OUTPATIENT
Start: 2018-12-31 | End: 2021-09-14

## 2018-12-31 RX ORDER — KETAMINE HCL IN 0.9 % NACL 50 MG/5 ML
SYRINGE (ML) INTRAVENOUS
Status: DISCONTINUED | OUTPATIENT
Start: 2018-12-31 | End: 2018-12-31

## 2018-12-31 RX ADMIN — PROPOFOL 40 MG: 10 INJECTION, EMULSION INTRAVENOUS at 09:12

## 2018-12-31 RX ADMIN — Medication 30 MG: at 08:12

## 2018-12-31 RX ADMIN — PROPOFOL 100 MG: 10 INJECTION, EMULSION INTRAVENOUS at 08:12

## 2018-12-31 RX ADMIN — Medication 10 MG: at 09:12

## 2018-12-31 RX ADMIN — LIDOCAINE HYDROCHLORIDE 100 MG: 20 INJECTION, SOLUTION INTRAVENOUS at 08:12

## 2018-12-31 RX ADMIN — PROPOFOL 50 MG: 10 INJECTION, EMULSION INTRAVENOUS at 09:12

## 2018-12-31 RX ADMIN — SODIUM CHLORIDE: 0.9 INJECTION, SOLUTION INTRAVENOUS at 08:12

## 2018-12-31 RX ADMIN — MIDAZOLAM HYDROCHLORIDE 2 MG: 1 INJECTION, SOLUTION INTRAMUSCULAR; INTRAVENOUS at 08:12

## 2018-12-31 RX ADMIN — LIDOCAINE HYDROCHLORIDE 100 MG: 20 INJECTION, SOLUTION INTRAVENOUS at 09:12

## 2018-12-31 RX ADMIN — PROPOFOL 100 MCG/KG/MIN: 10 INJECTION, EMULSION INTRAVENOUS at 08:12

## 2018-12-31 NOTE — DISCHARGE INSTRUCTIONS
Upper GI Endoscopy     During endoscopy, a long, flexible tube is used to view the inside of your upper GI tract.      Upper GI endoscopy allows your healthcare provider to look directly into the beginning of your gastrointestinal (GI) tract. The esophagus, stomach, and duodenum (the first part of the small intestine) make up the upper GI tract.   Before the exam  Follow these and any other instructions you are given before your endoscopy. If you dont follow the healthcare providers instructions carefully, the test may need to be canceled or done over:  · Don't eat or drink anything after midnight the night before your exam. If your exam is in the afternoon, drink only clear liquids in the morning. Don't eat or drink anything for 8 hours before the exam. In some cases, you may be able to take medicines with sips of water until 2 hours before the procedure. Speak with your healthcare provider about this.   · Bring your X-rays and any other test results you have.  · Because you will be sedated, arrange for an adult to drive you home after the exam.  · Tell your healthcare provider before the exam if you are taking any medicines or have any medical problems.  The procedure  Here is what to expect:  · You will lie on the endoscopy table. Usually patients lie on the left side.  · You will be monitored and given oxygen.  · Your throat may be numbed with a spray or gargle. You are given medicine through an intravenous (IV) line that will help you relax and remain comfortable. You may be awake or asleep during the procedure.  · The healthcare provider will put the endoscope in your mouth and down your esophagus. It is thinner than most pieces of food that you swallow. It will not affect your breathing. The medicine helps keep you from gagging.  · Air is put into your GI tract to expand it. It can make you burp.  · During the procedure, the healthcare provider can take biopsies (tissue samples), remove abnormalities,  such as polyps, or treat abnormalities through a variety of devices placed through the endoscope. You will not feel this.   · The endoscope carries images of your upper GI tract to a video screen. If you are awake, you may be able to look at the images.  · After the procedure is done, you will rest for a time. An adult must drive you home.  When to call your healthcare provider  Contact your healthcare provider if you have:  · Black or tarry stools, or blood in your stool  · Fever  · Pain in your belly that does not go away  · Nausea and vomiting, or vomiting blood   Date Last Reviewed: 7/1/2016 © 2000-2017 [employer]. [address] [place] [address], [place] [address]. All rights reserved. This information is not intended as a substitute for professional medical care. Always follow your healthcare professional's instructions.        Colonoscopy     A camera attached to a flexible tube with a viewing lens is used to take video pictures.     Colonoscopy is a test to view the inside of your lower digestive tract (colon and rectum). Sometimes it can show the last part of the small intestine (ileum). During the test, small pieces of tissue may be removed for testing. This is called a biopsy. Small growths, such as polyps, may also be removed.   Why is colonoscopy done?  The test is done to help look for colon cancer. And it can help find the source of abdominal pain, bleeding, and changes in bowel habits. It may be needed once a year, depending on factors such as your:  · Age  · Health history  · Family health history  · Symptoms  · Results from any prior colonoscopy  Risks and possible complications  These include:  · Bleeding               · A puncture or tear in the colon   · Risks of anesthesia  · A cancer lesion not being seen  Getting ready   To prepare for the test:  · Talk with your healthcare provider about the risks of the test (see below). Also ask your healthcare provider about alternatives to the  test.  · Tell your healthcare provider about any medicines you take. Also tell him or her about any health conditions you may have.  · Make sure your rectum and colon are empty for the test. Follow the diet and bowel prep instructions exactly. If you dont, the test may need to be rescheduled.  · Plan for a friend or family member to drive you home after the test.     Colonoscopy provides an inside view of the entire colon.     You may discuss the results with your doctor right away or at a future visit.  During the test   The test is usually done in the hospital on an outpatient basis. This means you go home the same day. The procedure takes about 30 minutes. During that time:  · You are given relaxing (sedating) medicine through an IV line. You may be drowsy, or fully asleep.  · The healthcare provider will first give you a physical exam to check for anal and rectal problems.  · Then the anus is lubricated and the scope inserted.  · If you are awake, you may have a feeling similar to needing to have a bowel movement. You may also feel pressure as air is pumped into the colon. Its OK to pass gas during the procedure.  · Biopsy, polyp removal, or other treatments may be done during the test.  After the test   You may have gas right after the test. It can help to try to pass it to help prevent later bloating. Your healthcare provider may discuss the results with you right away. Or you may need to schedule a follow-up visit to talk about the results. After the test, you can go back to your normal eating and other activities. You may be tired from the sedation and need to rest for a few hours.  Date Last Reviewed: 11/1/2016 © 2000-2017 [employer]. [address] [place] [address]. All rights reserved. This information is not intended as a substitute for professional medical care. Always follow your healthcare professional's instructions.

## 2018-12-31 NOTE — [employer] PATIENT INSTRUCTIONS
Discharge Summary/Instructions after an Endoscopic Procedure  Patient Name: Paras Smith  Patient MRN: [id number]  Patient YOB: 1960 Monday, December 31, 2018  Chavo Wray MD  RESTRICTIONS:  During your procedure today, you received medications for sedation.  These   medications may affect your judgment, balance and coordination.  Therefore,   for 24 hours, you have the following restrictions:   - DO NOT drive a car, operate machinery, make legal/financial decisions,   sign important papers or drink alcohol.    ACTIVITY:  Today: no heavy lifting, straining or running due to procedural   sedation/anesthesia.  The following day: return to full activity including work.  DIET:  Eat and drink normally unless instructed otherwise.     TREATMENT FOR COMMON SIDE EFFECTS:  - Mild abdominal pain, nausea, belching, bloating or excessive gas:  rest,   eat lightly and use a heating pad.  - Sore Throat: treat with throat lozenges and/or gargle with warm salt   water.  - Because air was used during the procedure, expelling large amounts of air   from your rectum or belching is normal.  - If a bowel prep was taken, you may not have a bowel movement for 1-3 days.    This is normal.  SYMPTOMS TO WATCH FOR AND REPORT TO YOUR PHYSICIAN:  1. Abdominal pain or bloating, other than gas cramps.  2. Chest pain.  3. Back pain.  4. Signs of infection such as: chills or fever occurring within 24 hours   after the procedure.  5. Rectal bleeding, which would show as bright red, maroon, or black stools.   (A tablespoon of blood from the rectum is not serious, especially if   hemorrhoids are present.)  6. Vomiting.  7. Weakness or dizziness.  GO DIRECTLY TO THE NEAREST EMERGENCY ROOM IF YOU HAVE ANY OF THE FOLLOWING:      Difficulty breathing              Chills and/or fever over 101 F   Persistent vomiting and/or vomiting blood   Severe abdominal pain   Severe chest pain   Black, tarry stools   Bleeding- more than one tablespoon   Any  other symptom or condition that you feel may need urgent attention  Your doctor recommends these additional instructions:  If any biopsies were taken, your doctors clinic will contact you in 1 to 2   weeks with any results.  - Discharge patient to home.   - Await pathology results.   - Perform a colonoscopy today.   - The findings and recommendations were discussed with the designated   responsible adult.   For questions, problems or results please call your physician - Chavo Wray MD at Work:  [phone].  [facility], EMERGENCY ROOM PHONE NUMBER: [phone]  IF A COMPLICATION OR EMERGENCY SITUATION ARISES AND YOU ARE UNABLE TO REACH   YOUR PHYSICIAN - GO DIRECTLY TO THE EMERGENCY ROOM.  Chavo Wray MD  12/31/2018 9:04:20 AM  This report has been verified and signed electronically.  [employer]

## 2018-12-31 NOTE — TRANSFER OF CARE
"Anesthesia Transfer of Care Note    Patient: Paras mSith    Procedure(s) Performed: Procedure(s) (LRB):  EGD (ESOPHAGOGASTRODUODENOSCOPY) (N/A)  COLONOSCOPY (N/A)    Patient location: [facility]    Anesthesia Type: general    Transport from OR: Transported from OR on room air with adequate spontaneous ventilation    Post pain: adequate analgesia    Post assessment: no apparent anesthetic complications    Post vital signs: stable    Level of consciousness: awake    Nausea/Vomiting: no nausea/vomiting    Complications: none    Transfer of care protocol was followed      Last vitals:   Visit Vitals  /66   Pulse (!) 58   Temp 36.7 °C (98 °F)   Resp 16   Ht 5' 1" (1.549 m)   Wt 121.6 kg (268 lb)   LMP 04/12/2014   SpO2 99%   Breastfeeding? No   BMI 50.64 kg/m²     "

## 2018-12-31 NOTE — ANESTHESIA POSTPROCEDURE EVALUATION
"Anesthesia Post Evaluation    Patient: Paras Smith    Procedure(s) Performed: Procedure(s) (LRB):  EGD (ESOPHAGOGASTRODUODENOSCOPY) (N/A)  COLONOSCOPY (N/A)    Final Anesthesia Type: general  Patient location during evaluation: PACU  Patient participation: Yes- Able to Participate  Level of consciousness: awake and alert  Post-procedure vital signs: reviewed and stable  Pain management: adequate  Airway patency: patent  PONV status at discharge: No PONV  Anesthetic complications: no      Cardiovascular status: blood pressure returned to baseline  Respiratory status: unassisted  Hydration status: euvolemic  Follow-up not needed.        Visit Vitals  BP (!) 152/76   Pulse (!) 57   Temp 36.5 °C (97.7 °F) (Temporal)   Resp 18   Ht 5' 1" (1.549 m)   Wt 121.6 kg (268 lb)   LMP 04/12/2014   SpO2 98%   Breastfeeding? No   BMI 50.64 kg/m²       Pain/Manjeet Score: Manjeet Score: 10 (12/31/2018 10:00 AM)        "

## 2018-12-31 NOTE — ANESTHESIA PREPROCEDURE EVALUATION
12/31/2018  Paras Smith is a 58 y.o., female.    Anesthesia Evaluation         Review of Systems  Anesthesia Hx:  No problems with previous Anesthesia   Social:  Smoker    Cardiovascular:   Exercise tolerance: good Hypertension CAD  CABG/stent   Denies Angina.  Functional Capacity Can you climb two flights of stairs? ==> Yes    Pulmonary:   Denies Asthma.  Denies Recent URI.  Denies Sleep Apnea.    Renal/:  Renal/ Normal     Hepatic/GI:   Denies PUD. Denies Hiatal Hernia. GERD Liver Disease,  Denies Hepatitis.    Neurological:   Denies CVA. Denies Seizures.    Endocrine:   Denies Diabetes. Hypothyroidism        Physical Exam  General:  Well nourished    Airway/Jaw/Neck:  Airway Findings: Mouth Opening: Normal Tongue: Normal  General Airway Assessment: Adult  Mallampati: I  TM Distance: Normal, at least 6 cm  Jaw/Neck Findings:  Neck ROM: Normal ROM  Neck Findings:     Eyes/Ears/Nose:  EYES/EARS/NOSE FINDINGS: Normal   Dental:  Dental Findings: In tact   Chest/Lungs:  Chest/Lungs Findings: Clear to auscultation     Heart/Vascular:  Heart Findings: Rate: Normal  Rhythm: Regular Rhythm  Sounds: Normal        Mental Status:  Mental Status Findings:  Alert and Oriented         Anesthesia Plan  Type of Anesthesia, risks & benefits discussed:  Anesthesia Type:  general  Patient's Preference: Proceed with anesthesia understanding that the risks are very small but could be serious or life threatening.  Intra-op Monitoring Plan: standard ASA monitors  Intra-op Monitoring Plan Comments:   Post Op Pain Control Plan:   Post Op Pain Control Plan Comments:   Induction:   IV  Beta Blocker:  Patient is not currently on a Beta-Blocker (No further documentation required).       Informed Consent: Patient understands risks and agrees with Anesthesia plan.  Questions answered. Anesthesia consent signed with  patient.  ASA Score: 3     Day of Surgery Review of History & Physical: I have interviewed and examined the patient. I have reviewed the patient's H&P dated:            Ready For Surgery From Anesthesia Perspective.

## 2018-12-31 NOTE — H&P
Short Stay Endoscopy History and Physical    PCP - Guicho Lizarraga MD    Procedure - EGD/Colonoscopy  ASA - per anesthesia  Mallampati - per anesthesia  History of Anesthesia problems - no  Family history Anesthesia problems - no   Plan of anesthesia - General    HPI:  This is a 58 y.o. female here for evaluation of :   Dysphagia  abd pain  Previous ulcer  Previous polyp    ROS:  Constitutional: No fevers, chills, No weight loss  CV: No chest pain  Pulm: No cough, No shortness of breath  GI: see HPI  Derm: No rash    Medical History:  has a past medical history of Asthma, CAD (coronary artery disease), Cervical spine disease, Depression, Difficult intubation, Falls (2018), GERD (gastroesophageal reflux disease), Glaucoma, Helicobacter pylori (H. pylori) infection, History of stomach ulcers, HTN (hypertension), Muscle weakness, Neck problem, Nuclear cataract (2014), Postsurgical hypothyroidism, Stroke, Thyroid cancer, and Vitamin D deficiency disease.    Surgical History:  has a past surgical history that includes Total thyroidectomy; Coronary angioplasty with stent; Cervical spine surgery; Thyroid surgery; Appendectomy; Patella surgery (); Cholecystectomy; and  section, low transverse.    Family History: family history includes Asthma in her father and mother; Breast cancer in her maternal aunt and mother; Cancer in her mother and sister; Cataracts in her father; Cervical cancer in her daughter and daughter; Diabetes in her father; Glaucoma in her father; Heart disease in her brother and mother; Hypertension in her brother, daughter, mother, and son; Kidney failure in her father; Liver disease in her sister; Lupus in her daughter; Mental illness in her son; No Known Problems in her brother, daughter, maternal uncle, paternal aunt, paternal grandfather, paternal grandmother, and paternal uncle; Ovarian cancer in her maternal grandfather and mother; Scoliosis in her son; Seizures in her  daughter; Stomach cancer in her maternal aunt, maternal aunt, and maternal grandmother.. Otherwise no colon cancer, inflammatory bowel disease, or GI malignancies.    Social History:  reports that  has never smoked. she has never used smokeless tobacco. She reports that she uses drugs. Drug: Marijuana. Frequency: 6.00 times per week. She reports that she does not drink alcohol.    Review of patient's allergies indicates:   Allergen Reactions    Vioxx [rofecoxib] Rash       Medications:   Facility-Administered Medications Prior to Admission   Medication Dose Route Frequency Provider Last Rate Last Dose    onabotulinumtoxina injection 200 Units  200 Units Intramuscular Q90 Days Aundrea Pathak MD         Medications Prior to Admission   Medication Sig Dispense Refill Last Dose    albuterol (VENTOLIN HFA) 90 mcg/actuation inhaler inhale 2 puffs by mouth every 6 hours if needed 18 g 2 12/30/2018 at Unknown time    amitriptyline (ELAVIL) 50 MG tablet Take 1 tablet (50 mg total) by mouth nightly as needed. 30 tablet 5 Past Month at Unknown time    amLODIPine (NORVASC) 5 MG tablet TAKE ONE TABLET BY MOUTH ONCE A DAY 30 tablet 1 Past Week at Unknown time    baclofen (LIORESAL) 10 MG tablet TAKE 1 TABLET(10 MG) BY MOUTH THREE TIMES DAILY 90 tablet 0 12/30/2018 at Unknown time    diazePAM (VALIUM) 10 MG Tab Take 10 mg by mouth.   12/30/2018 at Unknown time    gabapentin (NEURONTIN) 300 MG capsule Take 600 mg by mouth 3 (three) times daily.   0 12/30/2018 at Unknown time    hydrOXYzine pamoate (VISTARIL) 50 MG Cap Take 1 capsule (50 mg total) by mouth every 6 (six) hours as needed (anxiety). 30 capsule 1 Past Month at Unknown time    levothyroxine (SYNTHROID) 100 MCG tablet TAKE ONE TABLET BY MOUTH ONCE A DAY 30 tablet 3 12/31/2018 at Unknown time    lisinopril 10 MG tablet TAKE ONE TABLET BY MOUTH ONCE A DAY 30 tablet 3 Past Month at Unknown time    magnesium oxide (MAG-OX) 400 mg tablet Take 1 tablet (400 mg  total) by mouth 2 (two) times daily.  0 Past Month at Unknown time    omeprazole magnesium 20 mg CpDR Take 1 tablet by mouth 2 (two) times daily. 60 capsule 3 12/31/2018 at Unknown time    promethazine (PHENERGAN) 25 MG tablet take 1 tablet by mouth every 8 hours if needed for nausea and vomiting  0 12/30/2018 at Unknown time    topiramate (TOPAMAX) 100 MG tablet Take 50 mg by mouth once daily.  0 Past Month at Unknown time    anthralin 1.2 % CmRR   0 Taking    chlorproMAZINE (THORAZINE) 25 MG tablet Ok to take 1-2 pills qhs prn. 90 tablet 3 Unknown at Unknown time    clotrimazole (LOTRIMIN) 1 % cream Apply topically 2 (two) times daily. 24 g 1 Taking    ergocalciferol (VITAMIN D2) 50,000 unit Cap TAKE ONE CAPSULE BY MOUTH once every week 4 capsule 1     fluticasone (FLONASE) 50 mcg/actuation nasal spray 1 spray (50 mcg total) by Each Nare route 2 (two) times daily. 16 g 3 Taking    lidocaine (LMX 4) 4 % cream Apply topically as needed. 30 g 1 Taking    lidocaine-prilocaine (EMLA) cream   0 Taking    naproxen (NAPROSYN) 375 MG tablet   0 Unknown at Unknown time    neomycin-polymyxin-hydrocortisone (CORTISPORIN) 3.5-10,000-1 mg/mL-unit/mL-% otic suspension Place 3 drops into both ears 3 (three) times daily. 10 mL 0 Taking    nitroGLYCERIN (NITROSTAT) 0.4 MG SL tablet DISSOLVE 1 TABLET UNDER THE TONGUE IF NEEDED EVERY 5 MINUTES FOR CHEST PAIN FOR 3 DOSES IF NO RELIEF AFTER THIRD DOSE CALL 911 OR PRESCRIBER 25 tablet 0 More than a month at Unknown time    nitroGLYCERIN 0.4 MG/HR TD PT24 (NITRODUR) 0.4 mg/hr apply 1 patch ONTO THE SKIN ONCE DAILY 30 patch 11 Unknown at Unknown time    propranolol (INDERAL) 60 MG tablet 60 mg.  0 Unknown at Unknown time    REXULTI 0.5 mg Tab 0.5 mg.  0 Taking    sodium,potassium,mag sulfates (SUPREP BOWEL PREP KIT) 17.5-3.13-1.6 gram SolR As directed, dispense 1 kit. 1 Bottle 0 Taking         Physical Exam:    Vital Signs:   Vitals:    12/31/18 0825   BP: 125/66   Pulse:  (!) 58   Resp: 16   Temp: 98 °F (36.7 °C)       General Appearance: Well appearing in no acute distress  Eyes:    No scleral icterus  ENT: Neck supple, Lips, mucosa, and tongue normal; teeth and gums normal  Lungs: CTA bilaterally  Heart:  S1, S2 normal, no murmurs heard  Abdomen: Soft, non tender, non distended with positive bowel sounds. No hepatosplenomegaly, ascites, or mass.  Extremities: 2+ pulses, no clubbing, cyanosis or edema  Skin: No rash      Labs:  Lab Results   Component Value Date    WBC 6.91 05/01/2018    HGB 14.6 05/01/2018    HCT 42.1 05/01/2018     05/01/2018    CHOL 208 (H) 04/19/2017    TRIG 112 04/19/2017    HDL 59 04/19/2017    ALT 30 04/25/2018    AST 26 04/25/2018     05/01/2018    K 4.8 05/01/2018     05/01/2018    CREATININE 0.8 05/01/2018    BUN 18 05/01/2018    CO2 28 05/01/2018    TSH 0.072 (L) 04/25/2018    INR 0.9 05/01/2018    HGBA1C 5.4 04/11/2018       I have explained the risks and benefits of endoscopy procedures to the patient including but not limited to bleeding, perforation, infection, and death.      Chavo Wray MD

## 2018-12-31 NOTE — [employer] PATIENT INSTRUCTIONS
Discharge Summary/Instructions after an Endoscopic Procedure  Patient Name: Paras Smith  Patient MRN: [id number]  Patient YOB: 1960 Monday, December 31, 2018  Chavo Wray MD  RESTRICTIONS:  During your procedure today, you received medications for sedation.  These   medications may affect your judgment, balance and coordination.  Therefore,   for 24 hours, you have the following restrictions:   - DO NOT drive a car, operate machinery, make legal/financial decisions,   sign important papers or drink alcohol.    ACTIVITY:  Today: no heavy lifting, straining or running due to procedural   sedation/anesthesia.  The following day: return to full activity including work.  DIET:  Eat and drink normally unless instructed otherwise.     TREATMENT FOR COMMON SIDE EFFECTS:  - Mild abdominal pain, nausea, belching, bloating or excessive gas:  rest,   eat lightly and use a heating pad.  - Sore Throat: treat with throat lozenges and/or gargle with warm salt   water.  - Because air was used during the procedure, expelling large amounts of air   from your rectum or belching is normal.  - If a bowel prep was taken, you may not have a bowel movement for 1-3 days.    This is normal.  SYMPTOMS TO WATCH FOR AND REPORT TO YOUR PHYSICIAN:  1. Abdominal pain or bloating, other than gas cramps.  2. Chest pain.  3. Back pain.  4. Signs of infection such as: chills or fever occurring within 24 hours   after the procedure.  5. Rectal bleeding, which would show as bright red, maroon, or black stools.   (A tablespoon of blood from the rectum is not serious, especially if   hemorrhoids are present.)  6. Vomiting.  7. Weakness or dizziness.  GO DIRECTLY TO THE NEAREST EMERGENCY ROOM IF YOU HAVE ANY OF THE FOLLOWING:      Difficulty breathing              Chills and/or fever over 101 F   Persistent vomiting and/or vomiting blood   Severe abdominal pain   Severe chest pain   Black, tarry stools   Bleeding- more than one tablespoon   Any  other symptom or condition that you feel may need urgent attention  Your doctor recommends these additional instructions:  If any biopsies were taken, your doctors clinic will contact you in 1 to 2   weeks with any results.  - Patient has a contact number available for emergencies.  The signs and   symptoms of potential delayed complications were discussed with the   patient.  Return to normal activities tomorrow.  Written discharge   instructions were provided to the patient.   - Discharge patient to home.   - Repeat colonoscopy in 5 years for screening purposes given nature of prep.     - The findings and recommendations were discussed with the designated   responsible adult.   For questions, problems or results please call your physician - Chavo Wray MD at Work:  [phone].  [facility], EMERGENCY ROOM PHONE NUMBER: [phone]  IF A COMPLICATION OR EMERGENCY SITUATION ARISES AND YOU ARE UNABLE TO REACH   YOUR PHYSICIAN - GO DIRECTLY TO THE EMERGENCY ROOM.  Chavo Wray MD  12/31/2018 9:25:32 AM  This report has been verified and signed electronically.  [employer]

## 2019-01-08 ENCOUNTER — TELEPHONE (OUTPATIENT)
Dept: GASTROENTEROLOGY | Facility: CLINIC | Age: 59
End: 2019-01-08

## 2019-01-08 NOTE — TELEPHONE ENCOUNTER
Ma contact pt to informed pt per Dr. Pierre Wilson message below:     Please call patient - stomach biopsy negative for bacteria  and normal small bowel biopsies.    Pt didn't answer left detail message to return call

## 2019-01-11 ENCOUNTER — TELEPHONE (OUTPATIENT)
Dept: ENDOSCOPY | Facility: HOSPITAL | Age: 59
End: 2019-01-11

## 2019-01-11 RX ORDER — ERGOCALCIFEROL 1.25 MG/1
CAPSULE ORAL
Qty: 12 CAPSULE | Refills: 1 | Status: SHIPPED | OUTPATIENT
Start: 2019-01-11 | End: 2019-07-11 | Stop reason: SDUPTHER

## 2019-01-11 NOTE — TELEPHONE ENCOUNTER
----- Message from Maria A Douglas sent at 1/11/2019  2:23 PM CST -----  Contact: self  [phone] til 4  Ray    Test Results    Type of Test: egd and colon  Date of Test: 1/7/19  Communication Preference: [phone] til 4  Additional Information[facility] [place] [facility]

## 2019-01-11 NOTE — TELEPHONE ENCOUNTER
Please call patient - stomach biopsy negative for bacteria  and normal small bowel biopsies.      Patient informed.

## 2019-01-14 DIAGNOSIS — R10.11 RIGHT UPPER QUADRANT ABDOMINAL PAIN: ICD-10-CM

## 2019-01-14 RX ORDER — CHOLECALCIFEROL (VITAMIN D3) 50 MCG
1 CAPSULE ORAL 2 TIMES DAILY
Qty: 60 CAPSULE | Refills: 3 | Status: SHIPPED | OUTPATIENT
Start: 2019-01-14 | End: 2019-01-15 | Stop reason: SDUPTHER

## 2019-01-15 DIAGNOSIS — R10.11 RIGHT UPPER QUADRANT ABDOMINAL PAIN: ICD-10-CM

## 2019-01-15 RX ORDER — PANTOPRAZOLE SODIUM 40 MG/1
40 TABLET, DELAYED RELEASE ORAL DAILY
Qty: 30 TABLET | Refills: 5 | Status: SHIPPED | OUTPATIENT
Start: 2019-01-15 | End: 2019-02-25 | Stop reason: CLARIF

## 2019-01-15 RX ORDER — CHOLECALCIFEROL (VITAMIN D3) 50 MCG
1 CAPSULE ORAL 2 TIMES DAILY
Qty: 60 CAPSULE | Refills: 3 | Status: SHIPPED | OUTPATIENT
Start: 2019-01-15 | End: 2019-10-15 | Stop reason: CLARIF

## 2019-01-15 RX ORDER — OMEPRAZOLE 40 MG/1
CAPSULE, DELAYED RELEASE ORAL
Qty: 30 CAPSULE | Status: CANCELLED | OUTPATIENT
Start: 2019-01-15

## 2019-01-15 NOTE — TELEPHONE ENCOUNTER
----- Message from Ghulam Melara sent at 1/15/2019  9:12 AM CST -----  Contact: [facility]/[phone]  [facility] would like to know if she can change the patients omeprazole magnesium 20 mg CpDR to 40mg.          Thank you

## 2019-01-15 NOTE — TELEPHONE ENCOUNTER
Called [facility] about request for medication change .. MD hernandez medication omeprazole 20 mg now omeprazole 40 mg . Staff confirmed with pharmacy

## 2019-01-15 NOTE — TELEPHONE ENCOUNTER
Per doctor judy  Patient medication change    [facility] would like to know if she can change the patients omeprazole magnesium 20 mg CpDR to 40mg.

## 2019-01-15 NOTE — TELEPHONE ENCOUNTER
----- Message from Ekta Girard sent at 1/15/2019  1:28 PM CST -----  Contact: Self/ [phone]  Refill:  omeprazole magnesium 20 mg CpDR  [facility] [place] - [address]    Patient request prescription be sent to [facility] and it was sent to another drug store.  Thank you.

## 2019-01-16 ENCOUNTER — TELEPHONE (OUTPATIENT)
Dept: NEUROLOGY | Facility: CLINIC | Age: 59
End: 2019-01-16

## 2019-01-23 ENCOUNTER — OFFICE VISIT (OUTPATIENT)
Dept: FAMILY MEDICINE | Facility: CLINIC | Age: 59
End: 2019-01-23
Payer: MEDICAID

## 2019-01-23 VITALS
TEMPERATURE: 98 F | HEIGHT: 61 IN | BODY MASS INDEX: 51.45 KG/M2 | RESPIRATION RATE: 17 BRPM | SYSTOLIC BLOOD PRESSURE: 119 MMHG | DIASTOLIC BLOOD PRESSURE: 72 MMHG | WEIGHT: 272.5 LBS | HEART RATE: 63 BPM | OXYGEN SATURATION: 97 %

## 2019-01-23 DIAGNOSIS — M54.2 CHRONIC MIDLINE POSTERIOR NECK PAIN: Primary | ICD-10-CM

## 2019-01-23 DIAGNOSIS — Z12.39 SCREENING FOR BREAST CANCER: ICD-10-CM

## 2019-01-23 DIAGNOSIS — G89.29 CHRONIC MIDLINE POSTERIOR NECK PAIN: Primary | ICD-10-CM

## 2019-01-23 DIAGNOSIS — E03.9 ACQUIRED HYPOTHYROIDISM: ICD-10-CM

## 2019-01-23 DIAGNOSIS — E66.01 MORBID OBESITY DUE TO EXCESS CALORIES: ICD-10-CM

## 2019-01-23 PROCEDURE — 99214 OFFICE O/P EST MOD 30 MIN: CPT | Mod: PBBFAC,PN | Performed by: INTERNAL MEDICINE

## 2019-01-23 PROCEDURE — 99214 OFFICE O/P EST MOD 30 MIN: CPT | Mod: S$PBB,,, | Performed by: INTERNAL MEDICINE

## 2019-01-23 PROCEDURE — 99999 PR PBB SHADOW E&M-EST. PATIENT-LVL IV: CPT | Mod: PBBFAC,,, | Performed by: INTERNAL MEDICINE

## 2019-01-23 PROCEDURE — 99999 PR PBB SHADOW E&M-EST. PATIENT-LVL IV: ICD-10-PCS | Mod: PBBFAC,,, | Performed by: INTERNAL MEDICINE

## 2019-01-23 PROCEDURE — 99214 PR OFFICE/OUTPT VISIT, EST, LEVL IV, 30-39 MIN: ICD-10-PCS | Mod: S$PBB,,, | Performed by: INTERNAL MEDICINE

## 2019-01-23 NOTE — PROGRESS NOTES
"Subjective:       Patient ID: Paras Smith is a 58 y.o. female.    Chief Complaint: Establish Care and Follow-up    F/u chronic neck pain    HPI: 59 y/o w/ hypothyroid morbid obesity chronic midline cervical neck pain presents with  for follow up. Reports neck pain is unchanged despite use of amitriptyline, gabapentin and baclofen. Has "burning" pain posterior neck constantly exacerbated by walking no radiation to upper extremities. She had MRI of neck concerning for mass underwent IR biopsy last year without evidnence of neoplastic process. She otherwise is well. She was able to travel to [place] last month to see her grandson graduate from [employer] basic training. She did have flare of pain there requiring use of motorize scooter. She denies loss of bowel or bladder.       Review of Systems   Constitutional: Negative for activity change, appetite change, fatigue, fever and unexpected weight change.   HENT: Negative for ear pain, rhinorrhea and sore throat.    Eyes: Negative for discharge and visual disturbance.   Respiratory: Negative for chest tightness, shortness of breath and wheezing.    Cardiovascular: Negative for chest pain, palpitations and leg swelling.   Gastrointestinal: Positive for abdominal pain. Negative for constipation and diarrhea.   Endocrine: Negative for cold intolerance and heat intolerance.   Genitourinary: Negative for dysuria and hematuria.   Musculoskeletal: Positive for back pain and neck pain. Negative for joint swelling and neck stiffness.   Skin: Negative for rash.   Neurological: Negative for dizziness, syncope, weakness and headaches.   Psychiatric/Behavioral: Negative for suicidal ideas.       Objective:     Vitals:    01/23/19 0811   BP: 119/72   BP Location: Right arm   Patient Position: Sitting   Pulse: 63   Resp: 17   Temp: 98 °F (36.7 °C)   TempSrc: Oral   SpO2: 97%   Weight: 123.6 kg (272 lb 7.8 oz)   Height: 5' 1" (1.549 m)          Physical Exam "   Constitutional: She is oriented to person, place, and time. She appears well-developed and well-nourished.   HENT:   Head: Normocephalic and atraumatic.   Eyes: Conjunctivae are normal. No scleral icterus.   Neck: Normal range of motion.   Cardiovascular: Normal rate and regular rhythm. Exam reveals no gallop and no friction rub.   No murmur heard.  Pulmonary/Chest: Effort normal and breath sounds normal. She has no wheezes. She has no rales.   Abdominal: Soft. Bowel sounds are normal. There is no tenderness. There is no rebound and no guarding.   Musculoskeletal: Normal range of motion. She exhibits tenderness. She exhibits no edema.   Diffuse tenderness to palpation withotu overlying skin changes   Neurological: She is alert and oriented to person, place, and time. No cranial nerve deficit.   Skin: Skin is warm and dry.   Psychiatric: She has a normal mood and affect.       Assessment and Plan   1. Chronic midline posterior neck pain  referal to PM&R at [facility]   - Ambulatory referral to Physical Medicine Rehab    2. Screening for breast cancer  mammogram  - Mammo Digital Screening Bilat; Future    3. Morbid obesity due to excess calories  The patient is asked to make an attempt to improve diet and exercise patterns to aid in medical management of this problem.      4. Acquired hypothyroidism  Repeat tsh prior to follow up  - TSH; Future  - Comprehensive metabolic panel; Future

## 2019-01-26 DIAGNOSIS — M54.2 CERVICALGIA: ICD-10-CM

## 2019-01-26 DIAGNOSIS — G89.29 CHRONIC NECK PAIN: ICD-10-CM

## 2019-01-26 DIAGNOSIS — J30.89 NON-SEASONAL ALLERGIC RHINITIS DUE TO OTHER ALLERGIC TRIGGER: ICD-10-CM

## 2019-01-26 DIAGNOSIS — M54.2 CHRONIC NECK PAIN: ICD-10-CM

## 2019-01-26 DIAGNOSIS — I10 ESSENTIAL HYPERTENSION: ICD-10-CM

## 2019-01-28 RX ORDER — AMITRIPTYLINE HYDROCHLORIDE 50 MG/1
TABLET, FILM COATED ORAL
Qty: 30 TABLET | Refills: 5 | Status: SHIPPED | OUTPATIENT
Start: 2019-01-28 | End: 2019-07-11 | Stop reason: SDUPTHER

## 2019-01-28 RX ORDER — AMLODIPINE BESYLATE 5 MG/1
TABLET ORAL
Qty: 30 TABLET | Refills: 1 | Status: SHIPPED | OUTPATIENT
Start: 2019-01-28 | End: 2019-04-17 | Stop reason: SDUPTHER

## 2019-01-28 RX ORDER — ALBUTEROL SULFATE 90 UG/1
AEROSOL, METERED RESPIRATORY (INHALATION)
Qty: 18 G | Refills: 2 | Status: SHIPPED | OUTPATIENT
Start: 2019-01-28 | End: 2019-04-17 | Stop reason: SDUPTHER

## 2019-01-28 RX ORDER — FLUTICASONE PROPIONATE 50 MCG
SPRAY, SUSPENSION (ML) NASAL
Qty: 16 G | Refills: 3 | Status: SHIPPED | OUTPATIENT
Start: 2019-01-28 | End: 2019-07-12 | Stop reason: SDUPTHER

## 2019-02-01 ENCOUNTER — HOSPITAL ENCOUNTER (OUTPATIENT)
Dept: RADIOLOGY | Facility: HOSPITAL | Age: 59
Discharge: HOME | End: 2019-02-01
Attending: INTERNAL MEDICINE
Payer: MEDICAID

## 2019-02-01 DIAGNOSIS — Z12.39 SCREENING FOR BREAST CANCER: ICD-10-CM

## 2019-02-01 PROCEDURE — 77067 MAMMO DIGITAL SCREENING BILAT WITH CAD: ICD-10-PCS | Mod: 26,,, | Performed by: RADIOLOGY

## 2019-02-01 PROCEDURE — 77067 SCR MAMMO BI INCL CAD: CPT | Mod: TC

## 2019-02-01 PROCEDURE — 77067 SCR MAMMO BI INCL CAD: CPT | Mod: 26,,, | Performed by: RADIOLOGY

## 2019-02-02 ENCOUNTER — NURSE TRIAGE (OUTPATIENT)
Dept: ADMINISTRATIVE | Facility: CLINIC | Age: 59
End: 2019-02-02

## 2019-02-04 ENCOUNTER — TELEPHONE (OUTPATIENT)
Dept: FAMILY MEDICINE | Facility: CLINIC | Age: 59
End: 2019-02-04

## 2019-02-04 DIAGNOSIS — J11.1 INFLUENZA: Primary | ICD-10-CM

## 2019-02-04 RX ORDER — OSELTAMIVIR PHOSPHATE 75 MG/1
75 CAPSULE ORAL 2 TIMES DAILY
Qty: 10 CAPSULE | Refills: 0 | Status: SHIPPED | OUTPATIENT
Start: 2019-02-04 | End: 2019-02-09

## 2019-02-04 NOTE — TELEPHONE ENCOUNTER
----- Message from Germán Chen sent at 2/1/2019  4:14 PM CST -----  Contact: Paras [phone]  The patient has been exposed to the flu. She does not report any symptoms as of yet, but wants to get medication called in to the pharmacy as a precaution. Please call at your earliest convenience.

## 2019-02-04 NOTE — TELEPHONE ENCOUNTER
Patient states her grandson tested postive for FLU A on Friday, 2/1/19. Patient states she is having chills, started running a fever last night of 100.1, patient states she's taking tylenol for her fever. Patient would like Tamiflu to be called in. Please advise.         *Patient had flu shot 10/09/18    *Patient exposed to flu 2/1/19

## 2019-02-13 ENCOUNTER — TELEPHONE (OUTPATIENT)
Dept: NEUROLOGY | Facility: CLINIC | Age: 59
End: 2019-02-13

## 2019-02-13 NOTE — TELEPHONE ENCOUNTER
----- Message from Dayami Fields sent at 2/12/2019 10:37 AM CST -----  Contact: pt   Patient Requesting Sooner Appointment.     Reason for sooner appt.: pt is calling to speak with the nurse to reschedule her fu that she missed pt had the flu   When is the first available appointment? N/A pt has Medicaid insurance and no appts are coming up to schedule    Communication Preference: can you please call pt at [phone]   Additional Information: none    RADHA

## 2019-02-22 DIAGNOSIS — B35.4 TINEA CORPORIS: ICD-10-CM

## 2019-02-22 DIAGNOSIS — M54.2 CERVICALGIA: ICD-10-CM

## 2019-02-22 RX ORDER — CLOTRIMAZOLE 1 %
CREAM (GRAM) TOPICAL
Qty: 24 G | Refills: 1 | Status: SHIPPED | OUTPATIENT
Start: 2019-02-22 | End: 2019-04-23 | Stop reason: SDUPTHER

## 2019-02-22 RX ORDER — BACLOFEN 10 MG/1
TABLET ORAL
Qty: 90 TABLET | Refills: 0 | Status: SHIPPED | OUTPATIENT
Start: 2019-02-22 | End: 2019-06-26 | Stop reason: SDUPTHER

## 2019-02-22 RX ORDER — DICYCLOMINE HYDROCHLORIDE 10 MG/1
CAPSULE ORAL
Qty: 120 CAPSULE | Refills: 1 | Status: SHIPPED | OUTPATIENT
Start: 2019-02-22 | End: 2019-04-17 | Stop reason: SDUPTHER

## 2019-02-25 RX ORDER — OMEPRAZOLE 20 MG/1
CAPSULE, DELAYED RELEASE ORAL
Qty: 60 CAPSULE | Refills: 3 | Status: SHIPPED | OUTPATIENT
Start: 2019-02-25 | End: 2019-10-15 | Stop reason: CLARIF

## 2019-04-01 ENCOUNTER — TELEPHONE (OUTPATIENT)
Dept: FAMILY MEDICINE | Facility: CLINIC | Age: 59
End: 2019-04-01

## 2019-04-01 ENCOUNTER — LAB VISIT (OUTPATIENT)
Dept: LAB | Facility: HOSPITAL | Age: 59
End: 2019-04-01
Attending: INTERNAL MEDICINE
Payer: MEDICAID

## 2019-04-01 DIAGNOSIS — C73 THYROID CANCER: ICD-10-CM

## 2019-04-01 DIAGNOSIS — E89.0 POSTSURGICAL HYPOTHYROIDISM: ICD-10-CM

## 2019-04-01 DIAGNOSIS — E03.9 ACQUIRED HYPOTHYROIDISM: ICD-10-CM

## 2019-04-01 LAB
ALBUMIN SERPL BCP-MCNC: 4.2 G/DL (ref 3.5–5.2)
ALP SERPL-CCNC: 147 U/L (ref 55–135)
ALT SERPL W/O P-5'-P-CCNC: 41 U/L (ref 10–44)
ANION GAP SERPL CALC-SCNC: 8 MMOL/L (ref 8–16)
AST SERPL-CCNC: 31 U/L (ref 10–40)
BILIRUB SERPL-MCNC: 0.6 MG/DL (ref 0.1–1)
BUN SERPL-MCNC: 12 MG/DL (ref 6–20)
CALCIUM SERPL-MCNC: 9.6 MG/DL (ref 8.7–10.5)
CHLORIDE SERPL-SCNC: 105 MMOL/L (ref 95–110)
CO2 SERPL-SCNC: 28 MMOL/L (ref 23–29)
CREAT SERPL-MCNC: 0.8 MG/DL (ref 0.5–1.4)
EST. GFR  (AFRICAN AMERICAN): >60 ML/MIN/1.73 M^2
EST. GFR  (NON AFRICAN AMERICAN): >60 ML/MIN/1.73 M^2
GLUCOSE SERPL-MCNC: 97 MG/DL (ref 70–110)
POTASSIUM SERPL-SCNC: 4.2 MMOL/L (ref 3.5–5.1)
PROT SERPL-MCNC: 7.4 G/DL (ref 6–8.4)
SODIUM SERPL-SCNC: 141 MMOL/L (ref 136–145)
T4 FREE SERPL-MCNC: 1.28 NG/DL (ref 0.71–1.51)
TSH SERPL DL<=0.005 MIU/L-ACNC: 0.34 UIU/ML (ref 0.4–4)

## 2019-04-01 PROCEDURE — 86800 THYROGLOBULIN ANTIBODY: CPT

## 2019-04-01 PROCEDURE — 84443 ASSAY THYROID STIM HORMONE: CPT

## 2019-04-01 PROCEDURE — 36415 COLL VENOUS BLD VENIPUNCTURE: CPT | Mod: PN

## 2019-04-01 PROCEDURE — 80053 COMPREHEN METABOLIC PANEL: CPT

## 2019-04-01 PROCEDURE — 84439 ASSAY OF FREE THYROXINE: CPT

## 2019-04-01 NOTE — TELEPHONE ENCOUNTER
Pt called back and c/o urinary urgency for four days. Denies fever. Appointment made for 4/3/2019. Pt advised if sx increase or other sx develop she should go to the Urgent Care for evaluation. Pt voices understanding.

## 2019-04-01 NOTE — TELEPHONE ENCOUNTER
----- Message from Guicho Lizarraga MD sent at 4/1/2019  8:53 AM CDT -----   Ov with any available provider this week  ----- Message -----  From: Chato Beverly MA  Sent: 4/1/2019   8:52 AM  To: Guicho Lizarraga MD     Would you like to schedule pt   ----- Message -----  From: Minda Egan  Sent: 4/1/2019   8:22 AM  To: Elahm Lindo Staff    Please call pt would like to see Elham watson, states she is having kidney problems....[phone]

## 2019-04-02 LAB
THRYOGLOBULIN INTERPRETATION: ABNORMAL
THYROGLOB AB SERPL-ACNC: <1.8 IU/ML
THYROGLOB SERPL-MCNC: 0.3 NG/ML

## 2019-04-03 ENCOUNTER — OFFICE VISIT (OUTPATIENT)
Dept: FAMILY MEDICINE | Facility: CLINIC | Age: 59
End: 2019-04-03
Payer: MEDICAID

## 2019-04-03 VITALS
HEIGHT: 61 IN | BODY MASS INDEX: 51.91 KG/M2 | HEART RATE: 66 BPM | OXYGEN SATURATION: 95 % | SYSTOLIC BLOOD PRESSURE: 128 MMHG | DIASTOLIC BLOOD PRESSURE: 86 MMHG | WEIGHT: 274.94 LBS | TEMPERATURE: 98 F

## 2019-04-03 DIAGNOSIS — R35.0 URINARY FREQUENCY: Primary | ICD-10-CM

## 2019-04-03 DIAGNOSIS — N12 PYELONEPHRITIS: ICD-10-CM

## 2019-04-03 LAB
BILIRUB SERPL-MCNC: NEGATIVE MG/DL
BLOOD URINE, POC: 50
COLOR, POC UA: YELLOW
GLUCOSE UR QL STRIP: NORMAL
KETONES UR QL STRIP: NORMAL
LEUKOCYTE ESTERASE URINE, POC: NORMAL
NITRITE, POC UA: NEGATIVE
PH, POC UA: 8
PROTEIN, POC: POSITIVE
SPECIFIC GRAVITY, POC UA: 1.01
UROBILINOGEN, POC UA: 1

## 2019-04-03 PROCEDURE — 99999 PR PBB SHADOW E&M-EST. PATIENT-LVL III: CPT | Mod: PBBFAC,,, | Performed by: NURSE PRACTITIONER

## 2019-04-03 PROCEDURE — 99214 PR OFFICE/OUTPT VISIT, EST, LEVL IV, 30-39 MIN: ICD-10-PCS | Mod: S$PBB,,, | Performed by: NURSE PRACTITIONER

## 2019-04-03 PROCEDURE — 99213 OFFICE O/P EST LOW 20 MIN: CPT | Mod: PBBFAC,PN,25 | Performed by: NURSE PRACTITIONER

## 2019-04-03 PROCEDURE — 96372 THER/PROPH/DIAG INJ SC/IM: CPT | Mod: PBBFAC,PN

## 2019-04-03 PROCEDURE — 99999 PR PBB SHADOW E&M-EST. PATIENT-LVL III: ICD-10-PCS | Mod: PBBFAC,,, | Performed by: NURSE PRACTITIONER

## 2019-04-03 PROCEDURE — 87086 URINE CULTURE/COLONY COUNT: CPT

## 2019-04-03 PROCEDURE — 99214 OFFICE O/P EST MOD 30 MIN: CPT | Mod: S$PBB,,, | Performed by: NURSE PRACTITIONER

## 2019-04-03 PROCEDURE — 81002 URINALYSIS NONAUTO W/O SCOPE: CPT | Mod: PBBFAC,PN | Performed by: NURSE PRACTITIONER

## 2019-04-03 RX ORDER — CEFTRIAXONE 1 G/1
1 INJECTION, POWDER, FOR SOLUTION INTRAMUSCULAR; INTRAVENOUS
Status: COMPLETED | OUTPATIENT
Start: 2019-04-03 | End: 2019-04-03

## 2019-04-03 RX ORDER — METHOCARBAMOL 500 MG/1
500 TABLET, FILM COATED ORAL
COMMUNITY
End: 2019-12-10

## 2019-04-03 RX ORDER — SULFAMETHOXAZOLE AND TRIMETHOPRIM 800; 160 MG/1; MG/1
1 TABLET ORAL 2 TIMES DAILY
Qty: 20 TABLET | Refills: 0 | Status: SHIPPED | OUTPATIENT
Start: 2019-04-03 | End: 2019-04-23

## 2019-04-03 RX ORDER — SERTRALINE HYDROCHLORIDE 100 MG/1
100 TABLET, FILM COATED ORAL
COMMUNITY
End: 2023-05-11 | Stop reason: CLARIF

## 2019-04-03 RX ADMIN — CEFTRIAXONE 1 G: 1 INJECTION, POWDER, FOR SOLUTION INTRAMUSCULAR; INTRAVENOUS at 01:04

## 2019-04-03 NOTE — PROGRESS NOTES
Routine Office Visit    Patient Name: Paras Smith    : 1960  MRN: [id number]    Chief Complaint:  Urinary frequency    Subjective:  Paras is a 58 y.o. female who presents today for:    1. Urinary frequency - patient states that she has been having urinary frequency that started in the last week.  She endorses urgency, frequency, foul smell to urine, but no dysuria.  She states that when she does use the bathroom only a small amount of urine comes out.  She is also having some lower back and flank pain that started the last couple of days. The pain is aching in nature and 8/10 in severity. Reports some chills but denies any fevers.  Endorses some nausea and decreased appetite but no vomiting.  Denies any diarrhea.  She has tried nothing for the symptoms.    Past Medical History  Past Medical History:   Diagnosis Date    Asthma     CAD (coronary artery disease)     stent     Cervical spine disease     Depression     Difficult intubation     POSSIBLE    Falls 2018    GERD (gastroesophageal reflux disease)     Glaucoma     left eye    Helicobacter pylori (H. pylori) infection     History of stomach ulcers     HTN (hypertension)     Muscle weakness     LEFT    Neck problem     LIMITED ROM    Nuclear cataract 2014    Postsurgical hypothyroidism     Stroke     mini strokes    Thyroid cancer     Vitamin D deficiency disease        Past Surgical History  Past Surgical History:   Procedure Laterality Date    APPENDECTOMY      [id number] N/A 2018    Performed by [facility] Diagnostic Provider at [facility] OR [facility] FLR    CATHETERIZATION, HEART, LEFT Left 3/12/2014    Performed by Tae Patterson [facility] at [facility] CATH LAB    CERVICAL SPINE SURGERY      vocal cord damage     SECTION, LOW TRANSVERSE      x3    CHOLECYSTECTOMY      CHOLECYSTECTOMY-LAPAROSCOPIC N/A 2015    Performed by Angelo Person MD at [facility] OR    COLONOSCOPY N/A 2018    Performed by Chavo  MD Nils at [facility] ENDO (2ND FLR)    COLONOSCOPY N/A 7/31/2018    Performed by Chavo Wray MD at [facility] ENDO (2ND FLR)    CORONARY ANGIOPLASTY WITH STENT PLACEMENT      x 3    EGD (ESOPHAGOGASTRODUODENOSCOPY) N/A 12/31/2018    Performed by Chavo Wray MD at [facility] ENDO (2ND FLR)    EGD (ESOPHAGOGASTRODUODENOSCOPY) N/A 7/31/2018    Performed by Chavo Wray MD at [facility] ENDO (2ND FLR)    HEART CATH-LEFT Left 8/23/2017    Performed by Tae Patterson MD at [facility] CATH LAB    PATELLA SURGERY  1969    THYROID SURGERY      total    TOTAL THYROIDECTOMY         Family History  Family History   Problem Relation Age of Onset    Diabetes Father     Kidney failure Father     Cataracts Father     Asthma Father     Glaucoma Father     Heart disease Mother         has pacemaker    Hypertension Mother     Asthma Mother     Cancer Mother         lung stage 4    Ovarian cancer Mother     Breast cancer Mother     Hypertension Brother     Heart disease Brother     Cervical cancer Daughter     Scoliosis Son     Hypertension Son     Hypertension Daughter     Seizures Daughter     Lupus Daughter     Cervical cancer Daughter     Mental illness Son         bipolar    Cancer Sister     Liver disease Sister     No Known Problems Brother     No Known Problems Daughter     Breast cancer Maternal Aunt     No Known Problems Maternal Uncle     No Known Problems Paternal Aunt     No Known Problems Paternal Uncle     Stomach cancer Maternal Grandmother     No Known Problems Paternal Grandmother     No Known Problems Paternal Grandfather     Stomach cancer Maternal Aunt     Breast cancer Maternal Aunt     Breast cancer Maternal Aunt     Esophageal cancer Neg Hx        Social History  Social History     Socioeconomic History    Marital status: Legally      Spouse name: Not on file    Number of children: Not on file    Years of education: Not on file    Highest education level: Not on file   Occupational  History    Not on file   Social Needs    Financial resource strain: Not on file    Food insecurity:     Worry: Not on file     Inability: Not on file    Transportation needs:     Medical: Not on file     Non-medical: Not on file   Tobacco Use    Smoking status: Never Smoker    Smokeless tobacco: Never Used    Tobacco comment: No cigarrettes, only marijuana occasionally   Substance and Sexual Activity    Alcohol use: No     Comment: recovering alcoholic    Drug use: Yes     Frequency: 6.0 times per week     Types: Marijuana     Comment: ocasional    Sexual activity: Not Currently     Partners: Male   Lifestyle    Physical activity:     Days per week: Not on file     Minutes per session: Not on file    Stress: Not on file   Relationships    Social connections:     Talks on phone: Not on file     Gets together: Not on file     Attends Voodoo service: Not on file     Active member of club or organization: Not on file     Attends meetings of clubs or organizations: Not on file     Relationship status: Not on file   Other Topics Concern    Not on file   Social History Narrative    Not on file       Current Medications  Current Outpatient Medications on File Prior to Visit   Medication Sig Dispense Refill    albuterol (VENTOLIN HFA) 90 mcg/actuation inhaler INHALE TWO PUFFS BY MOUTH EVERY 6 HOURS AS NEEDED 18 g 2    amitriptyline (ELAVIL) 50 MG tablet TAKE ONE TABLET BY MOUTH AT BEDTIME AS NEEDED 30 tablet 5    amLODIPine (NORVASC) 5 MG tablet TAKE ONE TABLET BY MOUTH ONCE A DAY 30 tablet 1    baclofen (LIORESAL) 10 MG tablet TAKE ONE TABLET BY MOUTH THREE TIMES DAILY 90 tablet 0    chlorproMAZINE (THORAZINE) 25 MG tablet Ok to take 1-2 pills qhs prn. 90 tablet 3    clotrimazole (LOTRIMIN) 1 % cream APPLY topically TWICE DAILY 24 g 1    dicyclomine (BENTYL) 10 MG capsule TAKE ONE CAPSULE BY MOUTH FOUR TIMES DAILY BEFORE meals AND nightly 120 capsule 1    ergocalciferol (ERGOCALCIFEROL) 50,000 unit  Cap TAKE ONE CAPSULE BY MOUTH once every week 12 capsule 1    fluticasone (FLONASE) 50 mcg/actuation nasal spray USE ONE SPRAY IN EACH NOSTRIL TWICE DAILY 16 g 3    gabapentin (NEURONTIN) 300 MG capsule Take 600 mg by mouth 3 (three) times daily.   0    hydrOXYzine pamoate (VISTARIL) 50 MG Cap Take 1 capsule (50 mg total) by mouth every 6 (six) hours as needed (anxiety). 30 capsule 1    levothyroxine (SYNTHROID) 100 MCG tablet TAKE ONE TABLET BY MOUTH ONCE A DAY 30 tablet 3    lidocaine (LMX 4) 4 % cream Apply topically as needed. 30 g 1    lidocaine-prilocaine (EMLA) cream   0    lisinopril 10 MG tablet TAKE ONE TABLET BY MOUTH ONCE A DAY 30 tablet 3    nitroGLYCERIN (NITROSTAT) 0.4 MG SL tablet DISSOLVE 1 TABLET UNDER THE TONGUE IF NEEDED EVERY 5 MINUTES FOR CHEST PAIN FOR 3 DOSES IF NO RELIEF AFTER THIRD DOSE CALL 911 OR PRESCRIBER 25 tablet 0    nitroGLYCERIN 0.4 MG/HR TD PT24 (NITRODUR) 0.4 mg/hr apply 1 patch ONTO THE SKIN ONCE DAILY 30 patch 11    omeprazole (PRILOSEC) 20 MG capsule TAKE ONE CAPSULE BY MOUTH TWICE DAILY 60 capsule 3    promethazine (PHENERGAN) 25 MG tablet take 1 tablet by mouth every 8 hours if needed for nausea and vomiting  0    propranolol (INDERAL) 60 MG tablet 60 mg.  0    REXULTI 0.5 mg Tab 0.5 mg.  0    topiramate (TOPAMAX) 100 MG tablet Take 50 mg by mouth once daily.  0    anthralin 1.2 % CmRR   0    diazePAM (VALIUM) 10 MG Tab Take 10 mg by mouth.      magnesium oxide (MAG-OX) 400 mg tablet Take 1 tablet (400 mg total) by mouth 2 (two) times daily.  0    methocarbamol (ROBAXIN) 500 MG Tab Take 500 mg by mouth.      multivitamin capsule Take 1 capsule by mouth.      naproxen (NAPROSYN) 375 MG tablet   0    neomycin-polymyxin-hydrocortisone (CORTISPORIN) 3.5-10,000-1 mg/mL-unit/mL-% otic suspension Place 3 drops into both ears 3 (three) times daily. 10 mL 0    omeprazole magnesium 20 mg CpDR Take 1 tablet by mouth 2 (two) times daily. 60 capsule 3    sertraline  "(ZOLOFT) 100 MG tablet Take 100 mg by mouth.       Current Facility-Administered Medications on File Prior to Visit   Medication Dose Route Frequency Provider Last Rate Last Dose    0.9%  NaCl infusion   Intravenous Continuous Chavo Wray MD        0.9%  NaCl infusion   Intravenous Continuous Chavo Wray MD        onabotulinumtoxina injection 200 Units  200 Units Intramuscular Q90 Days Aundrea Pathak MD        sodium chloride 0.9% flush 3 mL  3 mL Intravenous PRN Chavo Wray MD        sodium chloride 0.9% flush 3 mL  3 mL Intravenous PRN Chavo Wray MD           Allergies   Review of patient's allergies indicates:   Allergen Reactions    Vioxx [rofecoxib] Rash       Review of Systems (Pertinent positives)  Review of Systems   Constitutional: Positive for chills and malaise/fatigue. Negative for diaphoresis, fever and weight loss.   HENT: Negative.    Eyes: Negative.    Respiratory: Negative.    Cardiovascular: Negative.    Gastrointestinal: Positive for nausea. Negative for abdominal pain, blood in stool, constipation, diarrhea, melena and vomiting.   Genitourinary: Positive for flank pain, frequency and urgency. Negative for dysuria and hematuria.   Musculoskeletal: Positive for myalgias. Negative for back pain, falls, joint pain and neck pain.   Skin: Negative.    Neurological: Negative.    Endo/Heme/Allergies: Negative.    Psychiatric/Behavioral: Negative.        Pulse 66   Temp 97.8 °F (36.6 °C) (Oral)   Ht 5' 1" (1.549 m)   Wt 124.7 kg (274 lb 14.6 oz)   LMP 04/12/2014   SpO2 95%   BMI 51.94 kg/m²     Physical Exam   Constitutional: She is oriented to person, place, and time. She appears well-developed and well-nourished. No distress.   Eyes: Pupils are equal, round, and reactive to light. Conjunctivae and EOM are normal.   Neck: Normal range of motion. Neck supple.   Cardiovascular: Normal rate, regular rhythm, normal heart sounds and intact distal pulses. Exam reveals no gallop and no friction " rub.   No murmur heard.  Pulmonary/Chest: Effort normal and breath sounds normal. No stridor. No respiratory distress. She has no wheezes. She has no rales. She exhibits no tenderness.   Abdominal: Soft. Normal appearance and bowel sounds are normal. There is tenderness in the suprapubic area. There is CVA tenderness. There is no rigidity, no rebound, no guarding, no tenderness at McBurney's point and negative Cunningham's sign. No hernia.   Positive left-sided CVA tenderness   Musculoskeletal: Normal range of motion.   Neurological: She is alert and oriented to person, place, and time.   Skin: Skin is warm and dry. Capillary refill takes less than 2 seconds. She is not diaphoretic.        Assessment/Plan:  Paras Smith is a 58 y.o. female who presents today for :    Paras was seen today for fatigue and urinary frequency.    Diagnoses and all orders for this visit:    Urinary frequency  -     POCT URINE DIPSTICK WITHOUT MICROSCOPE  -     Cancel: Urine culture  -     Urine culture    Pyelonephritis  -     CBC auto differential; Future  -     cefTRIAXone injection 1 g  -     sulfamethoxazole-trimethoprim 800-160mg (BACTRIM DS) 800-160 mg Tab; Take 1 tablet by mouth 2 (two) times daily.     Urine dipstick positive for white blood cells, protein, and ketones.  She has left-sided CVA tenderness as well as nausea.  Will treat for pyelonephritis at this time.  Will culture urine.  Will give ceftriaxone 1 g IM today.  Patient was instructed to start her Bactrim tonight.  I will call patient with the urine culture results.  I will also draw CBC today to check her white blood cell count as well as platelet count.  She was educated to drink plenty of water, and take Tylenol for any aches pains or fevers.  She was instructed that if she has intractable nausea or vomiting, worsening pain, or fevers greater than 100.4 despite medication that she is to follow-up to the clinic or go to the emergency room.  Maintain follow-up  with her PCP and neurologist in the next weeks.  Patient was given a handout of these instructions and verbalized understanding of these instructions.        This office note has been dictated.  This dictation has been generated using M-Modal Fluency Direct dictation; some phonetic errors may occur.   My collaborating physician is Dr. Chris Mccrary.

## 2019-04-03 NOTE — PATIENT INSTRUCTIONS
"  Kidney Infection (Adult, Female)    An infection in one or both kidneys is called "pyelonephritis." It usually happens when bacteria (or rarely, viruses, fungi, or other disease-causing organisms) get into the kidney. The bacteria (or other disease-causing organisms) can enter the kidneys from the bladder or blood traveling from other parts of the body. A kidney infection can become serious. It can cause severe illness, scarring of the kidneys, or kidney failure if not treated properly.  Common causes for this problem include:  · Not keeping the genital area clean and dry, which promotes the growth of bacteria  · Wiping back to front which drags bacteria from the rectum toward the urinary opening (urethra)  · Wearing tight pants or underwear (this lets moisture build up in the genital area, which helps bacteria grow)  · Holding urine in for long periods of time  · Dehydration  Kidney infections can cause symptoms similar to a bladder infection. Symptoms include:  · Pain (or burning) when urinating  · Having to urinate more often than usual  · Blood in the urine (pink or red)  · Abdominal pain or discomfort, usually in the lower abdomen  · Pain in the side or back  · Pain above the pubic bone  · Fever or chills  · Vomiting  · Loss of appetite  Treatment is oral antibiotics, or in more severe cases, intramuscular or IV antibiotics. These are started right away and may be changed once urine culture results determine the infecting organisms. Treatment helps prevent a more serious kidney infection.  Medicines  Medicines can help in the treatment of a bladder infection:  · Take antibiotics until they are used up, even if you feel better. It is important to finish them to make sure the infection is gone.  · Unless another medicine was prescribed, you can use over-the-counter medicines for pain, fever, or discomfort. If you have chronic liver of kidney disease, talk with your healthcare provider before using these " medicines. Also talk with your provider if you've ever had a stomach ulcer or gastrointestinal (GI) bleeding, or are taking blood thinners.  Home care  The following are general care guidelines:  · Stay home from work or school. Rest in bed until your fever breaks and you are feeling better, or as advised by your healthcare provider.  · Drink lots of fluid unless you must restrict fluids for other medical reasons. This will force the medicine into your urinary system and flush the bacteria out of your body. Ask your healthcare provider how much you should drink.  · Avoid sex until you have finished all of your medicine and your symptoms are gone.  · Avoid caffeine, alcohol, and spicy foods. These foods may irritate the kidneys and bladder.  · Avoid taking bubble baths. Sensitivity to the chemicals in bubble baths can irritate the urethra.  · Make sure you wipe from front to back after using the toilet.  · Wear loose cloths and cotton underwear.  Prevention  These self-care steps can help prevent future infections:  · Drink plenty of fluids to prevent dehydration and flush out the bladder. Do this unless you must restrict fluids for other health reasons, or your healthcare provider told you not to.  · Proper cleaning after going to the bathroom in important. Make sure you wipe from front to back after using the toilet.  · Urinate more often. Don't try to hold urine in for a long time.  · Avoid tight-fitting pants and underwear.  · Improve your diet to prevent constipation. Eat more fruits, vegetables, and fiber. Eat less junk and fatty foods. Constipation can make a urinary tract infection more likely. Talk with your healthcare provider if you have trouble with bowel movements.  · Urinate right after intercourse to flush out the bladder.  Follow-up care  Follow up with your healthcare provider, or as advised. Additional testing may be needed to make sure the infection has cleared. Close follow-up and further testing  is very important to find the cause and to prevent future infections.  If a urine culture was done, you will be contacted if your treatment needs to be changed. If directed, you may call to find out the results.  If you had an X-ray, CT scan, or other diagnostic test, you will be notified of any new findings that may affect your care.  Call 911  Call 911 if any of the following occur:  · Trouble breathing  · Fainting or loss of consciousness  · Rapid or very slow heart rate  · Weakness, dizziness, or fainting  · Difficulty arousing or confusion  When to seek medical advice  Call your healthcare provider right away if any of these occur:  · Fever 100.4°F (38°C) or higher after 48 hours of treatment, or as advised by your healthcare provider  · Not feeling better within 1 to 2 days after starting antibiotics  · Any symptom that continues after 3 days of treatment  · Increasing pain in the stomach, back, side, or groin area  · Repeated vomiting  · Not able to take prescribed medicine due to nausea or another reason  · Bloody, dark-colored, or foul smelling urine  · Trouble urinating or decreased urine output  · No urine for 8 hours, no tears when crying, sunken eyes, or dry mouth  Date Last Reviewed: 10/1/2016  © 4245-4456 [employer]. [address] [place] [address], [place] [address]. All rights reserved. This information is not intended as a substitute for professional medical care. Always follow your healthcare professional's instructions.

## 2019-04-05 LAB — BACTERIA UR CULT: NORMAL

## 2019-04-12 ENCOUNTER — TELEPHONE (OUTPATIENT)
Dept: FAMILY MEDICINE | Facility: CLINIC | Age: 59
End: 2019-04-12

## 2019-04-12 NOTE — TELEPHONE ENCOUNTER
----- Message from Brigid Davis sent at 4/12/2019  8:39 AM CDT -----  Contact: Self   Type: Patient Call Back    Who called:Self    What is the request in detail: patient says she still has side and back pain as well as frequent urination     Can the clinic reply by [facility]? NO    Would the patient rather a call back or a response via [facility]? Call     Best call back number: [phone]

## 2019-04-17 DIAGNOSIS — I10 ESSENTIAL HYPERTENSION: ICD-10-CM

## 2019-04-17 DIAGNOSIS — I10 HYPERTENSION, ESSENTIAL: ICD-10-CM

## 2019-04-17 DIAGNOSIS — E89.0 POSTSURGICAL HYPOTHYROIDISM: ICD-10-CM

## 2019-04-18 RX ORDER — LISINOPRIL 10 MG/1
TABLET ORAL
Qty: 30 TABLET | Refills: 3 | Status: SHIPPED | OUTPATIENT
Start: 2019-04-18 | End: 2019-08-06 | Stop reason: SDUPTHER

## 2019-04-18 RX ORDER — LEVOTHYROXINE SODIUM 100 UG/1
TABLET ORAL
Qty: 30 TABLET | Refills: 3 | Status: SHIPPED | OUTPATIENT
Start: 2019-04-18 | End: 2019-05-08 | Stop reason: SDUPTHER

## 2019-04-18 RX ORDER — AMLODIPINE BESYLATE 5 MG/1
TABLET ORAL
Qty: 30 TABLET | Refills: 3 | Status: SHIPPED | OUTPATIENT
Start: 2019-04-18 | End: 2019-08-09

## 2019-04-18 RX ORDER — ALBUTEROL SULFATE 90 UG/1
AEROSOL, METERED RESPIRATORY (INHALATION)
Qty: 18 G | Refills: 2 | Status: SHIPPED | OUTPATIENT
Start: 2019-04-18 | End: 2019-07-12 | Stop reason: SDUPTHER

## 2019-04-18 RX ORDER — DICYCLOMINE HYDROCHLORIDE 10 MG/1
CAPSULE ORAL
Qty: 120 CAPSULE | Refills: 1 | Status: SHIPPED | OUTPATIENT
Start: 2019-04-18 | End: 2019-06-17 | Stop reason: SDUPTHER

## 2019-04-23 ENCOUNTER — OFFICE VISIT (OUTPATIENT)
Dept: FAMILY MEDICINE | Facility: CLINIC | Age: 59
End: 2019-04-23
Payer: MEDICAID

## 2019-04-23 ENCOUNTER — OFFICE VISIT (OUTPATIENT)
Dept: HEPATOLOGY | Facility: CLINIC | Age: 59
End: 2019-04-23
Payer: MEDICAID

## 2019-04-23 VITALS
HEART RATE: 61 BPM | DIASTOLIC BLOOD PRESSURE: 80 MMHG | BODY MASS INDEX: 52.62 KG/M2 | TEMPERATURE: 98 F | OXYGEN SATURATION: 96 % | WEIGHT: 278.69 LBS | SYSTOLIC BLOOD PRESSURE: 130 MMHG | RESPIRATION RATE: 17 BRPM | HEIGHT: 61 IN

## 2019-04-23 VITALS
DIASTOLIC BLOOD PRESSURE: 77 MMHG | OXYGEN SATURATION: 96 % | TEMPERATURE: 97 F | HEART RATE: 72 BPM | RESPIRATION RATE: 16 BRPM | SYSTOLIC BLOOD PRESSURE: 137 MMHG | WEIGHT: 274.69 LBS | HEIGHT: 61 IN | BODY MASS INDEX: 51.86 KG/M2

## 2019-04-23 DIAGNOSIS — B35.4 TINEA CORPORIS: ICD-10-CM

## 2019-04-23 DIAGNOSIS — R33.9 URINARY RETENTION: ICD-10-CM

## 2019-04-23 DIAGNOSIS — K76.0 FATTY LIVER: Primary | ICD-10-CM

## 2019-04-23 DIAGNOSIS — G62.82 POLYNEUROPATHY DUE TO RADIATION: ICD-10-CM

## 2019-04-23 DIAGNOSIS — M54.2 CERVICALGIA: ICD-10-CM

## 2019-04-23 DIAGNOSIS — G43.019 INTRACTABLE MIGRAINE WITHOUT AURA AND WITHOUT STATUS MIGRAINOSUS: Primary | ICD-10-CM

## 2019-04-23 DIAGNOSIS — E89.0 H/O TOTAL THYROIDECTOMY: ICD-10-CM

## 2019-04-23 PROCEDURE — 99999 PR PBB SHADOW E&M-EST. PATIENT-LVL V: ICD-10-PCS | Mod: PBBFAC,,, | Performed by: INTERNAL MEDICINE

## 2019-04-23 PROCEDURE — 99999 PR PBB SHADOW E&M-EST. PATIENT-LVL V: CPT | Mod: PBBFAC,,, | Performed by: INTERNAL MEDICINE

## 2019-04-23 PROCEDURE — 99214 OFFICE O/P EST MOD 30 MIN: CPT | Mod: S$PBB,,, | Performed by: INTERNAL MEDICINE

## 2019-04-23 PROCEDURE — 99215 OFFICE O/P EST HI 40 MIN: CPT | Mod: PBBFAC,27 | Performed by: INTERNAL MEDICINE

## 2019-04-23 PROCEDURE — 99214 PR OFFICE/OUTPT VISIT, EST, LEVL IV, 30-39 MIN: ICD-10-PCS | Mod: S$PBB,,, | Performed by: INTERNAL MEDICINE

## 2019-04-23 PROCEDURE — 99215 OFFICE O/P EST HI 40 MIN: CPT | Mod: PBBFAC,PN | Performed by: INTERNAL MEDICINE

## 2019-04-23 RX ORDER — CLOTRIMAZOLE 1 %
CREAM (GRAM) TOPICAL
Qty: 30 G | Refills: 2 | Status: SHIPPED | OUTPATIENT
Start: 2019-04-23 | End: 2019-05-06 | Stop reason: SDUPTHER

## 2019-04-23 RX ORDER — ASPIRIN 325 MG
325 TABLET ORAL DAILY
Refills: 11 | COMMUNITY
Start: 2019-04-08 | End: 2023-05-11 | Stop reason: CLARIF

## 2019-04-23 NOTE — PROGRESS NOTES
Hepatology Follow-up Note    Chief complaint:   Chief Complaint   Patient presents with    Hepatic steatosis       HPI:  Paras Smith is a 58 y.o. female that presents to hepatology clinic for follow-up of hepatic steatosis.  She is alone.    The patient was last seen on 2/21/2018.  She has a history of hepatic steatosis and abdominal pain.  During prior evaluation, reassured that steatosis and liver cyst not likely associated with pain.      Patient has absence of liver symptoms.  Recent blood work with mild elevation of AP, which has been seen previously seen.  20 lb weight gain since last office visit.  Reports difficulty with physical activity due to bilateral bunions and not surgical candidate.      Patient continues to have intermittent abdominal pain that she is concerned is related to the liver.      Seen by PCP today with concerns of elevated thyroid tests and plan for further work-up due to history of thyroid cancer.  Also with new right hand tremor felt to be associated with thorazine and EPS.  Planned for PMR referral.        Patient Active Problem List   Diagnosis    HTN (hypertension)    Postsurgical hypothyroidism    Thyroid cancer    Chest pain, cardiac    Depression    Wound of toenail    Chest pain, atypical    Cervicalgia    Weakness of neck    Weakness of both arms    Stiffness of neck    Glaucoma suspect    Nuclear cataract    Dry eye syndrome    Vitamin D deficiency disease    Helicobacter pylori (H. pylori) infection    Dyspnea    Cholecystitis    Morbid obesity due to excess calories    Bronchitis, acute, with bronchospasm    Anxiety    NAFLD (nonalcoholic fatty liver disease)    Liver cyst    Coronary artery disease    Diarrhea of presumed infectious origin    Intractable migraine without aura and without status migrainosus    Complex partial seizure disorder without intractable epilepsy    Polyneuropathy due to radiation    Medication overuse headache     Other headache syndrome    Falls    Cervical spinal mass    Chronic pain    Diarrhea       Past Medical History:   Diagnosis Date    Asthma     CAD (coronary artery disease)     stent     Cervical spine disease     Depression     Difficult intubation     POSSIBLE    Falls 2018    GERD (gastroesophageal reflux disease)     Glaucoma     left eye    Helicobacter pylori (H. pylori) infection     History of stomach ulcers     HTN (hypertension)     Muscle weakness     LEFT    Neck problem     LIMITED ROM    Nuclear cataract 2014    Postsurgical hypothyroidism     Stroke     mini strokes    Thyroid cancer     Vitamin D deficiency disease        Past Surgical History:   Procedure Laterality Date    APPENDECTOMY      QTZUMA-CLIWAK-JT N/A 2018    Performed by [facility] at [facility] OR 2ND FLR    CATHETERIZATION, HEART, LEFT Left 3/12/2014    Performed by Tae Patterson MD at [facility] CATH LAB    CERVICAL SPINE SURGERY      vocal cord damage     SECTION, LOW TRANSVERSE      x3    CHOLECYSTECTOMY      CHOLECYSTECTOMY-LAPAROSCOPIC N/A 2015    Performed by Angelo Person MD at [facility] OR    COLONOSCOPY N/A 2018    Performed by Chavo Wray MD at [facility] ENDO (2ND FLR)    COLONOSCOPY N/A 2018    Performed by [facility] at [facility] ENDO (2ND FLR)    CORONARY ANGIOPLASTY WITH STENT PLACEMENT      x 3    EGD (ESOPHAGOGASTRODUODENOSCOPY) N/A 2018    Performed by [facility] at [facility] ENDO (2ND FLR)    EGD (ESOPHAGOGASTRODUODENOSCOPY) N/A 2018    Performed by [facility] at [facility] ENDO (2ND FLR)    HEART CATH-LEFT Left 2017    Performed by Tae Patterson MD at [facility] CATH LAB    PATELLA SURGERY  1969    THYROID SURGERY      total    TOTAL THYROIDECTOMY         Family History   Problem Relation Age of Onset    Diabetes Father     Kidney failure Father     Cataracts Father     Asthma Father     Glaucoma Father     Heart disease  Mother         has pacemaker    Hypertension Mother     Asthma Mother     Cancer Mother         lung stage 4    Ovarian cancer Mother     Breast cancer Mother     Hypertension Brother     Heart disease Brother     Cervical cancer Daughter     Scoliosis Son     Hypertension Son     Hypertension Daughter     Seizures Daughter     Lupus Daughter     Cervical cancer Daughter     Mental illness Son         bipolar    Cancer Sister     Liver disease Sister     No Known Problems Brother     No Known Problems Daughter     Breast cancer Maternal Aunt     No Known Problems Maternal Uncle     No Known Problems Paternal Aunt     No Known Problems Paternal Uncle     Stomach cancer Maternal Grandmother     No Known Problems Paternal Grandmother     No Known Problems Paternal Grandfather     Stomach cancer Maternal Aunt     Breast cancer Maternal Aunt     Breast cancer Maternal Aunt     Esophageal cancer Neg Hx        Social History     Socioeconomic History    Marital status: Legally      Spouse name: Not on file    Number of children: Not on file    Years of education: Not on file    Highest education level: Not on file   Occupational History    Not on file   Social Needs    Financial resource strain: Not on file    Food insecurity:     Worry: Not on file     Inability: Not on file    Transportation needs:     Medical: Not on file     Non-medical: Not on file   Tobacco Use    Smoking status: Never Smoker    Smokeless tobacco: Never Used    Tobacco comment: No cigarrettes, only marijuana occasionally   Substance and Sexual Activity    Alcohol use: No     Comment: recovering alcoholic    Drug use: Yes     Frequency: 6.0 times per week     Types: Marijuana     Comment: ocasional    Sexual activity: Not Currently     Partners: Male   Lifestyle    Physical activity:     Days per week: Not on file     Minutes per session: Not on file    Stress: Not on file   Relationships     Social connections:     Talks on phone: Not on file     Gets together: Not on file     Attends Faith service: Not on file     Active member of club or organization: Not on file     Attends meetings of clubs or organizations: Not on file     Relationship status: Not on file   Other Topics Concern    Not on file   Social History Narrative    Not on file       Current Outpatient Medications   Medication Sig Dispense Refill    albuterol (VENTOLIN HFA) 90 mcg/actuation inhaler INHALE TWO PUFFS BY MOUTH EVERY 6 HOURS AS NEEDED 18 g 2    amitriptyline (ELAVIL) 50 MG tablet TAKE ONE TABLET BY MOUTH AT BEDTIME AS NEEDED 30 tablet 5    amLODIPine (NORVASC) 5 MG tablet TAKE ONE TABLET BY MOUTH ONCE A DAY 30 tablet 3    anthralin 1.2 % CmRR   0    baclofen (LIORESAL) 10 MG tablet TAKE ONE TABLET BY MOUTH THREE TIMES DAILY 90 tablet 0    clotrimazole (LOTRIMIN) 1 % cream APPLY topically TWICE DAILY 30 g 2    diazePAM (VALIUM) 10 MG Tab Take 10 mg by mouth.      dicyclomine (BENTYL) 10 MG capsule TAKE ONE CAPSULE BY MOUTH FOUR TIMES DAILY BEFORE meals AND nightly 120 capsule 1    ergocalciferol (ERGOCALCIFEROL) 50,000 unit Cap TAKE ONE CAPSULE BY MOUTH once every week 12 capsule 1    fluticasone (FLONASE) 50 mcg/actuation nasal spray USE ONE SPRAY IN EACH NOSTRIL TWICE DAILY 16 g 3    gabapentin (NEURONTIN) 300 MG capsule Take 600 mg by mouth 3 (three) times daily.   0    hydrOXYzine pamoate (VISTARIL) 50 MG Cap Take 1 capsule (50 mg total) by mouth every 6 (six) hours as needed (anxiety). 30 capsule 1    levothyroxine (SYNTHROID) 100 MCG tablet TAKE ONE TABLET BY MOUTH ONCE A DAY 30 tablet 3    lidocaine (LMX 4) 4 % cream Apply topically as needed. 30 g 1    lidocaine-prilocaine (EMLA) cream   0    lisinopril 10 MG tablet TAKE ONE TABLET BY MOUTH ONCE A DAY 30 tablet 3    magnesium oxide (MAG-OX) 400 mg tablet Take 1 tablet (400 mg total) by mouth 2 (two) times daily.  0    methocarbamol (ROBAXIN) 500 MG  Tab Take 500 mg by mouth.      multivitamin capsule Take 1 capsule by mouth.      naproxen (NAPROSYN) 375 MG tablet   0    neomycin-polymyxin-hydrocortisone (CORTISPORIN) 3.5-10,000-1 mg/mL-unit/mL-% otic suspension Place 3 drops into both ears 3 (three) times daily. 10 mL 0    nitroGLYCERIN 0.4 MG/HR TD PT24 (NITRODUR) 0.4 mg/hr apply 1 patch ONTO THE SKIN ONCE DAILY 30 patch 11    omeprazole (PRILOSEC) 20 MG capsule TAKE ONE CAPSULE BY MOUTH TWICE DAILY 60 capsule 3    omeprazole magnesium 20 mg CpDR Take 1 tablet by mouth 2 (two) times daily. 60 capsule 3    promethazine (PHENERGAN) 25 MG tablet take 1 tablet by mouth every 8 hours if needed for nausea and vomiting  0    propranolol (INDERAL) 60 MG tablet 60 mg.  0    REXULTI 0.5 mg Tab 0.5 mg.  0    sertraline (ZOLOFT) 100 MG tablet Take 100 mg by mouth.      topiramate (TOPAMAX) 100 MG tablet Take 50 mg by mouth once daily.  0     Current Facility-Administered Medications   Medication Dose Route Frequency Provider Last Rate Last Dose    onabotulinumtoxina injection 200 Units  200 Units Intramuscular Q90 Days Aundrea Pathak MD         Facility-Administered Medications Ordered in Other Visits   Medication Dose Route Frequency Provider Last Rate Last Dose    0.9%  NaCl infusion   Intravenous Continuous Chavo Wray MD        0.9%  NaCl infusion   Intravenous Continuous Chavo Wray MD        sodium chloride 0.9% flush 3 mL  3 mL Intravenous PRN Chavo Wray MD        sodium chloride 0.9% flush 3 mL  3 mL Intravenous PRN Chavo Wray MD           Review of patient's allergies indicates:   Allergen Reactions    Vioxx [rofecoxib] Rash       Review of Systems   Constitutional: Negative for chills, fever, malaise/fatigue and weight loss.   Eyes: Negative.    Respiratory: Negative for cough and shortness of breath.    Cardiovascular: Negative for chest pain and leg swelling.   Gastrointestinal: Positive for abdominal pain. Negative for heartburn, nausea  "and vomiting.   Musculoskeletal: Negative for joint pain and myalgias.   Skin: Negative for itching and rash.   Neurological: Negative for dizziness, focal weakness and headaches.   Endo/Heme/Allergies: Does not bruise/bleed easily.   Psychiatric/Behavioral: Negative for depression.       Vitals:    04/23/19 1445   BP: 137/77   Pulse: 72   Resp: 16   Temp: 96.7 °F (35.9 °C)   TempSrc: Oral   SpO2: 96%   Weight: 124.6 kg (274 lb 11.1 oz)   Height: 5' 1" (1.549 m)       Physical Exam   Constitutional: She is oriented to person, place, and time. She appears well-developed and well-nourished. No distress.   HENT:   Head: Normocephalic and atraumatic.   Mouth/Throat: Oropharynx is clear and moist.   Eyes: Pupils are equal, round, and reactive to light. EOM are normal. No scleral icterus.   Neck: Normal range of motion. Neck supple. No thyromegaly present.   Cardiovascular: Normal rate, regular rhythm and normal heart sounds. Exam reveals no gallop and no friction rub.   No murmur heard.  Pulmonary/Chest: Effort normal. No respiratory distress. She has no wheezes. She has no rales.   Abdominal: Soft. Bowel sounds are normal. She exhibits no distension. There is tenderness.   Musculoskeletal: Normal range of motion. She exhibits no edema.   Lymphadenopathy:     She has no cervical adenopathy.   Neurological: She is alert and oriented to person, place, and time. No cranial nerve deficit.   Right arm tremor   Skin: Skin is warm and dry. No rash noted.   Psychiatric: She has a normal mood and affect. Her behavior is normal.   Vitals reviewed.      Lab Results   Component Value Date    ALT 41 04/01/2019    AST 31 04/01/2019    ALKPHOS 147 (H) 04/01/2019    BILITOT 0.6 04/01/2019       Lab Results   Component Value Date    WBC 6.91 05/01/2018    HGB 14.6 05/01/2018    HCT 42.1 05/01/2018    MCV 89 05/01/2018     05/01/2018       Lab Results   Component Value Date     04/01/2019    K 4.2 04/01/2019     " 04/01/2019    CO2 28 04/01/2019    BUN 12 04/01/2019    CREATININE 0.8 04/01/2019    CALCIUM 9.6 04/01/2019    ANIONGAP 8 04/01/2019    ESTGFRAFRICA >60 04/01/2019    EGFRNONAA >60 04/01/2019     Imaging: US 2/2018 with steatosis and simple hepatic cyst  Fibroscan: 4.0 kPa F0-1     Assessment:  58 y.o. female presenting with hepatic steatosis and isolated elevation of AP.      Fatty liver:  Elevation of AP with known hepatic steatosis.  Discussed importance of risk factor modification for progression to WRIGHT.  Patient states that she will look into gym options with a pool given her foot issues.  Again reassured that intermittent abdominal pain is not likely related to benign fatty liver.  E/C were reviewed and no concerning findings.      Patient would benefit from lipid panel with next set of labs.      RTC in 12 months

## 2019-04-23 NOTE — PROGRESS NOTES
Subjective:       Patient ID: Paras Smith is a 58 y.o. female.    Chief Complaint: Urinary Retention (2-3 months); Back Pain (2-3 months); Establish Care; Follow-up; Memory Loss; and Fatigue    F/u chronic pain, urinary retention      HPI: 59 y/o with history of thyroid cancer s/p excision and radiation therapy presents for scheduled follow up. She reports over last month difficulty initiating urinary stream. No hematuria. She has not changed her PO intake. She denies LE swelling. Moving bowels every other day. No melena or BRBPR. She has been taking multiple medicaitons for chronic headache (thorazine, amitripytline). She had recent urine culture with no growth she denies fevers/chills.     She also has chronic tremor of right hand only. [facility] and this tremor. Headaches are daily descriped as pressure behind both eyes no tearing no facial numbness +phono and +photophobia. She has quit smoking THC for last three weeks     Review of Systems   Constitutional: Positive for fatigue. Negative for activity change, appetite change, fever and unexpected weight change.   HENT: Negative for ear pain, rhinorrhea and sore throat.    Eyes: Negative for discharge and visual disturbance.   Respiratory: Negative for chest tightness, shortness of breath and wheezing.    Cardiovascular: Negative for chest pain, palpitations and leg swelling.   Gastrointestinal: Negative for abdominal pain, constipation and diarrhea.   Endocrine: Negative for cold intolerance and heat intolerance.   Genitourinary: Positive for difficulty urinating. Negative for dysuria, frequency, hematuria and urgency.   Musculoskeletal: Negative for joint swelling and neck stiffness.   Skin: Negative for rash.   Neurological: Positive for tremors and headaches. Negative for dizziness, syncope and weakness.   Psychiatric/Behavioral: Negative for suicidal ideas.       Objective:     Vitals:    04/23/19 0856   BP:  "130/80   BP Location: Right arm   Patient Position: Sitting   Pulse: 61   Resp: 17   Temp: 98.1 °F (36.7 °C)   TempSrc: Oral   SpO2: 96%   Weight: 126.4 kg (278 lb 10.6 oz)   Height: 5' 1" (1.549 m)          Physical Exam   Constitutional: She is oriented to person, place, and time. She appears well-developed and well-nourished.   HENT:   Head: Normocephalic and atraumatic.   Eyes: Conjunctivae are normal. No scleral icterus.   Neck: Normal range of motion.   Cardiovascular: Normal rate and regular rhythm. Exam reveals no gallop and no friction rub.   No murmur heard.  Pulmonary/Chest: Effort normal and breath sounds normal. She has no wheezes. She has no rales.   Abdominal: Soft. Bowel sounds are normal. There is no tenderness. There is no rebound and no guarding.   Musculoskeletal: Normal range of motion. She exhibits no edema or tenderness.   Neurological: She is alert and oriented to person, place, and time. No cranial nerve deficit.   Coarse tremor to right hand continous minmal cogwheeling rigidity at right wrist  strength equal bilaterally   Skin: Skin is warm and dry.   Psychiatric: She has a normal mood and affect.       Assessment and Plan   1. Intractable migraine without aura and without status migrainosus  Continue follow up with neuro. Stop thorazine in light of tremor question if this is related to EPS    2. Polyneuropathy due to radiation  Chronic on gabapentin at lower dose then prescribed because of sedation    3. Cervicalgia  baclofen    4. Urinary retention  Check renal ultrasound repeat UA and referral to urology no evidence of LE edema to suggest outlet obstruction  - US Retroperitoneal Complete (Kidney and; Future  - Ambulatory referral to Urology  - Urinalysis; Future    5. H/O total thyroidectomy  With detectable thyroglobulin needs endocrine follow and repeat thyroid ultrasound for residula tissue  - US Soft Tissue Head Neck Thyroid; Future  - Ambulatory referral to Endocrinologyu  "

## 2019-04-23 NOTE — PATIENT INSTRUCTIONS
You have evidence of fatty liver.  Your most recent liver tests were mildly elevated.    It is important to achieve weight loss.  Recommend joining a gym and using aquatic therapy given joint issues.    Recommend lipid panel with next set of labs to ensure that cholesterol is adequately controlled.    Return to clinic in 12 months

## 2019-04-24 ENCOUNTER — LAB VISIT (OUTPATIENT)
Dept: LAB | Facility: HOSPITAL | Age: 59
End: 2019-04-24
Attending: INTERNAL MEDICINE
Payer: MEDICAID

## 2019-04-24 ENCOUNTER — TELEPHONE (OUTPATIENT)
Dept: ENDOCRINOLOGY | Facility: CLINIC | Age: 59
End: 2019-04-24

## 2019-04-24 DIAGNOSIS — R33.9 URINARY RETENTION: ICD-10-CM

## 2019-04-24 LAB
BACTERIA #/AREA URNS HPF: ABNORMAL /HPF
BILIRUB UR QL STRIP: NEGATIVE
CLARITY UR: CLEAR
COLOR UR: ABNORMAL
GLUCOSE UR QL STRIP: NEGATIVE
HGB UR QL STRIP: NEGATIVE
KETONES UR QL STRIP: NEGATIVE
LEUKOCYTE ESTERASE UR QL STRIP: NEGATIVE
MICROSCOPIC COMMENT: ABNORMAL
NITRITE UR QL STRIP: POSITIVE
PH UR STRIP: 5 [PH] (ref 5–8)
PROT UR QL STRIP: NEGATIVE
RBC #/AREA URNS HPF: 0 /HPF (ref 0–4)
SP GR UR STRIP: 1.02 (ref 1–1.03)
URN SPEC COLLECT METH UR: ABNORMAL
UROBILINOGEN UR STRIP-ACNC: NEGATIVE EU/DL
WBC #/AREA URNS HPF: 1 /HPF (ref 0–5)

## 2019-04-24 PROCEDURE — 81000 URINALYSIS NONAUTO W/SCOPE: CPT

## 2019-04-24 NOTE — TELEPHONE ENCOUNTER
Patient still has Medicaid insurance. Thyroglobulin level is increasing. History of thyroid cancer.  Patient states can come anytime

## 2019-04-24 NOTE — TELEPHONE ENCOUNTER
----- Message from Crystal Heller sent at 4/24/2019 11:31 AM CDT -----  Contact: Self [phone]  [place]     PT called to schedule a fu appointment for H/O total thyroidectomy. Her PCP thinks her cancer may have returned.

## 2019-04-25 DIAGNOSIS — F41.9 ANXIETY: ICD-10-CM

## 2019-04-26 ENCOUNTER — LAB VISIT (OUTPATIENT)
Dept: LAB | Facility: HOSPITAL | Age: 59
End: 2019-04-26
Attending: INTERNAL MEDICINE
Payer: MEDICAID

## 2019-04-26 DIAGNOSIS — I25.118 ATHSCL HEART DISEASE OF NATIVE COR ART W OTH ANG PCTRS: ICD-10-CM

## 2019-04-26 DIAGNOSIS — Z86.73 PERSONAL HISTORY OF TRANSIENT CEREBRAL ISCHEMIA: ICD-10-CM

## 2019-04-26 DIAGNOSIS — R07.89 OTHER CHEST PAIN: Primary | ICD-10-CM

## 2019-04-26 DIAGNOSIS — I10 ESSENTIAL HYPERTENSION, MALIGNANT: ICD-10-CM

## 2019-04-26 DIAGNOSIS — G47.33 OBSTRUCTIVE SLEEP APNEA (ADULT) (PEDIATRIC): ICD-10-CM

## 2019-04-26 DIAGNOSIS — C73 MALIGNANT NEOPLASM OF THYROID GLAND: ICD-10-CM

## 2019-04-26 LAB
ALBUMIN SERPL BCP-MCNC: 3.6 G/DL (ref 3.5–5.2)
ALP SERPL-CCNC: 142 U/L (ref 55–135)
ALT SERPL W/O P-5'-P-CCNC: 23 U/L (ref 10–44)
AST SERPL-CCNC: 23 U/L (ref 10–40)
BILIRUB DIRECT SERPL-MCNC: 0.2 MG/DL (ref 0.1–0.3)
BILIRUB SERPL-MCNC: 0.6 MG/DL (ref 0.1–1)
CHOLEST SERPL-MCNC: 193 MG/DL (ref 120–199)
CHOLEST/HDLC SERPL: 3.4 {RATIO} (ref 2–5)
HDLC SERPL-MCNC: 57 MG/DL (ref 40–75)
HDLC SERPL: 29.5 % (ref 20–50)
LDLC SERPL CALC-MCNC: 108.2 MG/DL (ref 63–159)
NONHDLC SERPL-MCNC: 136 MG/DL
PROT SERPL-MCNC: 7 G/DL (ref 6–8.4)
TRIGL SERPL-MCNC: 139 MG/DL (ref 30–150)

## 2019-04-26 PROCEDURE — 80076 HEPATIC FUNCTION PANEL: CPT

## 2019-04-26 PROCEDURE — 80061 LIPID PANEL: CPT

## 2019-04-26 PROCEDURE — 36415 COLL VENOUS BLD VENIPUNCTURE: CPT | Mod: PO

## 2019-05-01 ENCOUNTER — HOSPITAL ENCOUNTER (OUTPATIENT)
Dept: RADIOLOGY | Facility: HOSPITAL | Age: 59
Discharge: HOME | End: 2019-05-01
Attending: INTERNAL MEDICINE
Payer: MEDICAID

## 2019-05-01 DIAGNOSIS — R33.9 URINARY RETENTION: ICD-10-CM

## 2019-05-01 DIAGNOSIS — E89.0 H/O TOTAL THYROIDECTOMY: ICD-10-CM

## 2019-05-01 PROCEDURE — 76536 US EXAM OF HEAD AND NECK: CPT | Mod: TC

## 2019-05-01 PROCEDURE — 76770 US RETROPERITONEAL COMPLETE: ICD-10-PCS | Mod: 26,,, | Performed by: RADIOLOGY

## 2019-05-01 PROCEDURE — 76536 US EXAM OF HEAD AND NECK: CPT | Mod: 26,,, | Performed by: RADIOLOGY

## 2019-05-01 PROCEDURE — 76536 US SOFT TISSUE HEAD NECK THYROID: ICD-10-PCS | Mod: 26,,, | Performed by: RADIOLOGY

## 2019-05-01 PROCEDURE — 76770 US EXAM ABDO BACK WALL COMP: CPT | Mod: TC

## 2019-05-01 PROCEDURE — 76770 US EXAM ABDO BACK WALL COMP: CPT | Mod: 26,,, | Performed by: RADIOLOGY

## 2019-05-02 ENCOUNTER — OFFICE VISIT (OUTPATIENT)
Dept: ENDOCRINOLOGY | Facility: CLINIC | Age: 59
End: 2019-05-02
Payer: MEDICAID

## 2019-05-02 VITALS
HEART RATE: 78 BPM | HEIGHT: 61 IN | WEIGHT: 283.31 LBS | DIASTOLIC BLOOD PRESSURE: 80 MMHG | BODY MASS INDEX: 53.49 KG/M2 | SYSTOLIC BLOOD PRESSURE: 130 MMHG

## 2019-05-02 DIAGNOSIS — C73 THYROID CANCER: Primary | ICD-10-CM

## 2019-05-02 DIAGNOSIS — E66.01 MORBID OBESITY DUE TO EXCESS CALORIES: ICD-10-CM

## 2019-05-02 DIAGNOSIS — E89.0 POSTSURGICAL HYPOTHYROIDISM: ICD-10-CM

## 2019-05-02 PROCEDURE — 99213 OFFICE O/P EST LOW 20 MIN: CPT | Mod: PBBFAC | Performed by: INTERNAL MEDICINE

## 2019-05-02 PROCEDURE — 99999 PR PBB SHADOW E&M-EST. PATIENT-LVL III: CPT | Mod: PBBFAC,,, | Performed by: INTERNAL MEDICINE

## 2019-05-02 PROCEDURE — 99214 OFFICE O/P EST MOD 30 MIN: CPT | Mod: S$PBB,,, | Performed by: INTERNAL MEDICINE

## 2019-05-02 PROCEDURE — 99214 PR OFFICE/OUTPT VISIT, EST, LEVL IV, 30-39 MIN: ICD-10-PCS | Mod: S$PBB,,, | Performed by: INTERNAL MEDICINE

## 2019-05-02 PROCEDURE — 99999 PR PBB SHADOW E&M-EST. PATIENT-LVL III: ICD-10-PCS | Mod: PBBFAC,,, | Performed by: INTERNAL MEDICINE

## 2019-05-02 NOTE — PROGRESS NOTES
Subjective:      Patient ID: Paras Mni is a 58 y.o. female.    Chief Complaint:  Postsurgical hypothyroidism      History of Present Illness  Ms. Min presents for follow up of thyroid cancer and hypothyroidism.      Previous patient of Dr. Correa, last seen by HORACIO Kumar NP in 11/2017.     She is s/p total thyroidectomy for thyroid cancer  in 2008 followed by I-131 tx with 100 mCi on 11/18/2008     Initially had left lobectomy and isthmusectomy that showed a invasive follicular carcinoma with vascular invasion.     Post treatment scan showed no evidence of distant metastatic disease.    Had Tg that was undetectable after thyroid hormone withdrawal in 12/2011.     Follow up TBS performed in 2010 and 2012 noted increase uptake within the neck which could represent residual thyroid tissue or malignancy. There is no evidence of distant metastatic disease      Thyroglobulin levels have been undetectable over time, until recently her Tg increased to 0.3. She had been on significant TSH suppression until the past year when her thyroid hormone dose was decreased.    No family history of thyroid cancer. No history of head or neck irradiation.     Currently, she is taking Levothyroxine 100 mcg once daily      She endorses fatigue, constipation, dry skin and difficulty losing weight.    Ultrasound 5/2019:  Status post thyroidectomy.  Echogenic tissue in the left thyroid bed measuring 1.5 x 1.0 x 0.9 cm, possibly residual thyroid tissue, not seen on prior study.  No thyroid tissue seen on the right.  No evidence for cervical lymphadenopathy.      Impression       Questionable thyroid tissue on the left.  Consider further evaluation with nuclear medicine thyroid scan.     Review of Systems   Constitutional: Negative for chills and fever.   Gastrointestinal: Negative for nausea.       Objective:   Physical Exam   Nursing note and vitals reviewed.  No thyroid tissue palpated    Lab Review:   Results for PARAS MIN  CHIN (MRN [id number]) as of 5/2/2019 08:32   Ref. Range 4/1/2019 08:12 4/26/2019 11:42   Sodium Latest Ref Range: 136 - 145 mmol/L 141    Potassium Latest Ref Range: 3.5 - 5.1 mmol/L 4.2    Chloride Latest Ref Range: 95 - 110 mmol/L 105    CO2 Latest Ref Range: 23 - 29 mmol/L 28    Anion Gap Latest Ref Range: 8 - 16 mmol/L 8    BUN, Bld Latest Ref Range: 6 - 20 mg/dL 12    Creatinine Latest Ref Range: 0.5 - 1.4 mg/dL 0.8    eGFR if non African American Latest Ref Range: >60 mL/min/1.73 m^2 >60    eGFR if  Latest Ref Range: >60 mL/min/1.73 m^2 >60    Glucose Latest Ref Range: 70 - 110 mg/dL 97    Calcium Latest Ref Range: 8.7 - 10.5 mg/dL 9.6    Alkaline Phosphatase Latest Ref Range: 55 - 135 U/L 147 (H) 142 (H)   PROTEIN TOTAL Latest Ref Range: 6.0 - 8.4 g/dL 7.4 7.0   Albumin Latest Ref Range: 3.5 - 5.2 g/dL 4.2 3.6   BILIRUBIN TOTAL Latest Ref Range: 0.1 - 1.0 mg/dL 0.6 0.6   Bilirubin, Direct Latest Ref Range: 0.1 - 0.3 mg/dL  0.2   AST Latest Ref Range: 10 - 40 U/L 31 23   ALT Latest Ref Range: 10 - 44 U/L 41 23   Triglycerides Latest Ref Range: 30 - 150 mg/dL  139   Cholesterol Latest Ref Range: 120 - 199 mg/dL  193   HDL Latest Ref Range: 40 - 75 mg/dL  57   Hdl/Cholesterol Ratio Latest Ref Range: 20.0 - 50.0 %  29.5   LDL Cholesterol Latest Ref Range: 63.0 - 159.0 mg/dL  108.2   Non-HDL Cholesterol Latest Units: mg/dL  136   Total Cholesterol/HDL Ratio Latest Ref Range: 2.0 - 5.0   3.4   TSH Latest Ref Range: 0.400 - 4.000 uIU/mL 0.340 (L)    Free T4 Latest Ref Range: 0.71 - 1.51 ng/dL 1.28    Thyroglobulin Interpretation Unknown SEE BELOW    Thyroglobulin Antibody Screen Latest Ref Range: <4.0 IU/mL <1.8    Thyroglobulin, Tumor Marker Latest Units: ng/mL 0.3 (H)        Assessment:     1. Thyroid cancer    2. Postsurgical hypothyroidism    3. Morbid obesity due to excess calories      Plan:     1 and 2. Post surgical hypothyroidism / thyroid cancer - with thyroglobulin that is now  detectable  --Previous diagnostic whole body scans did show that she had residual tissue in the thyroid bed that was iodine avid, the increase in Tg could potentially be explained by this residual tissue, and now that her TSH is less suppressed we are detecting thyroglobulin  --We did decide to FNA the area in the left thyroid bed to see if this is thyroid tissue and if so, is it benign or malignant  --Will continue current thyroid hormone dose for now, levothyroxine 100 mcg daily, as recent TSH at goal at 0.3  -- Will decide further steps once FNA results are back     3. Morbid obesity   -- refer to medical fitness  -- continue dietary and lifestyle modifications   -- increase exercise as tolerated     Zay Vincent M.D. Staff Endocrinology

## 2019-05-02 NOTE — LETTER
May 3, 2019      Guicho Lizarraga MD  [address]  [place] [address]           [facility]  [address]  [place] [address]  Phone: [phone]          Patient: Paras Smith   MR Number: [id number]   YOB: 1960   Date of Visit: 5/2/2019       Dear Dr. Guicho Lizarraga:    Thank you for referring Paras Smith to me for evaluation. Attached you will find relevant portions of my assessment and plan of care.    If you have questions, please do not hesitate to call me. I look forward to following Paras Smith along with you.    Sincerely,    Zya Vincent MD    Enclosure  CC:  No Recipients    If you would like to receive this communication electronically, please contact [email or web address] or [phone] to request more information on [employer] Link access.    For providers and/or their staff who would like to refer a patient to [facility], please contact us through our one-stop-shop provider referral line, [facility] , at [phone].    If you feel you have received this communication in error or would no longer like to receive these types of communications, please e-mail [email or web address]

## 2019-05-03 ENCOUNTER — HOSPITAL ENCOUNTER (OUTPATIENT)
Dept: ENDOCRINOLOGY | Facility: CLINIC | Age: 59
Discharge: HOME | End: 2019-05-03
Attending: INTERNAL MEDICINE
Payer: MEDICAID

## 2019-05-03 DIAGNOSIS — C73 THYROID CANCER: ICD-10-CM

## 2019-05-03 PROCEDURE — 10005 FNA BX W/US GDN 1ST LES: CPT | Mod: ,,, | Performed by: INTERNAL MEDICINE

## 2019-05-03 PROCEDURE — 84432 ASSAY OF THYROGLOBULIN: CPT

## 2019-05-03 PROCEDURE — 10005 US FINE NEEDLE ASPIRATION BIOPSY, FIRST LESION: ICD-10-PCS | Mod: ,,, | Performed by: INTERNAL MEDICINE

## 2019-05-03 PROCEDURE — 88173 CYTOLOGY SPECIMEN-FNA NON-RADIOLOGY CLINICIAN PERFORMED W/O ON SITE: ICD-10-PCS | Mod: 26,,, | Performed by: PATHOLOGY

## 2019-05-03 PROCEDURE — 88173 CYTOPATH EVAL FNA REPORT: CPT | Performed by: PATHOLOGY

## 2019-05-03 PROCEDURE — 88173 CYTOPATH EVAL FNA REPORT: CPT | Mod: 26,,, | Performed by: PATHOLOGY

## 2019-05-03 PROCEDURE — 10005 FNA BX W/US GDN 1ST LES: CPT

## 2019-05-04 LAB
BDY SITE: NORMAL
THYROGLOB LN FNA-MCNC: <0.1 NG/ML

## 2019-05-06 ENCOUNTER — PATIENT MESSAGE (OUTPATIENT)
Dept: ENDOCRINOLOGY | Facility: CLINIC | Age: 59
End: 2019-05-06

## 2019-05-06 ENCOUNTER — TELEPHONE (OUTPATIENT)
Dept: ENDOCRINOLOGY | Facility: CLINIC | Age: 59
End: 2019-05-06

## 2019-05-06 DIAGNOSIS — B35.4 TINEA CORPORIS: ICD-10-CM

## 2019-05-06 RX ORDER — HYDROXYZINE PAMOATE 50 MG/1
CAPSULE ORAL
Qty: 180 CAPSULE | Refills: 1 | Status: SHIPPED | OUTPATIENT
Start: 2019-05-06 | End: 2019-07-02 | Stop reason: SDUPTHER

## 2019-05-06 RX ORDER — CLOTRIMAZOLE 1 %
1 CREAM (GRAM) TOPICAL 2 TIMES DAILY
Qty: 30 G | Refills: 2 | Status: SHIPPED | OUTPATIENT
Start: 2019-05-06 | End: 2020-02-03

## 2019-05-06 NOTE — TELEPHONE ENCOUNTER
----- Message from Crystal Heller sent at 5/6/2019 11:08 AM CDT -----  Contact: Self & daughter (Priscilla)  [phone]  PT needs help interpreting biopsy results. PT can be reached at [phone].

## 2019-05-06 NOTE — TELEPHONE ENCOUNTER
Spoke to patient and let her know that her results are not in right now, she stated that she would like only her self to get the results not her daughter.

## 2019-05-07 ENCOUNTER — OFFICE VISIT (OUTPATIENT)
Dept: OPTOMETRY | Facility: CLINIC | Age: 59
End: 2019-05-07
Payer: MEDICAID

## 2019-05-07 DIAGNOSIS — H52.03 HYPEROPIA WITH ASTIGMATISM AND PRESBYOPIA, BILATERAL: ICD-10-CM

## 2019-05-07 DIAGNOSIS — H25.13 NUCLEAR SCLEROSIS, BILATERAL: ICD-10-CM

## 2019-05-07 DIAGNOSIS — H52.4 HYPEROPIA WITH ASTIGMATISM AND PRESBYOPIA, BILATERAL: ICD-10-CM

## 2019-05-07 DIAGNOSIS — H40.023 OAG (OPEN ANGLE GLAUCOMA) SUSPECT, HIGH RISK, BILATERAL: Primary | ICD-10-CM

## 2019-05-07 DIAGNOSIS — H52.203 HYPEROPIA WITH ASTIGMATISM AND PRESBYOPIA, BILATERAL: ICD-10-CM

## 2019-05-07 PROCEDURE — 92015 DETERMINE REFRACTIVE STATE: CPT | Mod: ,,, | Performed by: OPTOMETRIST

## 2019-05-07 PROCEDURE — 99999 PR PBB SHADOW E&M-EST. PATIENT-LVL III: CPT | Mod: PBBFAC,,, | Performed by: OPTOMETRIST

## 2019-05-07 PROCEDURE — 92015 PR REFRACTION: ICD-10-PCS | Mod: ,,, | Performed by: OPTOMETRIST

## 2019-05-07 PROCEDURE — 99999 PR PBB SHADOW E&M-EST. PATIENT-LVL III: ICD-10-PCS | Mod: PBBFAC,,, | Performed by: OPTOMETRIST

## 2019-05-07 PROCEDURE — 92014 PR EYE EXAM, EST PATIENT,COMPREHESV: ICD-10-PCS | Mod: S$PBB,,, | Performed by: OPTOMETRIST

## 2019-05-07 PROCEDURE — 99213 OFFICE O/P EST LOW 20 MIN: CPT | Mod: PBBFAC,PO | Performed by: OPTOMETRIST

## 2019-05-07 PROCEDURE — 92014 COMPRE OPH EXAM EST PT 1/>: CPT | Mod: S$PBB,,, | Performed by: OPTOMETRIST

## 2019-05-07 NOTE — PROGRESS NOTES
HPI     Patient is here for annual eye exam, pt complaints of pressure behind OD   and headaches.  Pt lost current RX.  (-)Flashes (-)Floaters  (+)Itch, (+)tear, (+)burn, (+)Dryness. (+) OTC Drops Systane PRN  (-)Photophobia  (-)Glare (--)diplopia (+) headaches          Last edited by Annabelle Ya PCT on 5/7/2019  1:56 PM. (History)            Assessment /Plan     For exam results, see Encounter Report.    OAG (open angle glaucoma) suspect, high risk, bilateral  -     OCT, Optic Nerve - OU - Both Eyes; Future  -No OCT defect  -monitor as risk 2/2 enlarged CD's and FHX father    Nuclear sclerosis, bilateral  -Educated patient on presence of cataracts at today's exam, monitor at annual dilated fundus exam. 5+ years surgical estimate.    Hyperopia with astigmatism and presbyopia, bilateral  Eyeglass Final Rx     Eyeglass Final Rx       Sphere Cylinder Axis Dist VA Add    Right +0.75 +0.75 010 20/20 +2.25    Left +0.75 +0.50 165 20/20 +2.25    Type:  PAL    Expiration Date:  5/7/2020                  RTC 1 yr

## 2019-05-08 DIAGNOSIS — E89.0 POSTSURGICAL HYPOTHYROIDISM: ICD-10-CM

## 2019-05-08 RX ORDER — LEVOTHYROXINE SODIUM 100 UG/1
TABLET ORAL
Qty: 30 TABLET | Refills: 3 | Status: SHIPPED | OUTPATIENT
Start: 2019-05-08 | End: 2019-06-23

## 2019-05-09 ENCOUNTER — TELEPHONE (OUTPATIENT)
Dept: ENDOCRINOLOGY | Facility: CLINIC | Age: 59
End: 2019-05-09

## 2019-05-09 DIAGNOSIS — C73 THYROID CANCER: Primary | ICD-10-CM

## 2019-05-09 DIAGNOSIS — E89.0 POSTSURGICAL HYPOTHYROIDISM: ICD-10-CM

## 2019-05-09 NOTE — TELEPHONE ENCOUNTER
Called patient regarding FNA results. FNA of the left thyroid bed lesion was acellular and negative for thyroglobulin. This could represent scar tissue or non thyroidal tissue. Tg increase likely due to increase in TSH. Will monitor closely with repeat ultrasound and Tg in 6 months. Will repeat TSH in 6 weeks with a goal TSH in the 0.1-0.5 range.

## 2019-06-07 ENCOUNTER — OFFICE VISIT (OUTPATIENT)
Dept: FAMILY MEDICINE | Facility: CLINIC | Age: 59
End: 2019-06-07
Payer: MEDICAID

## 2019-06-07 VITALS
HEIGHT: 61 IN | SYSTOLIC BLOOD PRESSURE: 97 MMHG | BODY MASS INDEX: 55.32 KG/M2 | RESPIRATION RATE: 17 BRPM | TEMPERATURE: 98 F | HEART RATE: 71 BPM | WEIGHT: 293 LBS | OXYGEN SATURATION: 96 % | DIASTOLIC BLOOD PRESSURE: 80 MMHG

## 2019-06-07 DIAGNOSIS — F33.1 MODERATE EPISODE OF RECURRENT MAJOR DEPRESSIVE DISORDER: ICD-10-CM

## 2019-06-07 DIAGNOSIS — E66.01 MORBID OBESITY DUE TO EXCESS CALORIES: ICD-10-CM

## 2019-06-07 DIAGNOSIS — L71.9 ROSACEA: Primary | ICD-10-CM

## 2019-06-07 DIAGNOSIS — G43.019 INTRACTABLE MIGRAINE WITHOUT AURA AND WITHOUT STATUS MIGRAINOSUS: ICD-10-CM

## 2019-06-07 PROCEDURE — 99213 OFFICE O/P EST LOW 20 MIN: CPT | Mod: PBBFAC,PN | Performed by: INTERNAL MEDICINE

## 2019-06-07 PROCEDURE — 99214 OFFICE O/P EST MOD 30 MIN: CPT | Mod: S$PBB,,, | Performed by: INTERNAL MEDICINE

## 2019-06-07 PROCEDURE — 99214 PR OFFICE/OUTPT VISIT, EST, LEVL IV, 30-39 MIN: ICD-10-PCS | Mod: S$PBB,,, | Performed by: INTERNAL MEDICINE

## 2019-06-07 PROCEDURE — 99999 PR PBB SHADOW E&M-EST. PATIENT-LVL III: CPT | Mod: PBBFAC,,, | Performed by: INTERNAL MEDICINE

## 2019-06-07 PROCEDURE — 99999 PR PBB SHADOW E&M-EST. PATIENT-LVL III: ICD-10-PCS | Mod: PBBFAC,,, | Performed by: INTERNAL MEDICINE

## 2019-06-07 RX ORDER — METRONIDAZOLE 10 MG/G
GEL TOPICAL NIGHTLY
Qty: 60 G | Refills: 0 | Status: SHIPPED | OUTPATIENT
Start: 2019-06-07 | End: 2019-06-12

## 2019-06-07 NOTE — PROGRESS NOTES
"Subjective:       Patient ID: Paras Smith is a 58 y.o. female.    Chief Complaint: Establish Care; Follow-up; Rash (on face 2 months); and vomting (in sleep 3 days/ happen  twice )    Facial rash    HPI: 57 y/o w/ chronic neck/back pain hypothyroid s/p thyroidectomy chronic migraines ([facility] neurologist Dr. Bull Garza) presents for follow upl. Last visit thorazine stopped due to right hand tremor no change since that time. She continues to gain weight. She reports not eating in attempt to lose weight she also reports two weeks of intermittent rash to face worse with son exposure no oral lesions or ulcers    Review of Systems   Constitutional: Positive for fatigue and unexpected weight change. Negative for activity change, appetite change and fever.   HENT: Negative for ear pain, rhinorrhea and sore throat.    Eyes: Negative for discharge and visual disturbance.   Respiratory: Negative for chest tightness, shortness of breath and wheezing.    Cardiovascular: Negative for chest pain, palpitations and leg swelling.   Gastrointestinal: Negative for abdominal pain, constipation and diarrhea.   Endocrine: Negative for cold intolerance and heat intolerance.   Genitourinary: Negative for dysuria and hematuria.   Musculoskeletal: Positive for arthralgias, back pain, neck pain and neck stiffness. Negative for joint swelling.   Skin: Negative for rash.   Neurological: Negative for dizziness, syncope, weakness and headaches.   Psychiatric/Behavioral: Negative for suicidal ideas.       Objective:     Vitals:    06/07/19 1059   BP: 97/80   BP Location: Left arm   Patient Position: Sitting   Pulse: 71   Resp: 17   Temp: 97.6 °F (36.4 °C)   TempSrc: Oral   SpO2: 96%   Weight: 133 kg (293 lb 3.4 oz)   Height: 5' 1" (1.549 m)          Physical Exam   Constitutional: She is oriented to person, place, and time. She appears well-developed and well-nourished.   HENT:   Head: Normocephalic and atraumatic.   Eyes: Conjunctivae are " normal. No scleral icterus.   Neck: Normal range of motion.   Cardiovascular: Normal rate and regular rhythm. Exam reveals no gallop and no friction rub.   No murmur heard.  No pitting edema of lower legs   Pulmonary/Chest: Effort normal and breath sounds normal. She has no wheezes. She has no rales.   Abdominal: Soft. Bowel sounds are normal. There is no tenderness. There is no rebound and no guarding.   Musculoskeletal: Normal range of motion. She exhibits no edema or tenderness.   Neurological: She is alert and oriented to person, place, and time. No cranial nerve deficit.   Skin: Skin is warm and dry. Rash noted.   Confluent erythematous papules along forehead, chin and maxilla without crusting or induration   Psychiatric: She has a normal mood and affect.       Assessment and Plan   1. Rosacea  Topical metrogel nightly  - metronidazole 1% (METROGEL) 1 % Gel; Apply topically every evening.  Dispense: 60 g; Refill: 0    2. Intractable migraine without aura and without status migrainosus  Following with neuro    3. Moderate episode of recurrent major depressive disorder  No improvement with ssri referral for counseling services    4. Morbid obesity due to excess calories  She is starting with the medical fitness program next week discussed eating healthy food options rather than fasting/skipping meals

## 2019-06-11 ENCOUNTER — OFFICE VISIT (OUTPATIENT)
Dept: UROLOGY | Facility: CLINIC | Age: 59
End: 2019-06-11
Payer: MEDICAID

## 2019-06-11 VITALS
WEIGHT: 290 LBS | DIASTOLIC BLOOD PRESSURE: 80 MMHG | HEIGHT: 61 IN | BODY MASS INDEX: 54.75 KG/M2 | SYSTOLIC BLOOD PRESSURE: 130 MMHG

## 2019-06-11 DIAGNOSIS — R39.198 DIFFICULTY URINATING: Primary | ICD-10-CM

## 2019-06-11 DIAGNOSIS — R35.1 NOCTURIA: ICD-10-CM

## 2019-06-11 PROCEDURE — 99999 PR PBB SHADOW E&M-EST. PATIENT-LVL V: ICD-10-PCS | Mod: PBBFAC,,, | Performed by: NURSE PRACTITIONER

## 2019-06-11 PROCEDURE — 99215 OFFICE O/P EST HI 40 MIN: CPT | Mod: PBBFAC | Performed by: NURSE PRACTITIONER

## 2019-06-11 PROCEDURE — 99999 PR PBB SHADOW E&M-EST. PATIENT-LVL V: CPT | Mod: PBBFAC,,, | Performed by: NURSE PRACTITIONER

## 2019-06-11 PROCEDURE — 99203 OFFICE O/P NEW LOW 30 MIN: CPT | Mod: S$PBB,,, | Performed by: NURSE PRACTITIONER

## 2019-06-11 PROCEDURE — 81001 URINALYSIS AUTO W/SCOPE: CPT | Mod: PBBFAC | Performed by: NURSE PRACTITIONER

## 2019-06-11 PROCEDURE — 51798 US URINE CAPACITY MEASURE: CPT | Mod: PBBFAC | Performed by: NURSE PRACTITIONER

## 2019-06-11 PROCEDURE — 99203 PR OFFICE/OUTPT VISIT, NEW, LEVL III, 30-44 MIN: ICD-10-PCS | Mod: S$PBB,,, | Performed by: NURSE PRACTITIONER

## 2019-06-11 RX ORDER — TAMSULOSIN HYDROCHLORIDE 0.4 MG/1
0.4 CAPSULE ORAL DAILY
Qty: 30 CAPSULE | Refills: 11 | Status: CANCELLED | OUTPATIENT
Start: 2019-06-11 | End: 2020-06-10

## 2019-06-11 NOTE — PROGRESS NOTES
"Subjective:       Patient ID: Paras Smith is a 58 y.o. female who is a new patient was referred by Guicho Lizarraga MD for evaluation of "urinary retention"    Chief Complaint:   Chief Complaint   Patient presents with    Other     Patient states that she is having problems urinating..she goes all day without urinating.. at night she gets up at 4 times to urinate and she is concerned because she had a normal pattern at one point and now she doesn't.. sometimes 2 days at a time she doesn't urinate no pain or burning when she does urinate     Difficulty Urinating  Patient reports issues with initiating urinary stream for the past few months.  Reports occasional straining with urination. Baseline nocturia x 1-2. Also with occasional constipation. Reports relief of constipation with "home remedies". Last BM was yesterday. She reports decrease in UOP despite increased daily intake of water--~8-16 oz bottles of water per day.     She is concerned about her urinary symptoms because her father had "kidney issues".   Cr 4/2019--0.8 Denies dysuria, gross hematuria or flank pain. Denies LE swelling    ISREAL 5/2019--normal, PVR-- 32 ml    PVR (bladder scan) today - 14 ml      ACTIVE MEDICAL ISSUES:  Patient Active Problem List   Diagnosis    HTN (hypertension)    Postsurgical hypothyroidism    Thyroid cancer    Chest pain, cardiac    Depression    Wound of toenail    Chest pain, atypical    Cervicalgia    Weakness of neck    Weakness of both arms    Stiffness of neck    Glaucoma suspect    Nuclear cataract    Dry eye syndrome    Vitamin D deficiency disease    Helicobacter pylori (H. pylori) infection    Dyspnea    Cholecystitis    Morbid obesity due to excess calories    Anxiety    NAFLD (nonalcoholic fatty liver disease)    Liver cyst    Coronary artery disease    Diarrhea of presumed infectious origin    Intractable migraine without aura and without status migrainosus    Complex partial " seizure disorder without intractable epilepsy    Polyneuropathy due to radiation    Medication overuse headache    Other headache syndrome    Falls    Cervical spinal mass    Chronic pain    Diarrhea       PAST MEDICAL HISTORY  Past Medical History:   Diagnosis Date    Asthma     CAD (coronary artery disease)     stent     Cervical spine disease     Depression     Difficult intubation     POSSIBLE    Falls 2018    GERD (gastroesophageal reflux disease)     Glaucoma     left eye    Helicobacter pylori (H. pylori) infection     History of stomach ulcers     HTN (hypertension)     Muscle weakness     LEFT    Neck problem     LIMITED ROM    Nuclear cataract 2014    Postsurgical hypothyroidism     Stroke     mini strokes    Thyroid cancer     Vitamin D deficiency disease        PAST SURGICAL HISTORY:  Past Surgical History:   Procedure Laterality Date    APPENDECTOMY      RRSMLU-BEDDCY-FN N/A 2018    Performed [facility] OR 2ND FLR    CATHETERIZATION, HEART, LEFT Left 3/12/2014    Performed by [facility] at [facility] CATH LAB    CERVICAL SPINE SURGERY      vocal cord damage     SECTION, LOW TRANSVERSE      x3    CHOLECYSTECTOMY      CHOLECYSTECTOMY-LAPAROSCOPIC N/A 2015    Performed by [facility]o Person [facility] at [facility] OR    COLONOSCOPY N/A 2018    [facility]formed by [facility] at [facility] ENDO (2ND FLR)    COLONOSCOPY N/A 2018    Per[facility] by [facility] ENDO (2ND FLR)    CORONARY ANGIOPLASTY WITH STENT PLACEMENT      x 3    EGD (ESOPHAGOGASTRODUODENOSCOPY) N/A 2018    Performed by [facility] ENDO (2ND FLR)    EGD (ESOPHAGOGASTRODUODENOSCOPY) N/A 2018    [facility] ENDO (2ND FLR)    HEART CATH-LEFT Left 2017    Performed by [facility] at [facility] CATH LAB    PATELLA SURGERY  1969    THYROID SURGERY      total    TOTAL THYROIDECTOMY         SOCIAL  HISTORY:  Social History     Tobacco Use    Smoking status: Never Smoker    Smokeless tobacco: Never Used    Tobacco comment: No cigarrettes, only marijuana occasionally   Substance Use Topics    Alcohol use: No     Comment: recovering alcoholic    Drug use: Yes     Frequency: 6.0 times per week     Types: Marijuana     Comment: ocasional       FAMILY HISTORY:  Family History   Problem Relation Age of Onset    Diabetes Father     Kidney failure Father     Cataracts Father     Asthma Father     Glaucoma Father     Heart disease Mother         has pacemaker    Hypertension Mother     Asthma Mother     Cancer Mother         lung stage 4    Ovarian cancer Mother     Breast cancer Mother     Hypertension Brother     Heart disease Brother     Cervical cancer Daughter     Scoliosis Son     Hypertension Son     Hypertension Daughter     Seizures Daughter     Lupus Daughter     Cervical cancer Daughter     Mental illness Son         bipolar    Cancer Sister     Liver disease Sister     No Known Problems Brother     No Known Problems Daughter     Breast cancer Maternal Aunt     No Known Problems Maternal Uncle     No Known Problems Paternal Aunt     No Known Problems Paternal Uncle     Stomach cancer Maternal Grandmother     No Known Problems Paternal Grandmother     No Known Problems Paternal Grandfather     Stomach cancer Maternal Aunt     Breast cancer Maternal Aunt     Breast cancer Maternal Aunt     Esophageal cancer Neg Hx        ALLERGIES AND MEDICATIONS: updated and reviewed.  Review of patient's allergies indicates:   Allergen Reactions    Vioxx [rofecoxib] Rash     Current Outpatient Medications   Medication Sig    albuterol (VENTOLIN HFA) 90 mcg/actuation inhaler INHALE TWO PUFFS BY MOUTH EVERY 6 HOURS AS NEEDED    amitriptyline (ELAVIL) 50 MG tablet TAKE ONE TABLET BY MOUTH AT BEDTIME AS NEEDED    amLODIPine (NORVASC) 5 MG tablet TAKE ONE TABLET BY MOUTH ONCE A DAY     anthralin 1.2 % CmRR     aspirin 325 MG tablet Take 325 mg by mouth once daily.    baclofen (LIORESAL) 10 MG tablet TAKE ONE TABLET BY MOUTH THREE TIMES DAILY    clotrimazole (LOTRIMIN) 1 % cream Apply 1 application topically 2 (two) times daily. Apply to affected area    diazePAM (VALIUM) 10 MG Tab Take 10 mg by mouth.    dicyclomine (BENTYL) 10 MG capsule TAKE ONE CAPSULE BY MOUTH FOUR TIMES DAILY BEFORE meals AND nightly (Patient taking differently: TAKE ONE CAPSULE BY MOUTH two  TIMES DAILY BEFORE meals AND nightly)    ergocalciferol (ERGOCALCIFEROL) 50,000 unit Cap TAKE ONE CAPSULE BY MOUTH once every week    fluticasone (FLONASE) 50 mcg/actuation nasal spray USE ONE SPRAY IN EACH NOSTRIL TWICE DAILY    gabapentin (NEURONTIN) 300 MG capsule Take 600 mg by mouth 3 (three) times daily.     hydrOXYzine pamoate (VISTARIL) 50 MG Cap TAKE ONE CAPSULE BY MOUTH EVERY 6 HOURS AS NEEDED FOR ANXIETY    levothyroxine (SYNTHROID) 100 MCG tablet TAKE ONE TABLET BY MOUTH ONCE A DAY    lidocaine (LMX 4) 4 % cream Apply topically as needed.    lidocaine-prilocaine (EMLA) cream     lisinopril 10 MG tablet TAKE ONE TABLET BY MOUTH ONCE A DAY    magnesium oxide (MAG-OX) 400 mg tablet Take 1 tablet (400 mg total) by mouth 2 (two) times daily.    methocarbamol (ROBAXIN) 500 MG Tab Take 500 mg by mouth.    metronidazole 1% (METROGEL) 1 % Gel Apply topically every evening.    multivitamin capsule Take 1 capsule by mouth.    naproxen (NAPROSYN) 375 MG tablet     neomycin-polymyxin-hydrocortisone (CORTISPORIN) 3.5-10,000-1 mg/mL-unit/mL-% otic suspension Place 3 drops into both ears 3 (three) times daily.    nitroGLYCERIN 0.4 MG/HR TD PT24 (NITRODUR) 0.4 mg/hr apply 1 patch ONTO THE SKIN ONCE DAILY    omeprazole (PRILOSEC) 20 MG capsule TAKE ONE CAPSULE BY MOUTH TWICE DAILY    omeprazole magnesium 20 mg CpDR Take 1 tablet by mouth 2 (two) times daily.    promethazine (PHENERGAN) 25 MG tablet take 1 tablet by mouth  "every 8 hours if needed for nausea and vomiting    propranolol (INDERAL) 60 MG tablet 60 mg.    REXULTI 0.5 mg Tab 0.5 mg.    sertraline (ZOLOFT) 100 MG tablet Take 100 mg by mouth.    topiramate (TOPAMAX) 100 MG tablet Take 50 mg by mouth once daily.     Current Facility-Administered Medications   Medication    onabotulinumtoxina injection 200 Units     Facility-Administered Medications Ordered in Other Visits   Medication    0.9%  NaCl infusion    0.9%  NaCl infusion    sodium chloride 0.9% flush 3 mL    sodium chloride 0.9% flush 3 mL       Review of Systems   Constitutional: Negative for activity change, chills, fatigue, fever and unexpected weight change.   Eyes: Negative for discharge, redness and visual disturbance.   Respiratory: Negative for cough, shortness of breath and wheezing.    Cardiovascular: Negative for chest pain and leg swelling.   Gastrointestinal: Negative for abdominal distention, abdominal pain, constipation, diarrhea, nausea and vomiting.   Genitourinary: Positive for decreased urine volume and difficulty urinating. Negative for dysuria, flank pain, frequency, hematuria, pelvic pain, urgency, vaginal bleeding and vaginal discharge.   Musculoskeletal: Negative for arthralgias, joint swelling and myalgias.   Skin: Negative for color change and rash.   Neurological: Negative for dizziness and light-headedness.   Psychiatric/Behavioral: Negative for behavioral problems and confusion. The patient is not nervous/anxious.        Objective:      Vitals:    06/11/19 1256   BP: 130/80   Weight: 131.5 kg (290 lb)   Height: 5' 1" (1.549 m)     Physical Exam   Constitutional: She is oriented to person, place, and time. She appears well-developed.   HENT:   Head: Normocephalic and atraumatic.   Nose: Nose normal.   Eyes: Conjunctivae are normal. Right eye exhibits no discharge. Left eye exhibits no discharge.   Neck: Normal range of motion. Neck supple. No tracheal deviation present. No " thyromegaly present.   Cardiovascular: Normal rate and regular rhythm.    Pulmonary/Chest: Effort normal. No respiratory distress. She has no wheezes.   Abdominal: Soft. She exhibits no distension. There is no hepatosplenomegaly. There is no tenderness. There is no CVA tenderness. No hernia.   Genitourinary:   Genitourinary Comments: Patient declined exam   Musculoskeletal: Normal range of motion. She exhibits no edema.   Neurological: She is alert and oriented to person, place, and time.   Skin: Skin is warm and dry. No rash noted. No erythema.     Psychiatric: She has a normal mood and affect. Her behavior is normal. Judgment normal.       Urine dipstick shows trace LE.      Narrative     EXAMINATION:  US RETROPERITONEAL COMPLETE    CLINICAL HISTORY:  Retention of urine, unspecified    TECHNIQUE:  Ultrasound of kidneys and urinary bladder.    COMPARISON:  04/25/2018    FINDINGS:  Right: Measures 11.8 cm.  Normal cortical thickness and echogenicity.  No focal lesions.  No hydronephrosis.    Left: Measures 11.3 cm.  Normal cortical thickness and echogenicity.  No focal lesions.  No hydronephrosis.    Urinary bladder: Unremarkable.  Postvoid residual volume of 32 mL.     Reviewed    Assessment:       1. Difficulty urinating    2. Nocturia          Plan:       1. Difficulty urinating  -ISREAL--normal. PVR--32 ml  -Acceptable PVR today  -Nothing on imaging or labs to explain etiology. Patient reassured  -Offered a trial of Flomax-- unsure of efficacy. Patient declined medication for now  -Suspect r/t constipation. Discussed bowel regimen  - POCT Bladder Scan    2. Nocturia  -Limit evening fluids  - POCT urinalysis, dipstick or tablet reag            Follow up in about 3 months (around 9/11/2019) for Follow up.

## 2019-06-11 NOTE — LETTER
June 11, 2019      Guicho Lizarraga MD  [address] [place] [address]           [facility]  [address] [facility] [address]  [place] [address]  Phone: [phone]  Fax: [phone]          Patient: Paras Smith   MR Number: [id number]   YOB: 1960   Date of Visit: 6/11/2019       Dear Dr. Guicho Lizarraga:    Thank you for referring Paras Smith to me for evaluation. Attached you will find relevant portions of my assessment and plan of care.    If you have questions, please do not hesitate to call me. I look forward to following Paras Smith along with you.    Sincerely,    Marilu Govea, RADHA    Enclosure  CC:  No Recipients    If you would like to receive this communication electronically, please contact [email or web address] or [phone] to request more information on [facility] access.    For providers and/or their staff who would like to refer a patient to [facility], please contact us through our one-stop-shop provider referral line, [facility], at [phone].    If you feel you have received this communication in error or would no longer like to receive these types of communications, please e-mail [email or web address]

## 2019-06-12 ENCOUNTER — TELEPHONE (OUTPATIENT)
Dept: FAMILY MEDICINE | Facility: CLINIC | Age: 59
End: 2019-06-12

## 2019-06-12 LAB
BILIRUB SERPL-MCNC: NORMAL MG/DL
BLOOD URINE, POC: NORMAL
COLOR, POC UA: YELLOW
GLUCOSE UR QL STRIP: NORMAL
KETONES UR QL STRIP: NORMAL
LEUKOCYTE ESTERASE URINE, POC: NORMAL
NITRITE, POC UA: NORMAL
PH, POC UA: 6
PROTEIN, POC: NORMAL
SPECIFIC GRAVITY, POC UA: 1015
UROBILINOGEN, POC UA: NORMAL

## 2019-06-12 RX ORDER — CLOBETASOL PROPIONATE 0.5 MG/G
CREAM TOPICAL DAILY
Qty: 15 G | Refills: 0 | Status: SHIPPED | OUTPATIENT
Start: 2019-06-12 | End: 2019-06-14

## 2019-06-12 NOTE — TELEPHONE ENCOUNTER
----- Message from Heather Davis LPN sent at 6/12/2019  4:28 PM CDT -----  Contact: pt Daughter Meliza ph. [phone]  Patient states the rash is getting worse. Please advise.   ----- Message -----  From: Shana Kahn  Sent: 6/12/2019   4:09 PM  To: [facility] Staff    .Type: Patient Call Back    Who called:Pt Daughter    What is the request in detail:Pt came in to see Dr. Lizarraga about a rash on her face. She was prescribed a creme and the rash spread and has gotten worse.     Can the clinic reply by [facility]?no     Would the patient rather a call back or a response via [facility]? Call back     Best call back number:[phone]    Additional Information:

## 2019-06-12 NOTE — TELEPHONE ENCOUNTER
Patient contacted and ID confirmed by name and   Feels rash is more burning some increase redness no itching no involvement of extremities or trunk. Trial short course of topical steroid cream stressed not to use more than five days (once per day) stop metrogel cream

## 2019-06-14 ENCOUNTER — TELEPHONE (OUTPATIENT)
Dept: FAMILY MEDICINE | Facility: CLINIC | Age: 59
End: 2019-06-14

## 2019-06-14 RX ORDER — CLOBETASOL PROPIONATE 0.5 MG/G
CREAM TOPICAL DAILY
Qty: 45 G | Refills: 0 | Status: SHIPPED | OUTPATIENT
Start: 2019-06-14 | End: 2021-07-20

## 2019-06-14 NOTE — TELEPHONE ENCOUNTER
----- Message from Jody Mckeon sent at 6/14/2019  2:22 PM CDT -----  Contact: [facility] [phone]  .Type:  Pharmacy Calling to Clarify an RX      Pharmacy Name.  [facility] - [place], [place] - [address]  [address]  [place] [address]  Phone: [phone] Fax: [phone]      Prescription Name:Clobetasol Cream  What do they need to clarify?:would like to know if 15mg cream and be changed to 45  Best Call Back [phone]  Additional Information:

## 2019-06-18 RX ORDER — DICYCLOMINE HYDROCHLORIDE 10 MG/1
CAPSULE ORAL
Qty: 120 CAPSULE | Refills: 1 | Status: SHIPPED | OUTPATIENT
Start: 2019-06-18 | End: 2019-08-09 | Stop reason: SDUPTHER

## 2019-06-20 ENCOUNTER — LAB VISIT (OUTPATIENT)
Dept: LAB | Facility: HOSPITAL | Age: 59
End: 2019-06-20
Attending: INTERNAL MEDICINE
Payer: MEDICAID

## 2019-06-20 DIAGNOSIS — C73 THYROID CANCER: ICD-10-CM

## 2019-06-20 DIAGNOSIS — E89.0 POSTSURGICAL HYPOTHYROIDISM: ICD-10-CM

## 2019-06-20 LAB
T4 FREE SERPL-MCNC: 0.83 NG/DL (ref 0.71–1.51)
TSH SERPL DL<=0.005 MIU/L-ACNC: 6.51 UIU/ML (ref 0.4–4)

## 2019-06-20 PROCEDURE — 36415 COLL VENOUS BLD VENIPUNCTURE: CPT | Mod: PO

## 2019-06-20 PROCEDURE — 84439 ASSAY OF FREE THYROXINE: CPT

## 2019-06-20 PROCEDURE — 84443 ASSAY THYROID STIM HORMONE: CPT

## 2019-06-23 ENCOUNTER — TELEPHONE (OUTPATIENT)
Dept: ENDOCRINOLOGY | Facility: HOSPITAL | Age: 59
End: 2019-06-23

## 2019-06-23 DIAGNOSIS — E89.0 POSTSURGICAL HYPOTHYROIDISM: Primary | ICD-10-CM

## 2019-06-23 RX ORDER — LEVOTHYROXINE SODIUM 112 UG/1
112 TABLET ORAL DAILY
Qty: 30 TABLET | Refills: 5 | Status: SHIPPED | OUTPATIENT
Start: 2019-06-23 | End: 2019-11-12 | Stop reason: SDUPTHER

## 2019-06-23 NOTE — TELEPHONE ENCOUNTER
Please advise patient that I reviewed her thyroid levels. Her TSH level is too high which means she needs more thyroid hormone. I recommend we increase her dose to 112 mcg once daily and repeat the TSH in 6 weeks.

## 2019-06-24 NOTE — TELEPHONE ENCOUNTER
Spoke to patient and let her know that Dr Vincent reviewed her thyroid levels. Her TSH level is too high which means she needs more thyroid hormone. I recommend we increase her dose to 112 mcg once daily and repeat the TSH in 6 weeks.

## 2019-06-26 DIAGNOSIS — M54.2 CERVICALGIA: ICD-10-CM

## 2019-06-26 RX ORDER — BACLOFEN 10 MG/1
TABLET ORAL
Qty: 90 TABLET | Refills: 2 | Status: SHIPPED | OUTPATIENT
Start: 2019-06-26 | End: 2019-10-03 | Stop reason: SDUPTHER

## 2019-07-02 DIAGNOSIS — F41.9 ANXIETY: ICD-10-CM

## 2019-07-03 RX ORDER — HYDROXYZINE PAMOATE 50 MG/1
CAPSULE ORAL
Qty: 180 CAPSULE | Refills: 1 | Status: SHIPPED | OUTPATIENT
Start: 2019-07-03 | End: 2019-11-04 | Stop reason: SDUPTHER

## 2019-07-11 DIAGNOSIS — M54.2 CHRONIC NECK PAIN: ICD-10-CM

## 2019-07-11 DIAGNOSIS — M54.2 CERVICALGIA: ICD-10-CM

## 2019-07-11 DIAGNOSIS — G89.29 CHRONIC NECK PAIN: ICD-10-CM

## 2019-07-11 DIAGNOSIS — J30.89 NON-SEASONAL ALLERGIC RHINITIS DUE TO OTHER ALLERGIC TRIGGER: ICD-10-CM

## 2019-07-11 RX ORDER — ERGOCALCIFEROL 1.25 MG/1
CAPSULE ORAL
Qty: 12 CAPSULE | Refills: 1 | Status: SHIPPED | OUTPATIENT
Start: 2019-07-11 | End: 2019-12-30

## 2019-07-11 RX ORDER — AMITRIPTYLINE HYDROCHLORIDE 50 MG/1
TABLET, FILM COATED ORAL
Qty: 30 TABLET | Refills: 5 | Status: SHIPPED | OUTPATIENT
Start: 2019-07-11 | End: 2020-01-08

## 2019-07-12 RX ORDER — FLUTICASONE PROPIONATE 50 MCG
1 SPRAY, SUSPENSION (ML) NASAL 2 TIMES DAILY
Qty: 16 G | Refills: 3 | Status: SHIPPED | OUTPATIENT
Start: 2019-07-12 | End: 2020-02-03

## 2019-07-12 RX ORDER — ALBUTEROL SULFATE 90 UG/1
AEROSOL, METERED RESPIRATORY (INHALATION)
Qty: 18 G | Refills: 2 | Status: SHIPPED | OUTPATIENT
Start: 2019-07-12 | End: 2019-10-10 | Stop reason: SDUPTHER

## 2019-07-29 ENCOUNTER — TELEPHONE (OUTPATIENT)
Dept: FAMILY MEDICINE | Facility: CLINIC | Age: 59
End: 2019-07-29

## 2019-07-29 NOTE — TELEPHONE ENCOUNTER
----- Message from Beatrice Anderson sent at 7/29/2019  1:28 PM CDT -----  Contact: Patient ph [phone]  Type: Patient Call Back    Who called:Patient    What is the request in detail: Patient was discharged from [facility] on Thurs., 07-, for mild chest and back pain. Was told to follow up with PCP. Nothing's avail until September. Please call pt to set this up.    Would the patient rather a call back or a response via [facility]? Call back    Best call back number: [phone]

## 2019-07-29 NOTE — TELEPHONE ENCOUNTER
I spoke to the pt and offered follow up with NP here in the office. Pt has scheduled appointment with Dr. Lizarraga on 8/9/2019. She would like to keep that appointment and agrees to call to see NP if needed sooner.

## 2019-08-06 DIAGNOSIS — I10 HYPERTENSION, ESSENTIAL: ICD-10-CM

## 2019-08-06 RX ORDER — LISINOPRIL 10 MG/1
TABLET ORAL
Qty: 30 TABLET | Refills: 3 | Status: SHIPPED | OUTPATIENT
Start: 2019-08-06 | End: 2019-12-03 | Stop reason: SDUPTHER

## 2019-08-09 ENCOUNTER — OFFICE VISIT (OUTPATIENT)
Dept: FAMILY MEDICINE | Facility: CLINIC | Age: 59
End: 2019-08-09
Payer: MEDICAID

## 2019-08-09 ENCOUNTER — LAB VISIT (OUTPATIENT)
Dept: LAB | Facility: HOSPITAL | Age: 59
End: 2019-08-09
Attending: INTERNAL MEDICINE
Payer: MEDICAID

## 2019-08-09 VITALS
SYSTOLIC BLOOD PRESSURE: 112 MMHG | DIASTOLIC BLOOD PRESSURE: 75 MMHG | BODY MASS INDEX: 53.69 KG/M2 | OXYGEN SATURATION: 96 % | HEIGHT: 61 IN | HEART RATE: 58 BPM | WEIGHT: 284.38 LBS | TEMPERATURE: 98 F | RESPIRATION RATE: 17 BRPM

## 2019-08-09 DIAGNOSIS — S91.209A AVULSION OF TOENAIL, INITIAL ENCOUNTER: ICD-10-CM

## 2019-08-09 DIAGNOSIS — G43.019 INTRACTABLE MIGRAINE WITHOUT AURA AND WITHOUT STATUS MIGRAINOSUS: ICD-10-CM

## 2019-08-09 DIAGNOSIS — E89.0 POSTSURGICAL HYPOTHYROIDISM: ICD-10-CM

## 2019-08-09 DIAGNOSIS — L03.032 PARONYCHIA OF SECOND TOE, LEFT: Primary | ICD-10-CM

## 2019-08-09 LAB
T4 FREE SERPL-MCNC: 1.15 NG/DL (ref 0.71–1.51)
TSH SERPL DL<=0.005 MIU/L-ACNC: 0.39 UIU/ML (ref 0.4–4)

## 2019-08-09 PROCEDURE — 99214 OFFICE O/P EST MOD 30 MIN: CPT | Mod: PBBFAC,PN | Performed by: INTERNAL MEDICINE

## 2019-08-09 PROCEDURE — 84439 ASSAY OF FREE THYROXINE: CPT

## 2019-08-09 PROCEDURE — 36415 COLL VENOUS BLD VENIPUNCTURE: CPT | Mod: PO

## 2019-08-09 PROCEDURE — 99214 OFFICE O/P EST MOD 30 MIN: CPT | Mod: S$PBB,,, | Performed by: INTERNAL MEDICINE

## 2019-08-09 PROCEDURE — 99214 PR OFFICE/OUTPT VISIT, EST, LEVL IV, 30-39 MIN: ICD-10-PCS | Mod: S$PBB,,, | Performed by: INTERNAL MEDICINE

## 2019-08-09 PROCEDURE — 99999 PR PBB SHADOW E&M-EST. PATIENT-LVL IV: CPT | Mod: PBBFAC,,, | Performed by: INTERNAL MEDICINE

## 2019-08-09 PROCEDURE — 84443 ASSAY THYROID STIM HORMONE: CPT

## 2019-08-09 PROCEDURE — 99999 PR PBB SHADOW E&M-EST. PATIENT-LVL IV: ICD-10-PCS | Mod: PBBFAC,,, | Performed by: INTERNAL MEDICINE

## 2019-08-09 RX ORDER — CEPHALEXIN 500 MG/1
500 CAPSULE ORAL EVERY 8 HOURS
Qty: 21 CAPSULE | Refills: 0 | Status: SHIPPED | OUTPATIENT
Start: 2019-08-09 | End: 2019-08-16

## 2019-08-09 RX ORDER — ATORVASTATIN CALCIUM 40 MG/1
40 TABLET, FILM COATED ORAL DAILY
COMMUNITY
Start: 2019-04-24 | End: 2020-04-23

## 2019-08-09 RX ORDER — DICYCLOMINE HYDROCHLORIDE 10 MG/1
CAPSULE ORAL
Qty: 120 CAPSULE | Refills: 1 | Status: SHIPPED | OUTPATIENT
Start: 2019-08-09 | End: 2019-11-25 | Stop reason: SDUPTHER

## 2019-08-09 NOTE — PROGRESS NOTES
"Subjective:       Patient ID: Paras Smith is a 58 y.o. female.    Chief Complaint: Establish Care; Follow-up; Migraine (ongoing); Nail Problem (2 -3 days  L SIDE FOOT ); and pass/out (one week ago)    F/u hospitalization    HPI: 59 y/o w/ chronic migraines (followed by Outside neurologist Dr. Sebastian) morbid obesity chronic neck pain and h/o thyroid cancer presents fro follow up. Three weeks ago developed substernal "burning" admitted for observation at [facility] with negative cardiac ischemic work up. She reports no similar symptoms since discharge. She has had episodes of "passing' out no prodrome no loss of bowel or bladder no tounge biting.  has not witness any abnormal movements. She struck toe on chair three days ago losing the nail fo second toe (left side) area has been red and painful since then.     Review of Systems   Constitutional: Negative for activity change, appetite change, fatigue, fever and unexpected weight change.   HENT: Negative for ear pain, rhinorrhea and sore throat.    Eyes: Negative for discharge and visual disturbance.   Respiratory: Negative for chest tightness, shortness of breath and wheezing.    Cardiovascular: Negative for chest pain, palpitations and leg swelling.   Gastrointestinal: Negative for abdominal pain, constipation and diarrhea.   Endocrine: Negative for cold intolerance and heat intolerance.   Genitourinary: Negative for dysuria and hematuria.   Musculoskeletal: Negative for joint swelling and neck stiffness.   Skin: Negative for rash.   Neurological: Positive for headaches. Negative for dizziness, syncope and light-headedness.   Psychiatric/Behavioral: Negative for suicidal ideas.       Objective:     Vitals:    08/09/19 1125   BP: 112/75   BP Location: Right arm   Patient Position: Sitting   Pulse: (!) 58   Resp: 17   Temp: 97.6 °F (36.4 °C)   TempSrc: Oral   SpO2: 96%   Weight: 129 kg (284 lb 6.3 oz)   Height: 5' 1" (1.549 m)          Physical Exam "   Constitutional: She is oriented to person, place, and time. She appears well-developed and well-nourished.   HENT:   Head: Normocephalic and atraumatic.   Eyes: Pupils are equal, round, and reactive to light. Conjunctivae are normal.   Neck: Normal range of motion.   Cardiovascular: Normal rate and regular rhythm. Exam reveals no gallop and no friction rub.   No murmur heard.  Pulmonary/Chest: Effort normal and breath sounds normal. She has no wheezes. She has no rales.   Abdominal: Soft. Bowel sounds are normal. There is no tenderness. There is no rebound and no guarding.   Musculoskeletal: Normal range of motion. She exhibits no edema or tenderness.   Neurological: She is alert and oriented to person, place, and time. No cranial nerve deficit.   Skin: Skin is warm and dry. There is erythema.   Left second toe distal blanching erythema nail bed tender to light palpation    Psychiatric: She has a normal mood and affect.       Assessment and Plan   1. Paronychia of second toe, left  Keflex tid x seven days  - cephALEXin (KEFLEX) 500 MG capsule; Take 1 capsule (500 mg total) by mouth every 8 (eight) hours. for 7 days  Dispense: 21 capsule; Refill: 0    2. Intractable migraine without aura and without status migrainosus  Follow up with neuro    3. Avulsion of toenail, initial encounter  As above keep area clean OTC antibiotic ointment daily

## 2019-08-16 ENCOUNTER — TELEPHONE (OUTPATIENT)
Dept: ENDOCRINOLOGY | Facility: CLINIC | Age: 59
End: 2019-08-16

## 2019-08-16 DIAGNOSIS — E89.0 POSTSURGICAL HYPOTHYROIDISM: Primary | ICD-10-CM

## 2019-08-16 DIAGNOSIS — C73 THYROID CANCER: ICD-10-CM

## 2019-08-16 NOTE — TELEPHONE ENCOUNTER
Spoke to patient and let her know that Dr Vincent reviewed thyroid levels and they are now in a good range. Dr Vincent recommend she continue the current dose of thyroid hormone. She will need a TSH, thyroglobulin and ultrasound in 11/2019 with follow up after that. she was saying she needs a new PCP I told her to check with [facility] to get in soon since she was up set and saying she needs to know why she falling and crying all the time.

## 2019-08-16 NOTE — TELEPHONE ENCOUNTER
Please advise patient I reviewed her thyroid levels and they are now in a good range. I recommend she continue the current dose of thyroid hormone. She will need a TSH, thyroglobulin and ultrasound in 11/2019 with follow up after that.

## 2019-09-10 ENCOUNTER — PATIENT OUTREACH (OUTPATIENT)
Dept: ADMINISTRATIVE | Facility: OTHER | Age: 59
End: 2019-09-10

## 2019-09-30 ENCOUNTER — HOSPITAL ENCOUNTER (EMERGENCY)
Facility: HOSPITAL | Age: 59
Discharge: HOME | End: 2019-10-01
Attending: EMERGENCY MEDICINE
Payer: MEDICAID

## 2019-09-30 DIAGNOSIS — J20.9 ACUTE BRONCHITIS, UNSPECIFIED ORGANISM: Primary | ICD-10-CM

## 2019-09-30 DIAGNOSIS — R05.9 COUGH: ICD-10-CM

## 2019-09-30 PROCEDURE — 94640 AIRWAY INHALATION TREATMENT: CPT | Mod: ER

## 2019-09-30 PROCEDURE — 99284 EMERGENCY DEPT VISIT MOD MDM: CPT | Mod: 25,ER

## 2019-09-30 RX ORDER — IPRATROPIUM BROMIDE AND ALBUTEROL SULFATE 2.5; .5 MG/3ML; MG/3ML
3 SOLUTION RESPIRATORY (INHALATION) ONCE
Status: COMPLETED | OUTPATIENT
Start: 2019-10-01 | End: 2019-09-30

## 2019-09-30 RX ADMIN — IPRATROPIUM BROMIDE AND ALBUTEROL SULFATE 3 ML: .5; 3 SOLUTION RESPIRATORY (INHALATION) at 11:09

## 2019-10-01 VITALS
DIASTOLIC BLOOD PRESSURE: 88 MMHG | SYSTOLIC BLOOD PRESSURE: 154 MMHG | HEIGHT: 62 IN | HEART RATE: 94 BPM | OXYGEN SATURATION: 95 % | RESPIRATION RATE: 18 BRPM | TEMPERATURE: 98 F | WEIGHT: 286 LBS | BODY MASS INDEX: 52.63 KG/M2

## 2019-10-01 PROCEDURE — 94640 AIRWAY INHALATION TREATMENT: CPT | Mod: ER

## 2019-10-01 PROCEDURE — 25000242 PHARM REV CODE 250 ALT 637 W/ HCPCS: Mod: ER | Performed by: EMERGENCY MEDICINE

## 2019-10-01 PROCEDURE — 63600175 PHARM REV CODE 636 W HCPCS: Mod: ER | Performed by: EMERGENCY MEDICINE

## 2019-10-01 RX ORDER — ALBUTEROL SULFATE 90 UG/1
2 AEROSOL, METERED RESPIRATORY (INHALATION) EVERY 6 HOURS PRN
Qty: 6.7 G | Refills: 0 | Status: SHIPPED | OUTPATIENT
Start: 2019-10-01 | End: 2019-10-10 | Stop reason: SDUPTHER

## 2019-10-01 RX ORDER — PREDNISONE 10 MG/1
10 TABLET ORAL DAILY
Qty: 5 TABLET | Refills: 0 | Status: SHIPPED | OUTPATIENT
Start: 2019-10-01 | End: 2019-10-06

## 2019-10-01 RX ORDER — IPRATROPIUM BROMIDE AND ALBUTEROL SULFATE 2.5; .5 MG/3ML; MG/3ML
3 SOLUTION RESPIRATORY (INHALATION) ONCE
Status: COMPLETED | OUTPATIENT
Start: 2019-10-01 | End: 2019-10-01

## 2019-10-01 RX ORDER — AZITHROMYCIN 250 MG/1
TABLET, FILM COATED ORAL
Qty: 6 TABLET | Refills: 0 | Status: SHIPPED | OUTPATIENT
Start: 2019-10-01 | End: 2020-02-10 | Stop reason: ALTCHOICE

## 2019-10-01 RX ORDER — PREDNISONE 20 MG/1
40 TABLET ORAL
Status: COMPLETED | OUTPATIENT
Start: 2019-10-01 | End: 2019-10-01

## 2019-10-01 RX ADMIN — PREDNISONE 40 MG: 20 TABLET ORAL at 12:10

## 2019-10-01 RX ADMIN — IPRATROPIUM BROMIDE AND ALBUTEROL SULFATE 3 ML: .5; 3 SOLUTION RESPIRATORY (INHALATION) at 12:10

## 2019-10-01 NOTE — ED PROVIDER NOTES
Encounter Date: 2019    SCRIBE #1 NOTE: I, Malaika Skaggs , am scribing for, and in the presence of,  Dr. castillo . I have scribed the following portions of the note - Other sections scribed: HPI, ROS, PE .       History     Chief Complaint   Patient presents with    Cough     pt reports she has had a cough, congestion and intermittent SOB x 2 weeks. pt reports she has been taking robitussin but denies relief. denies chest pain or any other associated symptoms     [place] Eder Smith is a 58 y.o. female with a history of asthma who presents to the ED complaining of cough and SOB for the last week and half. Pt states that her ribs hurt from all the coughing She has tried taking Robitussin with no relief. Pt reports that she uses a asthma pump as well. She denies fever. She endorses redness to her conjunctiva and soreness in her jaw.     The history is provided by the patient.     Review of patient's allergies indicates:   Allergen Reactions    Vioxx [rofecoxib] Rash     Past Medical History:   Diagnosis Date    Asthma     CAD (coronary artery disease)     stent     Cervical spine disease     Depression     Difficult intubation     POSSIBLE    Falls 2018    GERD (gastroesophageal reflux disease)     Glaucoma     left eye    Helicobacter pylori (H. pylori) infection     History of stomach ulcers     HTN (hypertension)     Muscle weakness     LEFT    Neck problem     LIMITED ROM    Nuclear cataract 2014    Postsurgical hypothyroidism     Stroke     mini strokes    Thyroid cancer     Vitamin D deficiency disease      Past Surgical History:   Procedure Laterality Date    APPENDECTOMY      CERVICAL SPINE SURGERY      vocal cord damage     SECTION, LOW TRANSVERSE      x3    CHOLECYSTECTOMY      COLONOSCOPY N/A 2018    Procedure: COLONOSCOPY;  Surgeon: Chavo Wray MD;  Location: [address]);  Service: Endoscopy;  Laterality: N/A;    CORONARY ANGIOPLASTY WITH STENT  PLACEMENT      x 3    ESOPHAGOGASTRODUODENOSCOPY N/A 12/31/2018    Procedure: EGD (ESOPHAGOGASTRODUODENOSCOPY);  Surgeon: Chavo Wray MD;  Location: [facility] ([address] [place] [address]);  Service: Endoscopy;  Laterality: N/A;  Hx of Anesthetic complications c spine surgery and thyroid surgery with known unilateral VC paralysis per patient, pt unsure of intubation history History of prior surgery of interest to airway management or planning - 2nd floor/not able to use current order,    PATELLA SURGERY  1969    THYROID SURGERY      total    TOTAL THYROIDECTOMY       Family History   Problem Relation Age of Onset    Diabetes Father     Kidney failure Father     Cataracts Father     Asthma Father     Glaucoma Father     Heart disease Mother         has pacemaker    Hypertension Mother     Asthma Mother     Cancer Mother         lung stage 4    Ovarian cancer Mother     Breast cancer Mother     Hypertension Brother     Heart disease Brother     Cervical cancer Daughter     Scoliosis Son     Hypertension Son     Hypertension Daughter     Seizures Daughter     Lupus Daughter     Cervical cancer Daughter     Mental illness Son         bipolar    Cancer Sister     Liver disease Sister     No Known Problems Brother     No Known Problems Daughter     Breast cancer Maternal Aunt     No Known Problems Maternal Uncle     No Known Problems Paternal Aunt     No Known Problems Paternal Uncle     Stomach cancer Maternal Grandmother     No Known Problems Paternal Grandmother     No Known Problems Paternal Grandfather     Stomach cancer Maternal Aunt     Breast cancer Maternal Aunt     Breast cancer Maternal Aunt     Esophageal cancer Neg Hx      Social History     Tobacco Use    Smoking status: Never Smoker    Smokeless tobacco: Never Used    Tobacco comment: No cigarrettes, only marijuana occasionally   Substance Use Topics    Alcohol use: No     Comment: recovering alcoholic    Drug use: Yes     Frequency:  6.0 times per week     Types: Marijuana     Comment: ocasional     Review of Systems   Constitutional: Negative.  Negative for fever.   HENT: Negative.  Negative for sore throat.    Eyes: Positive for redness.   Respiratory: Positive for cough and shortness of breath.    Cardiovascular: Negative.  Negative for chest pain.   Gastrointestinal: Negative.  Negative for nausea and vomiting.   Endocrine: Negative.    Genitourinary: Negative.  Negative for dysuria.   Musculoskeletal: Positive for arthralgias. Negative for myalgias.   Skin: Negative.  Negative for rash.   Allergic/Immunologic: Negative.    Neurological: Negative.  Negative for headaches.   Hematological: Negative.  Negative for adenopathy.   Psychiatric/Behavioral: Negative.  Negative for behavioral problems.   All other systems reviewed and are negative.      Physical Exam     Initial Vitals [09/30/19 2245]   BP Pulse Resp Temp SpO2   (!) 139/94 80 18 97.7 °F (36.5 °C) 96 %      MAP       --         Physical Exam    Nursing note and vitals reviewed.  Constitutional: She appears well-developed and well-nourished.   HENT:   Head: Normocephalic and atraumatic.   Right Ear: External ear normal.   Left Ear: External ear normal.   Nose: Nose normal.   Eyes: Right conjunctiva has a hemorrhage. Left conjunctiva has a hemorrhage.   Neck: Normal range of motion. Neck supple.   Cardiovascular: Normal rate and intact distal pulses.   Pulmonary/Chest: Effort normal. No respiratory distress. She has wheezes (fine inspiratory: upper and lower).   Abdominal: Soft. There is no tenderness.   Musculoskeletal: Normal range of motion.   Neurological: She is alert and oriented to person, place, and time.   Skin: Skin is warm and dry. Capillary refill takes less than 2 seconds.   Psychiatric: She has a normal mood and affect. Her behavior is normal.         ED Course   Procedures  Labs Reviewed - No data to display       Imaging Results          X-Ray Chest 1 View (Final result)   Result time 10/01/19 00:16:23    Final result by Rima Aguilar MD (10/01/19 00:16:23)                 Impression:      No acute cardiopulmonary process identified.      Electronically signed by: Rima Aguilar MD  Date:    10/01/2019  Time:    00:16             Narrative:    EXAMINATION:  XR CHEST 1 VIEW    CLINICAL HISTORY:  Cough    TECHNIQUE:  Single frontal view of the chest was performed.    COMPARISON:  November 2017.    FINDINGS:  Cardiac silhouette is normal in size.  Lungs are symmetrically expanded.  No evidence of focal consolidative process, pneumothorax, or significant effusion.  No acute osseous abnormality identified.  Lower cervical fusion hardware is seen.                                 Medical Decision Making:   Clinical Tests:   Radiological Study: Ordered and Reviewed  ED Management:  Patient improved significantly after 1 treatment I felt however that she needed a 2nd.  Patient would appear to be experiencing signs symptoms consistent with acute bronchitis.  Patient's chest x-ray was unremarkable therefore this no signs of any congestive heart failure nor pneumonia nor pneumothorax.            Scribe Attestation:   Scribe #1: I performed the above scribed service and the documentation accurately describes the services I performed. I attest to the accuracy of the note.    This document was produced by a scribe under my direction and in my presence. I agree with the content of the note and have made any necessary edits.     Angelo Solorzano MD    10/01/2019 12:42 AM           Clinical Impression:     1. Acute bronchitis, unspecified organism    2. Cough                                   Angelo Solorzano MD  10/01/19 0042

## 2019-10-03 DIAGNOSIS — M54.2 CERVICALGIA: ICD-10-CM

## 2019-10-03 RX ORDER — BACLOFEN 10 MG/1
TABLET ORAL
Qty: 90 TABLET | Refills: 2 | Status: SHIPPED | OUTPATIENT
Start: 2019-10-03 | End: 2019-12-10 | Stop reason: SDUPTHER

## 2019-10-08 DIAGNOSIS — I10 ESSENTIAL HYPERTENSION: ICD-10-CM

## 2019-10-09 RX ORDER — AMLODIPINE BESYLATE 5 MG/1
TABLET ORAL
Qty: 30 TABLET | Refills: 3 | Status: SHIPPED | OUTPATIENT
Start: 2019-10-09 | End: 2019-12-11 | Stop reason: SDUPTHER

## 2019-10-10 RX ORDER — ALBUTEROL SULFATE 90 UG/1
AEROSOL, METERED RESPIRATORY (INHALATION)
Qty: 18 G | Refills: 2 | Status: SHIPPED | OUTPATIENT
Start: 2019-10-10 | End: 2020-04-06

## 2019-10-15 RX ORDER — PANTOPRAZOLE SODIUM 40 MG/1
TABLET, DELAYED RELEASE ORAL
Qty: 30 TABLET | Refills: 2 | Status: SHIPPED | OUTPATIENT
Start: 2019-10-15 | End: 2020-03-02

## 2019-11-04 DIAGNOSIS — F41.9 ANXIETY: ICD-10-CM

## 2019-11-04 RX ORDER — HYDROXYZINE PAMOATE 50 MG/1
CAPSULE ORAL
Qty: 180 CAPSULE | Refills: 1 | Status: SHIPPED | OUTPATIENT
Start: 2019-11-04 | End: 2020-02-03

## 2019-11-12 DIAGNOSIS — C73 THYROID CANCER: Primary | ICD-10-CM

## 2019-11-12 RX ORDER — LEVOTHYROXINE SODIUM 112 UG/1
TABLET ORAL
Qty: 30 TABLET | Refills: 5 | Status: SHIPPED | OUTPATIENT
Start: 2019-11-12 | End: 2020-04-28

## 2019-11-25 RX ORDER — DICYCLOMINE HYDROCHLORIDE 10 MG/1
CAPSULE ORAL
Qty: 120 CAPSULE | Refills: 1 | Status: SHIPPED | OUTPATIENT
Start: 2019-11-25 | End: 2020-02-03

## 2019-12-03 DIAGNOSIS — I10 HYPERTENSION, ESSENTIAL: ICD-10-CM

## 2019-12-03 RX ORDER — LISINOPRIL 10 MG/1
TABLET ORAL
Qty: 30 TABLET | Refills: 3 | Status: SHIPPED | OUTPATIENT
Start: 2019-12-03 | End: 2020-02-10 | Stop reason: SDUPTHER

## 2019-12-10 DIAGNOSIS — M54.2 CERVICALGIA: ICD-10-CM

## 2019-12-10 RX ORDER — BACLOFEN 10 MG/1
TABLET ORAL
Qty: 90 TABLET | Refills: 2 | Status: SHIPPED | OUTPATIENT
Start: 2019-12-10 | End: 2020-02-03

## 2019-12-11 DIAGNOSIS — I10 ESSENTIAL HYPERTENSION: ICD-10-CM

## 2019-12-12 RX ORDER — AMLODIPINE BESYLATE 5 MG/1
TABLET ORAL
Qty: 30 TABLET | Refills: 3 | Status: SHIPPED | OUTPATIENT
Start: 2019-12-12 | End: 2020-02-10 | Stop reason: SDUPTHER

## 2019-12-27 ENCOUNTER — HOSPITAL ENCOUNTER (EMERGENCY)
Facility: HOSPITAL | Age: 59
Discharge: HOME | End: 2019-12-27
Attending: EMERGENCY MEDICINE
Payer: MEDICAID

## 2019-12-27 VITALS
DIASTOLIC BLOOD PRESSURE: 80 MMHG | HEART RATE: 84 BPM | BODY MASS INDEX: 51.03 KG/M2 | RESPIRATION RATE: 18 BRPM | WEIGHT: 279 LBS | OXYGEN SATURATION: 100 % | SYSTOLIC BLOOD PRESSURE: 154 MMHG | TEMPERATURE: 98 F

## 2019-12-27 DIAGNOSIS — H72.91 PERFORATION OF RIGHT TYMPANIC MEMBRANE: ICD-10-CM

## 2019-12-27 DIAGNOSIS — R05.9 COUGH: ICD-10-CM

## 2019-12-27 DIAGNOSIS — B34.9 VIRAL SYNDROME: Primary | ICD-10-CM

## 2019-12-27 LAB
CTP QC/QA: YES
POC MOLECULAR INFLUENZA A AGN: NEGATIVE
POC MOLECULAR INFLUENZA B AGN: NEGATIVE

## 2019-12-27 PROCEDURE — 99284 EMERGENCY DEPT VISIT MOD MDM: CPT | Mod: 25,ER

## 2019-12-27 PROCEDURE — 94640 AIRWAY INHALATION TREATMENT: CPT | Mod: ER

## 2019-12-27 PROCEDURE — 25000242 PHARM REV CODE 250 ALT 637 W/ HCPCS: Mod: ER | Performed by: PHYSICIAN ASSISTANT

## 2019-12-27 PROCEDURE — 87502 INFLUENZA DNA AMP PROBE: CPT | Mod: ER

## 2019-12-27 PROCEDURE — 25000003 PHARM REV CODE 250: Mod: ER | Performed by: PHYSICIAN ASSISTANT

## 2019-12-27 RX ORDER — BENZONATATE 100 MG/1
100 CAPSULE ORAL
Status: COMPLETED | OUTPATIENT
Start: 2019-12-27 | End: 2019-12-27

## 2019-12-27 RX ORDER — ACETAMINOPHEN 325 MG/1
650 TABLET ORAL
Status: COMPLETED | OUTPATIENT
Start: 2019-12-27 | End: 2019-12-27

## 2019-12-27 RX ORDER — ONDANSETRON 4 MG/1
4 TABLET, ORALLY DISINTEGRATING ORAL EVERY 12 HOURS PRN
Qty: 10 TABLET | Refills: 0 | Status: SHIPPED | OUTPATIENT
Start: 2019-12-27 | End: 2021-01-07

## 2019-12-27 RX ORDER — BENZONATATE 100 MG/1
100 CAPSULE ORAL 3 TIMES DAILY PRN
Qty: 20 CAPSULE | Refills: 0 | Status: SHIPPED | OUTPATIENT
Start: 2019-12-27 | End: 2020-01-06

## 2019-12-27 RX ORDER — DEXTROMETHORPHAN HYDROBROMIDE, GUAIFENESIN 5; 100 MG/5ML; MG/5ML
650 LIQUID ORAL EVERY 8 HOURS PRN
Qty: 30 TABLET | Refills: 0 | Status: ON HOLD | OUTPATIENT
Start: 2019-12-27 | End: 2020-10-19 | Stop reason: HOSPADM

## 2019-12-27 RX ORDER — ONDANSETRON 4 MG/1
4 TABLET, ORALLY DISINTEGRATING ORAL
Status: COMPLETED | OUTPATIENT
Start: 2019-12-27 | End: 2019-12-27

## 2019-12-27 RX ORDER — ALBUTEROL SULFATE 2.5 MG/.5ML
2.5 SOLUTION RESPIRATORY (INHALATION)
Status: COMPLETED | OUTPATIENT
Start: 2019-12-27 | End: 2019-12-27

## 2019-12-27 RX ADMIN — ALBUTEROL SULFATE 2.5 MG: 2.5 SOLUTION RESPIRATORY (INHALATION) at 09:12

## 2019-12-27 RX ADMIN — ACETAMINOPHEN 650 MG: 325 TABLET, FILM COATED ORAL at 09:12

## 2019-12-27 RX ADMIN — BENZONATATE 100 MG: 100 CAPSULE ORAL at 09:12

## 2019-12-27 RX ADMIN — ONDANSETRON 4 MG: 4 TABLET, ORALLY DISINTEGRATING ORAL at 09:12

## 2019-12-27 NOTE — ED PROVIDER NOTES
"Encounter Date: 2019    SCRIBE #1 NOTE: I, Abril Quiroz, am scribing for, and in the presence of,  TRISTIN Chen. I have scribed the following portions of the note - Other sections scribed: HPI, ROS, PE.       History     Chief Complaint   Patient presents with    URI     Pt states," I have a cough since last night. When I cough my chest hurts/parr."     Paras Smith is a 59 y.o. female with history of HTN, thyroid cancer, stroke, asthma, and GERD who presents to the ED complaining of a headache, rhinorrhea, bilateral ear pain, chronic neck pain, and dry cough with burning chest pain since last night.  She explains her chest burns only upon coughing, and denies chest pain at rest.  Patient also complains of nausea, myalgias, and 6 to 8 episodes of watery nonbloody diarrhea since last night. Patient was SOB last night, but resolved with breathing treatment. Patient took robitussin without relief. Denies vomiting. Positive sick contact with grandson with similar symptoms. Does not smoke cigarettes.  No recent travel.    The history is provided by the patient. No  was used.     Review of patient's allergies indicates:   Allergen Reactions    Vioxx [rofecoxib] Rash     Past Medical History:   Diagnosis Date    Asthma     CAD (coronary artery disease)     stent     Cervical spine disease     Depression     Difficult intubation     POSSIBLE    Falls 2018    GERD (gastroesophageal reflux disease)     Glaucoma     left eye    Helicobacter pylori (H. pylori) infection     History of stomach ulcers     HTN (hypertension)     Muscle weakness     LEFT    Neck problem     LIMITED ROM    Nuclear cataract 2014    Postsurgical hypothyroidism     Stroke     mini strokes    Thyroid cancer     Vitamin D deficiency disease      Past Surgical History:   Procedure Laterality Date    APPENDECTOMY      CERVICAL SPINE SURGERY      vocal cord damage     SECTION, LOW " TRANSVERSE      x3    CHOLECYSTECTOMY      COLONOSCOPY N/A 12/31/2018    Procedure: COLONOSCOPY;  Surgeon: Chavo Wray MD;  Location: [facility] ENDO (2ND FLR);  Service: Endoscopy;  Laterality: N/A;    CORONARY ANGIOPLASTY WITH STENT PLACEMENT      x 3    ESOPHAGOGASTRODUODENOSCOPY N/A 12/31/2018    Procedure: EGD (ESOPHAGOGASTRODUODENOSCOPY);  Surgeon: Chavo Wray MD;  Location: [facility] ENDO (2ND FLR);  Service: Endoscopy;  Laterality: N/A;  Hx of Anesthetic complications c spine surgery and thyroid surgery with known unilateral VC paralysis per patient, pt unsure of intubation history History of prior surgery of interest to airway management or planning - 2nd floor/not able to use current order,    PATELLA SURGERY  1969    THYROID SURGERY      total    TOTAL THYROIDECTOMY       Family History   Problem Relation Age of Onset    Diabetes Father     Kidney failure Father     Cataracts Father     Asthma Father     Glaucoma Father     Heart disease Mother         has pacemaker    Hypertension Mother     Asthma Mother     Cancer Mother         lung stage 4    Ovarian cancer Mother     Breast cancer Mother     Hypertension Brother     Heart disease Brother     Cervical cancer Daughter     Scoliosis Son     Hypertension Son     Hypertension Daughter     Seizures Daughter     Lupus Daughter     Cervical cancer Daughter     Mental illness Son         bipolar    Cancer Sister     Liver disease Sister     No Known Problems Brother     No Known Problems Daughter     Breast cancer Maternal Aunt     No Known Problems Maternal Uncle     No Known Problems Paternal Aunt     No Known Problems Paternal Uncle     Stomach cancer Maternal Grandmother     No Known Problems Paternal Grandmother     No Known Problems Paternal Grandfather     Stomach cancer Maternal Aunt     Breast cancer Maternal Aunt     Breast cancer Maternal Aunt     Esophageal cancer Neg Hx      Social History     Tobacco Use     Smoking status: Never Smoker    Smokeless tobacco: Never Used    Tobacco comment: No cigarrettes, only marijuana occasionally   Substance Use Topics    Alcohol use: No     Comment: recovering alcoholic    Drug use: Yes     Frequency: 6.0 times per week     Types: Marijuana     Comment: ocasional     Review of Systems   HENT: Positive for ear pain and rhinorrhea.    Respiratory: Positive for cough (dry). Negative for shortness of breath (resolved).    Cardiovascular: Positive for chest pain (burning with cough).   Gastrointestinal: Positive for diarrhea and nausea. Negative for vomiting.   Musculoskeletal: Positive for myalgias and neck pain (chronic).   Neurological: Positive for headaches.   All other systems reviewed and are negative.      Physical Exam     Initial Vitals [12/27/19 0844]   BP Pulse Resp Temp SpO2   (!) 153/86 104 16 98.4 °F (36.9 °C) 97 %      MAP       --         Physical Exam    Nursing note and vitals reviewed.  Constitutional: She appears well-developed and well-nourished.   HENT:   Head: Normocephalic and atraumatic.   Right Ear: No drainage, swelling or tenderness. No mastoid tenderness. Tympanic membrane is perforated. Tympanic membrane is not erythematous. Decreased hearing is noted.   Left Ear: Hearing, tympanic membrane, external ear and ear canal normal.   Nose: Nose normal.   Mouth/Throat: Uvula is midline, oropharynx is clear and moist and mucous membranes are normal.   Eyes: Conjunctivae are normal.   Neck: Normal range of motion. Neck supple.   Cardiovascular: Normal rate, regular rhythm, S1 normal, S2 normal, normal heart sounds and intact distal pulses. Exam reveals no gallop and no friction rub.    No murmur heard.  Pulmonary/Chest: Effort normal. No respiratory distress. She has no decreased breath sounds. She has no wheezes. She has rhonchi in the left lower field. She has no rales.   Musculoskeletal: Normal range of motion.   Neurological: She is alert and oriented to  person, place, and time.   Skin: Skin is warm and dry.   Psychiatric: She has a normal mood and affect.         ED Course   Procedures  Labs Reviewed   POCT INFLUENZA A/B MOLECULAR          Imaging Results          X-Ray Chest PA And Lateral (Final result)  Result time 12/27/19 09:19:50    Final result by Diaz Beckman MD (12/27/19 09:19:50)                 Impression:      1. Symmetric bibasilar reticulonodular airspace opacities as can be and is most often seen in the setting of viral pneumonia.      Electronically signed by: Diaz Beckman  Date:    12/27/2019  Time:    09:19             Narrative:    EXAMINATION:  XR CHEST PA AND LATERAL    CLINICAL HISTORY:  Cough    TECHNIQUE:  PA and lateral views of the chest were performed.    COMPARISON:  Chest radiograph 10/01/2019    FINDINGS:  Cardiomediastinal silhouette is within normal limits.    Symmetric bibasilar reticulonodular airspace opacities.  Otherwise, no focal consolidation, overt interstitial edema, sizable pleural effusion or pneumothorax.    Multilevel degenerative changes of the imaged spine.                              X-Rays:   Independently Interpreted Readings:   Chest X-Ray: Normal heart size. Bibasilar reticulonodular airspace opacities.  No focal consolidations, pneumothorax, or effusions.     Medical Decision Making:   History:   Old Medical Records: I decided to obtain old medical records.  Clinical Tests:   Radiological Study: Ordered and Reviewed  ED Management:  59-year-old female with history and exam concerning for viral syndrome with constellation of symptoms including upper respiratory and GI.  She does report bilateral otalgia, without evidence of acute infection.  Exam does reveal a significant sized right TM perforation.  Without acute ear infection or injury I do not think this is an acute perforation.  She does endorse hearing loss from the right side for an extended period of time.  Remainder of exam is otherwise reassuring, and  without evidence of meningitis, bacterial sinusitis, strep pharyngitis, bowel obstruction, diverticulitis, or serious diarrheal illness.  Chest x-ray with symmetrically bilateral findings consistent with acute viral process.  Low suspicion for bacterial pneumonia.  No indication at this time for antibiotic therapy.  Patient was treated with albuterol nebulizer, Tessalon, and Tylenol with improved symptoms. She is tolerating p.o..  Patient is safe for discharge with continued p.o. hydration, supportive medications, and instructions to follow up with PCP.  She was also given ENT resources for further evaluation of right TM.             Scribe Attestation:   Scribe #1: I performed the above scribed service and the documentation accurately describes the services I performed. I attest to the accuracy of the note.     Jose Church                      Clinical Impression:     1. Viral syndrome    2. Cough    3. Perforation of right tympanic membrane                                Jose Church PA-C  12/27/19 1000

## 2019-12-30 RX ORDER — ERGOCALCIFEROL 1.25 MG/1
CAPSULE ORAL
Qty: 12 CAPSULE | Refills: 1 | Status: SHIPPED | OUTPATIENT
Start: 2019-12-30 | End: 2020-06-14

## 2020-01-03 ENCOUNTER — TELEPHONE (OUTPATIENT)
Dept: HEPATOLOGY | Facility: CLINIC | Age: 60
End: 2020-01-03

## 2020-01-06 ENCOUNTER — TELEPHONE (OUTPATIENT)
Dept: NEUROLOGY | Facility: CLINIC | Age: 60
End: 2020-01-06

## 2020-01-06 NOTE — TELEPHONE ENCOUNTER
----- Message from Tabatha Hanley sent at 1/3/2020  1:09 PM CST -----  Contact: Paras Burgess called to discuss her current condition (Tremors) and be scheduled soon. [phone]

## 2020-01-08 DIAGNOSIS — M54.2 CHRONIC NECK PAIN: ICD-10-CM

## 2020-01-08 DIAGNOSIS — G89.29 CHRONIC NECK PAIN: ICD-10-CM

## 2020-01-08 DIAGNOSIS — M54.2 CERVICALGIA: ICD-10-CM

## 2020-01-08 RX ORDER — AMITRIPTYLINE HYDROCHLORIDE 50 MG/1
TABLET, FILM COATED ORAL
Qty: 30 TABLET | Refills: 5 | Status: SHIPPED | OUTPATIENT
Start: 2020-01-08 | End: 2020-06-23

## 2020-01-26 ENCOUNTER — PATIENT OUTREACH (OUTPATIENT)
Dept: ADMINISTRATIVE | Facility: OTHER | Age: 60
End: 2020-01-26

## 2020-01-26 DIAGNOSIS — Z12.31 ENCOUNTER FOR SCREENING MAMMOGRAM FOR MALIGNANT NEOPLASM OF BREAST: Primary | ICD-10-CM

## 2020-01-26 NOTE — PROGRESS NOTES
Chart Reviewed  Care Everywhere updated  Immunizations reconciled  Health Maintenance updated  Orders placed MAMMOGRAM  Upcoming Appts: N/A

## 2020-01-27 ENCOUNTER — PATIENT OUTREACH (OUTPATIENT)
Dept: ADMINISTRATIVE | Facility: HOSPITAL | Age: 60
End: 2020-01-27

## 2020-01-29 NOTE — ASSESSMENT & PLAN NOTE
Complex partial seizures suspected  - EEG  - MRI Brain WWO (given h/o thryoid CA)  - Seizure precautions: no driving, avoid heights, avoid cooking or bathing alone (showers ok), avoid operating heavy machinery.  Discussed with patient.     80

## 2020-02-03 DIAGNOSIS — J30.89 NON-SEASONAL ALLERGIC RHINITIS DUE TO OTHER ALLERGIC TRIGGER: ICD-10-CM

## 2020-02-03 DIAGNOSIS — B35.4 TINEA CORPORIS: ICD-10-CM

## 2020-02-03 DIAGNOSIS — M54.2 CERVICALGIA: ICD-10-CM

## 2020-02-03 DIAGNOSIS — F41.9 ANXIETY: ICD-10-CM

## 2020-02-03 RX ORDER — CLOTRIMAZOLE 1 %
CREAM (GRAM) TOPICAL 2 TIMES DAILY
Qty: 30 G | Refills: 2 | Status: SHIPPED | OUTPATIENT
Start: 2020-02-03 | End: 2023-06-21 | Stop reason: CLARIF

## 2020-02-03 RX ORDER — DICYCLOMINE HYDROCHLORIDE 10 MG/1
CAPSULE ORAL
Qty: 120 CAPSULE | Refills: 1 | Status: SHIPPED | OUTPATIENT
Start: 2020-02-03 | End: 2020-04-27

## 2020-02-03 RX ORDER — HYDROXYZINE PAMOATE 50 MG/1
CAPSULE ORAL
Qty: 180 CAPSULE | Refills: 1 | Status: SHIPPED | OUTPATIENT
Start: 2020-02-03 | End: 2020-03-30 | Stop reason: SDUPTHER

## 2020-02-03 RX ORDER — FLUTICASONE PROPIONATE 50 MCG
SPRAY, SUSPENSION (ML) NASAL
Qty: 16 G | Refills: 3 | Status: SHIPPED | OUTPATIENT
Start: 2020-02-03 | End: 2020-05-26

## 2020-02-03 RX ORDER — BACLOFEN 10 MG/1
TABLET ORAL
Qty: 90 TABLET | Refills: 2 | Status: SHIPPED | OUTPATIENT
Start: 2020-02-03 | End: 2020-04-02

## 2020-02-05 ENCOUNTER — PATIENT OUTREACH (OUTPATIENT)
Dept: ADMINISTRATIVE | Facility: HOSPITAL | Age: 60
End: 2020-02-05

## 2020-02-10 ENCOUNTER — OFFICE VISIT (OUTPATIENT)
Dept: FAMILY MEDICINE | Facility: CLINIC | Age: 60
End: 2020-02-10
Payer: MEDICAID

## 2020-02-10 VITALS
OXYGEN SATURATION: 96 % | DIASTOLIC BLOOD PRESSURE: 104 MMHG | SYSTOLIC BLOOD PRESSURE: 140 MMHG | HEIGHT: 62 IN | TEMPERATURE: 98 F | RESPIRATION RATE: 17 BRPM | HEART RATE: 80 BPM | BODY MASS INDEX: 52.05 KG/M2 | WEIGHT: 282.88 LBS

## 2020-02-10 DIAGNOSIS — E66.01 MORBID OBESITY: ICD-10-CM

## 2020-02-10 DIAGNOSIS — I10 ESSENTIAL HYPERTENSION: Primary | ICD-10-CM

## 2020-02-10 DIAGNOSIS — M54.2 CERVICALGIA: ICD-10-CM

## 2020-02-10 DIAGNOSIS — R73.09 ELEVATED RANDOM BLOOD GLUCOSE LEVEL: ICD-10-CM

## 2020-02-10 PROCEDURE — 99214 OFFICE O/P EST MOD 30 MIN: CPT | Mod: S$PBB,,, | Performed by: INTERNAL MEDICINE

## 2020-02-10 PROCEDURE — 99999 PR PBB SHADOW E&M-EST. PATIENT-LVL IV: CPT | Mod: PBBFAC,,, | Performed by: INTERNAL MEDICINE

## 2020-02-10 PROCEDURE — 99214 PR OFFICE/OUTPT VISIT, EST, LEVL IV, 30-39 MIN: ICD-10-PCS | Mod: S$PBB,,, | Performed by: INTERNAL MEDICINE

## 2020-02-10 PROCEDURE — 99214 OFFICE O/P EST MOD 30 MIN: CPT | Mod: PBBFAC,PN | Performed by: INTERNAL MEDICINE

## 2020-02-10 PROCEDURE — 99999 PR PBB SHADOW E&M-EST. PATIENT-LVL IV: ICD-10-PCS | Mod: PBBFAC,,, | Performed by: INTERNAL MEDICINE

## 2020-02-10 RX ORDER — AMLODIPINE BESYLATE 5 MG/1
5 TABLET ORAL DAILY
Qty: 30 TABLET | Refills: 3 | Status: SHIPPED | OUTPATIENT
Start: 2020-02-10 | End: 2020-08-18

## 2020-02-10 RX ORDER — LISINOPRIL 10 MG/1
10 TABLET ORAL DAILY
Qty: 30 TABLET | Refills: 3 | Status: SHIPPED | OUTPATIENT
Start: 2020-02-10 | End: 2020-07-20

## 2020-02-10 NOTE — PROGRESS NOTES
"Subjective:       Patient ID: Paras Smith is a 59 y.o. female.    Chief Complaint: Establish Care; Hypertension; and Follow-up    F/u chronic conditions    HPI: 58 y/o with chronic neck and shoulder pain morbid obesity and HTN presents for follow up after break in care. She continues to follow with outside neurologist for migraine headaches no longer taking benzos or narcotics. Has resumed smoking THC daily for pain. She uses muscle relaxer for acute neck pain up to three times daily. No motor weakness no vision changes. She has not been taking lisinopril or amlodipine because she did not know what these medications were for    Review of Systems   Constitutional: Negative for activity change, appetite change, fatigue, fever and unexpected weight change.   HENT: Negative for ear pain, rhinorrhea and sore throat.    Eyes: Negative for discharge and visual disturbance.   Respiratory: Negative for chest tightness, shortness of breath and wheezing.    Cardiovascular: Negative for chest pain, palpitations and leg swelling.   Gastrointestinal: Negative for abdominal pain, constipation and diarrhea.   Endocrine: Negative for cold intolerance and heat intolerance.   Genitourinary: Negative for dysuria and hematuria.   Musculoskeletal: Positive for arthralgias, back pain and neck pain. Negative for joint swelling and neck stiffness.   Skin: Negative for rash.   Neurological: Negative for dizziness, syncope, weakness and headaches.   Psychiatric/Behavioral: Negative for suicidal ideas.       Objective:     Vitals:    02/10/20 1354 02/10/20 1356 02/10/20 1417   BP: (!) 145/121 (!) 140/115 (!) 140/104   BP Location: Right arm Left arm    Patient Position: Sitting Sitting    Pulse: 87  80   Resp: 17     Temp: 97.5 °F (36.4 °C)     TempSrc: Oral     SpO2: 96%     Weight: 128.3 kg (282 lb 13.6 oz)     Height: 5' 2" (1.575 m)            Physical Exam   Constitutional: She is oriented to person, place, and time. She appears " well-developed and well-nourished.   HENT:   Head: Normocephalic and atraumatic.   Eyes: Conjunctivae are normal. No scleral icterus.   Neck: Normal range of motion.   Cardiovascular: Normal rate and regular rhythm. Exam reveals no gallop and no friction rub.   No murmur heard.  No LE edema   Pulmonary/Chest: Effort normal and breath sounds normal. She has no wheezes. She has no rales.   Abdominal: Soft. Bowel sounds are normal. There is no tenderness. There is no rebound and no guarding.   Musculoskeletal: Normal range of motion. She exhibits no edema or tenderness.   Neurological: She is alert and oriented to person, place, and time. No cranial nerve deficit.   Skin: Skin is warm and dry.   Left second toenail has regrown no eythema  Or palpable tenderness   Psychiatric: She has a normal mood and affect.       Assessment and Plan   1. Essential hypertension  Above goal off medications restart bp check in two weeks labs today for end organ damage  - lisinopril 10 MG tablet; Take 1 tablet (10 mg total) by mouth once daily.  Dispense: 30 tablet; Refill: 3  - amLODIPine (NORVASC) 5 MG tablet; Take 1 tablet (5 mg total) by mouth once daily.  Dispense: 30 tablet; Refill: 3  - CBC auto differential; Future  - Comprehensive metabolic panel; Future    2. Morbid obesity  The patient is asked to make an attempt to improve diet and exercise patterns to aid in medical management of this problem.  Screen for dm with a1c in light of elevated previously  - Hemoglobin A1c; Future    3. Cervicalgia  Continue management per neurologist    4. Elevated random blood glucose level  Screen with a1c for DM  - Hemoglobin A1c; Future

## 2020-02-12 ENCOUNTER — TELEPHONE (OUTPATIENT)
Dept: ENDOCRINOLOGY | Facility: CLINIC | Age: 60
End: 2020-02-12

## 2020-02-21 ENCOUNTER — CLINICAL SUPPORT (OUTPATIENT)
Dept: FAMILY MEDICINE | Facility: CLINIC | Age: 60
End: 2020-02-21
Payer: MEDICAID

## 2020-02-21 VITALS — OXYGEN SATURATION: 98 % | HEART RATE: 67 BPM | SYSTOLIC BLOOD PRESSURE: 132 MMHG | DIASTOLIC BLOOD PRESSURE: 78 MMHG

## 2020-02-21 DIAGNOSIS — I10 ESSENTIAL HYPERTENSION: Primary | ICD-10-CM

## 2020-02-21 PROCEDURE — 99499 NO LOS: ICD-10-PCS | Mod: S$PBB,,, | Performed by: INTERNAL MEDICINE

## 2020-02-21 PROCEDURE — 99999 PR PBB SHADOW E&M-EST. PATIENT-LVL II: CPT | Mod: PBBFAC,,,

## 2020-02-21 PROCEDURE — 99499 UNLISTED E&M SERVICE: CPT | Mod: S$PBB,,, | Performed by: INTERNAL MEDICINE

## 2020-02-21 PROCEDURE — 99212 OFFICE O/P EST SF 10 MIN: CPT | Mod: PBBFAC,PN

## 2020-02-21 PROCEDURE — 99999 PR PBB SHADOW E&M-EST. PATIENT-LVL II: ICD-10-PCS | Mod: PBBFAC,,,

## 2020-02-21 NOTE — PROGRESS NOTES
Naheed Smith 59 y.o. female is here today for Blood Pressure check.   History of HTN yes.    Review of patient's allergies indicates:   Allergen Reactions    Vioxx [rofecoxib] Rash     Creatinine   Date Value Ref Range Status   02/10/2020 0.7 0.5 - 1.4 mg/dL Final     Sodium   Date Value Ref Range Status   02/10/2020 140 136 - 145 mmol/L Final     Potassium   Date Value Ref Range Status   02/10/2020 4.0 3.5 - 5.1 mmol/L Final   ]  Patient denies taking blood pressure medications on a regular basis at the same time of the day.     Current Outpatient Medications:     acetaminophen (TYLENOL) 650 MG TbSR, Take 1 tablet (650 mg total) by mouth every 8 (eight) hours as needed., Disp: 30 tablet, Rfl: 0    albuterol (PROVENTIL/VENTOLIN HFA) 90 mcg/actuation inhaler, INHALE TWO PUFFS BY MOUTH EVERY 6 HOURS AS NEEDED, Disp: 18 g, Rfl: 2    amitriptyline (ELAVIL) 50 MG tablet, TAKE ONE TABLET BY MOUTH AT BEDTIME AS NEEDED, Disp: 30 tablet, Rfl: 5    amLODIPine (NORVASC) 5 MG tablet, Take 1 tablet (5 mg total) by mouth once daily., Disp: 30 tablet, Rfl: 3    anthralin 1.2 % CmRR, , Disp: , Rfl: 0    aspirin 325 MG tablet, Take 325 mg by mouth once daily., Disp: , Rfl: 11    atorvastatin (LIPITOR) 40 MG tablet, Take 40 mg by mouth once daily., Disp: , Rfl:     baclofen (LIORESAL) 10 MG tablet, TAKE ONE TABLET BY MOUTH THREE TIMES DAILY, Disp: 90 tablet, Rfl: 2    clobetasol (TEMOVATE) 0.05 % cream, Apply topically once daily. For five days, Disp: 45 g, Rfl: 0    clotrimazole (LOTRIMIN) 1 % cream, Apply topically 2 (two) times daily., Disp: 30 g, Rfl: 2    diazePAM (VALIUM) 10 MG Tab, Take 10 mg by mouth., Disp: , Rfl:     dicyclomine (BENTYL) 10 MG capsule, TAKE ONE CAPSULE BY MOUTH TWICE DAILY BEFORE meals AND AT BEDTIME, Disp: 120 capsule, Rfl: 1    ergocalciferol (VITAMIN D2) 50,000 unit Cap, TAKE ONE CAPSULE BY MOUTH once every week, Disp: 12 capsule, Rfl: 1    fluticasone propionate (FLONASE) 50  mcg/actuation nasal spray, USE ONE SPRAY IN EACH NOSTRIL TWICE DAILY, Disp: 16 g, Rfl: 3    gabapentin (NEURONTIN) 300 MG capsule, Take 600 mg by mouth 3 (three) times daily. , Disp: , Rfl: 0    hydrOXYzine pamoate (VISTARIL) 50 MG Cap, TAKE ONE CAPSULE BY MOUTH EVERY 6 HOURS AS NEEDED FOR ANXIETY, Disp: 180 capsule, Rfl: 1    ipratropium (ATROVENT HFA) 17 mcg/actuation inhaler, Inhale 2 puffs into the lungs every 6 (six) hours. Rescue, Disp: 12.9 g, Rfl: 0    lidocaine (LMX 4) 4 % cream, Apply topically as needed., Disp: 30 g, Rfl: 1    lidocaine-prilocaine (EMLA) cream, , Disp: , Rfl: 0    lisinopril 10 MG tablet, Take 1 tablet (10 mg total) by mouth once daily., Disp: 30 tablet, Rfl: 3    magnesium oxide (MAG-OX) 400 mg tablet, Take 1 tablet (400 mg total) by mouth 2 (two) times daily. (Patient not taking: Reported on 2/10/2020), Disp: , Rfl: 0    multivitamin capsule, Take 1 capsule by mouth., Disp: , Rfl:     naproxen (NAPROSYN) 375 MG tablet, , Disp: , Rfl: 0    neomycin-polymyxin-hydrocortisone (CORTISPORIN) 3.5-10,000-1 mg/mL-unit/mL-% otic suspension, Place 3 drops into both ears 3 (three) times daily., Disp: 10 mL, Rfl: 0    nitroGLYCERIN 0.4 MG/HR TD PT24 (NITRODUR) 0.4 mg/hr, apply 1 patch ONTO THE SKIN ONCE DAILY, Disp: 30 patch, Rfl: 11    ondansetron (ZOFRAN-ODT) 4 MG TbDL, Take 1 tablet (4 mg total) by mouth every 12 (twelve) hours as needed., Disp: 10 tablet, Rfl: 0    pantoprazole (PROTONIX) 40 MG tablet, TAKE ONE TABLET BY MOUTH ONCE A DAY, Disp: 30 tablet, Rfl: 2    promethazine (PHENERGAN) 25 MG tablet, take 1 tablet by mouth every 8 hours if needed for nausea and vomiting, Disp: , Rfl: 0    REXULTI 0.5 mg Tab, 0.5 mg., Disp: , Rfl: 0    sertraline (ZOLOFT) 100 MG tablet, Take 100 mg by mouth., Disp: , Rfl:     SYNTHROID 112 mcg tablet, TAKE ONE TABLET BY MOUTH ONCE A DAY, Disp: 30 tablet, Rfl: 5    topiramate (TOPAMAX) 100 MG tablet, Take 50 mg by mouth once daily., Disp: ,  Rfl: 0    Current Facility-Administered Medications:     onabotulinumtoxina injection 200 Units, 200 Units, Intramuscular, Q90 Days, Aundrea Pathak MD    Facility-Administered Medications Ordered in Other Visits:     0.9%  NaCl infusion, , Intravenous, Continuous, Chavo Wray MD    0.9%  NaCl infusion, , Intravenous, Continuous, Chavo Wray MD    sodium chloride 0.9% flush 3 mL, 3 mL, Intravenous, PRN, Chavo Wray MD    sodium chloride 0.9% flush 3 mL, 3 mL, Intravenous, PRN, Chavo Wray MD  Does patient have record of home blood pressure readings no. Readings have been averaging .   Last dose of blood pressure medication was taken at 6:30AM.  Patient is asymptomatic.   Complains of  Upper abdominal pain.    BP: 132/78 , Pulse: 67 .    Blood pressure reading after 15 minutes was not repeated  Dr. Lizarraga notified.

## 2020-03-02 RX ORDER — PANTOPRAZOLE SODIUM 40 MG/1
TABLET, DELAYED RELEASE ORAL
Qty: 30 TABLET | Refills: 2 | Status: SHIPPED | OUTPATIENT
Start: 2020-03-02 | End: 2020-07-20

## 2020-03-30 DIAGNOSIS — F41.9 ANXIETY: ICD-10-CM

## 2020-03-30 RX ORDER — HYDROXYZINE PAMOATE 50 MG/1
CAPSULE ORAL
Qty: 180 CAPSULE | Refills: 1 | Status: SHIPPED | OUTPATIENT
Start: 2020-03-30 | End: 2020-05-27 | Stop reason: SDUPTHER

## 2020-03-30 NOTE — TELEPHONE ENCOUNTER
----- Message from Cherri Schulte sent at 3/30/2020 10:29 AM CDT -----  Contact: Patient   Type: RX Refill Request    Who Called: Patient     Have you contacted your pharmacy: no     Refill or New Rx: Refill     RX Name and Strength: hydrOXYzine pamoate (VISTARIL) 50 MG Cap    How is the patient currently taking it? (ex. 1XDay):    Is this a 30 day or 90 day RX:    Preferred Pharmacy with phone number:   [facility] - [place] [id number] [facility] [place] [id number]  Phone: [phone] Fax: [phone]        Local or Mail Order: Local     Ordering Provider: Dr. Lizarraga     Would the patient rather a call back or a response via [facility]? Call back     Best Call Back Number:[phone]      Additional Information: Pt states that she has extreme anxiety

## 2020-04-01 ENCOUNTER — PATIENT MESSAGE (OUTPATIENT)
Dept: ENDOCRINOLOGY | Facility: CLINIC | Age: 60
End: 2020-04-01

## 2020-04-02 DIAGNOSIS — M54.2 CERVICALGIA: ICD-10-CM

## 2020-04-02 RX ORDER — BACLOFEN 10 MG/1
TABLET ORAL
Qty: 90 TABLET | Refills: 0 | Status: SHIPPED | OUTPATIENT
Start: 2020-04-02 | End: 2020-05-13

## 2020-04-02 NOTE — TELEPHONE ENCOUNTER
----- Message from Ruma Josh sent at 4/2/2020  1:38 PM CDT -----  Contact: Self  [phone]  Type: Patient Call Back    Who called:Self    What is the request in detail: pt is calling in regards to getting a nebulizer or some medication to help with her asthma    Can the clinic reply by [facility]? Call back    Would the patient rather a call back or a response via [facility]? Call back    Best call back number: [phone]

## 2020-04-02 NOTE — TELEPHONE ENCOUNTER
"I spoke to the pt and she reports that she was seen at  in February and was given a breathing treatment. She is requesting an order for a nebulizer and medication to use for her Asthma. Pt states she had a nebulizer in the past but it was destroyed during Nicki. Pt reports that she does not have any sx now and she wants it "just in case". I advised I could send a request to PCP and get back to her Monday as PCP is not in the office. Pt refuses and states she needs the order now. Pt offered Virtual visit with another provider. She agrees and was given instructions on how to download the cris. Pt will download the cris and call back to schedule a virtual visit.  "

## 2020-04-06 ENCOUNTER — PATIENT MESSAGE (OUTPATIENT)
Dept: FAMILY MEDICINE | Facility: CLINIC | Age: 60
End: 2020-04-06

## 2020-04-06 DIAGNOSIS — J45.40 MODERATE PERSISTENT ASTHMA, UNSPECIFIED WHETHER COMPLICATED: Primary | ICD-10-CM

## 2020-04-06 RX ORDER — ALBUTEROL SULFATE 0.63 MG/3ML
0.63 SOLUTION RESPIRATORY (INHALATION) EVERY 6 HOURS PRN
Qty: 1 BOX | Refills: 0 | Status: SHIPPED | OUTPATIENT
Start: 2020-04-06 | End: 2020-05-19 | Stop reason: SDUPTHER

## 2020-04-06 RX ORDER — ALBUTEROL SULFATE 90 UG/1
2 AEROSOL, METERED RESPIRATORY (INHALATION) EVERY 6 HOURS PRN
Qty: 18 G | Refills: 2 | Status: SHIPPED | OUTPATIENT
Start: 2020-04-06 | End: 2020-06-03 | Stop reason: SDUPTHER

## 2020-04-09 ENCOUNTER — TELEPHONE (OUTPATIENT)
Dept: FAMILY MEDICINE | Facility: CLINIC | Age: 60
End: 2020-04-09

## 2020-04-09 NOTE — TELEPHONE ENCOUNTER
----- Message from Priscilla Ortiz sent at 4/9/2020  8:59 AM CDT -----  Contact: pt  Type: Patient Call Back    Who called:pt    What is the request in detail:pt has questions about code for her virtual appt. Call pt    Can the clinic reply by [facility]?    Would the patient rather a call back or a response via [facility]? call    Best call back number:[phone]    Additional Information:

## 2020-04-16 ENCOUNTER — OFFICE VISIT (OUTPATIENT)
Dept: FAMILY MEDICINE | Facility: CLINIC | Age: 60
End: 2020-04-16
Payer: MEDICAID

## 2020-04-16 DIAGNOSIS — I10 ESSENTIAL HYPERTENSION: Primary | ICD-10-CM

## 2020-04-16 DIAGNOSIS — E66.01 MORBID OBESITY DUE TO EXCESS CALORIES: ICD-10-CM

## 2020-04-16 DIAGNOSIS — M54.2 CERVICALGIA: ICD-10-CM

## 2020-04-16 DIAGNOSIS — G43.019 INTRACTABLE MIGRAINE WITHOUT AURA AND WITHOUT STATUS MIGRAINOSUS: ICD-10-CM

## 2020-04-16 DIAGNOSIS — J30.89 NON-SEASONAL ALLERGIC RHINITIS DUE TO OTHER ALLERGIC TRIGGER: ICD-10-CM

## 2020-04-16 PROCEDURE — 99213 PR OFFICE/OUTPT VISIT, EST, LEVL III, 20-29 MIN: ICD-10-PCS | Mod: 95,,, | Performed by: INTERNAL MEDICINE

## 2020-04-16 PROCEDURE — 99213 OFFICE O/P EST LOW 20 MIN: CPT | Mod: 95,,, | Performed by: INTERNAL MEDICINE

## 2020-04-16 RX ORDER — BENZONATATE 200 MG/1
200 CAPSULE ORAL 3 TIMES DAILY PRN
Qty: 60 CAPSULE | Refills: 0 | Status: SHIPPED | OUTPATIENT
Start: 2020-04-16 | End: 2020-04-26

## 2020-04-16 NOTE — PROGRESS NOTES
Subjective:       Patient ID: Paras Smith is a 59 y.o. female.    Chief Complaint: No chief complaint on file.    The patient location is: in her home in [place]  The chief complaint leading to consultation is: cough neck pain  Visit type: audio only  Total time spent with patient: 15 minutes  Each patient to whom he or she provides medical services by telemedicine is:  (1) informed of the relationship between the physician and patient and the respective role of any other health care provider with respect to management of the patient; and (2) notified that he or she may decline to receive medical services by telemedicine and may withdraw from such care at any time.    Notes: patient with long history of chronic cough/wheezing on daily inhaler no fevers chills has been self isolating chronic neck pain. No change in breathing using nebulizer (lives alone) once daily with relief. No diarrhrra or abdominal pain. Using flonase intermittently       Review of Systems   Constitutional: Negative for activity change, appetite change, fatigue, fever and unexpected weight change.   HENT: Positive for congestion and rhinorrhea. Negative for ear pain, sore throat and trouble swallowing.    Eyes: Negative for discharge and visual disturbance.   Respiratory: Positive for cough. Negative for chest tightness, shortness of breath and wheezing.    Cardiovascular: Negative for chest pain, palpitations and leg swelling.   Gastrointestinal: Negative for abdominal pain, constipation and diarrhea.   Endocrine: Negative for cold intolerance and heat intolerance.   Genitourinary: Negative for dysuria and hematuria.   Musculoskeletal: Positive for back pain, myalgias and neck pain. Negative for joint swelling and neck stiffness.   Skin: Negative for rash.   Neurological: Negative for dizziness, syncope, weakness and headaches.   Psychiatric/Behavioral: Negative for suicidal ideas.       Objective:   There were no vitals filed for  this visit.       Physical Exam    Assessment and Plan   1. Essential hypertension  BP at goal at recent cardiologist appointment 3/11 continue acei and ccb    2. Non-seasonal allergic rhinitis due to other allergic trigger  Recommend regular twice daily nasal steroid    3. Intractable migraine without aura and without status migrainosus  Followed by neurologist    4. Morbid obesity due to excess calories  The patient is asked to make an attempt to improve diet and exercise patterns to aid in medical management of this problem.      5. Cervicalgia  Prn muscle relaxer

## 2020-04-23 ENCOUNTER — PATIENT OUTREACH (OUTPATIENT)
Dept: ADMINISTRATIVE | Facility: OTHER | Age: 60
End: 2020-04-23

## 2020-04-24 ENCOUNTER — TELEPHONE (OUTPATIENT)
Dept: HEPATOLOGY | Facility: CLINIC | Age: 60
End: 2020-04-24

## 2020-04-24 ENCOUNTER — OFFICE VISIT (OUTPATIENT)
Dept: HEPATOLOGY | Facility: CLINIC | Age: 60
End: 2020-04-24
Payer: MEDICAID

## 2020-04-24 DIAGNOSIS — K76.0 FATTY LIVER: Primary | ICD-10-CM

## 2020-04-24 PROCEDURE — 99213 OFFICE O/P EST LOW 20 MIN: CPT | Mod: 95,,, | Performed by: NURSE PRACTITIONER

## 2020-04-24 PROCEDURE — 99213 PR OFFICE/OUTPT VISIT, EST, LEVL III, 20-29 MIN: ICD-10-PCS | Mod: 95,,, | Performed by: NURSE PRACTITIONER

## 2020-04-24 NOTE — PROGRESS NOTES
[facility] - Established Patient    Last Clinic Visit: 4/23/19    Video visit  The patient location is: Home, [place]  The chief complaint leading to consultation is: F/u for fatty liver  Visit type: audiovisual  Total time spent with patient: 40 min  Each patient to whom he or she provides medical services by telemedicine is:  (1) informed of the relationship between the physician and patient and the respective role of any other health care provider with respect to management of the patient; and (2) notified that he or she may decline to receive medical services by telemedicine and may withdraw from such care at any time.      HPI: This is a 59 y.o. White female here for follow-up for fatty liver.  Previously followed by Dr. Gaines; new patient to me.    Hepatic steatosis first noted on imaging in 2017.  Simple hepatic cyst also present.    Fibrosis staging:   Fibroscan 7/2017 = F0-F1     Transaminases essentially normal.  Alk phos mildly elevated, 140-150s.   Serological workup has been negative for Danish's, alpha-1 antitrypsin deficiency, hemochromatosis, autoimmune etiology, and viral hepatitis.    Updates on risk factors for fatty liver:  · Weight -- Weight trending up, BMI ~51                        · Dyslipidemia -- previously well controlled on statin                              · Insulin resistance / diabetes -- labs now show pre-diabetes (HgbA1c 5.8)         · Hypertension -- running higher over the last few months, she thinks this is from pain  · Alcohol use -- none, reports history of heavy alcohol use multiple years ago    She continues to have intermittent RUQ abdominal pain which she is concerned is due to her liver. Previously advised that fatty liver and small hepatic cysts should not cause pain. Takes bentyl; also using zofran/phenergan PRN nausea. GI workup with unremarkable EGD/colonoscopy.    Denies symptoms of hepatic decompensation including jaundice, ascites, cognitive problems to  suggest hepatic encephalopathy, or GI bleeding.     (+) hep A immunity, completed hep B vaccines    Interested in seeing a pulmonologist for SOB when walking short distances. Noted h/o asthma and bronchitis. Has not had PFTs in a while. Also concerned about family h/o lung CA.            Review of patient's allergies indicates:   Allergen Reactions    Vioxx [rofecoxib] Rash        Current Outpatient Medications on File Prior to Visit   Medication Sig Dispense Refill    acetaminophen (TYLENOL) 650 MG TbSR Take 1 tablet (650 mg total) by mouth every 8 (eight) hours as needed. 30 tablet 0    albuterol (ACCUNEB) 0.63 mg/3 mL Nebu Take 3 mLs (0.63 mg total) by nebulization every 6 (six) hours as needed. Rescue 1 Box 0    albuterol (PROVENTIL/VENTOLIN HFA) 90 mcg/actuation inhaler Inhale 2 puffs into the lungs every 6 (six) hours as needed. Rescue 18 g 2    amitriptyline (ELAVIL) 50 MG tablet TAKE ONE TABLET BY MOUTH AT BEDTIME AS NEEDED 30 tablet 5    amLODIPine (NORVASC) 5 MG tablet Take 1 tablet (5 mg total) by mouth once daily. 30 tablet 3    anthralin 1.2 % CmRR   0    aspirin 325 MG tablet Take 325 mg by mouth once daily.  11    baclofen (LIORESAL) 10 MG tablet TAKE ONE TABLET BY MOUTH THREE TIMES DAILY 90 tablet 0    benzonatate (TESSALON) 200 MG capsule Take 1 capsule (200 mg total) by mouth 3 (three) times daily as needed for Cough. 60 capsule 0    clobetasol (TEMOVATE) 0.05 % cream Apply topically once daily. For five days 45 g 0    clotrimazole (LOTRIMIN) 1 % cream Apply topically 2 (two) times daily. 30 g 2    diazePAM (VALIUM) 10 MG Tab Take 10 mg by mouth.      dicyclomine (BENTYL) 10 MG capsule TAKE ONE CAPSULE BY MOUTH TWICE DAILY BEFORE meals AND AT BEDTIME 120 capsule 1    ergocalciferol (VITAMIN D2) 50,000 unit Cap TAKE ONE CAPSULE BY MOUTH once every week 12 capsule 1    fluticasone propionate (FLONASE) 50 mcg/actuation nasal spray USE ONE SPRAY IN EACH NOSTRIL TWICE DAILY 16 g 3     gabapentin (NEURONTIN) 300 MG capsule Take 600 mg by mouth 3 (three) times daily.   0    hydrOXYzine pamoate (VISTARIL) 50 MG Cap TAKE ONE CAPSULE BY MOUTH EVERY 6 HOURS AS NEEDED FOR ANXIETY 180 capsule 1    ipratropium (ATROVENT HFA) 17 mcg/actuation inhaler Inhale 2 puffs into the lungs every 6 (six) hours. Rescue 12.9 g 0    lidocaine (LMX 4) 4 % cream Apply topically as needed. 30 g 1    lidocaine-prilocaine (EMLA) cream   0    lisinopril 10 MG tablet Take 1 tablet (10 mg total) by mouth once daily. 30 tablet 3    multivitamin capsule Take 1 capsule by mouth.      naproxen (NAPROSYN) 375 MG tablet   0    neomycin-polymyxin-hydrocortisone (CORTISPORIN) 3.5-10,000-1 mg/mL-unit/mL-% otic suspension Place 3 drops into both ears 3 (three) times daily. 10 mL 0    nitroGLYCERIN 0.4 MG/HR TD PT24 (NITRODUR) 0.4 mg/hr apply 1 patch ONTO THE SKIN ONCE DAILY 30 patch 11    ondansetron (ZOFRAN-ODT) 4 MG TbDL Take 1 tablet (4 mg total) by mouth every 12 (twelve) hours as needed. 10 tablet 0    pantoprazole (PROTONIX) 40 MG tablet TAKE ONE TABLET BY MOUTH ONCE A DAY 30 tablet 2    promethazine (PHENERGAN) 25 MG tablet take 1 tablet by mouth every 8 hours if needed for nausea and vomiting  0    REXULTI 0.5 mg Tab 0.5 mg.  0    sertraline (ZOLOFT) 100 MG tablet Take 100 mg by mouth.      SYNTHROID 112 mcg tablet TAKE ONE TABLET BY MOUTH ONCE A DAY 30 tablet 5    topiramate (TOPAMAX) 100 MG tablet Take 50 mg by mouth once daily.  0    [] atorvastatin (LIPITOR) 40 MG tablet Take 40 mg by mouth once daily.       Current Facility-Administered Medications on File Prior to Visit   Medication Dose Route Frequency Provider Last Rate Last Dose    0.9%  NaCl infusion   Intravenous Continuous Chavo Wray MD        0.9%  NaCl infusion   Intravenous Continuous Chavo Wray MD        onabotulinumtoxina injection 200 Units  200 Units Intramuscular Q90 Days Aundrea Pathak MD        sodium chloride 0.9% flush 3 mL   3 mL Intravenous PRN Chavo Wray MD        sodium chloride 0.9% flush 3 mL  3 mL Intravenous PRN Chavo Wray MD             PMHX:  has a past medical history of Asthma, CAD (coronary artery disease), Cervical spine disease, Depression, Difficult intubation, Falls (2018), GERD (gastroesophageal reflux disease), Glaucoma, Helicobacter pylori (H. pylori) infection, History of stomach ulcers, HTN (hypertension), Muscle weakness, Neck problem, Nuclear cataract (2014), Postsurgical hypothyroidism, Stroke, Thyroid cancer, and Vitamin D deficiency disease.    PSHX:  has a past surgical history that includes Total thyroidectomy; Coronary angioplasty with stent; Cervical spine surgery; Thyroid surgery; Appendectomy; Patella surgery (); Cholecystectomy;  section, low transverse; Esophagogastroduodenoscopy (N/A, 2018); and Colonoscopy (N/A, 2018).    FAMILY HISTORY:   Family History   Problem Relation Age of Onset    Diabetes Father     Kidney failure Father     Cataracts Father     Asthma Father     Glaucoma Father     Heart disease Mother         has pacemaker    Hypertension Mother     Asthma Mother     Cancer Mother         lung stage 4    Ovarian cancer Mother     Breast cancer Mother     Hypertension Brother     Heart disease Brother     Cervical cancer Daughter     Scoliosis Son     Hypertension Son     Hypertension Daughter     Seizures Daughter     Lupus Daughter     Cervical cancer Daughter     Mental illness Son         bipolar    Cancer Sister     Liver disease Sister     No Known Problems Brother     No Known Problems Daughter     Breast cancer Maternal Aunt     No Known Problems Maternal Uncle     No Known Problems Paternal Aunt     No Known Problems Paternal Uncle     Stomach cancer Maternal Grandmother     No Known Problems Paternal Grandmother     No Known Problems Paternal Grandfather     Stomach cancer Maternal Aunt     Breast cancer Maternal  Aunt     Breast cancer Maternal Aunt     Esophageal cancer Neg Hx        SOCIAL HISTORY:   Social History     Tobacco Use   Smoking Status Never Smoker   Smokeless Tobacco Never Used   Tobacco Comment    No cigarrettes, only marijuana occasionally       Social History     Substance and Sexual Activity   Alcohol Use No    Comment: recovering alcoholic       Social History     Substance and Sexual Activity   Drug Use Yes    Frequency: 6.0 times per week    Types: Marijuana    Comment: ocasional         Review of Systems   Constitutional: Negative for appetite change, chills, fatigue, fever. (+) weight gain  Eyes: Negative for visual disturbance.   Respiratory: Negative for cough. (+) AGUIRRE   Cardiovascular: Negative for chest pain, palpitations and leg swelling.   Gastrointestinal: Negative for abdominal distention, blood in stool, constipation, diarrhea, vomiting. No change in stool color. (+) RUQ pain, intermittent nausea  Genitourinary: Negative for dysuria and hematuria. Denies dark urine.   Musculoskeletal: Negative for arthralgias, gait problem, joint swelling and myalgias.   Skin: Negative for color change, rash and wound. Denies itching.   Neurological: Negative for dizziness, tremors, speech difficulty, and headaches.   Hematological: Does not bruise/bleed easily.   Psychiatric/Behavioral: Negative for confusion, decreased concentration and sleep disturbance. Denies memory loss. Denies depression. (+) anxiety       Physical Exam   Limited by video visit  Constitutional: No distress. Alert and oriented to person, place, and time.  Eyes: No scleral icterus.   Pulmonary/Chest: No evidence of respiratory distress.   Skin: No jaundice.   Psychiatric: Normal mood and affect. Speech, behavior, and thought content normal. No depression or anxiety noted.         LABS:  Lab Results   Component Value Date    WBC 9.43 02/10/2020    HGB 15.4 02/10/2020    HCT 46.9 02/10/2020     02/10/2020     02/10/2020     K 4.0 02/10/2020    CREATININE 0.7 02/10/2020    ALT 33 02/10/2020    AST 20 02/10/2020    ALKPHOS 156 (H) 02/10/2020    BILITOT 0.7 02/10/2020    BILIDIR 0.2 04/26/2019    ALBUMIN 4.1 02/10/2020    INR 0.9 05/01/2018    SMOOTHMUSCAB Negative 1:40 07/25/2017    IGGSERUM 1026 07/25/2017    ANASCREEN Negative <1:160 07/25/2017    FERRITIN 110 07/25/2017    FESATURATED 16 (L) 07/25/2017    HTMNN4KQITLI MM 07/25/2017    CEUNX7BHRLVO 160 07/25/2017    CERULOPLSM 27.0 07/25/2017    CHOL 193 04/26/2019    TRIG 139 04/26/2019    LDLCALC 108.2 04/26/2019    HGBA1C 5.8 (H) 02/10/2020       Hepatitis A Antibody IgG   Date Value Ref Range Status   07/25/2017 Positive (A)  Final       Hepatitis B Surface Ag   Date Value Ref Range Status   07/25/2017 Negative  Final     Hep B Core Total Ab   Date Value Ref Range Status   07/25/2017 Negative  Final     Hep B S Ab   Date Value Ref Range Status   07/25/2017 Negative  Final     Hepatitis C Ab   Date Value Ref Range Status   03/23/2018 Negative  Final     Immunization History   Administered Date(s) Administered    Hepatitis B, Adult 09/06/2017, 10/13/2017, 03/06/2018    Influenza 09/22/2009, 10/22/2013, 11/11/2015    Influenza - Quadrivalent 10/22/2014    Influenza - Quadrivalent - PF (6 months and older) 10/22/2018    Influenza Split 09/22/2009, 10/22/2013    Pneumococcal Conjugate - 13 Valent 10/22/2018          DIAGNOSTIC STUDIES:  ABD. US - 2/28/18  Findings:    The visualized portion of the pancreas is unremarkable.      The liver is normal in size measuring 17.7 cm.  The liver demonstrates diffuse hepatic attenuation.  No focal hepatic lesions are seen.  Elevated hepatorenal index measuring 1.6.  There is a simple cyst measuring 0.9 x 1.1 x 0.8 cm.    The gallbladder is surgically removed.  The common duct is not dilated, measuring 3 mm.  No dilated intrahepatic radicles are seen.    The spleen is normal in size measuring 10.7 cm with a homogeneous echotexture.    The  aorta tapers normally.    The kidneys are normal in size without focal abnormality or evidence of hydronephrosis.      There is no evidence of ascites.    Slow flow within the portal veins, otherwise the main portal vein, right portal vein, left portal vein, middle hepatic vein, right hepatic vein, left hepatic vein, SMV, and IVC are patent with proper directional flow.  The main hepatic artery is patent with a resistive index of 0.64.  The umbilical vein is not patent.      Impression         Liver steatosis and simple cyst within the right lobe.    Status post cholecystectomy.         ASSESSMENT / PLAN:  59 y.o. White female with:    1. Fatty liver   -- Labs, imaging, and Fibroscan results reviewed with patient. Fibroscan previously suggested no fibrosis (F0-F1)  -- Needs updated fibrosis staging though would obtain MRI elastography given BMI >50  -- Discussed importance of lifestyle modifications including healthy diet and adequate physical activity to achieve and maintain ideal body weight.   -- Low carbohydrate, low sugar diet.  -- Maintain control of blood pressure, cholesterol, and blood sugar as these are risk factors for fatty liver    *If statins are being considered for HLD, statins are safe in patients with liver disease. Statins can be used to treat dyslipidemia in patients with both NAFLD and WRIGHT.    2. Elevated alk phos  -- Serological workup negative, likely due to fatty liver  -- Check AMA and GGT with next labs    3. Abdominal pain  -- Reassured that intermittent abdominal pain is likely not related to fatty liver and/or simple liver cyst. Recommend f/u with GI if pain persists.      4. BMI 51, pre-diabetes, HTN, HLD        Labs and MRI elastography in 6 months.  Return to clinic in 1 year.        Orders Placed This Encounter   Procedures    MR Elastography    Gamma GT    Antimitochondrial Antibody    Hepatic function panel         EDUCATION / DISCUSSION:   We discussed the manifestations of  fatty liver. At this time there is no FDA approved therapy for fatty liver. The patient and I discussed the importance of lifestyle modifications such as diet, exercise, and weight loss for management of fatty liver. We reviewed modification of risk factors such as hypertension, hyperlipidemia, and diabetes mellitus / impaired fasting glucose. Discussed that excessive alcohol consumption can also cause fatty liver. We discussed a low carb, low sugar diet and a goal of 30 minutes of exercise 5 times per week. Patient is aware that fatty liver can progress to steatohepatitis and possibly to cirrhosis, putting one at increased risk for liver cancer or liver failure.           Thank you for allowing me to participate in the care of Paras Eder Lancaster, FNP-C  Hepatology and Liver Transplant

## 2020-04-24 NOTE — Clinical Note
1. Please schedule labs (hepatic panel, AMA, GGT) and MRI elastography in 6 months2. Enter recall for f/u visit in 1 year. Thanks!

## 2020-04-24 NOTE — TELEPHONE ENCOUNTER
----- Message from Josefina Lancaster NP sent at 4/24/2020 12:08 PM CDT -----  1. Please schedule labs (hepatic panel, AMA, GGT) and MRI elastography in 6 months  2. Enter recall for f/u visit in 1 year. Thanks!

## 2020-04-27 RX ORDER — DICYCLOMINE HYDROCHLORIDE 10 MG/1
CAPSULE ORAL
Qty: 120 CAPSULE | Refills: 1 | Status: SHIPPED | OUTPATIENT
Start: 2020-04-27 | End: 2020-05-25

## 2020-04-28 DIAGNOSIS — C73 THYROID CANCER: ICD-10-CM

## 2020-04-28 RX ORDER — LEVOTHYROXINE SODIUM 112 UG/1
TABLET ORAL
Qty: 30 TABLET | Refills: 3 | Status: SHIPPED | OUTPATIENT
Start: 2020-04-28 | End: 2020-07-02 | Stop reason: SDUPTHER

## 2020-05-04 ENCOUNTER — PATIENT MESSAGE (OUTPATIENT)
Dept: ENDOCRINOLOGY | Facility: CLINIC | Age: 60
End: 2020-05-04

## 2020-05-08 ENCOUNTER — PATIENT OUTREACH (OUTPATIENT)
Dept: ADMINISTRATIVE | Facility: OTHER | Age: 60
End: 2020-05-08

## 2020-05-08 ENCOUNTER — OFFICE VISIT (OUTPATIENT)
Dept: ENDOCRINOLOGY | Facility: CLINIC | Age: 60
End: 2020-05-08
Payer: MEDICAID

## 2020-05-08 DIAGNOSIS — C73 THYROID CANCER: ICD-10-CM

## 2020-05-08 DIAGNOSIS — E89.0 POSTSURGICAL HYPOTHYROIDISM: Primary | ICD-10-CM

## 2020-05-08 PROCEDURE — 99213 PR OFFICE/OUTPT VISIT, EST, LEVL III, 20-29 MIN: ICD-10-PCS | Mod: 95,,, | Performed by: INTERNAL MEDICINE

## 2020-05-08 PROCEDURE — 99213 OFFICE O/P EST LOW 20 MIN: CPT | Mod: 95,,, | Performed by: INTERNAL MEDICINE

## 2020-05-08 NOTE — ASSESSMENT & PLAN NOTE
--Post surgical hypothyroidism / thyroid cancer - with thyroglobulin that is now detectable (biochemical incomplete disease), with a non-specific focus in the left thyroid bed  --Previous diagnostic whole body scans did show that she had residual tissue in the thyroid bed that was iodine avid, the increase in Tg could potentially be explained by this residual tissue, and now that her TSH is less suppressed we are detecting thyroglobulin  --We did decide to FNA the area in the left thyroid bed, and while this came back unsatisfactory the Tg wash was negative so this could represent scar or other tissue  --Will repeat Tg and neck ultrasound when able

## 2020-05-08 NOTE — PROGRESS NOTES
Subjective:      Patient ID: Paras Smith is a 59 y.o. female.    Chief Complaint:  Hypothyroidism and Thyroid Cancer      History of Present Illness  Ms. Smith presents for follow up of thyroid cancer and hypothyroidism. Last visit 5/2019.     Previous patient of Dr. Correa.     She is s/p total thyroidectomy for thyroid cancer  in 2008 followed by I-131 tx with 100 mCi on 11/18/2008      Initially had left lobectomy and isthmusectomy that showed a invasive follicular carcinoma with vascular invasion.      Post treatment scan showed no evidence of distant metastatic disease.     Had Tg that was undetectable after thyroid hormone withdrawal in 12/2011.     Follow up TBS performed in 2010 and 2012 noted increase uptake within the neck which could represent residual thyroid tissue or malignancy. There is no evidence of distant metastatic disease      Thyroglobulin levels have been undetectable over time, until recently her Tg increased to 0.3 in 4/2019. She had been on significant TSH suppression until 2019 when her thyroid hormone was reduced.     No family history of thyroid cancer. No history of head or neck irradiation.      Currently, she is taking Synthroid 112 mcg once daily.  Takes on an empty stomach and waits 1 hr before taking food or other meds. She was missing doses previously but as of the past several months she now has a pill organizer and has not missed any doses.       She does note more fatigue recently.   Denies palpitations. She does have a chronic tremor in her right hand.      Ultrasound 5/2019:       Status post thyroidectomy.  Echogenic tissue in the left thyroid bed measuring 1.5 x 1.0 x 0.9 cm, possibly residual thyroid tissue, not seen on prior study.  No thyroid tissue seen on the right.  No evidence for cervical lymphadenopathy.       Impression         Questionable thyroid tissue on the left.  Consider further evaluation with nuclear medicine thyroid scan.     FNA 5/2019:  FINAL  "PATHOLOGIC DIAGNOSIS  FINE-NEEDLE ASPIRATE OF LEFT THYROID:  THE [facility] SYSTEM FOR REPORTING THYROID CYTOPATHOLOGY I NONDIAGNOSTIC OR  UNSATISFACTORY  Acellular specimen. Although colloid is present, it is not abundant .    Results for HAMILTON MIN (MRN [id number]) as of 5/8/2020 08:15   Ref. Range 5/3/2019 08:23   Thyroglobulin, FNA, Lymph Node Latest Ref Range: <=1.0 ng/mL <0.1   Site, FNA Unknown Test Not Performed      Notes some trouble with swallowing for the past 2 years. Her difficulty seems to be more with initiation of the swallow. She describes it as having to "teach herself how to swallow".    Review of Systems   Constitutional: Negative for chills and fever.   Gastrointestinal: Negative for nausea.       Objective:   Physical Exam   Constitutional: She appears well-developed and well-nourished.     BP Readings from Last 3 Encounters:   02/21/20 132/78   02/10/20 (!) 140/104   12/27/19 (!) 154/80     Wt Readings from Last 1 Encounters:   02/10/20 1354 128.3 kg (282 lb 13.6 oz)       There is no height or weight on file to calculate BMI.    Lab Review:   Lab Results   Component Value Date    HGBA1C 5.8 (H) 02/10/2020     Lab Results   Component Value Date    CHOL 193 04/26/2019    HDL 57 04/26/2019    LDLCALC 108.2 04/26/2019    TRIG 139 04/26/2019    CHOLHDL 29.5 04/26/2019     Lab Results   Component Value Date     02/10/2020    K 4.0 02/10/2020     02/10/2020    CO2 26 02/10/2020    GLU 96 02/10/2020    BUN 10 02/10/2020    CREATININE 0.7 02/10/2020    CALCIUM 9.6 02/10/2020    PROT 7.4 02/10/2020    ALBUMIN 4.1 02/10/2020    BILITOT 0.7 02/10/2020    ALKPHOS 156 (H) 02/10/2020    AST 20 02/10/2020    ALT 33 02/10/2020    ANIONGAP 10 02/10/2020    ESTGFRAFRICA >60 02/10/2020    EGFRNONAA >60 02/10/2020    TSH 0.390 (L) 08/09/2019         Assessment and Plan     Thyroid cancer  --Post surgical hypothyroidism / thyroid cancer - with thyroglobulin that is now detectable (biochemical " incomplete disease), with a non-specific focus in the left thyroid bed  --Previous diagnostic whole body scans did show that she had residual tissue in the thyroid bed that was iodine avid, the increase in Tg could potentially be explained by this residual tissue, and now that her TSH is less suppressed we are detecting thyroglobulin  --We did decide to FNA the area in the left thyroid bed, and while this came back unsatisfactory the Tg wash was negative so this could represent scar or other tissue  --Will repeat Tg and neck ultrasound when able    Postsurgical hypothyroidism  --patient with postoperative hypothyroidism with a history of thyroid cancer  --TSH goal 0.1-0.5  --Will repeat TSH when able  --Continue Synthroid 112 mcg PO daily for now      The patient location is: [facility]  The chief complaint leading to consultation is: Hypothyroidism, thyroid cancer  Visit type: audiovisual  Total time spent with patient: 25 min  Each patient to whom he or she provides medical services by telemedicine is:  (1) informed of the relationship between the physician and patient and the respective role of any other health care provider with respect to management of the patient; and (2) notified that he or she may decline to receive medical services by telemedicine and may withdraw from such care at any time.    Follow up in 6 months    Zay Vincent M.D. Staff Endocrinology

## 2020-05-08 NOTE — ASSESSMENT & PLAN NOTE
--patient with postoperative hypothyroidism with a history of thyroid cancer  --TSH goal 0.1-0.5  --Will repeat TSH when able  --Continue Synthroid 112 mcg PO daily for now

## 2020-05-13 ENCOUNTER — PATIENT MESSAGE (OUTPATIENT)
Dept: FAMILY MEDICINE | Facility: CLINIC | Age: 60
End: 2020-05-13

## 2020-05-13 DIAGNOSIS — M54.2 CERVICALGIA: ICD-10-CM

## 2020-05-13 RX ORDER — BACLOFEN 10 MG/1
TABLET ORAL
Qty: 90 TABLET | Refills: 0 | Status: SHIPPED | OUTPATIENT
Start: 2020-05-13 | End: 2020-06-01

## 2020-05-19 ENCOUNTER — PATIENT MESSAGE (OUTPATIENT)
Dept: FAMILY MEDICINE | Facility: CLINIC | Age: 60
End: 2020-05-19

## 2020-05-19 DIAGNOSIS — J45.40 MODERATE PERSISTENT ASTHMA, UNSPECIFIED WHETHER COMPLICATED: ICD-10-CM

## 2020-05-19 RX ORDER — ALBUTEROL SULFATE 0.63 MG/3ML
0.63 SOLUTION RESPIRATORY (INHALATION) EVERY 6 HOURS PRN
Qty: 1 BOX | Refills: 0 | Status: SHIPPED | OUTPATIENT
Start: 2020-05-19 | End: 2020-10-05

## 2020-05-25 RX ORDER — DICYCLOMINE HYDROCHLORIDE 10 MG/1
CAPSULE ORAL
Qty: 120 CAPSULE | Refills: 1 | Status: SHIPPED | OUTPATIENT
Start: 2020-05-25 | End: 2020-07-20

## 2020-05-26 ENCOUNTER — TELEPHONE (OUTPATIENT)
Dept: FAMILY MEDICINE | Facility: CLINIC | Age: 60
End: 2020-05-26

## 2020-05-26 DIAGNOSIS — J30.89 NON-SEASONAL ALLERGIC RHINITIS DUE TO OTHER ALLERGIC TRIGGER: ICD-10-CM

## 2020-05-26 RX ORDER — AZELASTINE 1 MG/ML
1 SPRAY, METERED NASAL 2 TIMES DAILY
Qty: 30 ML | Refills: 0 | Status: SHIPPED | OUTPATIENT
Start: 2020-05-26 | End: 2020-10-15

## 2020-05-26 RX ORDER — FLUTICASONE PROPIONATE 50 MCG
SPRAY, SUSPENSION (ML) NASAL
Qty: 16 G | Refills: 3 | Status: SHIPPED | OUTPATIENT
Start: 2020-05-26 | End: 2020-09-15

## 2020-05-26 NOTE — TELEPHONE ENCOUNTER
----- Message from Ekta Girard sent at 5/26/2020 12:47 PM CDT -----  Contact: Self/  [phone]  Type: Patient Call Back    Who called:  Patient    What is the request in detail:  Patient would like staff to give her a call regarding her sinus.  Thank you    Would the patient rather a call back or a response via [facility]?   Call back    Best call back number:  [phone]          
I spoke to the pt and she reports she is still having thick post nasal drip that is causing her to have to clear her throat frequently. She is using Flonase, Mucinex and her nebulizer. She reports no improvement. Please advise.   
Should continue with current medications, and I have sent a new prescription for astelin nose spray (to use twice per day with flonase)  
No

## 2020-05-27 DIAGNOSIS — F41.9 ANXIETY: ICD-10-CM

## 2020-05-27 RX ORDER — HYDROXYZINE PAMOATE 50 MG/1
CAPSULE ORAL
Qty: 180 CAPSULE | Refills: 1 | Status: SHIPPED | OUTPATIENT
Start: 2020-05-27 | End: 2021-01-07

## 2020-05-27 NOTE — TELEPHONE ENCOUNTER
Last Office Visit Info:   The patient's last visit with Guicho Lizarraga MD was on 4/16/2020.    The patient's last visit in current department was on Visit date not found.        Last CBC Results:   Lab Results   Component Value Date    WBC 9.43 02/10/2020    HGB 15.4 02/10/2020    HCT 46.9 02/10/2020     02/10/2020       Last CMP Results  Lab Results   Component Value Date     02/10/2020    K 4.0 02/10/2020     02/10/2020    CO2 26 02/10/2020    BUN 10 02/10/2020    CREATININE 0.7 02/10/2020    CALCIUM 9.6 02/10/2020    ALBUMIN 4.1 02/10/2020    AST 20 02/10/2020    ALT 33 02/10/2020       Last Lipids  Lab Results   Component Value Date    CHOL 193 04/26/2019    TRIG 139 04/26/2019    HDL 57 04/26/2019    LDLCALC 108.2 04/26/2019       Last A1C  Lab Results   Component Value Date    HGBA1C 5.8 (H) 02/10/2020       Last TSH  Lab Results   Component Value Date    TSH 0.390 (L) 08/09/2019         Current Med Refills  Medication List with Changes/Refills   Current Medications    ACETAMINOPHEN (TYLENOL) 650 MG TBSR    Take 1 tablet (650 mg total) by mouth every 8 (eight) hours as needed.       Start Date: 12/27/2019End Date: --    ALBUTEROL (ACCUNEB) 0.63 MG/3 ML NEBU    Take 3 mLs (0.63 mg total) by nebulization every 6 (six) hours as needed. Rescue       Start Date: 5/19/2020 End Date: 5/19/2021    ALBUTEROL (PROVENTIL/VENTOLIN HFA) 90 MCG/ACTUATION INHALER    Inhale 2 puffs into the lungs every 6 (six) hours as needed. Rescue       Start Date: 4/6/2020  End Date: --    AMITRIPTYLINE (ELAVIL) 50 MG TABLET    TAKE ONE TABLET BY MOUTH AT BEDTIME AS NEEDED       Start Date: 1/8/2020  End Date: --    AMLODIPINE (NORVASC) 5 MG TABLET    Take 1 tablet (5 mg total) by mouth once daily.       Start Date: 2/10/2020 End Date: --    ANTHRALIN 1.2 % CMRR           Start Date: 7/18/2014 End Date: --    ASPIRIN 325 MG TABLET    Take 325 mg by mouth once daily.       Start Date: 4/8/2019  End Date: --     AZELASTINE (ASTELIN) 137 MCG (0.1 %) NASAL SPRAY    1 spray (137 mcg total) by Nasal route 2 (two) times daily.       Start Date: 5/26/2020 End Date: 5/26/2021    BACLOFEN (LIORESAL) 10 MG TABLET    TAKE ONE TABLET BY MOUTH THREE TIMES DAILY       Start Date: 5/13/2020 End Date: --    CLOBETASOL (TEMOVATE) 0.05 % CREAM    Apply topically once daily. For five days       Start Date: 6/14/2019 End Date: --    CLOTRIMAZOLE (LOTRIMIN) 1 % CREAM    Apply topically 2 (two) times daily.       Start Date: 2/3/2020  End Date: --    DIAZEPAM (VALIUM) 10 MG TAB    Take 10 mg by mouth.       Start Date: --        End Date: --    DICYCLOMINE (BENTYL) 10 MG CAPSULE    TAKE ONE CAPSULE BY MOUTH TWICE DAILY BEFORE MEALS AND AT BEDTIME       Start Date: 5/25/2020 End Date: --    ERGOCALCIFEROL (VITAMIN D2) 50,000 UNIT CAP    TAKE ONE CAPSULE BY MOUTH once every week       Start Date: 12/30/2019End Date: --    FLUTICASONE PROPIONATE (FLONASE) 50 MCG/ACTUATION NASAL SPRAY    INSTILL ONE SPRAY IN EACH NOSTRIL TWICE DAILY       Start Date: 5/26/2020 End Date: --    GABAPENTIN (NEURONTIN) 300 MG CAPSULE    Take 600 mg by mouth 3 (three) times daily.        Start Date: 4/29/2014 End Date: --    HYDROXYZINE PAMOATE (VISTARIL) 50 MG CAP    TAKE ONE CAPSULE BY MOUTH EVERY 6 HOURS AS NEEDED FOR ANXIETY       Start Date: 3/30/2020 End Date: --    IPRATROPIUM (ATROVENT HFA) 17 MCG/ACTUATION INHALER    Inhale 2 puffs into the lungs every 6 (six) hours. Rescue       Start Date: 5/19/2020 End Date: 5/19/2021    LIDOCAINE (LMX 4) 4 % CREAM    Apply topically as needed.       Start Date: 10/22/2018End Date: --    LIDOCAINE-PRILOCAINE (EMLA) CREAM           Start Date: 10/5/2015 End Date: --    LISINOPRIL 10 MG TABLET    Take 1 tablet (10 mg total) by mouth once daily.       Start Date: 2/10/2020 End Date: --    MULTIVITAMIN CAPSULE    Take 1 capsule by mouth.       Start Date: --        End Date: --    NAPROXEN (NAPROSYN) 375 MG TABLET            Start Date: 5/5/2018  End Date: --    NEOMYCIN-POLYMYXIN-HYDROCORTISONE (CORTISPORIN) 3.5-10,000-1 MG/ML-UNIT/ML-% OTIC SUSPENSION    Place 3 drops into both ears 3 (three) times daily.       Start Date: 1/12/2018 End Date: --    NITROGLYCERIN 0.4 MG/HR TD PT24 (NITRODUR) 0.4 MG/HR    apply 1 patch ONTO THE SKIN ONCE DAILY       Start Date: 12/18/2018End Date: --    ONDANSETRON (ZOFRAN-ODT) 4 MG TBDL    Take 1 tablet (4 mg total) by mouth every 12 (twelve) hours as needed.       Start Date: 12/27/2019End Date: --    PANTOPRAZOLE (PROTONIX) 40 MG TABLET    TAKE ONE TABLET BY MOUTH ONCE A DAY       Start Date: 3/2/2020  End Date: --    PROMETHAZINE (PHENERGAN) 25 MG TABLET    take 1 tablet by mouth every 8 hours if needed for nausea and vomiting       Start Date: 9/6/2016  End Date: --    REXULTI 0.5 MG TAB    0.5 mg.       Start Date: 4/6/2018  End Date: --    SERTRALINE (ZOLOFT) 100 MG TABLET    Take 100 mg by mouth.       Start Date: --        End Date: --    SYNTHROID 112 MCG TABLET    TAKE ONE TABLET BY MOUTH ONCE A DAY       Start Date: 4/28/2020 End Date: --    TOPIRAMATE (TOPAMAX) 100 MG TABLET    Take 50 mg by mouth once daily.       Start Date: 12/5/2016 End Date: --       Order(s) placed per written order guidelines:     Please advise.

## 2020-05-31 ENCOUNTER — HOSPITAL ENCOUNTER (EMERGENCY)
Facility: HOSPITAL | Age: 60
Discharge: HOME | End: 2020-05-31
Attending: EMERGENCY MEDICINE
Payer: MEDICAID

## 2020-05-31 VITALS
BODY MASS INDEX: 51.53 KG/M2 | HEIGHT: 62 IN | DIASTOLIC BLOOD PRESSURE: 73 MMHG | RESPIRATION RATE: 18 BRPM | HEART RATE: 85 BPM | OXYGEN SATURATION: 99 % | WEIGHT: 280 LBS | TEMPERATURE: 98 F | SYSTOLIC BLOOD PRESSURE: 116 MMHG

## 2020-05-31 DIAGNOSIS — H72.91 PERFORATION OF RIGHT TYMPANIC MEMBRANE: Primary | ICD-10-CM

## 2020-05-31 DIAGNOSIS — R51.9 ACUTE NONINTRACTABLE HEADACHE, UNSPECIFIED HEADACHE TYPE: ICD-10-CM

## 2020-05-31 LAB
ALBUMIN SERPL BCP-MCNC: 4.3 G/DL (ref 3.5–5.2)
ALP SERPL-CCNC: 162 U/L (ref 55–135)
ALT SERPL W/O P-5'-P-CCNC: 26 U/L (ref 10–44)
ANION GAP SERPL CALC-SCNC: 11 MMOL/L (ref 8–16)
AST SERPL-CCNC: 21 U/L (ref 10–40)
BASOPHILS # BLD AUTO: 0.07 K/UL (ref 0–0.2)
BASOPHILS NFR BLD: 0.9 % (ref 0–1.9)
BILIRUB SERPL-MCNC: 0.7 MG/DL (ref 0.1–1)
BUN SERPL-MCNC: 14 MG/DL (ref 6–20)
CALCIUM SERPL-MCNC: 9.8 MG/DL (ref 8.7–10.5)
CHLORIDE SERPL-SCNC: 107 MMOL/L (ref 95–110)
CO2 SERPL-SCNC: 20 MMOL/L (ref 23–29)
CREAT SERPL-MCNC: 0.8 MG/DL (ref 0.5–1.4)
CRP SERPL-MCNC: 1.3 MG/L (ref 0–8.2)
DIFFERENTIAL METHOD: ABNORMAL
EOSINOPHIL # BLD AUTO: 0.2 K/UL (ref 0–0.5)
EOSINOPHIL NFR BLD: 2.5 % (ref 0–8)
ERYTHROCYTE [DISTWIDTH] IN BLOOD BY AUTOMATED COUNT: 13.2 % (ref 11.5–14.5)
ERYTHROCYTE [SEDIMENTATION RATE] IN BLOOD BY WESTERGREN METHOD: 32 MM/HR (ref 0–36)
EST. GFR  (AFRICAN AMERICAN): >60 ML/MIN/1.73 M^2
EST. GFR  (NON AFRICAN AMERICAN): >60 ML/MIN/1.73 M^2
GLUCOSE SERPL-MCNC: 102 MG/DL (ref 70–110)
HCT VFR BLD AUTO: 46 % (ref 37–48.5)
HGB BLD-MCNC: 15.2 G/DL (ref 12–16)
IMM GRANULOCYTES # BLD AUTO: 0.01 K/UL (ref 0–0.04)
IMM GRANULOCYTES NFR BLD AUTO: 0.1 % (ref 0–0.5)
LYMPHOCYTES # BLD AUTO: 3.6 K/UL (ref 1–4.8)
LYMPHOCYTES NFR BLD: 47.8 % (ref 18–48)
MCH RBC QN AUTO: 31.1 PG (ref 27–31)
MCHC RBC AUTO-ENTMCNC: 33 G/DL (ref 32–36)
MCV RBC AUTO: 94 FL (ref 82–98)
MONOCYTES # BLD AUTO: 0.7 K/UL (ref 0.3–1)
MONOCYTES NFR BLD: 9.2 % (ref 4–15)
NEUTROPHILS # BLD AUTO: 3 K/UL (ref 1.8–7.7)
NEUTROPHILS NFR BLD: 39.5 % (ref 38–73)
NRBC BLD-RTO: 0 /100 WBC
PLATELET # BLD AUTO: 266 K/UL (ref 150–350)
PMV BLD AUTO: 12.4 FL (ref 9.2–12.9)
POTASSIUM SERPL-SCNC: 4 MMOL/L (ref 3.5–5.1)
PROT SERPL-MCNC: 7.8 G/DL (ref 6–8.4)
RBC # BLD AUTO: 4.88 M/UL (ref 4–5.4)
SODIUM SERPL-SCNC: 138 MMOL/L (ref 136–145)
WBC # BLD AUTO: 7.49 K/UL (ref 3.9–12.7)

## 2020-05-31 PROCEDURE — 99284 EMERGENCY DEPT VISIT MOD MDM: CPT | Mod: 25

## 2020-05-31 PROCEDURE — 85025 COMPLETE CBC W/AUTO DIFF WBC: CPT

## 2020-05-31 PROCEDURE — 85652 RBC SED RATE AUTOMATED: CPT

## 2020-05-31 PROCEDURE — 96374 THER/PROPH/DIAG INJ IV PUSH: CPT

## 2020-05-31 PROCEDURE — 80053 COMPREHEN METABOLIC PANEL: CPT

## 2020-05-31 PROCEDURE — 63600175 PHARM REV CODE 636 W HCPCS: Performed by: PHYSICIAN ASSISTANT

## 2020-05-31 PROCEDURE — 99284 PR EMERGENCY DEPT VISIT,LEVEL IV: ICD-10-PCS | Mod: ,,, | Performed by: PHYSICIAN ASSISTANT

## 2020-05-31 PROCEDURE — 99284 EMERGENCY DEPT VISIT MOD MDM: CPT | Mod: ,,, | Performed by: PHYSICIAN ASSISTANT

## 2020-05-31 PROCEDURE — 86140 C-REACTIVE PROTEIN: CPT

## 2020-05-31 PROCEDURE — 25000003 PHARM REV CODE 250: Performed by: PHYSICIAN ASSISTANT

## 2020-05-31 PROCEDURE — 96361 HYDRATE IV INFUSION ADD-ON: CPT

## 2020-05-31 PROCEDURE — 96375 TX/PRO/DX INJ NEW DRUG ADDON: CPT

## 2020-05-31 RX ORDER — ACETAMINOPHEN 500 MG
1000 TABLET ORAL
Status: COMPLETED | OUTPATIENT
Start: 2020-05-31 | End: 2020-05-31

## 2020-05-31 RX ORDER — KETOROLAC TROMETHAMINE 30 MG/ML
10 INJECTION, SOLUTION INTRAMUSCULAR; INTRAVENOUS
Status: COMPLETED | OUTPATIENT
Start: 2020-05-31 | End: 2020-05-31

## 2020-05-31 RX ORDER — PROCHLORPERAZINE EDISYLATE 5 MG/ML
10 INJECTION INTRAMUSCULAR; INTRAVENOUS
Status: COMPLETED | OUTPATIENT
Start: 2020-05-31 | End: 2020-05-31

## 2020-05-31 RX ADMIN — ACETAMINOPHEN 1000 MG: 500 TABLET ORAL at 07:05

## 2020-05-31 RX ADMIN — PROCHLORPERAZINE EDISYLATE 10 MG: 5 INJECTION INTRAMUSCULAR; INTRAVENOUS at 07:05

## 2020-05-31 RX ADMIN — SODIUM CHLORIDE 500 ML: 0.9 INJECTION, SOLUTION INTRAVENOUS at 07:05

## 2020-05-31 RX ADMIN — KETOROLAC TROMETHAMINE 10 MG: 30 INJECTION, SOLUTION INTRAMUSCULAR at 07:05

## 2020-06-01 ENCOUNTER — PATIENT MESSAGE (OUTPATIENT)
Dept: FAMILY MEDICINE | Facility: CLINIC | Age: 60
End: 2020-06-01

## 2020-06-01 DIAGNOSIS — H93.11 TINNITUS OF RIGHT EAR: Primary | ICD-10-CM

## 2020-06-01 NOTE — ED TRIAGE NOTES
Pt reports having hole in right ear drum x2 months. Pt states having pain in the back of her head going up towards the top. Pt states feeling dizzy, nauseous, and ringing in both ears. Pt also states having difficulty with sinuses because of ear pain.

## 2020-06-01 NOTE — DISCHARGE INSTRUCTIONS
Follow up with ENT for re-evaluation of your perforated eardrum.  Contact information is listed below.  Follow up in neurology clinic as soon as possible for re-evaluation and continued management of your headaches.  Your head CT was normal.  You should take Tylenol as needed for headaches and your previously prescribed migraine medication.    Return to the ER for any new or significantly worsening symptoms such as worsening headache, headache with fever greater than 100.4° or with a stiff neck, changes in your vision, changes in your speech or numbness or weakness in your arms or legs or any other worrisome symptoms.    Our goal in the emergency department is to always give you outstanding care and exceptional service. You may receive a survey by mail or e-mail in the next week regarding your experience in our ED. We would greatly appreciate your completing and returning the survey. Your feedback provides us with a way to recognize our staff who give very good care and it helps us learn how to improve when your experience was below our aspiration of excellence.   .

## 2020-06-01 NOTE — ED PROVIDER NOTES
Encounter Date: 5/31/2020       History     Chief Complaint   Patient presents with    Otalgia     reports having hole in ear x 2 months. reports headache behind right ear.     59-year-old female with medical comorbidities significant for HTN, CAD, cervical spine disease, GERD, morbid obesity, migraines presents to the ED with a chief complaint of ear pain.  Patient reports that she was told that she had a hole in her eardrum 2.5 months ago at urgent care.  Patient has had ongoing pain since that time.  Patient states that her doctor was supposed to coordinate follow-up with ENT, but she never received a call.  Patient reports associated headache in the right occipital, temporal and frontal regions for the same duration.  Patient does have a history of migraines, but feels that this pain is worse. She reports taking her home migraine medication for this pain, but is unsure which medication this is (unclear which medication she is referring to in medication list)She states that the pain sometimes radiates around the back of the head to the left side. Pt states that she avoided coming to the ED over this period of time due to the coronavirus.  Pt is a poor historian. She denies nausea, vomiting, weakness, numbness, fever.         Review of patient's allergies indicates:   Allergen Reactions    Vioxx [rofecoxib] Rash     Past Medical History:   Diagnosis Date    Asthma     CAD (coronary artery disease)     stent 2003    Cervical spine disease     Depression     Difficult intubation     POSSIBLE    Falls 4/11/2018    GERD (gastroesophageal reflux disease)     Glaucoma     left eye    Helicobacter pylori (H. pylori) infection     History of stomach ulcers     HTN (hypertension)     Muscle weakness     LEFT    Neck problem     LIMITED ROM    Nuclear cataract 5/28/2014    Postsurgical hypothyroidism     Stroke     mini strokes    Thyroid cancer     Vitamin D deficiency disease      Past Surgical  History:   Procedure Laterality Date    APPENDECTOMY      CERVICAL SPINE SURGERY      vocal cord damage     SECTION, LOW TRANSVERSE      x3    CHOLECYSTECTOMY      COLONOSCOPY N/A 2018    Procedure: COLONOSCOPY;  Surgeon: Chavo Wray MD;  [facility]  Service: Endoscopy;  Laterality: N/A;    CORONARY ANGIOPLASTY WITH STENT PLACEMENT      x 3    ESOPHAGOGASTRODUODENOSCOPY N/A 2018    Procedure: EGD (ESOPHAGOGASTRODUODENOSCOPY);  Surgeon: Chavo Wray MD;  [facility]  Service: Endoscopy;  Laterality: N/A;  Hx of Anesthetic complications c spine surgery and thyroid surgery with known unilateral VC paralysis per patient, pt unsure of intubation history History of prior surgery of interest to airway management or planning - 2nd floor/not able to use current order,    PATELLA SURGERY  1969    THYROID SURGERY      total    TOTAL THYROIDECTOMY       Family History   Problem Relation Age of Onset    Diabetes Father     Kidney failure Father     Cataracts Father     Asthma Father     Glaucoma Father     Heart disease Mother         has pacemaker    Hypertension Mother     Asthma Mother     Cancer Mother         lung stage 4    Ovarian cancer Mother     Breast cancer Mother     Hypertension Brother     Heart disease Brother     Cervical cancer Daughter     Scoliosis Son     Hypertension Son     Hypertension Daughter     Seizures Daughter     Lupus Daughter     Cervical cancer Daughter     Mental illness Son         bipolar    Cancer Sister     Liver disease Sister     No Known Problems Brother     No Known Problems Daughter     Breast cancer Maternal Aunt     No Known Problems Maternal Uncle     No Known Problems Paternal Aunt     No Known Problems Paternal Uncle     Stomach cancer Maternal Grandmother     No Known Problems Paternal Grandmother     No Known Problems Paternal Grandfather     Stomach cancer Maternal Aunt      Breast cancer Maternal Aunt     Breast cancer Maternal Aunt     Esophageal cancer Neg Hx      Social History     Tobacco Use    Smoking status: Never Smoker    Smokeless tobacco: Never Used    Tobacco comment: No cigarrettes, only marijuana occasionally   Substance Use Topics    Alcohol use: No     Comment: recovering alcoholic    Drug use: Yes     Frequency: 6.0 times per week     Types: Marijuana     Comment: ocasional     Review of Systems   Constitutional: Negative for fever.   HENT: Positive for ear pain. Negative for sore throat.    Respiratory: Negative for shortness of breath.    Cardiovascular: Negative for chest pain.   Gastrointestinal: Negative for nausea and vomiting.   Genitourinary: Negative for dysuria.   Musculoskeletal: Negative for back pain.   Skin: Negative for rash.   Neurological: Positive for headaches. Negative for weakness.   Hematological: Does not bruise/bleed easily.       Physical Exam     Initial Vitals [05/31/20 1914]   BP Pulse Resp Temp SpO2   (!) 133/101 110 20 98 °F (36.7 °C) 99 %      MAP       --         Physical Exam    Nursing note and vitals reviewed.  Constitutional: She appears well-developed and well-nourished. She is not diaphoretic. She is Obese .  Non-toxic appearance. She does not appear ill. No distress.   Morbidly obese   HENT:   Head: Normocephalic and atraumatic.   Right Ear: Ear canal normal. No drainage or swelling. No mastoid tenderness. Tympanic membrane is perforated.   Left Ear: Tympanic membrane normal.   Eyes: Conjunctivae and EOM are normal. Pupils are equal, round, and reactive to light.   Neck: Neck supple.   Cardiovascular: Normal rate and regular rhythm. Exam reveals no gallop and no friction rub.    No murmur heard.  Pulmonary/Chest: Effort normal and breath sounds normal. No accessory muscle usage. No tachypnea. No respiratory distress. She has no decreased breath sounds. She has no wheezes. She has no rhonchi. She has no rales.   Abdominal:  She exhibits no distension.   Neurological: She is alert. She has normal strength. No sensory deficit.   Speech is clear.  No facial droop or asymmetry.    Skin: No rash noted.   Psychiatric: She has a normal mood and affect. Her behavior is normal.         ED Course   Procedures  Labs Reviewed   CBC W/ AUTO DIFFERENTIAL - Abnormal; Notable for the following components:       Result Value    Mean Corpuscular Hemoglobin 31.1 (*)     All other components within normal limits    Narrative:     shared lavender   COMPREHENSIVE METABOLIC PANEL - Abnormal; Notable for the following components:    CO2 20 (*)     Alkaline Phosphatase 162 (*)     All other components within normal limits    Narrative:     shared lavender   SEDIMENTATION RATE    Narrative:     shared lavender   C-REACTIVE PROTEIN    Narrative:     shared lavender          Imaging Results          CT Head Without Contrast (Final result)  Result time 05/31/20 20:40:03    Final result by Norma Garcia MD (05/31/20 20:40:03)                 Impression:      No acute intracranial abnormality detected.      Electronically signed by: Norma Garcia  Date:    05/31/2020  Time:    20:40             Narrative:    EXAMINATION:  CT OF THE HEAD WITHOUT    CLINICAL HISTORY:  Headache, acute, norm neuro exam;    TECHNIQUE:  5 mm unenhanced axial images were obtained from the skull base to the vertex.    COMPARISON:  MRI brain from 04/18/2018    FINDINGS:  The ventricles, basal cisterns, and cortical sulci are within normal limits for patient's stated age. There is no acute intracranial hemorrhage, territorial infarct or mass effect, or midline shift. The visualized paranasal sinuses and mastoid air cells are clear.                                 Medical Decision Making:   History:   Old Medical Records: I decided to obtain old medical records.  Differential Diagnosis:   My differential diagnosis includes but is not limited to:  Perforated TM, otitis media, otitis  externa, mastoiditis, migraine, dehydration, temporal arteritis   Clinical Tests:   Lab Tests: Ordered  Radiological Study: Ordered       APC / Resident Notes:   59-year-old female presents to the ED for evaluation of ear pain and headache for the past 2.5 mos.  On initial evaluation, patient is hypertensive at 133/101 and tachycardic.  Vitals otherwise within normal limits.  Patient has a nonfocal neuro exam.  Right TM perforation noted.  No evidence of infection.    CT head revealed no acute abnormalities.  Blood work was unremarkable.  Patient received IV fluids, Compazine and Toradol in the ED. Upon reassessment, she reports improvement in her symptoms.  I feel the patient is stable for discharge.  I have placed a referral to ENT.  Patient advised follow-up for further evaluation of TM perforation.  Patient urged to follow-up with her neurologist a soon as possible for re-evaluation and continued management of her headaches.  She verbalized understanding and is comfortable with discharge.  ED return precautions given.    I have reviewed the patient's records and discussed this case with my supervising physician. Please be advised that this text was dictated with [employer] software and may contain dictation errors.                Attending Attestation:     Physician Attestation Statement for NP/PA:   I have conducted a face to face encounter with this patient in addition to the NP/PA, due to Medical Complexity    Other NP/PA Attestation Additions:     Physical Exam: NAD, A&Ox4, odd affect clear no FNDs, right TM perforation w/o purulence or erythema, diffusely subjectively TTP over right side of head and face, no rashes, EOMI, PERRL, normal neck ROM   Medical Decision Making: I discussed the patient's care with Advanced Practice Clinician, and did see this patient with the APC.  I reviewed their note and agree with the history, physical, assessment, diagnosis, treatment, and disposition plan provided by the  Advanced Practice Clinician.                                Clinical Impression:       ICD-10-CM ICD-9-CM   1. Perforation of right tympanic membrane H72.91 384.20   2. Acute nonintractable headache, unspecified headache type R51 784.0         Disposition:   Disposition: Discharged  Condition: Stable     ED Disposition Condition    Discharge Stable        ED Prescriptions     None        Follow-up Information     Follow up With Specialties Details Why Contact Info Additional Information    [facility] - Otorhinolaryngology Otolaryngology Schedule an appointment as soon as possible for a visit  For reevaluation [address]  [facility] [id number] [phone] [facility] - 4th Floor                                     Liberty Adkins PA-C  05/31/20 [id number]       Melissa Roche MD  06/02/20 [id number]

## 2020-06-01 NOTE — ED NOTES
Adult Physical Assessment  LOC: Paras Smith, 59 y.o. female verified via two identifiers.  The patient is awake, alert, oriented and speaking appropriately at this time.  APPEARANCE: Patient resting comfortably and appears to be in no acute distress at this time. Patient is clean and well groomed, patient's clothing is properly fastened.  SKIN:The skin is warm and dry, color consistent with ethnicity, patient has normal skin turgor and moist mucus membranes, skin intact, no breakdown or brusing noted.  MUSCULOSKELETAL: Patient moving all extremities well, no obvious swelling or deformities noted.  RESPIRATORY: Airway is open and patent, respirations are spontaneous, patient has a normal effort and rate, no accessory muscle use noted.  CARDIAC: Patient has a normal rate and rhythm, no periphreal edema noted in any extremity, capillary refill < 3 seconds in all extremities  ABDOMEN: Soft and non tender to palpation, no abdominal distention noted. Bowel sounds present in all four quadrants.  NEUROLOGIC: Eyes open spontaneously, behavior appropriate to situation, follows commands, facial expression symmetrical, bilateral hand grasp equal and even, purposeful motor response noted, normal sensation in all extremities when touched with a finger. Pt with complaints of ear pain and headache.

## 2020-06-03 ENCOUNTER — HOSPITAL ENCOUNTER (EMERGENCY)
Facility: HOSPITAL | Age: 60
Discharge: HOME | End: 2020-06-03
Attending: EMERGENCY MEDICINE
Payer: MEDICAID

## 2020-06-03 VITALS
OXYGEN SATURATION: 97 % | TEMPERATURE: 98 F | WEIGHT: 280 LBS | SYSTOLIC BLOOD PRESSURE: 100 MMHG | DIASTOLIC BLOOD PRESSURE: 67 MMHG | HEART RATE: 79 BPM | BODY MASS INDEX: 51.53 KG/M2 | HEIGHT: 62 IN | RESPIRATION RATE: 20 BRPM

## 2020-06-03 DIAGNOSIS — R07.9 CHEST PAIN: ICD-10-CM

## 2020-06-03 DIAGNOSIS — J45.909 ASTHMA, UNSPECIFIED ASTHMA SEVERITY, UNSPECIFIED WHETHER COMPLICATED, UNSPECIFIED WHETHER PERSISTENT: Primary | ICD-10-CM

## 2020-06-03 LAB
ALBUMIN SERPL-MCNC: 3.8 G/DL (ref 3.3–5.5)
ALP SERPL-CCNC: 122 U/L (ref 42–141)
BILIRUB SERPL-MCNC: 0.8 MG/DL (ref 0.2–1.6)
BUN SERPL-MCNC: 12 MG/DL (ref 7–22)
CALCIUM SERPL-MCNC: 9.5 MG/DL (ref 8–10.3)
CHLORIDE SERPL-SCNC: 110 MMOL/L (ref 98–108)
CREAT SERPL-MCNC: 0.9 MG/DL (ref 0.6–1.2)
GLUCOSE SERPL-MCNC: 95 MG/DL (ref 73–118)
POC ALT (SGPT): 20 U/L (ref 10–47)
POC AST (SGOT): 30 U/L (ref 11–38)
POC CARDIAC TROPONIN I: 0 NG/ML
POC PTINR: 1.1 (ref 0.9–1.2)
POC PTWBT: 12.6 SEC (ref 9.7–14.3)
POC TCO2: 21 MMOL/L (ref 18–33)
POTASSIUM BLD-SCNC: 3.6 MMOL/L (ref 3.6–5.1)
PROTEIN, POC: 7 G/DL (ref 6.4–8.1)
SAMPLE: NORMAL
SAMPLE: NORMAL
SODIUM BLD-SCNC: 144 MMOL/L (ref 128–145)

## 2020-06-03 PROCEDURE — 94640 AIRWAY INHALATION TREATMENT: CPT | Mod: ER

## 2020-06-03 PROCEDURE — 84484 ASSAY OF TROPONIN QUANT: CPT | Mod: ER

## 2020-06-03 PROCEDURE — 93010 EKG 12-LEAD: ICD-10-PCS | Mod: ,,, | Performed by: INTERNAL MEDICINE

## 2020-06-03 PROCEDURE — 85025 COMPLETE CBC W/AUTO DIFF WBC: CPT | Mod: ER

## 2020-06-03 PROCEDURE — 99284 EMERGENCY DEPT VISIT MOD MDM: CPT | Mod: 25,ER

## 2020-06-03 PROCEDURE — 93010 ELECTROCARDIOGRAM REPORT: CPT | Mod: ,,, | Performed by: INTERNAL MEDICINE

## 2020-06-03 PROCEDURE — 80053 COMPREHEN METABOLIC PANEL: CPT | Mod: ER

## 2020-06-03 PROCEDURE — 93005 ELECTROCARDIOGRAM TRACING: CPT | Mod: ER

## 2020-06-03 PROCEDURE — 25000242 PHARM REV CODE 250 ALT 637 W/ HCPCS: Mod: ER | Performed by: EMERGENCY MEDICINE

## 2020-06-03 PROCEDURE — 85610 PROTHROMBIN TIME: CPT | Mod: ER

## 2020-06-03 RX ORDER — IPRATROPIUM BROMIDE AND ALBUTEROL SULFATE 2.5; .5 MG/3ML; MG/3ML
3 SOLUTION RESPIRATORY (INHALATION) ONCE
Status: COMPLETED | OUTPATIENT
Start: 2020-06-03 | End: 2020-06-03

## 2020-06-03 RX ORDER — ALBUTEROL SULFATE 90 UG/1
2 AEROSOL, METERED RESPIRATORY (INHALATION) EVERY 6 HOURS PRN
Qty: 18 G | Refills: 2 | Status: SHIPPED | OUTPATIENT
Start: 2020-06-03 | End: 2021-01-07

## 2020-06-03 RX ORDER — PREDNISONE 10 MG/1
10 TABLET ORAL DAILY
Qty: 5 TABLET | Refills: 0 | Status: SHIPPED | OUTPATIENT
Start: 2020-06-03 | End: 2020-06-08

## 2020-06-03 RX ADMIN — IPRATROPIUM BROMIDE AND ALBUTEROL SULFATE 3 ML: .5; 3 SOLUTION RESPIRATORY (INHALATION) at 08:06

## 2020-06-04 NOTE — ED PROVIDER NOTES
Encounter Date: 6/3/2020    SCRIBE #1 NOTE: I, Delmis Gunter, am scribing for, and in the presence of,  HPI, ROS, PE . I have scribed the following portions of the note - Other sections scribed: HPI, ROS, PE .       History     Chief Complaint   Patient presents with    Chest Pain     pt c/o left sided chest pain that radiates to back since yesterday evening. pt describes pain as throbbing and rates pain 5/10 on pain scale. pt reports she took 2 nitroglycerin tablets and applied a patch. pt reports she had mild relief but pain came back. denies SOB, n/v/d, dizziness.      This is a 59 y.o female with CAD, HTN, GERD, and asthma presents to the ED complaining of pain in the left scapula that radiates to her left shoulder since yesterday. Describes the pain as a ping. Frequent marijuana use. Former EtOH abuse. Denies SOB, neck pain, N/V/D, or numbness or weakness in bilateral upper and lower extremities.     The history is provided by the patient. No  was used.     Review of patient's allergies indicates:   Allergen Reactions    Vioxx [rofecoxib] Rash     Past Medical History:   Diagnosis Date    Asthma     CAD (coronary artery disease)     stent     Cervical spine disease     Depression     Difficult intubation     POSSIBLE    Falls 2018    GERD (gastroesophageal reflux disease)     Glaucoma     left eye    Helicobacter pylori (H. pylori) infection     History of stomach ulcers     HTN (hypertension)     Muscle weakness     LEFT    Neck problem     LIMITED ROM    Nuclear cataract 2014    Postsurgical hypothyroidism     Stroke     mini strokes    Thyroid cancer     Vitamin D deficiency disease      Past Surgical History:   Procedure Laterality Date    APPENDECTOMY      CERVICAL SPINE SURGERY      vocal cord damage     SECTION, LOW TRANSVERSE      x3    CHOLECYSTECTOMY      COLONOSCOPY N/A 2018    Procedure: COLONOSCOPY;  Surgeon: Chavo Wray MD;   Location: [facility] (2ND FLR);  Service: Endoscopy;  Laterality: N/A;    CORONARY ANGIOPLASTY WITH STENT PLACEMENT      x 3    ESOPHAGOGASTRODUODENOSCOPY N/A 12/31/2018    Procedure: EGD (ESOPHAGOGASTRODUODENOSCOPY);  Surgeon: Chavo Wray MD;  Location: [facility] (2ND FLR);  Service: Endoscopy;  Laterality: N/A;  Hx of Anesthetic complications c spine surgery and thyroid surgery with known unilateral VC paralysis per patient, pt unsure of intubation history History of prior surgery of interest to airway management or planning - 2nd floor/not able to use current order,    PATELLA SURGERY  1969    THYROID SURGERY      total    TOTAL THYROIDECTOMY       Family History   Problem Relation Age of Onset    Diabetes Father     Kidney failure Father     Cataracts Father     Asthma Father     Glaucoma Father     Heart disease Mother         has pacemaker    Hypertension Mother     Asthma Mother     Cancer Mother         lung stage 4    Ovarian cancer Mother     Breast cancer Mother     Hypertension Brother     Heart disease Brother     Cervical cancer Daughter     Scoliosis Son     Hypertension Son     Hypertension Daughter     Seizures Daughter     Lupus Daughter     Cervical cancer Daughter     Mental illness Son         bipolar    Cancer Sister     Liver disease Sister     No Known Problems Brother     No Known Problems Daughter     Breast cancer Maternal Aunt     No Known Problems Maternal Uncle     No Known Problems Paternal Aunt     No Known Problems Paternal Uncle     Stomach cancer Maternal Grandmother     No Known Problems Paternal Grandmother     No Known Problems Paternal Grandfather     Stomach cancer Maternal Aunt     Breast cancer Maternal Aunt     Breast cancer Maternal Aunt     Esophageal cancer Neg Hx      Social History     Tobacco Use    Smoking status: Never Smoker    Smokeless tobacco: Never Used    Tobacco comment: No cigarrettes, only marijuana occasionally    Substance Use Topics    Alcohol use: No     Comment: recovering alcoholic    Drug use: Yes     Frequency: 6.0 times per week     Types: Marijuana     Comment: ocasional     Review of Systems   Constitutional: Negative.  Negative for fever.   HENT: Negative.  Negative for sore throat.    Eyes: Negative.    Respiratory: Negative.  Negative for shortness of breath.    Cardiovascular: Negative.  Negative for chest pain.   Gastrointestinal: Negative.  Negative for nausea and vomiting.   Endocrine: Negative.    Genitourinary: Negative.  Negative for dysuria.   Musculoskeletal: Positive for arthralgias. Negative for myalgias.   Skin: Negative.  Negative for rash.   Allergic/Immunologic: Negative.    Neurological: Negative.  Negative for headaches.   Hematological: Negative.  Negative for adenopathy.   Psychiatric/Behavioral: Negative.  Negative for behavioral problems.   All other systems reviewed and are negative.      Physical Exam     Initial Vitals [06/03/20 2003]   BP Pulse Resp Temp SpO2   138/73 81 17 97.9 °F (36.6 °C) 98 %      MAP       --         Physical Exam    Nursing note and vitals reviewed.  Constitutional: Vital signs are normal. She appears well-developed and well-nourished. She is active and cooperative.   HENT:   Head: Normocephalic and atraumatic.   Right Ear: External ear normal.   Left Ear: External ear normal.   Nose: Nose normal.   Eyes: Conjunctivae, EOM and lids are normal. Pupils are equal, round, and reactive to light.   Neck: Trachea normal, normal range of motion and full passive range of motion without pain. Neck supple. No thyroid mass present.   Cardiovascular: Normal rate, regular rhythm, S1 normal, S2 normal, normal heart sounds, intact distal pulses and normal pulses.   Pulmonary/Chest: Effort normal. No respiratory distress. She has decreased breath sounds. She has no wheezes. She has no rhonchi. She has no rales.   Abdominal: Soft. Normal appearance, normal aorta and bowel sounds  are normal. There is no tenderness.   Musculoskeletal: Normal range of motion.   Lymphadenopathy:     She has no axillary adenopathy.   Neurological: She is alert and oriented to person, place, and time.   Skin: Skin is warm, dry and intact. Capillary refill takes less than 2 seconds.   Psychiatric: She has a normal mood and affect. Her speech is normal and behavior is normal. Judgment and thought content normal. Cognition and memory are normal.         ED Course   Procedures  Labs Reviewed   POCT CMP - Abnormal; Notable for the following components:       Result Value    POC Chloride 110 (*)     All other components within normal limits   TROPONIN ISTAT   POCT CBC   POCT CMP   POCT PROTIME-INR   POCT TROPONIN   ISTAT PROCEDURE     EKG Readings: (Independently Interpreted)   Initial Reading: No STEMI. Rhythm: Normal Sinus Rhythm. Heart Rate: 79. Axis: Normal.       Imaging Results          X-Ray Chest PA And Lateral (Final result)  Result time 06/03/20 20:39:26    Final result by Pepe Polk MD (06/03/20 20:39:26)                 Impression:      No radiographic acute intrathoracic process seen.    Electronically signed by resident: Garcia James  Date:    06/03/2020  Time:    20:35    Electronically signed by: Pepe Polk MD  Date:    06/03/2020  Time:    20:39             Narrative:    EXAMINATION:  XR CHEST PA AND LATERAL    CLINICAL HISTORY:  Chest Pain;    TECHNIQUE:  PA and lateral views of the chest were performed.    COMPARISON:  12/27/2019    FINDINGS:  Cervical anterior instrumented fusion.    The lungs are clear, with normal appearance of pulmonary vasculature and no pleural effusion or pneumothorax.    The cardiac silhouette is normal in size. The hilar and mediastinal contours are unremarkable.    Mild degenerative change at the bilateral shoulders and thoracic spine noting slight levocurvature of the thoracolumbar junction.  Quadrant surgical clips noted.                                 Medical  Decision Making:   Clinical Tests:   Lab Tests: Ordered and Reviewed  Radiological Study: Ordered and Reviewed  Medical Tests: Ordered and Reviewed  ED Management:  This patient presents to the emergency department with complaints of shortness of breath and chest pain.  Patient was given nebulizer treatment with symptomatic improvement.  Patient's main concern a presentation which she was worried about whether not she had COVID.  Patient does not any criteria for COVID and subsequently I feel that presentation is secondary to an asthma exacerbation.  Patient will be discharged on albuterol as well as a low-dose steroid instructions to follow up with primary health care doctor.            Scribe Attestation:   Scribe #1: I performed the above scribed service and the documentation accurately describes the services I performed. I attest to the accuracy of the note.          Results for orders placed or performed during the hospital encounter of 06/03/20   Troponin ISTAT   Result Value Ref Range    POC Cardiac Troponin I 0.00 <0.09 ng/mL    Sample unknown    POCT CMP   Result Value Ref Range    Albumin, POC 3.8 3.3 - 5.5 g/dL    Alkaline Phosphatase,  42 - 141 U/L    ALT (SGPT), POC 20 10 - 47 U/L    AST (SGOT), POC 30 11 - 38 U/L    POC BUN 12 7 - 22 mg/dL    Calcium, POC 9.5 8 - 10.3 mg/dL    POC Chloride 110 (H) 98 - 108 mmol/L    POC Creatinine 0.9 0.6 - 1.2 mg/dL    POC Glucose 95 73 - 118 mg/dL    POC Potassium 3.6 3.6 - 5.1 mmol/L    POC Sodium 144 128 - 145 mmol/L    Bilirubin 0.8 0.2 - 1.6 mg/dL    POC TCO2 21 18 - 33 mmol/L    Protein 7.0 6.4 - 8.1 g/dL   ISTAT PROCEDURE   Result Value Ref Range    POC PTWBT 12.6 9.7 - 14.3 sec    POC PTINR 1.1 0.9 - 1.2    Sample unknown            Imaging Results          X-Ray Chest PA And Lateral (Final result)  Result time 06/03/20 20:39:26    Final result by Pepe Polk MD (06/03/20 20:39:26)                 Impression:      No radiographic acute intrathoracic  process seen.    Electronically signed by resident: Garcia James  Date:    06/03/2020  Time:    20:35    Electronically signed by: Pepe Polk MD  Date:    06/03/2020  Time:    20:39             Narrative:    EXAMINATION:  XR CHEST PA AND LATERAL    CLINICAL HISTORY:  Chest Pain;    TECHNIQUE:  PA and lateral views of the chest were performed.    COMPARISON:  12/27/2019    FINDINGS:  Cervical anterior instrumented fusion.    The lungs are clear, with normal appearance of pulmonary vasculature and no pleural effusion or pneumothorax.    The cardiac silhouette is normal in size. The hilar and mediastinal contours are unremarkable.    Mild degenerative change at the bilateral shoulders and thoracic spine noting slight levocurvature of the thoracolumbar junction.  Quadrant surgical clips noted.                                   This document was produced by a scribe under my direction and in my presence. I agree with the content of the note and have made any necessary edits.     Angelo Solorzano MD    06/03/2020 9:02 PM         Clinical Impression:     1. Asthma, unspecified asthma severity, unspecified whether complicated, unspecified whether persistent    2. Chest pain                ED Disposition Condition    Discharge Stable        ED Prescriptions     Medication Sig Dispense Start Date End Date Auth. Provider    albuterol (PROVENTIL/VENTOLIN HFA) 90 mcg/actuation inhaler Inhale 2 puffs into the lungs every 6 (six) hours as needed. Rescue 18 g 6/3/2020  Angelo Solorzano MD    predniSONE (DELTASONE) 10 MG tablet Take 1 tablet (10 mg total) by mouth once daily. for 5 days 5 tablet 6/3/2020 6/8/2020 Angelo Solorzano MD        Follow-up Information     Follow up With Specialties Details Why Contact Info    Guicho Lizarraga MD Internal Medicine, Wound Care Schedule an appointment as soon as possible for a visit in 1 week  [address] [facility] [address]  [phone]                                        Angelo Solorzano MD  06/03/20 9651

## 2020-06-05 ENCOUNTER — PATIENT OUTREACH (OUTPATIENT)
Dept: ADMINISTRATIVE | Facility: OTHER | Age: 60
End: 2020-06-05

## 2020-06-05 NOTE — PROGRESS NOTES
LINKS immunization registry triggered  Care Everywhere updated  Health Maintenance updated  Chart reviewed for overdue Proactive [facility] Encounters health maintenance testing

## 2020-06-06 ENCOUNTER — NURSE TRIAGE (OUTPATIENT)
Dept: ADMINISTRATIVE | Facility: CLINIC | Age: 60
End: 2020-06-06

## 2020-06-07 NOTE — TELEPHONE ENCOUNTER
Reason for Disposition   Patient sounds very sick or weak to the triager    Additional Information   Negative: Severe difficulty breathing (e.g., struggling for each breath, speak in single words, pulse > 120)   Negative: Bluish (or gray) lips or face now   Negative: Difficult to awaken or acting confused (e.g., disoriented, slurred speech)   Negative: Passed out (i.e., lost consciousness, collapsed and was not responding)   Negative: Wheezing started suddenly after medicine, an allergic food, or bee sting   Negative: Sounds like a life-threatening emergency to the triager   Negative: [1] SEVERE asthma attack (e.g., very SOB at rest, speaks in single words, loud wheezes) AND [2] not resolved after 2 nebulizer or inhaler treatments   Negative: Peak flow rate less than 50% of baseline level (RED Zone)   Negative: [1] Severe wheezing or coughing AND [2] doesn't have neb or inhaler available   Negative: Chest pain   Negative: [1] Hospitalized before with asthma AND [2] feels the same now   Negative: [1] MODERATE asthma attack (e.g., SOB at rest, speaks in phrases, audible wheezes) AND [2] not resolved after 2 nebulizer or inhaler treatments given 20 minutes apart   Negative: [1] Peak flow rate 50-80% of baseline level (YELLOW zone) AND [2] not resolved after 2 nebulizer or inhaler treatments given 20 minutes apart   Negative: Asthma medicine (nebulizer or inhaler) is needed more frequently than q 4 hours to keep you comfortable   Negative: [1] Fever > 101 F (38.3 C) AND [2] age > 60   Negative: Fever > 103 F (39.4 C)    Protocols used: ASTHMA ATTACK-A-AH  Pt called re  Monday this week - having pblms with L side. Dxd with asthma/wheezing. Whole left side was numb, on steroids and MDI and Mucinex. Chest is uncomfortable. Pt wants to use heat pad-would this help? , pain on back and shoulders. Afeb. no cough. Pt worried sx will turn to pna. Pt feels sx aren't any better. Pt denies SOB. Pain on L side  and to bottom of back towards R. Pt cant see PCP until 6/29. Having a lot of ear pain. pt scared about covid 19. Pt notified we are keeping covid 19 pts . Call back with questions

## 2020-06-08 ENCOUNTER — OFFICE VISIT (OUTPATIENT)
Dept: OTOLARYNGOLOGY | Facility: CLINIC | Age: 60
End: 2020-06-08
Payer: MEDICAID

## 2020-06-08 ENCOUNTER — TELEPHONE (OUTPATIENT)
Dept: FAMILY MEDICINE | Facility: CLINIC | Age: 60
End: 2020-06-08

## 2020-06-08 ENCOUNTER — CLINICAL SUPPORT (OUTPATIENT)
Dept: AUDIOLOGY | Facility: CLINIC | Age: 60
End: 2020-06-08
Payer: MEDICAID

## 2020-06-08 DIAGNOSIS — H91.12 PRESBYCUSIS OF LEFT EAR WITH RESTRICTED HEARING OF RIGHT EAR: Primary | ICD-10-CM

## 2020-06-08 DIAGNOSIS — H72.91 PERFORATION OF RIGHT TYMPANIC MEMBRANE: ICD-10-CM

## 2020-06-08 DIAGNOSIS — H92.01 RIGHT EAR PAIN: ICD-10-CM

## 2020-06-08 DIAGNOSIS — H90.A31 MIXED CONDUCTIVE AND SENSORINEURAL HEARING LOSS OF RIGHT EAR WITH RESTRICTED HEARING OF LEFT EAR: ICD-10-CM

## 2020-06-08 DIAGNOSIS — H90.A31 MIXED CONDUCTIVE AND SENSORINEURAL HEARING LOSS OF RIGHT EAR WITH RESTRICTED HEARING OF LEFT EAR: Primary | ICD-10-CM

## 2020-06-08 DIAGNOSIS — H92.01 RIGHT EAR PAIN: Primary | ICD-10-CM

## 2020-06-08 PROCEDURE — 92557 COMPREHENSIVE HEARING TEST: CPT | Mod: PBBFAC | Performed by: PHYSICIAN ASSISTANT

## 2020-06-08 PROCEDURE — 99999 PR PBB SHADOW E&M-EST. PATIENT-LVL III: CPT | Mod: PBBFAC,,, | Performed by: NURSE PRACTITIONER

## 2020-06-08 PROCEDURE — 99999 PR PBB SHADOW E&M-EST. PATIENT-LVL III: ICD-10-PCS | Mod: PBBFAC,,, | Performed by: NURSE PRACTITIONER

## 2020-06-08 PROCEDURE — 99202 PR OFFICE/OUTPT VISIT, NEW, LEVL II, 15-29 MIN: ICD-10-PCS | Mod: S$PBB,,, | Performed by: NURSE PRACTITIONER

## 2020-06-08 PROCEDURE — 92567 TYMPANOMETRY: CPT | Mod: PBBFAC | Performed by: PHYSICIAN ASSISTANT

## 2020-06-08 PROCEDURE — 99202 OFFICE O/P NEW SF 15 MIN: CPT | Mod: S$PBB,,, | Performed by: NURSE PRACTITIONER

## 2020-06-08 PROCEDURE — 99211 OFF/OP EST MAY X REQ PHY/QHP: CPT | Mod: PBBFAC,27,25 | Performed by: PHYSICIAN ASSISTANT

## 2020-06-08 PROCEDURE — 99999 PR PBB SHADOW E&M-EST. PATIENT-LVL I: ICD-10-PCS | Mod: PBBFAC,,, | Performed by: PHYSICIAN ASSISTANT

## 2020-06-08 PROCEDURE — 99999 PR PBB SHADOW E&M-EST. PATIENT-LVL I: CPT | Mod: PBBFAC,,, | Performed by: PHYSICIAN ASSISTANT

## 2020-06-08 PROCEDURE — 99213 OFFICE O/P EST LOW 20 MIN: CPT | Mod: PBBFAC | Performed by: NURSE PRACTITIONER

## 2020-06-08 RX ORDER — OFLOXACIN 3 MG/ML
4 SOLUTION AURICULAR (OTIC) 2 TIMES DAILY
Qty: 10 ML | Refills: 0 | Status: SHIPPED | OUTPATIENT
Start: 2020-06-08 | End: 2020-06-08

## 2020-06-08 RX ORDER — OFLOXACIN 3 MG/ML
SOLUTION/ DROPS OPHTHALMIC
Qty: 10 ML | Refills: 0 | Status: SHIPPED | OUTPATIENT
Start: 2020-06-08 | End: 2020-11-12 | Stop reason: SDUPTHER

## 2020-06-08 NOTE — TELEPHONE ENCOUNTER
----- Message from Sushila Teague sent at 6/6/2020 11:27 AM CDT -----  Contact: Patient   Requesting Call back:     Who Called :  Paras   Reason of call:  Patient is requesting a call back in regards to mix drink for procedure    Best contact number:  [phone]    Additional information:  No

## 2020-06-08 NOTE — PROGRESS NOTES
Subjective:      Paras Smith is a 59 y.o. female who was self-referred for hearing loss.    Ms. Smith reports decrease hearing in right ear with associated ear pain for the past year. She was evaluated by a provider in urgent care who recommended she see ENT. She has tired Cortisporin ear drops in the past without benefit. She also reports runny nose and nasal congestion. She relates this to her perforated ear drum.  There is not a family history of hearing loss at a young age.  There is not a prior history of ear surgery.  There is not a prior history of ear infections .  She denies a history of significant noise exposure.  She does not wear hearing aids currently.  She has not had a hearing test recently.        Past Medical History  She has a past medical history of Asthma, CAD (coronary artery disease), Cervical spine disease, Depression, Difficult intubation, Falls, GERD (gastroesophageal reflux disease), Glaucoma, Helicobacter pylori (H. pylori) infection, History of stomach ulcers, HTN (hypertension), Muscle weakness, Neck problem, Nuclear cataract, Postsurgical hypothyroidism, Stroke, Thyroid cancer, and Vitamin D deficiency disease.    Past Surgical History  She has a past surgical history that includes Total thyroidectomy; Coronary angioplasty with stent; Cervical spine surgery; Thyroid surgery; Appendectomy; Patella surgery (); Cholecystectomy;  section, low transverse; Esophagogastroduodenoscopy (N/A, 2018); and Colonoscopy (N/A, 2018).    Family History  Her family history includes Asthma in her father and mother; Breast cancer in her maternal aunt, maternal aunt, maternal aunt, and mother; Cancer in her mother and sister; Cataracts in her father; Cervical cancer in her daughter and daughter; Diabetes in her father; Glaucoma in her father; Heart disease in her brother and mother; Hypertension in her brother, daughter, mother, and son; Kidney failure in her father;  Liver disease in her sister; Lupus in her daughter; Mental illness in her son; No Known Problems in her brother, daughter, maternal uncle, paternal aunt, paternal grandfather, paternal grandmother, and paternal uncle; Ovarian cancer in her mother; Scoliosis in her son; Seizures in her daughter; Stomach cancer in her maternal aunt and maternal grandmother.    Social History  She reports that she has never smoked. She has never used smokeless tobacco. She reports that she has current or past drug history. Drug: Marijuana. Frequency: 6.00 times per week. She reports that she does not drink alcohol.    Allergies  She is allergic to vioxx [rofecoxib].    Medications  She has a current medication list which includes the following prescription(s): acetaminophen, albuterol, albuterol, amitriptyline, amlodipine, anthralin, aspirin, azelastine, baclofen, clobetasol, clotrimazole, diazepam, dicyclomine, ergocalciferol, fluticasone propionate, gabapentin, hydroxyzine pamoate, ipratropium, lidocaine, lidocaine-prilocaine, lisinopril, multivitamin, naproxen, nitroglycerin 0.4 mg/hr td pt24, ondansetron, pantoprazole, prednisone, promethazine, rexulti, sertraline, synthroid, topiramate, and ofloxacin, and the following Facility-Administered Medications: sodium chloride 0.9%, sodium chloride 0.9%, onabotulinumtoxina, sodium chloride 0.9%, and sodium chloride 0.9%.    Review of Systems   Constitutional: Negative for chills, fever and unexpected weight change.   HENT: Positive for ear pain, hearing loss and tinnitus. Negative for congestion, ear discharge, facial swelling, postnasal drip, sinus pressure, sore throat and trouble swallowing.    Eyes: Negative for pain and visual disturbance.   Respiratory: Negative for apnea and shortness of breath.    Cardiovascular: Negative for chest pain and palpitations.   Gastrointestinal: Negative for abdominal pain and nausea.   Endocrine: Negative for cold intolerance and heat intolerance.    Musculoskeletal: Negative for joint swelling and neck stiffness.   Skin: Negative for color change and rash.   Neurological: Negative for dizziness, facial asymmetry and headaches.   Hematological: Negative for adenopathy. Does not bruise/bleed easily.   Psychiatric/Behavioral: Negative for agitation. The patient is not nervous/anxious.           Objective:     LMP 04/12/2014      Constitutional:   Vital signs are normal. She appears well-developed and well-nourished.     Head:  Normocephalic and atraumatic.     Ears:    Right Ear: No lacerations. No drainage, swelling or tenderness. No foreign bodies. No mastoid tenderness. Tympanic membrane is not injected, not scarred, not perforated, not erythematous, not retracted and not bulging. Tympanic membrane mobility is normal. No middle ear effusion. No hemotympanum. Decreased hearing is noted.   Left Ear: No lacerations. No drainage, swelling or tenderness. No foreign bodies. No mastoid tenderness. Tympanic membrane is not injected, not scarred, not perforated, not erythematous, not retracted and not bulging. Tympanic membrane mobility is normal.  No middle ear effusion. No hemotympanum. Decreased hearing is noted.   Ears:      Nose:  Nose normal including turbinates, nasal mucosa, sinuses and nasal septum.     Neck:  Neck normal without thyromegaly masses, asymmetry, normal tracheal structure, crepitus, and tenderness and no adenopathy.     Psychiatric:   She has a normal mood and affect.       Procedure    None      Data Reviewed    WBC (K/uL)   Date Value   05/31/2020 7.49     Platelets (K/uL)   Date Value   05/31/2020 266      Creatinine (mg/dL)   Date Value   05/31/2020 0.8     TSH (uIU/mL)   Date Value   08/09/2019 0.390 (L)     Glucose (mg/dL)   Date Value   05/31/2020 102     Hemoglobin A1C (%)   Date Value   02/10/2020 5.8 (H)       I independently reviewed the tracings of the complete audiometric evaluation performed today.  I reviewed the audiogram with  the patient as well.  Pertinent findings include Left mild to moderate SNHL at 2000-8000Hz. Right mixed hearing loss. Right SRT 60 wtih 92% discrimination at 90db. Left SRT 30 with 92% discrimination at 70db. Type A tympanogram in left ear..         Assessment:     1. Presbycusis of left ear with restricted hearing of right ear    2. Perforation of right tympanic membrane    3. 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 Right ear pain         Plan:     I had a long discussion with the patient regarding her condition and the further workup and management options.    Ms. Smith has a right TM perforation. May consider T plasty.  I placed referral to Neurotology for evaluation and management.   She has left ear age related hearing loss and right mixed hearing loss due to TM perforation. There is no indication for hearing amplification at this time.  Hearing conservation strongly recommended.  I ordered Ofloxacin gtts for right ear pain.    Follow up if symptoms worsen or fail to improve.

## 2020-06-08 NOTE — TELEPHONE ENCOUNTER
Called pt back. Pt states she was unable to finish taking her stomach medication for her procedure and wanted to let the PCP know.

## 2020-06-23 DIAGNOSIS — G89.29 CHRONIC NECK PAIN: ICD-10-CM

## 2020-06-23 DIAGNOSIS — M54.2 CHRONIC NECK PAIN: ICD-10-CM

## 2020-06-23 DIAGNOSIS — M54.2 CERVICALGIA: ICD-10-CM

## 2020-06-23 RX ORDER — AMITRIPTYLINE HYDROCHLORIDE 50 MG/1
TABLET, FILM COATED ORAL
Qty: 30 TABLET | Refills: 5 | Status: SHIPPED | OUTPATIENT
Start: 2020-06-23 | End: 2021-01-05

## 2020-06-24 ENCOUNTER — TELEPHONE (OUTPATIENT)
Dept: OTOLARYNGOLOGY | Facility: CLINIC | Age: 60
End: 2020-06-24

## 2020-06-24 NOTE — TELEPHONE ENCOUNTER
----- Message from Alexandria Lora sent at 6/24/2020 12:39 PM CDT -----  Regarding: [facility] is referring pt to [facility] for surgery  Contact: [phone]  Pt saw [facility] June 8 and referred pt to [facility] for surgery.  Pt has been waiting for a call to set up.  Can be reached at above number.  Thanks.

## 2020-06-24 NOTE — TELEPHONE ENCOUNTER
----- Message from Alexandria Lora sent at 6/24/2020 12:39 PM CDT -----  Regarding: [facility] is referring pt to [facility] for surgery  Contact: [phone]  Pt saw [facility] June 8 and referred pt to [facility] for surgery.  Pt has been waiting for a call to set up.  Can be reached at above number.  Thanks.

## 2020-06-29 ENCOUNTER — OFFICE VISIT (OUTPATIENT)
Dept: FAMILY MEDICINE | Facility: CLINIC | Age: 60
End: 2020-06-29
Payer: MEDICAID

## 2020-06-29 VITALS
WEIGHT: 270.75 LBS | HEART RATE: 75 BPM | SYSTOLIC BLOOD PRESSURE: 116 MMHG | DIASTOLIC BLOOD PRESSURE: 77 MMHG | BODY MASS INDEX: 49.82 KG/M2 | HEIGHT: 62 IN | TEMPERATURE: 98 F | RESPIRATION RATE: 17 BRPM | OXYGEN SATURATION: 95 %

## 2020-06-29 DIAGNOSIS — E89.0 POSTSURGICAL HYPOTHYROIDISM: ICD-10-CM

## 2020-06-29 DIAGNOSIS — R30.0 DYSURIA: Primary | ICD-10-CM

## 2020-06-29 DIAGNOSIS — C73 THYROID CANCER: ICD-10-CM

## 2020-06-29 DIAGNOSIS — E66.01 MORBID OBESITY DUE TO EXCESS CALORIES: ICD-10-CM

## 2020-06-29 DIAGNOSIS — R06.09 DYSPNEA ON EXERTION: ICD-10-CM

## 2020-06-29 LAB
BILIRUB SERPL-MCNC: NORMAL MG/DL
BLOOD URINE, POC: NORMAL
CLARITY, POC UA: NORMAL
COLOR, POC UA: YELLOW
GLUCOSE UR QL STRIP: NORMAL
KETONES UR QL STRIP: NORMAL
LEUKOCYTE ESTERASE URINE, POC: NORMAL
NITRITE, POC UA: NORMAL
PH, POC UA: 5
PROTEIN, POC: NORMAL
SPECIFIC GRAVITY, POC UA: 1030
UROBILINOGEN, POC UA: NORMAL

## 2020-06-29 PROCEDURE — 99999 PR PBB SHADOW E&M-EST. PATIENT-LVL V: CPT | Mod: PBBFAC,,, | Performed by: INTERNAL MEDICINE

## 2020-06-29 PROCEDURE — 99214 PR OFFICE/OUTPT VISIT, EST, LEVL IV, 30-39 MIN: ICD-10-PCS | Mod: S$PBB,,, | Performed by: INTERNAL MEDICINE

## 2020-06-29 PROCEDURE — 99214 OFFICE O/P EST MOD 30 MIN: CPT | Mod: S$PBB,,, | Performed by: INTERNAL MEDICINE

## 2020-06-29 PROCEDURE — 99215 OFFICE O/P EST HI 40 MIN: CPT | Mod: PBBFAC,PN | Performed by: INTERNAL MEDICINE

## 2020-06-29 PROCEDURE — 81002 URINALYSIS NONAUTO W/O SCOPE: CPT | Mod: PBBFAC,PN | Performed by: INTERNAL MEDICINE

## 2020-06-29 PROCEDURE — 99999 PR PBB SHADOW E&M-EST. PATIENT-LVL V: ICD-10-PCS | Mod: PBBFAC,,, | Performed by: INTERNAL MEDICINE
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
## 2020-06-29 NOTE — PROGRESS NOTES
Paras Smith was seen today in the clinic for an audiologic evaluation.  Patients main complaint was hearing loss, greater in the right ear. Ms. Smith reported visiting the urgent care in February 2020 and they informed her she had a perforation in her right eardrum.      Tympanometry revealed a large volume in the right ear and Type A in the left ear.  Audiogram results revealed a mild to moderate SNHL in the left ear in the high frequencies and a moderate mixed hearing loss in the right ear.  Speech reception thresholds were noted at 60dB in the right ear and 30dB in the left ear.  Speech discrimination scores were 92% in the right ear and 92% in the left ear.    Recommendations:  1. Otologic evaluation  2. Follow-up audiograms as needed  3. Noise protection when in noise

## 2020-06-30 ENCOUNTER — LAB VISIT (OUTPATIENT)
Dept: LAB | Facility: HOSPITAL | Age: 60
End: 2020-06-30
Attending: INTERNAL MEDICINE
Payer: MEDICAID

## 2020-06-30 DIAGNOSIS — C73 THYROID CANCER: ICD-10-CM

## 2020-06-30 DIAGNOSIS — E89.0 POSTSURGICAL HYPOTHYROIDISM: ICD-10-CM

## 2020-06-30 LAB
T4 FREE SERPL-MCNC: 1.35 NG/DL (ref 0.71–1.51)
TSH SERPL DL<=0.005 MIU/L-ACNC: 0.01 UIU/ML (ref 0.4–4)

## 2020-06-30 PROCEDURE — 84432 ASSAY OF THYROGLOBULIN: CPT

## 2020-06-30 PROCEDURE — 36415 COLL VENOUS BLD VENIPUNCTURE: CPT | Mod: PN

## 2020-06-30 PROCEDURE — 84439 ASSAY OF FREE THYROXINE: CPT

## 2020-06-30 PROCEDURE — 84443 ASSAY THYROID STIM HORMONE: CPT

## 2020-07-02 ENCOUNTER — PATIENT MESSAGE (OUTPATIENT)
Dept: ENDOCRINOLOGY | Facility: CLINIC | Age: 60
End: 2020-07-02

## 2020-07-02 ENCOUNTER — TELEPHONE (OUTPATIENT)
Dept: ENDOCRINOLOGY | Facility: CLINIC | Age: 60
End: 2020-07-02

## 2020-07-02 DIAGNOSIS — C73 THYROID CANCER: ICD-10-CM

## 2020-07-02 RX ORDER — LEVOTHYROXINE SODIUM 112 UG/1
112 TABLET ORAL DAILY
Qty: 30 TABLET | Refills: 3 | Status: SHIPPED | OUTPATIENT
Start: 2020-07-02 | End: 2020-08-13 | Stop reason: SDUPTHER

## 2020-07-13 ENCOUNTER — PATIENT MESSAGE (OUTPATIENT)
Dept: FAMILY MEDICINE | Facility: CLINIC | Age: 60
End: 2020-07-13

## 2020-07-13 DIAGNOSIS — M54.2 CERVICALGIA: ICD-10-CM

## 2020-07-13 RX ORDER — BACLOFEN 20 MG/1
20 TABLET ORAL 3 TIMES DAILY
Qty: 90 TABLET | Refills: 1 | Status: SHIPPED | OUTPATIENT
Start: 2020-07-13 | End: 2020-08-11

## 2020-07-20 DIAGNOSIS — I10 ESSENTIAL HYPERTENSION: ICD-10-CM

## 2020-07-20 RX ORDER — DICYCLOMINE HYDROCHLORIDE 10 MG/1
CAPSULE ORAL
Qty: 120 CAPSULE | Refills: 1 | Status: SHIPPED | OUTPATIENT
Start: 2020-07-20 | End: 2020-09-15

## 2020-07-20 RX ORDER — LISINOPRIL 10 MG/1
TABLET ORAL
Qty: 30 TABLET | Refills: 3 | Status: SHIPPED | OUTPATIENT
Start: 2020-07-20 | End: 2020-11-10

## 2020-07-20 RX ORDER — PANTOPRAZOLE SODIUM 40 MG/1
TABLET, DELAYED RELEASE ORAL
Qty: 30 TABLET | Refills: 2 | Status: SHIPPED | OUTPATIENT
Start: 2020-07-20 | End: 2020-10-13

## 2020-07-22 ENCOUNTER — TELEPHONE (OUTPATIENT)
Dept: ENDOCRINOLOGY | Facility: CLINIC | Age: 60
End: 2020-07-22

## 2020-07-22 NOTE — TELEPHONE ENCOUNTER
----- Message from Kathrine Davis sent at 7/22/2020  8:35 AM CDT -----  Pt states she would like to speak  with nurse in regards to her ultrasound

## 2020-07-23 ENCOUNTER — PATIENT OUTREACH (OUTPATIENT)
Dept: ADMINISTRATIVE | Facility: OTHER | Age: 60
End: 2020-07-23

## 2020-07-23 NOTE — PROGRESS NOTES
Requested updates within Care Everywhere.  Patient's chart was reviewed for overdue BONNIE topics.  Immunizations reconciled.

## 2020-07-29 ENCOUNTER — TELEPHONE (OUTPATIENT)
Dept: OTOLARYNGOLOGY | Facility: CLINIC | Age: 60
End: 2020-07-29

## 2020-07-29 NOTE — TELEPHONE ENCOUNTER
Called Patient , has an appointment to see Dr. Garcia on 8/13/20 with needing a Hearing Test Scheduled before appointment.

## 2020-08-03 ENCOUNTER — TELEPHONE (OUTPATIENT)
Dept: OTOLARYNGOLOGY | Facility: CLINIC | Age: 60
End: 2020-08-03

## 2020-08-03 NOTE — TELEPHONE ENCOUNTER
----- Message from Osmany Payan MA sent at 8/3/2020 11:34 AM CDT -----  Regarding: Candy Hicks  Pt. Is returning a call from Kurt      ----- Message -----  From: Evelyn Douglass  Sent: 8/3/2020  10:40 AM CDT  To: Master Shaan Staff    Type: Patient Call Back    Who called:self    What is the request in detail:pt had hearing test done. KURT    Can the clinic reply by [facility]?NO    Would the patient rather a call back or a response via [facility]?CALL      Best call back number  /

## 2020-08-03 NOTE — TELEPHONE ENCOUNTER
Returned patient's call. No answer. Needing to set up hearing test along with Dr. Garcia appointment scheduled on 08/13/2020.

## 2020-08-12 ENCOUNTER — HOSPITAL ENCOUNTER (OUTPATIENT)
Dept: ENDOCRINOLOGY | Facility: CLINIC | Age: 60
Discharge: HOME | End: 2020-08-12
Attending: INTERNAL MEDICINE
Payer: MEDICAID

## 2020-08-12 ENCOUNTER — PATIENT OUTREACH (OUTPATIENT)
Dept: ADMINISTRATIVE | Facility: OTHER | Age: 60
End: 2020-08-12

## 2020-08-12 DIAGNOSIS — E89.0 POSTSURGICAL HYPOTHYROIDISM: ICD-10-CM

## 2020-08-12 DIAGNOSIS — C73 THYROID CANCER: ICD-10-CM

## 2020-08-12 PROCEDURE — 76536 US EXAM OF HEAD AND NECK: CPT | Mod: 26,,, | Performed by: INTERNAL MEDICINE

## 2020-08-12 PROCEDURE — 76536 US SOFT TISSUE HEAD NECK THYROID: ICD-10-PCS | Mod: 26,,, | Performed by: INTERNAL MEDICINE

## 2020-08-13 ENCOUNTER — OFFICE VISIT (OUTPATIENT)
Dept: ENDOCRINOLOGY | Facility: CLINIC | Age: 60
End: 2020-08-13
Payer: MEDICAID

## 2020-08-13 ENCOUNTER — TELEPHONE (OUTPATIENT)
Dept: OTOLARYNGOLOGY | Facility: CLINIC | Age: 60
End: 2020-08-13

## 2020-08-13 ENCOUNTER — OFFICE VISIT (OUTPATIENT)
Dept: OTOLARYNGOLOGY | Facility: CLINIC | Age: 60
End: 2020-08-13
Payer: MEDICAID

## 2020-08-13 VITALS
WEIGHT: 274.5 LBS | DIASTOLIC BLOOD PRESSURE: 82 MMHG | HEIGHT: 62 IN | TEMPERATURE: 98 F | BODY MASS INDEX: 50.51 KG/M2 | SYSTOLIC BLOOD PRESSURE: 122 MMHG

## 2020-08-13 DIAGNOSIS — H93.11 TINNITUS OF RIGHT EAR: ICD-10-CM

## 2020-08-13 DIAGNOSIS — H72.01 CENTRAL PERFORATION OF TYMPANIC MEMBRANE OF RIGHT EAR: Primary | ICD-10-CM

## 2020-08-13 DIAGNOSIS — Z01.818 PRE-OP TESTING: ICD-10-CM

## 2020-08-13 DIAGNOSIS — C73 THYROID CANCER: ICD-10-CM

## 2020-08-13 DIAGNOSIS — E89.0 POSTSURGICAL HYPOTHYROIDISM: Primary | ICD-10-CM

## 2020-08-13 DIAGNOSIS — H90.A31 MIXED CONDUCTIVE AND SENSORINEURAL HEARING LOSS OF RIGHT EAR WITH RESTRICTED HEARING OF LEFT EAR: ICD-10-CM

## 2020-08-13 PROCEDURE — 99214 PR OFFICE/OUTPT VISIT, EST, LEVL IV, 30-39 MIN: ICD-10-PCS | Mod: 57,S$GLB,, | Performed by: OTOLARYNGOLOGY

## 2020-08-13 PROCEDURE — 99214 OFFICE O/P EST MOD 30 MIN: CPT | Mod: 57,S$GLB,, | Performed by: OTOLARYNGOLOGY

## 2020-08-13 PROCEDURE — 99213 PR OFFICE/OUTPT VISIT, EST, LEVL III, 20-29 MIN: ICD-10-PCS | Mod: 95,,, | Performed by: INTERNAL MEDICINE

## 2020-08-13 PROCEDURE — 99213 OFFICE O/P EST LOW 20 MIN: CPT | Mod: 95,,, | Performed by: INTERNAL MEDICINE

## 2020-08-13 RX ORDER — LEVOTHYROXINE SODIUM 112 UG/1
TABLET ORAL
Qty: 28 TABLET | Refills: 11 | Status: SHIPPED | OUTPATIENT
Start: 2020-08-13 | End: 2021-07-09

## 2020-08-13 NOTE — PROGRESS NOTES
Subjective:       Patient ID: Paras Smith is a 59 y.o. female.    Chief Complaint: Tinnitus (for about a 2-3 years)    HPI     Paras Smith is a 59 y.o. female with history of AD tympanic membrane perforation x 2 years.  Perforation due to unknown causes, but denies history of ear infections or otorrhea. She does not associated moderate constant hearing loss, otalgia and tinnitus.  She is interested in surgical repair.  She does have a history of CAD and thyroid cancer    Review of Systems   Constitutional: Negative for chills and fever.   HENT: Negative for sore throat and trouble swallowing.    Respiratory: Negative for apnea and chest tightness.    Cardiovascular: Negative for chest pain.         Objective:      Physical Exam  Constitutional:       Appearance: She is well-developed.   HENT:      Head: Normocephalic and atraumatic.      Right Ear: Ear canal and external ear normal. No drainage. Tympanic membrane is perforated.      Left Ear: Tympanic membrane, ear canal and external ear normal.      Ears:        Nose: Nose normal.      Mouth/Throat:      Pharynx: Uvula midline.   Neck:      Musculoskeletal: Normal range of motion.      Thyroid: No thyroid mass.        Data:    Audiogram tracings independently reviewed and discussed with patient shows CHL AD with slight SNHL AS    Assessment:       1. 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 Tinnitus of right ear    3. Mixed conductive and sensorineural hearing loss of right ear with restricted hearing of left ear        Plan:        ok for right tympanoplasty    OR on 9/9 at [facility]    Discussed right Tympanoplasty with patient  Risks, complications, alternatives and goals reviewed including possible infection, bleeding, hearing loss, chronic drainage, graft failure, facial nerve problems, dural injury and other potential problems.

## 2020-08-13 NOTE — PROGRESS NOTES
Subjective:      Patient ID: Paras Smith is a 59 y.o. female.    Chief Complaint:  Hypothyroidism and Thyroid Cancer      The patient location is: Home  The chief complaint leading to consultation is: hypothyroidism, thyroid cancer    Visit type: audiovisual    Face to Face time with patient: 15 min  20 minutes of total time spent on the encounter, which includes face to face time and non-face to face time preparing to see the patient (eg, review of tests), Obtaining and/or reviewing separately obtained history, Documenting clinical information in the electronic or other health record, Independently interpreting results (not separately reported) and communicating results to the patient/family/caregiver, or Care coordination (not separately reported).     Each patient to whom he or she provides medical services by telemedicine is:  (1) informed of the relationship between the physician and patient and the respective role of any other health care provider with respect to management of the patient; and (2) notified that he or she may decline to receive medical services by telemedicine and may withdraw from such care at any time.      History of Present Illness  Ms. Smith presents for follow up of thyroid cancer and hypothyroidism. Last visit 5/2020.     She is s/p total thyroidectomy for thyroid cancer  in 2008 followed by I-131 tx with 100 mCi on 11/18/2008      Initially had left lobectomy and isthmusectomy that showed a invasive follicular carcinoma with vascular invasion.      Post treatment scan showed no evidence of distant metastatic disease.     Had Tg that was undetectable after thyroid hormone withdrawal in 12/2011.     Follow up TBS performed in 2010 and 2012 noted increase uptake within the neck which could represent residual thyroid tissue or malignancy, with no evidence of distant mets.     Thyroglobulin levels have been undetectable over time, until recently her Tg increased to 0.3 in 4/2019.  She had been on significant TSH suppression until 2019 when her thyroid hormone was reduced. Her Tg level in 6/2020 is now undetectable.      No family history of thyroid cancer. No history of head or neck irradiation.      Currently, she is taking Synthroid 112 mcg Mon-Sat with a half tablet on Sunday.   Takes on an empty stomach and waits 1 hr before taking food or other meds.     Denies palpitations or tremor.     Ultrasound 8/2020:  1.11 x 0.78 x 0.79 cm (previously 1.5 x 0.9 x 1.0 cm) focus is again seen in the area of the left thyroid bed.  This area is hyperechoic with blurred margins.  No microcalcifications are seen.  No significant vascularity.  This likely represents scar tissue vs residual thyroid tissue.  Thyroglobulin needle wash of this area was previously negative in 5/2019.      FNA 5/2019:  FINAL PATHOLOGIC DIAGNOSIS  FINE-NEEDLE ASPIRATE OF LEFT THYROID:  THE [facility] SYSTEM FOR REPORTING THYROID CYTOPATHOLOGY I NONDIAGNOSTIC OR  UNSATISFACTORY  Acellular specimen. Although colloid is present, it is not abundant .     Results for HAMILTON MIN (MRN [id number]) as of 5/8/2020 08:15    Ref. Range 5/3/2019 08:23   Thyroglobulin, FNA, Lymph Node Latest Ref Range: <=1.0 ng/mL <0.1   Site, FNA Unknown Test Not Performed              Review of Systems   Constitutional: Negative for chills and fever.   Gastrointestinal: Negative for nausea.       Objective:   Physical Exam  Constitutional:       Appearance: Normal appearance. She is not ill-appearing.   Neurological:      Mental Status: She is alert.       BP Readings from Last 3 Encounters:   08/13/20 122/82   06/29/20 116/77   06/03/20 100/67     Wt Readings from Last 1 Encounters:   08/13/20 0817 124.5 kg (274 lb 7.6 oz)       There is no height or weight on file to calculate BMI.    Lab Review:   Lab Results   Component Value Date    HGBA1C 5.8 (H) 02/10/2020     Lab Results   Component Value Date    CHOL 193 04/26/2019    HDL 57 04/26/2019     LDLCALC 108.2 04/26/2019    TRIG 139 04/26/2019    CHOLHDL 29.5 04/26/2019     Lab Results   Component Value Date     05/31/2020    K 4.0 05/31/2020     05/31/2020    CO2 20 (L) 05/31/2020     05/31/2020    BUN 14 05/31/2020    CREATININE 0.8 05/31/2020    CALCIUM 9.8 05/31/2020    PROT 7.8 05/31/2020    ALBUMIN 4.3 05/31/2020    BILITOT 0.7 05/31/2020    ALKPHOS 162 (H) 05/31/2020    AST 21 05/31/2020    ALT 26 05/31/2020    ANIONGAP 11 05/31/2020    ESTGFRAFRICA >60.0 05/31/2020    EGFRNONAA >60.0 05/31/2020    TSH 0.011 (L) 06/30/2020         Assessment and Plan     Postsurgical hypothyroidism  --patient with postoperative hypothyroidism with a history of thyroid cancer  --TSH goal 0.1-0.5  --TSH now below goal  --Will reduce Synthroid slightly by having her take 112 mcg 6 days per week  --Repeat TSH in 6-8 weeks    Thyroid cancer  --Post surgical hypothyroidism / thyroid cancer - with thyroglobulin that is now undetectable with TSH suppression (biochemical remission), with a non-specific focus in the left thyroid bed  --Left thyroid bed underwent FNA in 2019 and was Tg wash negative, and this area has not increased in size on the most recent ultrasound (could represent scar tissue)  --Will repeat Tg in 6 months  --Will repeat neck ultrasound in 1 year      TSH in 6 weeks, Tg in 6 months, ultrasound in 1 year    RTC in 6 months      Zay Vincent M.D. Staff Endocrinology

## 2020-08-13 NOTE — TELEPHONE ENCOUNTER
----- Message from Baylee Becerra MA sent at 8/13/2020 10:47 AM CDT -----  Regarding: FW: Patient call back    ----- Message -----  From: Jaqueline Dolye  Sent: 8/13/2020  10:41 AM CDT  To: Master Shaan Staff  Subject: Patient call back                                Who called:HAMILTON MIN [[id number]]    What is the request in detail: Patient is requesting a call back. She state she would like to know how to go about setting up her pre opt appt. She states she would also prefer to have it done on the [facility] as that's where she lives. She would like to further discuss.   Please advise.    Can the clinic reply by [facility]? No    Best call back number: [phone]    Additional Information: N/A

## 2020-08-13 NOTE — ASSESSMENT & PLAN NOTE
--patient with postoperative hypothyroidism with a history of thyroid cancer  --TSH goal 0.1-0.5  --TSH now below goal  --Will reduce Synthroid slightly by having her take 112 mcg 6 days per week  --Repeat TSH in 6-8 weeks

## 2020-08-13 NOTE — LETTER
August 13, 2020      Guicho Lizarraga MD  [address] [place] [address]           [facility]  [address]  [place] [address]  Phone: [phone]          Patient: Paras Smith   MR Number: [id number]   YOB: 1960   Date of Visit: 8/13/2020       Dear Dr. Guicho Lizarraga:    Thank you for referring Paras Smith to me for evaluation. Attached you will find relevant portions of my assessment and plan of care.    If you have questions, please do not hesitate to call me. I look forward to following Paras Smith along with you.    Sincerely,    Shaan Garcia MD    Enclosure  CC:  No Recipients    If you would like to receive this communication electronically, please contact [email or web address] or [phone] to request more information on [facility] access.    For providers and/or their staff who would like to refer a patient to [facility], please contact us through our one-stop-shop provider referral line, [facility] , at [phone].    If you feel you have received this communication in error or would no longer like to receive these types of communications, please e-mail [email or web address]

## 2020-08-13 NOTE — PROGRESS NOTES
Requested updates from Care Everywhere.  Reviewed chart for overdue BONNIE topics.  Updated Health Maintenance.   Reconciled immunizations in LINKS.

## 2020-08-13 NOTE — ASSESSMENT & PLAN NOTE
--Post surgical hypothyroidism / thyroid cancer - with thyroglobulin that is now undetectable with TSH suppression (biochemical remission), with a non-specific focus in the left thyroid bed  --Left thyroid bed underwent FNA in 2019 and was Tg wash negative, and this area has not increased in size on the most recent ultrasound (could represent scar tissue)  --Will repeat Tg in 6 months  --Will repeat neck ultrasound in 1 year

## 2020-08-17 ENCOUNTER — LAB VISIT (OUTPATIENT)
Dept: LAB | Facility: HOSPITAL | Age: 60
End: 2020-08-17
Attending: INTERNAL MEDICINE
Payer: MEDICAID

## 2020-08-17 DIAGNOSIS — C73 THYROID CANCER: ICD-10-CM

## 2020-08-17 LAB
T4 FREE SERPL-MCNC: 1.01 NG/DL (ref 0.71–1.51)
TSH SERPL DL<=0.005 MIU/L-ACNC: 0.13 UIU/ML (ref 0.4–4)

## 2020-08-17 PROCEDURE — 84439 ASSAY OF FREE THYROXINE: CPT

## 2020-08-17 PROCEDURE — 36415 COLL VENOUS BLD VENIPUNCTURE: CPT | Mod: PO

## 2020-08-17 PROCEDURE — 84443 ASSAY THYROID STIM HORMONE: CPT

## 2020-08-19 ENCOUNTER — TELEPHONE (OUTPATIENT)
Dept: OTOLARYNGOLOGY | Facility: CLINIC | Age: 60
End: 2020-08-19

## 2020-09-04 ENCOUNTER — ANESTHESIA EVENT (OUTPATIENT)
Dept: SURGERY | Facility: HOSPITAL | Age: 60
End: 2020-09-04
Payer: MEDICAID

## 2020-09-07 RX ORDER — ATORVASTATIN CALCIUM 40 MG/1
40 TABLET, FILM COATED ORAL DAILY
COMMUNITY
Start: 2020-08-18 | End: 2021-09-14 | Stop reason: DRUGHIGH

## 2020-09-07 RX ORDER — SODIUM, POTASSIUM,MAG SULFATES 17.5-3.13G
SOLUTION, RECONSTITUTED, ORAL ORAL
COMMUNITY
Start: 2020-08-14 | End: 2021-09-14 | Stop reason: ALTCHOICE

## 2020-09-09 ENCOUNTER — ANESTHESIA (OUTPATIENT)
Dept: SURGERY | Facility: HOSPITAL | Age: 60
End: 2020-09-09
Payer: MEDICAID

## 2020-09-14 ENCOUNTER — TELEPHONE (OUTPATIENT)
Dept: OTOLARYNGOLOGY | Facility: CLINIC | Age: 60
End: 2020-09-14

## 2020-09-14 DIAGNOSIS — H93.11 TINNITUS OF RIGHT EAR: ICD-10-CM

## 2020-09-14 DIAGNOSIS — Z01.818 PRE-OP TESTING: ICD-10-CM

## 2020-09-14 DIAGNOSIS — H90.A31 MIXED CONDUCTIVE AND SENSORINEURAL HEARING LOSS OF RIGHT EAR WITH RESTRICTED HEARING OF LEFT EAR: ICD-10-CM

## 2020-09-14 DIAGNOSIS — H72.01 CENTRAL PERFORATION OF TYMPANIC MEMBRANE OF RIGHT EAR: Primary | ICD-10-CM

## 2020-09-15 ENCOUNTER — TELEPHONE (OUTPATIENT)
Dept: FAMILY MEDICINE | Facility: CLINIC | Age: 60
End: 2020-09-15

## 2020-09-15 DIAGNOSIS — J30.89 NON-SEASONAL ALLERGIC RHINITIS DUE TO OTHER ALLERGIC TRIGGER: ICD-10-CM

## 2020-09-15 DIAGNOSIS — I10 HYPERTENSION, ESSENTIAL: Primary | ICD-10-CM

## 2020-09-15 RX ORDER — FLUTICASONE PROPIONATE 50 MCG
SPRAY, SUSPENSION (ML) NASAL
Qty: 16 G | Refills: 3 | Status: SHIPPED | OUTPATIENT
Start: 2020-09-15 | End: 2021-01-05

## 2020-09-15 RX ORDER — DICYCLOMINE HYDROCHLORIDE 10 MG/1
CAPSULE ORAL
Qty: 120 CAPSULE | Refills: 1 | Status: SHIPPED | OUTPATIENT
Start: 2020-09-15 | End: 2020-11-10

## 2020-09-15 NOTE — TELEPHONE ENCOUNTER
Pt was a no show this morning to Covid-19 testing ordered by Dr. Garcia. This nurse attempted to phone pt, no answer.

## 2020-09-16 NOTE — TELEPHONE ENCOUNTER
----- Message from Sandra Mcnulty sent at 9/16/2020 10:16 AM CDT -----  Contact: [phone]  Patient states she is overdue for a kidney test. Please call and advise

## 2020-09-18 ENCOUNTER — LAB VISIT (OUTPATIENT)
Dept: LAB | Facility: HOSPITAL | Age: 60
End: 2020-09-18
Attending: INTERNAL MEDICINE
Payer: MEDICAID

## 2020-09-18 ENCOUNTER — LAB VISIT (OUTPATIENT)
Dept: FAMILY MEDICINE | Facility: CLINIC | Age: 60
End: 2020-09-18
Payer: MEDICAID

## 2020-09-18 DIAGNOSIS — C73 THYROID CANCER: ICD-10-CM

## 2020-09-18 DIAGNOSIS — Z01.818 PRE-OP TESTING: ICD-10-CM

## 2020-09-18 DIAGNOSIS — H72.01 CENTRAL PERFORATION OF TYMPANIC MEMBRANE OF RIGHT EAR: ICD-10-CM

## 2020-09-18 DIAGNOSIS — I10 HYPERTENSION, ESSENTIAL: ICD-10-CM

## 2020-09-18 DIAGNOSIS — E89.0 POSTSURGICAL HYPOTHYROIDISM: ICD-10-CM

## 2020-09-18 LAB
ANION GAP SERPL CALC-SCNC: 10 MMOL/L (ref 8–16)
BASOPHILS # BLD AUTO: 0.08 K/UL (ref 0–0.2)
BASOPHILS NFR BLD: 1.2 % (ref 0–1.9)
BUN SERPL-MCNC: 17 MG/DL (ref 6–20)
CALCIUM SERPL-MCNC: 9.2 MG/DL (ref 8.7–10.5)
CHLORIDE SERPL-SCNC: 105 MMOL/L (ref 95–110)
CO2 SERPL-SCNC: 24 MMOL/L (ref 23–29)
CREAT SERPL-MCNC: 0.7 MG/DL (ref 0.5–1.4)
DIFFERENTIAL METHOD: ABNORMAL
EOSINOPHIL # BLD AUTO: 0.2 K/UL (ref 0–0.5)
EOSINOPHIL NFR BLD: 3 % (ref 0–8)
ERYTHROCYTE [DISTWIDTH] IN BLOOD BY AUTOMATED COUNT: 12.7 % (ref 11.5–14.5)
EST. GFR  (AFRICAN AMERICAN): >60 ML/MIN/1.73 M^2
EST. GFR  (NON AFRICAN AMERICAN): >60 ML/MIN/1.73 M^2
GLUCOSE SERPL-MCNC: 118 MG/DL (ref 70–110)
HCT VFR BLD AUTO: 44.8 % (ref 37–48.5)
HGB BLD-MCNC: 14.3 G/DL (ref 12–16)
IMM GRANULOCYTES # BLD AUTO: 0.02 K/UL (ref 0–0.04)
IMM GRANULOCYTES NFR BLD AUTO: 0.3 % (ref 0–0.5)
LYMPHOCYTES # BLD AUTO: 2.6 K/UL (ref 1–4.8)
LYMPHOCYTES NFR BLD: 37.8 % (ref 18–48)
MCH RBC QN AUTO: 30.9 PG (ref 27–31)
MCHC RBC AUTO-ENTMCNC: 31.9 G/DL (ref 32–36)
MCV RBC AUTO: 97 FL (ref 82–98)
MONOCYTES # BLD AUTO: 0.6 K/UL (ref 0.3–1)
MONOCYTES NFR BLD: 9.2 % (ref 4–15)
NEUTROPHILS # BLD AUTO: 3.3 K/UL (ref 1.8–7.7)
NEUTROPHILS NFR BLD: 48.5 % (ref 38–73)
NRBC BLD-RTO: 0 /100 WBC
PLATELET # BLD AUTO: 265 K/UL (ref 150–350)
PMV BLD AUTO: 12.4 FL (ref 9.2–12.9)
POTASSIUM SERPL-SCNC: 4.3 MMOL/L (ref 3.5–5.1)
RBC # BLD AUTO: 4.63 M/UL (ref 4–5.4)
SARS-COV-2 RNA RESP QL NAA+PROBE: NOT DETECTED
SODIUM SERPL-SCNC: 139 MMOL/L (ref 136–145)
TSH SERPL DL<=0.005 MIU/L-ACNC: 0.62 UIU/ML (ref 0.4–4)
WBC # BLD AUTO: 6.75 K/UL (ref 3.9–12.7)

## 2020-09-18 PROCEDURE — 80048 BASIC METABOLIC PNL TOTAL CA: CPT

## 2020-09-18 PROCEDURE — 36415 COLL VENOUS BLD VENIPUNCTURE: CPT | Mod: PO

## 2020-09-18 PROCEDURE — 85025 COMPLETE CBC W/AUTO DIFF WBC: CPT

## 2020-09-18 PROCEDURE — 84443 ASSAY THYROID STIM HORMONE: CPT

## 2020-09-18 PROCEDURE — U0003 INFECTIOUS AGENT DETECTION BY NUCLEIC ACID (DNA OR RNA); SEVERE ACUTE RESPIRATORY SYNDROME CORONAVIRUS 2 (SARS-COV-2) (CORONAVIRUS DISEASE [COVID-19]), AMPLIFIED PROBE TECHNIQUE, MAKING USE OF HIGH THROUGHPUT TECHNOLOGIES AS DESCRIBED BY CMS-2020-01-R: HCPCS

## 2020-09-21 ENCOUNTER — PATIENT MESSAGE (OUTPATIENT)
Dept: ENDOCRINOLOGY | Facility: CLINIC | Age: 60
End: 2020-09-21

## 2020-09-21 DIAGNOSIS — E89.0 POSTSURGICAL HYPOTHYROIDISM: Primary | ICD-10-CM

## 2020-09-21 DIAGNOSIS — C73 THYROID CANCER: ICD-10-CM

## 2020-09-23 ENCOUNTER — TELEPHONE (OUTPATIENT)
Dept: FAMILY MEDICINE | Facility: CLINIC | Age: 60
End: 2020-09-23

## 2020-09-23 ENCOUNTER — OFFICE VISIT (OUTPATIENT)
Dept: FAMILY MEDICINE | Facility: CLINIC | Age: 60
End: 2020-09-23
Payer: MEDICAID

## 2020-09-23 VITALS
DIASTOLIC BLOOD PRESSURE: 70 MMHG | HEIGHT: 62 IN | BODY MASS INDEX: 51.37 KG/M2 | RESPIRATION RATE: 20 BRPM | HEART RATE: 72 BPM | TEMPERATURE: 98 F | OXYGEN SATURATION: 97 % | WEIGHT: 279.13 LBS | SYSTOLIC BLOOD PRESSURE: 118 MMHG

## 2020-09-23 DIAGNOSIS — F33.1 MODERATE EPISODE OF RECURRENT MAJOR DEPRESSIVE DISORDER: ICD-10-CM

## 2020-09-23 DIAGNOSIS — E89.0 POSTSURGICAL HYPOTHYROIDISM: ICD-10-CM

## 2020-09-23 DIAGNOSIS — E66.01 MORBID OBESITY: Primary | ICD-10-CM

## 2020-09-23 DIAGNOSIS — I10 ESSENTIAL HYPERTENSION: ICD-10-CM

## 2020-09-23 PROCEDURE — 99213 OFFICE O/P EST LOW 20 MIN: CPT | Mod: PBBFAC,PN | Performed by: INTERNAL MEDICINE

## 2020-09-23 PROCEDURE — 99214 PR OFFICE/OUTPT VISIT, EST, LEVL IV, 30-39 MIN: ICD-10-PCS | Mod: S$PBB,,, | Performed by: INTERNAL MEDICINE

## 2020-09-23 PROCEDURE — 99999 PR PBB SHADOW E&M-EST. PATIENT-LVL III: CPT | Mod: PBBFAC,,, | Performed by: INTERNAL MEDICINE

## 2020-09-23 PROCEDURE — 99214 OFFICE O/P EST MOD 30 MIN: CPT | Mod: S$PBB,,, | Performed by: INTERNAL MEDICINE

## 2020-09-23 PROCEDURE — 99999 PR PBB SHADOW E&M-EST. PATIENT-LVL III: ICD-10-PCS | Mod: PBBFAC,,, | Performed by: INTERNAL MEDICINE

## 2020-09-23 RX ORDER — BENZONATATE 200 MG/1
200 CAPSULE ORAL 3 TIMES DAILY PRN
Qty: 21 CAPSULE | Refills: 0 | Status: SHIPPED | OUTPATIENT
Start: 2020-09-23 | End: 2020-10-03

## 2020-09-23 NOTE — TELEPHONE ENCOUNTER
----- Message from Guicho Lizarraga MD sent at 9/23/2020 10:28 AM CDT -----  Regarding: PFT scheduling  Please contact pulmonary department about PFT scheduling Patient had COVID screening 9/18 for PFT's but I do not see where PFT's are yet scehduled. Thank you

## 2020-09-23 NOTE — PROGRESS NOTES
"Subjective:       Patient ID: Paras Smith is a 59 y.o. female.    Chief Complaint: Diarrhea and Nasal Congestion    F/u chronic conditions    HPI: 58 y/o w/ HTN morbid obesity hypothryoid chronic pain presents for follow up. She is emotional today because her daughter told her this morning that her adult son is undergoing treatment for cancer. Apparently her son did not want her to know this because he was concerned about upsetting her but her daughter could not with hold this information. She is very tearful during our interview "I wish I could switch places with him". She reports prior to getting this news she was "doing okay". Still with daily lower back and neck pain. She had prescreening COVID testing for PFT's done (negative) feels more rhinorrhea since nasal swab. No LE swelling has had intermittent diarrhea. No nausea/vomitting. She is using antihistamine nose spray twice per day    Review of Systems   Constitutional: Negative for activity change, appetite change, fatigue, fever and unexpected weight change.   HENT: Positive for rhinorrhea. Negative for ear pain and sore throat.    Eyes: Negative for discharge and visual disturbance.   Respiratory: Negative for chest tightness, shortness of breath and wheezing.    Cardiovascular: Negative for chest pain, palpitations and leg swelling.   Gastrointestinal: Positive for diarrhea. Negative for abdominal pain and constipation.   Endocrine: Negative for cold intolerance and heat intolerance.   Genitourinary: Negative for dysuria and hematuria.   Musculoskeletal: Negative for joint swelling and neck stiffness.   Skin: Negative for rash.   Neurological: Negative for dizziness, syncope, weakness and headaches.   Psychiatric/Behavioral: Negative for suicidal ideas.       Objective:     Vitals:    09/23/20 0914   BP: 118/70   BP Location: Right arm   Patient Position: Sitting   BP Method: Large (Manual)   Pulse: 72   Resp: 20   Temp: 97.7 °F (36.5 °C) " "  TempSrc: Oral   SpO2: 97%   Weight: 126.6 kg (279 lb 1.6 oz)   Height: 5' 2" (1.575 m)          Physical Exam  Constitutional:       Appearance: She is well-developed. She is obese.      Comments: Very tearful during interview   HENT:      Head: Normocephalic and atraumatic.   Neck:      Musculoskeletal: Normal range of motion.   Cardiovascular:      Rate and Rhythm: Normal rate and regular rhythm.      Heart sounds: No murmur. No friction rub. No gallop.    Pulmonary:      Effort: Pulmonary effort is normal. No respiratory distress.      Breath sounds: Normal breath sounds. No wheezing or rales.   Abdominal:      General: There is no distension.      Palpations: Abdomen is soft.      Tenderness: There is no abdominal tenderness. There is no right CVA tenderness, left CVA tenderness, guarding or rebound.   Musculoskeletal: Normal range of motion.         General: No tenderness.   Skin:     General: Skin is warm and dry.   Neurological:      General: No focal deficit present.      Mental Status: She is alert and oriented to person, place, and time.      Cranial Nerves: No cranial nerve deficit.         Assessment and Plan   1. Morbid obesity  The patient is asked to make an attempt to improve diet and exercise patterns to aid in medical management of this problem.      2. Essential hypertension  At goal continue ccb    3. Postsurgical hypothyroidism  tsh at goal per endocrine continue current levothyroxine dose due for repat ultrasound in sprin 2021    4. Moderate episode of recurrent major depressive disorder  Continue nightly TCA to target improved insomnia. Today we discussed strategies of talking with her son about his health. I pointed out that he was concerned about upsetting her with his health issues and this was his personal choice. I suggested that to avoid becoming emotional when speaking with him (which is natural) that it might be best for her to first try writing out what she wants him to know and " allowing him to read her words/feelings first prior to speaking with him about information he had wanted to keep from her.

## 2020-09-23 NOTE — TELEPHONE ENCOUNTER
I called and left a message for the pt to call the office. She needs to have COVID swab again as it has to be within 48 hours of PFT. She was scheduled for COVID swab 09/08/2020 at 10:15AM at the [facility] and her PFT for 10/01/2020 at 2:00 PM at [facility].

## 2020-09-30 ENCOUNTER — TELEPHONE (OUTPATIENT)
Dept: OTOLARYNGOLOGY | Facility: CLINIC | Age: 60
End: 2020-09-30

## 2020-09-30 DIAGNOSIS — Z01.818 PRE-OP TESTING: ICD-10-CM

## 2020-09-30 DIAGNOSIS — H93.11 TINNITUS OF RIGHT EAR: ICD-10-CM

## 2020-09-30 DIAGNOSIS — H72.01 CENTRAL PERFORATION OF TYMPANIC MEMBRANE OF RIGHT EAR: Primary | ICD-10-CM

## 2020-10-01 ENCOUNTER — HOSPITAL ENCOUNTER (OUTPATIENT)
Dept: RESPIRATORY THERAPY | Facility: HOSPITAL | Age: 60
Discharge: HOME | End: 2020-10-01
Attending: INTERNAL MEDICINE
Payer: MEDICAID

## 2020-10-01 VITALS — OXYGEN SATURATION: 98 % | HEART RATE: 57 BPM | RESPIRATION RATE: 18 BRPM

## 2020-10-01 DIAGNOSIS — R06.09 DYSPNEA ON EXERTION: ICD-10-CM

## 2020-10-01 PROCEDURE — 94729 DIFFUSING CAPACITY: CPT

## 2020-10-01 PROCEDURE — 94727 GAS DIL/WSHOT DETER LNG VOL: CPT

## 2020-10-01 PROCEDURE — 94060 PR EVAL OF BRONCHOSPASM: ICD-10-PCS | Mod: 26,,, | Performed by: INTERNAL MEDICINE

## 2020-10-01 PROCEDURE — 94727 PR PULM FUNCTION TEST BY GAS: ICD-10-PCS | Mod: 26,,, | Performed by: INTERNAL MEDICINE

## 2020-10-01 PROCEDURE — 94729 DIFFUSING CAPACITY: CPT | Mod: 26,,, | Performed by: INTERNAL MEDICINE

## 2020-10-01 PROCEDURE — 94010 BREATHING CAPACITY TEST: CPT

## 2020-10-01 PROCEDURE — 25000242 PHARM REV CODE 250 ALT 637 W/ HCPCS: Performed by: INTERNAL MEDICINE

## 2020-10-01 PROCEDURE — 94729 PR C02/MEMBANE DIFFUSE CAPACITY: ICD-10-PCS | Mod: 26,,, | Performed by: INTERNAL MEDICINE

## 2020-10-01 PROCEDURE — 94727 GAS DIL/WSHOT DETER LNG VOL: CPT | Mod: 26,,, | Performed by: INTERNAL MEDICINE

## 2020-10-01 PROCEDURE — 94060 EVALUATION OF WHEEZING: CPT | Mod: 26,,, | Performed by: INTERNAL MEDICINE

## 2020-10-01 RX ORDER — ALBUTEROL SULFATE 2.5 MG/.5ML
2.5 SOLUTION RESPIRATORY (INHALATION) ONCE
Status: COMPLETED | OUTPATIENT
Start: 2020-10-01 | End: 2020-10-01

## 2020-10-01 RX ADMIN — ALBUTEROL SULFATE 2.5 MG: 2.5 SOLUTION RESPIRATORY (INHALATION) at 02:10

## 2020-10-15 ENCOUNTER — PATIENT MESSAGE (OUTPATIENT)
Dept: FAMILY MEDICINE | Facility: CLINIC | Age: 60
End: 2020-10-15

## 2020-10-15 DIAGNOSIS — J30.89 NON-SEASONAL ALLERGIC RHINITIS DUE TO OTHER ALLERGIC TRIGGER: ICD-10-CM

## 2020-10-15 RX ORDER — AZELASTINE 1 MG/ML
SPRAY, METERED NASAL
Qty: 30 ML | Refills: 0 | Status: SHIPPED | OUTPATIENT
Start: 2020-10-15 | End: 2022-05-24

## 2020-10-15 RX ORDER — AZELASTINE 1 MG/ML
SPRAY, METERED NASAL
Qty: 30 ML | Refills: 0 | Status: SHIPPED | OUTPATIENT
Start: 2020-10-15 | End: 2020-10-15 | Stop reason: SDUPTHER

## 2020-10-15 RX ORDER — BENZONATATE 200 MG/1
200 CAPSULE ORAL 3 TIMES DAILY PRN
Qty: 60 CAPSULE | Refills: 0 | Status: SHIPPED | OUTPATIENT
Start: 2020-10-15 | End: 2020-10-25

## 2020-10-16 ENCOUNTER — LAB VISIT (OUTPATIENT)
Dept: SURGERY | Facility: CLINIC | Age: 60
End: 2020-10-16
Payer: MEDICAID

## 2020-10-16 ENCOUNTER — HOSPITAL ENCOUNTER (OUTPATIENT)
Dept: RADIOLOGY | Facility: HOSPITAL | Age: 60
Discharge: HOME | End: 2020-10-16
Attending: NURSE PRACTITIONER
Payer: MEDICAID

## 2020-10-16 ENCOUNTER — PATIENT MESSAGE (OUTPATIENT)
Dept: ENDOCRINOLOGY | Facility: CLINIC | Age: 60
End: 2020-10-16

## 2020-10-16 DIAGNOSIS — Z01.818 PRE-OP TESTING: ICD-10-CM

## 2020-10-16 DIAGNOSIS — H93.11 TINNITUS OF RIGHT EAR: ICD-10-CM

## 2020-10-16 DIAGNOSIS — K76.0 FATTY LIVER: ICD-10-CM

## 2020-10-16 DIAGNOSIS — H72.01 CENTRAL PERFORATION OF TYMPANIC MEMBRANE OF RIGHT EAR: ICD-10-CM

## 2020-10-16 LAB — SARS-COV-2 RNA RESP QL NAA+PROBE: NOT DETECTED

## 2020-10-16 PROCEDURE — 76391 MR ELASTOGRAPHY: ICD-10-PCS | Mod: 26,,, | Performed by: RADIOLOGY

## 2020-10-16 PROCEDURE — 76391 MR ELASTOGRAPHY: CPT | Mod: 26,,, | Performed by: RADIOLOGY

## 2020-10-16 PROCEDURE — U0003 INFECTIOUS AGENT DETECTION BY NUCLEIC ACID (DNA OR RNA); SEVERE ACUTE RESPIRATORY SYNDROME CORONAVIRUS 2 (SARS-COV-2) (CORONAVIRUS DISEASE [COVID-19]), AMPLIFIED PROBE TECHNIQUE, MAKING USE OF HIGH THROUGHPUT TECHNOLOGIES AS DESCRIBED BY CMS-2020-01-R: HCPCS

## 2020-10-16 PROCEDURE — 76391 MR ELASTOGRAPHY: CPT | Mod: TC

## 2020-10-18 NOTE — ANESTHESIA PREPROCEDURE EVALUATION
[facility]  Anesthesia Pre-Operative Evaluation         Patient Name: Paras Smith  YOB: 1960  MRN: [id number]    SUBJECTIVE:     Pre-operative evaluation for Procedure(s) (LRB):  TYMPANOPLASTY (Right)     10/18/2020    Paras Smith is a 59 y.o. female w/ a significant PMHx of HTN, morbid obesity, asthma, CAD, thyroid ca s/p resection (2008) with postsurgical hypothyroidism on synthroid, GERD, and h/o stroke who presents  With R tympanic perforation x2 years. Now presents for the above procedure.      Echo: TTE 8/15/2017  1 - Normal left ventricular systolic function (EF 60-65%).     2 - No wall motion abnormalities.     LDA: None documented.    Prev airway: Present Prior to Hospital Arrival?: No; Placement Date: 11/12/15; Placement Time: 1829; Method of Intubation: Glidescope, Rapid Sequence Induction; Inserted by: Anesthesia MD Emily); Airway Device: Endotracheal Tube-Hi/Lo; Mask Ventilation: Not Attempted; Intubated: Postinduction; Blade:  (Glidescope 3 ); Airway Device Size: 7.0; Style: Cuffed; Cuff Inflation: Minimal occlusive pressure; Placement Verified By: Auscultation, Capnometry, Other (Comment) (visualization); Grade: Grade I; Complicating Factors: Poor neck/head extension, Anterior larynx (in line c spine stabilization to limit head extension); Intubation Findings: Positive EtCO2, Bilateral breath sounds, Atraumatic/Condition of teeth unchanged, Other (see comments) (ETT passed easily through VC with glidescope stylet drawn back to avoid any potential trauma, neck maintained in neutral position);  Depth of Insertion: 19; Securment: Lips; Complications: None; Breath Sounds: Equal Bilateral; Insertion Attempts: 1; Removal Date: 11/12/15;  Removal Time: 1934    Drips: None documented.    Patient Active Problem List   Diagnosis    HTN (hypertension)    Postsurgical hypothyroidism    Thyroid cancer    Chest pain, cardiac    Depression     Wound of toenail    Chest pain, atypical    Cervicalgia    Weakness of neck    Weakness of both arms    Stiffness of neck    Glaucoma suspect    Nuclear cataract    Dry eye syndrome    Vitamin D deficiency disease    Helicobacter pylori (H. pylori) infection    Dyspnea    Cholecystitis    Morbid obesity due to excess calories    Anxiety    NAFLD (nonalcoholic fatty liver disease)    Liver cyst    Coronary artery disease    Diarrhea of presumed infectious origin    Intractable migraine without aura and without status migrainosus    Complex partial seizure disorder without intractable epilepsy    Polyneuropathy due to radiation    Medication overuse headache    Other headache syndrome    Falls    Cervical spinal mass    Chronic pain    Diarrhea       Review of patient's allergies indicates:   Allergen Reactions    Vioxx [rofecoxib] Rash       Current Inpatient Medications:   onabotulinumtoxina  200 Units Intramuscular Q90 Days       Current Facility-Administered Medications on File Prior to Encounter   Medication Dose Route Frequency Provider Last Rate Last Dose    0.9%  NaCl infusion   Intravenous Continuous Chavo Wray MD        0.9%  NaCl infusion   Intravenous Continuous Chavo Wray MD        onabotulinumtoxina injection 200 Units  200 Units Intramuscular Q90 Days Aundrea Pathak MD        sodium chloride 0.9% flush 3 mL  3 mL Intravenous PRN Chavo Wray MD        sodium chloride 0.9% flush 3 mL  3 mL Intravenous PRN Chavo Wray MD         Current Outpatient Medications on File Prior to Encounter   Medication Sig Dispense Refill    acetaminophen (TYLENOL) 650 MG TbSR Take 1 tablet (650 mg total) by mouth every 8 (eight) hours as needed. 30 tablet 0    albuterol (PROVENTIL/VENTOLIN HFA) 90 mcg/actuation inhaler Inhale 2 puffs into the lungs every 6 (six) hours as needed. Rescue 18 g 2    amitriptyline (ELAVIL) 50 MG tablet TAKE ONE TABLET BY MOUTH AT BEDTIME AS NEEDED 30 tablet  5    anthralin 1.2 % CmRR   0    aspirin 325 MG tablet Take 325 mg by mouth once daily.  11    atorvastatin (LIPITOR) 40 MG tablet Take 40 mg by mouth once daily.      clobetasol (TEMOVATE) 0.05 % cream Apply topically once daily. For five days 45 g 0    clotrimazole (LOTRIMIN) 1 % cream Apply topically 2 (two) times daily. 30 g 2    ergocalciferol (VITAMIN D2) 50,000 unit Cap TAKE ONE CAPSULE BY MOUTH once every week 12 capsule 1    gabapentin (NEURONTIN) 300 MG capsule Take 600 mg by mouth 3 (three) times daily.   0    hydrOXYzine pamoate (VISTARIL) 50 MG Cap TAKE ONE CAPSULE BY MOUTH EVERY 6 HOURS AS NEEDED FOR ANXIETY 180 capsule 1    ipratropium (ATROVENT HFA) 17 mcg/actuation inhaler Inhale 2 puffs into the lungs every 6 (six) hours. Rescue 12.9 g 0    lidocaine (LMX 4) 4 % cream Apply topically as needed. 30 g 1    lidocaine-prilocaine (EMLA) cream   0    lisinopriL 10 MG tablet TAKE ONE TABLET BY MOUTH ONCE A DAY 30 tablet 3    multivitamin capsule Take 1 capsule by mouth.      naproxen (NAPROSYN) 375 MG tablet   0    nitroGLYCERIN 0.4 MG/HR TD PT24 (NITRODUR) 0.4 mg/hr apply 1 patch ONTO THE SKIN ONCE DAILY 30 patch 11    ofloxacin (OCUFLOX) 0.3 % ophthalmic solution Apply 3 drops to right ear three times daily for 5 days 10 mL 0    ondansetron (ZOFRAN-ODT) 4 MG TbDL Take 1 tablet (4 mg total) by mouth every 12 (twelve) hours as needed. 10 tablet 0    promethazine (PHENERGAN) 25 MG tablet take 1 tablet by mouth every 8 hours if needed for nausea and vomiting  0    REXULTI 0.5 mg Tab 0.5 mg.  0    sertraline (ZOLOFT) 100 MG tablet Take 100 mg by mouth.      SUPREP BOWEL PREP KIT 17.5-3.13-1.6 gram SolR TAKE AS DIRECTED      SYNTHROID 112 mcg tablet Take 1 tablet (112 mcg) by mouth Mon-Sat and skip Sundays, Brand name only 28 tablet 11    topiramate (TOPAMAX) 100 MG tablet Take 50 mg by mouth once daily.  0       Past Surgical History:   Procedure Laterality Date    APPENDECTOMY       CERVICAL SPINE SURGERY      vocal cord damage     SECTION, LOW TRANSVERSE      x3    CHOLECYSTECTOMY      COLONOSCOPY N/A 2018    Procedure: COLONOSCOPY;  Surgeon: Chavo Wray MD;  [facility]  Service: Endoscopy;  Laterality: N/A;    CORONARY ANGIOPLASTY WITH STENT PLACEMENT      x 3    ESOPHAGOGASTRODUODENOSCOPY N/A 2018    Procedure: EGD (ESOPHAGOGASTRODUODENOSCOPY);  Surgeon: Chavo Wray MD;  [facility]  Service: Endoscopy;  Laterality: N/A;  Hx of Anesthetic complications c spine surgery and thyroid surgery with known unilateral VC paralysis per patient, pt unsure of intubation history History of prior surgery of interest to airway management or planning - 2nd floor/not able to use current order,    PATELLA SURGERY  1969    THYROID SURGERY      total    TOTAL THYROIDECTOMY         Social History     Socioeconomic History    Marital status: Legally      Spouse name: Not on file    Number of children: Not on file    Years of education: Not on file    Highest education level: Not on file   Occupational History    Not on file   Social Needs    Financial resource strain: Not on file    Food insecurity     Worry: Not on file     Inability: Not on file    Transportation needs     Medical: Not on file     Non-medical: Not on file   Tobacco Use    Smoking status: Never Smoker    Smokeless tobacco: Never Used    Tobacco comment: No cigarrettes, only marijuana occasionally   Substance and Sexual Activity    Alcohol use: No     Comment: recovering alcoholic    Drug use: Yes     Frequency: 6.0 times per week     Types: Marijuana     Comment: ocasional    Sexual activity: Not Currently     Partners: Male   Lifestyle    Physical activity     Days per week: Not on file     Minutes per session: Not on file    Stress: Not on file   Relationships    Social connections     Talks on phone: Not on file     Gets together: Not on file      Attends [employer] service: Not on file     Active member of club or organization: Not on file     Attends meetings of clubs or organizations: Not on file     Relationship status: Not on file   Other Topics Concern    Not on file   Social History Narrative    Not on file       OBJECTIVE:     Vital Signs Range (Last 24H):         CBC:   No results for input(s): WBC, RBC, HGB, HCT, PLT, MCV, MCH, MCHC in the last 72 hours.    CMP:   Recent Labs     10/16/20  0904   ALBUMIN 3.8   PROT 7.1   ALKPHOS 223*   ALT 18   AST 17   BILITOT 0.4       INR:  No results for input(s): PT, INR, PROTIME, APTT in the last 72 hours.    Diagnostic Studies: No relevant studies.    EKG: No recent studies available.    2D ECHO:  Results for orders placed or performed during the hospital encounter of 08/16/17   2D Echo w/ Color Flow Doppler   Result Value Ref Range    QEF 65 55 - 65    Diastolic Dysfunction No     Pericardial Effusion NONE     Mitral Valve Mobility NORMAL     Tricuspid Valve Regurgitation TRIVIAL          ASSESSMENT/PLAN:         Pre-op Assessment    I have reviewed the Patient Summary Reports.       I have reviewed the Medications.     Review of Systems  Anesthesia Hx:  No problems with previous Anesthesia    Social:  Smoker    Cardiovascular:   Exercise tolerance: good Hypertension CAD  CABG/stent   Denies Angina.  Functional Capacity Can you climb two flights of stairs? ==> Yes    Pulmonary:   Denies Asthma.  Denies Recent URI.  Denies Sleep Apnea.    Renal/:  Renal/ Normal     Hepatic/GI:   Denies PUD. Denies Hiatal Hernia. GERD Liver Disease,  Denies Hepatitis.    Neurological:   Denies CVA. Headaches Denies Seizures.    Endocrine:   Denies Diabetes. Hypothyroidism        Physical Exam  General:  Well nourished    Airway/Jaw/Neck:  Airway Findings: Mouth Opening: Normal Tongue: Normal  General Airway Assessment: Adult  Mallampati: I  TM Distance: Normal, at least 6 cm  Jaw/Neck Findings:  Neck ROM: Normal ROM  Neck  Findings:     Eyes/Ears/Nose:  EYES/EARS/NOSE FINDINGS: Normal   Dental:  Dental Findings: In tact   Chest/Lungs:  Chest/Lungs Findings: Clear to auscultation     Heart/Vascular:  Heart Findings: Rate: Normal  Rhythm: Regular Rhythm  Sounds: Normal        Mental Status:  Mental Status Findings:  Alert and Oriented         Anesthesia Plan  Type of Anesthesia, risks & benefits discussed:  Anesthesia Type:  general  Patient's Preference: Proceed with anesthesia understanding that the risks are very small but could be serious or life threatening.  Intra-op Monitoring Plan: standard ASA monitors  Intra-op Monitoring Plan Comments:   Post Op Pain Control Plan: IV/PO Opioids PRN and per primary service following discharge from PACU  Post Op Pain Control Plan Comments:   Induction:   IV  Beta Blocker:  Patient is not currently on a Beta-Blocker (No further documentation required).       Informed Consent: Patient understands risks and agrees with Anesthesia plan.  Questions answered. Anesthesia consent signed with patient.  ASA Score: 2     Day of Surgery Review of History & Physical: I have interviewed and examined the patient. I have reviewed the patient's H&P dated:    H&P update referred to the surgeon.         Ready For Surgery From Anesthesia Perspective.

## 2020-10-19 ENCOUNTER — HOSPITAL ENCOUNTER (OUTPATIENT)
Facility: HOSPITAL | Age: 60
Discharge: HOME | End: 2020-10-19
Attending: OTOLARYNGOLOGY | Admitting: OTOLARYNGOLOGY
Payer: MEDICAID

## 2020-10-19 ENCOUNTER — NURSE TRIAGE (OUTPATIENT)
Dept: ADMINISTRATIVE | Facility: CLINIC | Age: 60
End: 2020-10-19

## 2020-10-19 VITALS
TEMPERATURE: 97 F | RESPIRATION RATE: 20 BRPM | WEIGHT: 280 LBS | HEIGHT: 62 IN | HEART RATE: 82 BPM | SYSTOLIC BLOOD PRESSURE: 107 MMHG | BODY MASS INDEX: 51.53 KG/M2 | OXYGEN SATURATION: 94 % | DIASTOLIC BLOOD PRESSURE: 62 MMHG

## 2020-10-19 DIAGNOSIS — H72.00 CENTRAL PERFORATION OF TYMPANIC MEMBRANE, UNSPECIFIED LATERALITY: ICD-10-CM

## 2020-10-19 DIAGNOSIS — H72.90 TYMPANIC MEMBRANE PERFORATION: Primary | ICD-10-CM

## 2020-10-19 DIAGNOSIS — H72.91 PERFORATION OF RIGHT TYMPANIC MEMBRANE: ICD-10-CM

## 2020-10-19 PROCEDURE — 94761 N-INVAS EAR/PLS OXIMETRY MLT: CPT

## 2020-10-19 PROCEDURE — 69631 PR TYMPANOPLASTY: ICD-10-PCS | Mod: RT,,, | Performed by: OTOLARYNGOLOGY

## 2020-10-19 PROCEDURE — 00120 ANES PX EAR W/BX NOS: CPT | Performed by: OTOLARYNGOLOGY

## 2020-10-19 PROCEDURE — D9220A PRA ANESTHESIA: Mod: ,,, | Performed by: ANESTHESIOLOGY

## 2020-10-19 PROCEDURE — 25000242 PHARM REV CODE 250 ALT 637 W/ HCPCS: Performed by: ANESTHESIOLOGY

## 2020-10-19 PROCEDURE — 25000242 PHARM REV CODE 250 ALT 637 W/ HCPCS: Performed by: STUDENT IN AN ORGANIZED HEALTH CARE EDUCATION/TRAINING PROGRAM

## 2020-10-19 PROCEDURE — 27201423 OPTIME MED/SURG SUP & DEVICES STERILE SUPPLY: Performed by: OTOLARYNGOLOGY

## 2020-10-19 PROCEDURE — D9220A PRA ANESTHESIA: ICD-10-PCS | Mod: ,,, | Performed by: ANESTHESIOLOGY

## 2020-10-19 PROCEDURE — 25000003 PHARM REV CODE 250: Performed by: STUDENT IN AN ORGANIZED HEALTH CARE EDUCATION/TRAINING PROGRAM

## 2020-10-19 PROCEDURE — 71000044 HC DOSC ROUTINE RECOVERY FIRST HOUR: Performed by: OTOLARYNGOLOGY

## 2020-10-19 PROCEDURE — 69631 REPAIR EARDRUM STRUCTURES: CPT | Mod: RT,,, | Performed by: OTOLARYNGOLOGY

## 2020-10-19 PROCEDURE — 63600175 PHARM REV CODE 636 W HCPCS: Performed by: STUDENT IN AN ORGANIZED HEALTH CARE EDUCATION/TRAINING PROGRAM

## 2020-10-19 PROCEDURE — 25000003 PHARM REV CODE 250: Performed by: OTOLARYNGOLOGY

## 2020-10-19 PROCEDURE — 71000045 HC DOSC ROUTINE RECOVERY EA ADD'L HR: Performed by: OTOLARYNGOLOGY

## 2020-10-19 PROCEDURE — 94640 AIRWAY INHALATION TREATMENT: CPT

## 2020-10-19 PROCEDURE — 71000015 HC POSTOP RECOV 1ST HR: Performed by: OTOLARYNGOLOGY

## 2020-10-19 PROCEDURE — 36000708 HC OR TIME LEV III 1ST 15 MIN: Performed by: OTOLARYNGOLOGY

## 2020-10-19 PROCEDURE — 37000009 HC ANESTHESIA EA ADD 15 MINS: Performed by: OTOLARYNGOLOGY

## 2020-10-19 PROCEDURE — 37000008 HC ANESTHESIA 1ST 15 MINUTES: Performed by: OTOLARYNGOLOGY

## 2020-10-19 PROCEDURE — 36000709 HC OR TIME LEV III EA ADD 15 MIN: Performed by: OTOLARYNGOLOGY

## 2020-10-19 RX ORDER — ONDANSETRON 2 MG/ML
4 INJECTION INTRAMUSCULAR; INTRAVENOUS DAILY PRN
Status: DISCONTINUED | OUTPATIENT
Start: 2020-10-19 | End: 2020-10-19 | Stop reason: HOSPADM

## 2020-10-19 RX ORDER — MECLIZINE HCL 12.5 MG 12.5 MG/1
25 TABLET ORAL 3 TIMES DAILY PRN
Qty: 30 TABLET | Refills: 0 | Status: SHIPPED | OUTPATIENT
Start: 2020-10-19 | End: 2023-06-21 | Stop reason: CLARIF

## 2020-10-19 RX ORDER — CEFAZOLIN SODIUM 1 G/3ML
INJECTION, POWDER, FOR SOLUTION INTRAMUSCULAR; INTRAVENOUS
Status: DISCONTINUED | OUTPATIENT
Start: 2020-10-19 | End: 2020-10-19

## 2020-10-19 RX ORDER — IPRATROPIUM BROMIDE AND ALBUTEROL SULFATE 2.5; .5 MG/3ML; MG/3ML
3 SOLUTION RESPIRATORY (INHALATION) ONCE
Status: COMPLETED | OUTPATIENT
Start: 2020-10-19 | End: 2020-10-19

## 2020-10-19 RX ORDER — PROPOFOL 10 MG/ML
VIAL (ML) INTRAVENOUS
Status: DISCONTINUED | OUTPATIENT
Start: 2020-10-19 | End: 2020-10-19

## 2020-10-19 RX ORDER — FENTANYL CITRATE 50 UG/ML
INJECTION, SOLUTION INTRAMUSCULAR; INTRAVENOUS
Status: DISCONTINUED | OUTPATIENT
Start: 2020-10-19 | End: 2020-10-19

## 2020-10-19 RX ORDER — KETAMINE HCL IN 0.9 % NACL 50 MG/5 ML
SYRINGE (ML) INTRAVENOUS
Status: DISCONTINUED | OUTPATIENT
Start: 2020-10-19 | End: 2020-10-19

## 2020-10-19 RX ORDER — DEXMEDETOMIDINE HYDROCHLORIDE 100 UG/ML
INJECTION, SOLUTION INTRAVENOUS
Status: DISCONTINUED | OUTPATIENT
Start: 2020-10-19 | End: 2020-10-19

## 2020-10-19 RX ORDER — ACETAMINOPHEN 500 MG
1000 TABLET ORAL ONCE
Status: COMPLETED | OUTPATIENT
Start: 2020-10-19 | End: 2020-10-19

## 2020-10-19 RX ORDER — LIDOCAINE HYDROCHLORIDE 20 MG/ML
INJECTION INTRAVENOUS
Status: DISCONTINUED | OUTPATIENT
Start: 2020-10-19 | End: 2020-10-19

## 2020-10-19 RX ORDER — LIDOCAINE HYDROCHLORIDE AND EPINEPHRINE 10; 10 MG/ML; UG/ML
INJECTION, SOLUTION INFILTRATION; PERINEURAL
Status: DISCONTINUED | OUTPATIENT
Start: 2020-10-19 | End: 2020-10-19 | Stop reason: HOSPADM

## 2020-10-19 RX ORDER — FENTANYL CITRATE 50 UG/ML
25 INJECTION, SOLUTION INTRAMUSCULAR; INTRAVENOUS EVERY 5 MIN PRN
Status: DISCONTINUED | OUTPATIENT
Start: 2020-10-19 | End: 2020-10-19 | Stop reason: HOSPADM

## 2020-10-19 RX ORDER — ONDANSETRON 8 MG/1
8 TABLET, ORALLY DISINTEGRATING ORAL EVERY 8 HOURS PRN
Qty: 15 TABLET | Refills: 0 | OUTPATIENT
Start: 2020-10-19 | End: 2021-01-07

## 2020-10-19 RX ORDER — CIPROFLOXACIN AND DEXAMETHASONE 3; 1 MG/ML; MG/ML
4 SUSPENSION/ DROPS AURICULAR (OTIC) 2 TIMES DAILY
Qty: 7.5 ML | Refills: 3 | Status: SHIPPED | OUTPATIENT
Start: 2020-10-26 | End: 2021-04-12 | Stop reason: ALTCHOICE

## 2020-10-19 RX ORDER — HYDROCODONE BITARTRATE AND ACETAMINOPHEN 5; 325 MG/1; MG/1
1 TABLET ORAL ONCE
Status: COMPLETED | OUTPATIENT
Start: 2020-10-19 | End: 2020-10-19

## 2020-10-19 RX ORDER — SUCCINYLCHOLINE CHLORIDE 20 MG/ML
INJECTION INTRAMUSCULAR; INTRAVENOUS
Status: DISCONTINUED | OUTPATIENT
Start: 2020-10-19 | End: 2020-10-19

## 2020-10-19 RX ORDER — ROCURONIUM BROMIDE 10 MG/ML
INJECTION, SOLUTION INTRAVENOUS
Status: DISCONTINUED | OUTPATIENT
Start: 2020-10-19 | End: 2020-10-19

## 2020-10-19 RX ORDER — HYDROMORPHONE HYDROCHLORIDE 1 MG/ML
0.2 INJECTION, SOLUTION INTRAMUSCULAR; INTRAVENOUS; SUBCUTANEOUS EVERY 5 MIN PRN
Status: DISCONTINUED | OUTPATIENT
Start: 2020-10-19 | End: 2020-10-19 | Stop reason: HOSPADM

## 2020-10-19 RX ORDER — SODIUM CHLORIDE 9 MG/ML
INJECTION, SOLUTION INTRAVENOUS CONTINUOUS PRN
Status: DISCONTINUED | OUTPATIENT
Start: 2020-10-19 | End: 2020-10-19

## 2020-10-19 RX ORDER — CEPHALEXIN 500 MG/1
500 CAPSULE ORAL EVERY 8 HOURS
Qty: 21 CAPSULE | Refills: 0 | Status: SHIPPED | OUTPATIENT
Start: 2020-10-19 | End: 2021-03-03 | Stop reason: ALTCHOICE

## 2020-10-19 RX ORDER — MIDAZOLAM HYDROCHLORIDE 1 MG/ML
INJECTION, SOLUTION INTRAMUSCULAR; INTRAVENOUS
Status: DISCONTINUED | OUTPATIENT
Start: 2020-10-19 | End: 2020-10-19

## 2020-10-19 RX ORDER — DEXAMETHASONE SODIUM PHOSPHATE 4 MG/ML
INJECTION, SOLUTION INTRA-ARTICULAR; INTRALESIONAL; INTRAMUSCULAR; INTRAVENOUS; SOFT TISSUE
Status: DISCONTINUED | OUTPATIENT
Start: 2020-10-19 | End: 2020-10-19

## 2020-10-19 RX ORDER — SODIUM CHLORIDE 0.9 % (FLUSH) 0.9 %
10 SYRINGE (ML) INJECTION
Status: DISCONTINUED | OUTPATIENT
Start: 2020-10-19 | End: 2020-10-19 | Stop reason: HOSPADM

## 2020-10-19 RX ORDER — HYDROCODONE BITARTRATE AND ACETAMINOPHEN 5; 325 MG/1; MG/1
1 TABLET ORAL EVERY 6 HOURS PRN
Qty: 30 TABLET | Refills: 0 | Status: SHIPPED | OUTPATIENT
Start: 2020-10-19 | End: 2021-03-03 | Stop reason: ALTCHOICE

## 2020-10-19 RX ORDER — CEFAZOLIN SODIUM 1 G/3ML
2 INJECTION, POWDER, FOR SOLUTION INTRAMUSCULAR; INTRAVENOUS ONCE
Status: DISCONTINUED | OUTPATIENT
Start: 2020-10-19 | End: 2020-10-19 | Stop reason: HOSPADM

## 2020-10-19 RX ORDER — ONDANSETRON 2 MG/ML
INJECTION INTRAMUSCULAR; INTRAVENOUS
Status: DISCONTINUED | OUTPATIENT
Start: 2020-10-19 | End: 2020-10-19

## 2020-10-19 RX ORDER — OFLOXACIN 3 MG/ML
SOLUTION/ DROPS OPHTHALMIC
Status: DISCONTINUED | OUTPATIENT
Start: 2020-10-19 | End: 2020-10-19 | Stop reason: HOSPADM

## 2020-10-19 RX ADMIN — SUGAMMADEX 400 MG: 100 INJECTION, SOLUTION INTRAVENOUS at 10:10

## 2020-10-19 RX ADMIN — ROCURONIUM BROMIDE 10 MG: 10 INJECTION, SOLUTION INTRAVENOUS at 09:10

## 2020-10-19 RX ADMIN — FENTANYL CITRATE 50 MCG: 50 INJECTION, SOLUTION INTRAMUSCULAR; INTRAVENOUS at 07:10

## 2020-10-19 RX ADMIN — DEXMEDETOMIDINE HYDROCHLORIDE 4 MCG: 100 INJECTION, SOLUTION INTRAVENOUS at 10:10

## 2020-10-19 RX ADMIN — DEXAMETHASONE SODIUM PHOSPHATE 4 MG: 4 INJECTION, SOLUTION INTRAMUSCULAR; INTRAVENOUS at 07:10

## 2020-10-19 RX ADMIN — DEXMEDETOMIDINE HYDROCHLORIDE 12 MCG: 100 INJECTION, SOLUTION INTRAVENOUS at 09:10

## 2020-10-19 RX ADMIN — SUCCINYLCHOLINE CHLORIDE 140 MG: 20 INJECTION, SOLUTION INTRAMUSCULAR; INTRAVENOUS at 07:10

## 2020-10-19 RX ADMIN — DEXMEDETOMIDINE HYDROCHLORIDE 8 MCG: 100 INJECTION, SOLUTION INTRAVENOUS at 08:10

## 2020-10-19 RX ADMIN — ACETAMINOPHEN 1000 MG: 500 TABLET ORAL at 05:10

## 2020-10-19 RX ADMIN — SODIUM CHLORIDE: 0.9 INJECTION, SOLUTION INTRAVENOUS at 06:10

## 2020-10-19 RX ADMIN — PROPOFOL 50 MG: 10 INJECTION, EMULSION INTRAVENOUS at 10:10

## 2020-10-19 RX ADMIN — ONDANSETRON 4 MG: 2 INJECTION, SOLUTION INTRAMUSCULAR; INTRAVENOUS at 10:10

## 2020-10-19 RX ADMIN — DEXMEDETOMIDINE HYDROCHLORIDE 12 MCG: 100 INJECTION, SOLUTION INTRAVENOUS at 10:10

## 2020-10-19 RX ADMIN — HYDROCODONE BITARTRATE AND ACETAMINOPHEN 1 TABLET: 5; 325 TABLET ORAL at 12:10

## 2020-10-19 RX ADMIN — LIDOCAINE HYDROCHLORIDE 100 MG: 20 INJECTION, SOLUTION INTRAVENOUS at 07:10

## 2020-10-19 RX ADMIN — ROCURONIUM BROMIDE 10 MG: 10 INJECTION, SOLUTION INTRAVENOUS at 10:10

## 2020-10-19 RX ADMIN — ROCURONIUM BROMIDE 50 MG: 10 INJECTION, SOLUTION INTRAVENOUS at 07:10

## 2020-10-19 RX ADMIN — Medication 30 MG: at 07:10

## 2020-10-19 RX ADMIN — SODIUM CHLORIDE, SODIUM GLUCONATE, SODIUM ACETATE, POTASSIUM CHLORIDE, MAGNESIUM CHLORIDE, SODIUM PHOSPHATE, DIBASIC, AND POTASSIUM PHOSPHATE: .53; .5; .37; .037; .03; .012; .00082 INJECTION, SOLUTION INTRAVENOUS at 08:10

## 2020-10-19 RX ADMIN — PROPOFOL 50 MG: 10 INJECTION, EMULSION INTRAVENOUS at 07:10

## 2020-10-19 RX ADMIN — ROCURONIUM BROMIDE 20 MG: 10 INJECTION, SOLUTION INTRAVENOUS at 08:10

## 2020-10-19 RX ADMIN — IPRATROPIUM BROMIDE AND ALBUTEROL SULFATE 3 ML: .5; 2.5 SOLUTION RESPIRATORY (INHALATION) at 11:10

## 2020-10-19 RX ADMIN — IPRATROPIUM BROMIDE AND ALBUTEROL SULFATE 3 ML: .5; 2.5 SOLUTION RESPIRATORY (INHALATION) at 06:10

## 2020-10-19 RX ADMIN — ROCURONIUM BROMIDE 10 MG: 10 INJECTION, SOLUTION INTRAVENOUS at 08:10

## 2020-10-19 RX ADMIN — CEFAZOLIN 3 G: 330 INJECTION, POWDER, FOR SOLUTION INTRAMUSCULAR; INTRAVENOUS at 07:10

## 2020-10-19 RX ADMIN — FENTANYL CITRATE 25 MCG: 50 INJECTION, SOLUTION INTRAMUSCULAR; INTRAVENOUS at 11:10

## 2020-10-19 RX ADMIN — PROPOFOL 150 MG: 10 INJECTION, EMULSION INTRAVENOUS at 07:10

## 2020-10-19 RX ADMIN — Medication 10 MG: at 08:10

## 2020-10-19 RX ADMIN — MIDAZOLAM HYDROCHLORIDE 2 MG: 1 INJECTION, SOLUTION INTRAMUSCULAR; INTRAVENOUS at 07:10

## 2020-10-19 RX ADMIN — FENTANYL CITRATE 50 MCG: 50 INJECTION, SOLUTION INTRAMUSCULAR; INTRAVENOUS at 08:10

## 2020-10-19 NOTE — TELEPHONE ENCOUNTER
"Pt has questions about newly prescribed medications post surgery and when she should start taking medication. Pt questions answered using AVS Pt advised per protocol and verbalized understanding.    Reason for Disposition   Caller has medication question only, adult not sick, and triager answers question    Additional Information   Negative: MORE THAN A DOUBLE DOSE of a prescription or over-the-counter (OTC) drug   Negative: [1] DOUBLE DOSE (an extra dose or lesser amount) of over-the-counter (OTC) drug AND [2] any symptoms (e.g., dizziness, nausea, pain, sleepiness)   Negative: [1] DOUBLE DOSE (an extra dose or lesser amount) of prescription drug AND [2] any symptoms (e.g., dizziness, nausea, pain, sleepiness)   Negative: Took another person's prescription drug   Negative: [1] DOUBLE DOSE (an extra dose or lesser amount) of prescription drug AND [2] NO symptoms (Exception: a double dose of antibiotics)   Negative: Diabetes drug error or overdose (e.g., took wrong type of insulin or took extra dose)   Negative: [1] Request for URGENT new prescription or refill of "essential" medication (i.e., likelihood of harm to patient if not taken) AND [2] triager unable to fill per unit policy   Negative: [1] Prescription not at pharmacy AND [2] was prescribed by PCP recently   Negative: [1] Pharmacy calling with prescription questions AND [2] triager unable to answer question   Negative: [1] Caller has URGENT medication question about med that PCP or specialist prescribed AND [2] triager unable to answer question   Negative: [1] Caller has medication question about med not prescribed by PCP AND [2] triager unable to answer question (e.g., compatibility with other med, storage)   Negative: [1] Caller has NON-URGENT medication question about med that PCP prescribed AND [2] triager unable to answer question   Negative: [1] Caller requesting a NON-URGENT new prescription or refill AND [2] triager unable to refill per " unit policy   Negative: Caller requesting a CONTROLLED substance prescription refill (e.g., narcotics, ADHD medicines)   Negative: Caller wants to use a complementary or alternative medicine   Negative: [1] Prescription prescribed recently is not at pharmacy AND [2] triager has access to patient's EMR AND [3] prescription is recorded in the EMR   Negative: [1] DOUBLE DOSE (an extra dose or lesser amount) of over-the-counter (OTC) drug AND [2] NO symptoms   Negative: [1] DOUBLE DOSE (an extra dose or lesser amount) of antibiotic drug AND [2] NO symptoms    Protocols used: MEDICATION QUESTION CALL-A-AH

## 2020-10-19 NOTE — ANESTHESIA POSTPROCEDURE EVALUATION
Anesthesia Post Evaluation    Patient: Paras Smith    Procedure(s) Performed: Procedure(s) (LRB):  TYMPANOPLASTY (Right)    Final Anesthesia Type: general    Patient location during evaluation: PACU  Patient participation: Yes- Able to Participate  Level of consciousness: awake and alert and oriented  Post-procedure vital signs: reviewed and stable  Pain management: adequate  Airway patency: patent    PONV status at discharge: No PONV  Anesthetic complications: no      Cardiovascular status: stable  Respiratory status: unassisted, spontaneous ventilation and room air  Hydration status: euvolemic  Follow-up not needed.          Vitals Value Taken Time   BP 98/65 10/19/20 1117   Temp  10/19/20 1124   Pulse 73 10/19/20 1124   Resp 21 10/19/20 1124   SpO2 94 % 10/19/20 1124   Vitals shown include unvalidated device data.      No case tracking events are documented in the log.      Pain/Manjeet Score: Pain Rating Prior to Med Admin: 0 (10/19/2020  5:30 AM)  Manjeet Score: 8 (10/19/2020 10:50 AM)

## 2020-10-19 NOTE — OP NOTE
[facility]  Surgery Department  Operative Note    SUMMARY     Date of Procedure: 10/19/2020     Procedure: Procedure(s) (LRB):  TYMPANOPLASTY (Right)     Surgeon(s) and Role:     * Shaan Garcia MD - Primary     * Enrique Dent Jr., MD - Resident - Assisting        Pre-Operative Diagnosis: Central perforation of tympanic membrane of right ear [H72.01]    Post-Operative Diagnosis: Post-Op Diagnosis Codes:     * Central perforation of tympanic membrane of right ear [H72.01]    Anesthesia: General    Technical Procedures Used: lateral graft tympnaoplasty, temporalis fascia    Description of the Findings of the Procedure:   1.  Dry sub-total perforation 70% of TM partially involving annulus posteriorly  2. Ossicles intact and mobile    Significant Surgical Tasks Conducted by the Assistant(s), if Applicable: n/a    Complications: No    Estimated Blood Loss (EBL): 25ml    Indications: Paras Smith is a 59 y.o. female with history of a left sided conductive hearing loss and a history of a tympanic membrane perforation for many years.      Procedure in detail:    The patient was taken to the operating room, placed under general anesthetic and intubated without difficulty. [facility] facial nerve monitoring electrodes were positioned and monitoring was performed throughout the procedure. There was no abnormal activity during this case. We inspected the ear canal, identified a total perforation of the tympanic membrane.   We then infiltrated the canal and postauricular area with 1:100,000 of epinephrine.  Vascular strip incisions were made in the standard fashion for lateral tympanoplasty making our 12-6 incision a few millimeters from the defect.    We prepped and draped the ear in a sterile fashion. A postauricular incision was made just behind the postauricular crease and dissected down the mastoid cortex.  A temporalis fascia graft was harvested using scissors.  I reflected the soft tissues  anteriorly to the level of the ear canal and identified our prior incisions made in the ear canal. The vascular strip was lifted up and tucked under the retractor extending the incisions as needed.      A semilunar incision was made across the anterior canal skin and it was elevated to the annulus.  The anterior canal skin and epithelial layer of the TM remnant was removed in continuity and set aside in saline.  Any remaining epithelium was removed.      Canaloplasty was then performed using cutting and marta burrs.  The ear canal was shaped into a cone allowing view of the annulus 360 degrees.  The TMJ was not entered and the ossicles were protected.    The ossicles were inspected and found to be mobile.  The temporalis fascia graft was cut to an oval shape with a slip for the malleus.  The middle ear was filled with gelfoam soaked in ofloxacin and the graft was placed and tucked under the malleus.      The ear canal skin was replaced an slightly overlapped the fascia.  Gelfoam was placed anteriorly and the vascular strip was replaced.  The ear canal was then completely filled with gelfoam    The periosteum of the mastoid was closed with sutures, and the dermis was closed in a subcuticular manner.  Steri-strips were applied to the skin.    Kecia dressing was applied. The patient was awakened from anesthesia and taken to the recovery room in stable condition. He will be given antibiotics and pain medicines and he will be given instructions to follow up with me in one week.              Implants: * No implants in log *    Specimens:   Specimen (12h ago, onward)    None                  Condition: Good    Disposition: PACU - hemodynamically stable.    Attestation: I was present and scrubbed for the entire procedure.

## 2020-10-19 NOTE — ANESTHESIA PROCEDURE NOTES
Intubation  Performed by: Isreal Jimenez MD  Authorized by: Cristobal Ward Jr., MD     Intubation:     Induction:  Intravenous    Intubated:  Postinduction    Mask Ventilation:  Easy with oral airway    Attempts:  1    Attempted By:  Resident anesthesiologist    Method of Intubation:  Direct    Blade:  Jose 3    Laryngeal View Grade: Grade I - full view of chords      Difficult Airway Encountered?: No      Complications:  None    Airway Device:  Oral endotracheal tube    Airway Device Size:  7.5    Style/Cuff Inflation:  Cuffed (inflated to minimal occlusive pressure)    Tube secured:  20    Secured at:  The teeth    Placement Verified By:  Capnometry    Complicating Factors:  None, obesity and oropharyngeal edema or fat    Findings Post-Intubation:  BS equal bilateral and atraumatic/condition of teeth unchanged

## 2020-10-19 NOTE — TRANSFER OF CARE
"Anesthesia Transfer of Care Note    Patient: Paras Smith    Procedure(s) Performed: Procedure(s) (LRB):  TYMPANOPLASTY (Right)    Patient location: PACU    Anesthesia Type: general    Transport from OR: Transported from OR on 6-10 L/min O2 by face mask with adequate spontaneous ventilation    Post pain: adequate analgesia    Post assessment: no apparent anesthetic complications    Post vital signs: stable    Level of consciousness: responds to stimulation    Nausea/Vomiting: no nausea/vomiting    Complications: none    Transfer of care protocol was followed      Last vitals:   Visit Vitals  BP (!) 110/57 (BP Location: Right arm, Patient Position: Lying)   Pulse 68   Temp 36.3 °C (97.4 °F) (Oral)   Resp 16   Ht 5' 2" (1.575 m)   Wt 127 kg (280 lb)   LMP 04/12/2014   SpO2 97%   Breastfeeding No   BMI 51.21 kg/m²     "

## 2020-10-19 NOTE — H&P
Patient ID: Paras Smith is a 59 y.o. female.     Chief Complaint: Tinnitus (for about a 2-3 years)       No recent changes. Ready for surgery.    HPI      Paras Smith is a 59 y.o. female with history of AD tympanic membrane perforation x 2 years.  Perforation due to unknown causes, but denies history of ear infections or otorrhea. She does not associated moderate constant hearing loss, otalgia and tinnitus.  She is interested in surgical repair.  She does have a history of CAD and thyroid cancer     Review of Systems   Constitutional: Negative for chills and fever.   HENT: Negative for sore throat and trouble swallowing.    Respiratory: Negative for apnea and chest tightness.    Cardiovascular: Negative for chest pain.          Objective:   Physical Exam  Constitutional:       Appearance: She is well-developed.   HENT:      Head: Normocephalic and atraumatic.      Right Ear: Ear canal and external ear normal. No drainage. Tympanic membrane is perforated.      Left Ear: Tympanic membrane, ear canal and external ear normal.      Ears:        Nose: Nose normal.      Mouth/Throat:      Pharynx: Uvula midline.   Neck:      Musculoskeletal: Normal range of motion.      Thyroid: No thyroid mass.       Data:    Audiogram tracings independently reviewed and discussed with patient shows CHL AD with slight SNHL AS     Assessment:       1. 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 Tinnitus of right ear    3. Mixed conductive and sensorineural hearing loss of right ear with restricted hearing of left ear        Plan:        ok for right tympanoplasty

## 2020-10-19 NOTE — BRIEF OP NOTE
[facility]  Brief Operative Note    Surgery Date: 10/19/2020     Surgeon(s) and Role:     * Shaan Garcia MD - Primary     * Enrique Dent Jr., MD - Resident - Assisting        Pre-op Diagnosis:  Central perforation of tympanic membrane of right ear [H72.01]    Post-op Diagnosis:  Post-Op Diagnosis Codes:     * Central perforation of tympanic membrane of right ear [H72.01]    Procedure(s) (LRB):  TYMPANOPLASTY (Right)    Anesthesia: General    Description of the findings of the procedure(s): large subtotal perforation with significant tympanosclerosis. Mobile ossicular chain    Estimated Blood Loss: * No values recorded between 10/19/2020  7:51 AM and 10/19/2020 10:51 AM *         Specimens:   Specimen (12h ago, onward)    None            Discharge Note    OUTCOME: Patient tolerated treatment/procedure well without complication and is now ready for discharge.    DISPOSITION: Home or Self Care    FINAL DIAGNOSIS:  Tympanic membrane perforation    FOLLOWUP: In clinic    DISCHARGE INSTRUCTIONS:    Discharge Procedure Orders   Lifting restrictions     Notify your health care provider if you experience any of the following:  temperature >100.4     Notify your health care provider if you experience any of the following:  persistent nausea and vomiting or diarrhea     Notify your health care provider if you experience any of the following:  severe uncontrolled pain     Notify your health care provider if you experience any of the following:  redness, tenderness, or signs of infection (pain, swelling, redness, odor or green/yellow discharge around incision site)     Remove dressing in 24 hours   Order Comments: You can take off libby ear dressing tomorrow. There are steri strips over the incision behind your ear which are to remain in place until you see Dr. Garcia in clinic.

## 2020-10-20 ENCOUNTER — PATIENT MESSAGE (OUTPATIENT)
Dept: HEPATOLOGY | Facility: CLINIC | Age: 60
End: 2020-10-20

## 2020-10-21 ENCOUNTER — TELEPHONE (OUTPATIENT)
Dept: ENDOCRINOLOGY | Facility: CLINIC | Age: 60
End: 2020-10-21

## 2020-10-21 ENCOUNTER — PATIENT MESSAGE (OUTPATIENT)
Dept: ENDOCRINOLOGY | Facility: CLINIC | Age: 60
End: 2020-10-21

## 2020-10-21 ENCOUNTER — TELEPHONE (OUTPATIENT)
Dept: HEPATOLOGY | Facility: CLINIC | Age: 60
End: 2020-10-21

## 2020-10-21 ENCOUNTER — PATIENT MESSAGE (OUTPATIENT)
Dept: HEPATOLOGY | Facility: CLINIC | Age: 60
End: 2020-10-21

## 2020-10-21 DIAGNOSIS — R74.8 ELEVATED ALKALINE PHOSPHATASE LEVEL: Primary | ICD-10-CM

## 2020-10-21 DIAGNOSIS — K76.0 FATTY LIVER: Primary | ICD-10-CM

## 2020-10-21 DIAGNOSIS — E55.9 VITAMIN D DEFICIENCY DISEASE: ICD-10-CM

## 2020-10-21 NOTE — TELEPHONE ENCOUNTER
Called patient to discuss lab and MRI elastography results. MRI elastography reassuring, still no fibrosis (F0). She has normal transaminases though alk phos trending up. GGT normal and AMA negative. Unlikely due to liver etiology but will update Endocrine in case further workup is needed from their end.

## 2020-10-21 NOTE — TELEPHONE ENCOUNTER
Discussed her case with her hepatologist. She needs additional lab testing to evaluate the elevated alkaline phosphatase level on her recent labs.

## 2020-10-27 ENCOUNTER — OFFICE VISIT (OUTPATIENT)
Dept: OTOLARYNGOLOGY | Facility: CLINIC | Age: 60
End: 2020-10-27
Payer: MEDICAID

## 2020-10-27 DIAGNOSIS — Z09 POSTOPERATIVE EXAMINATION: Primary | ICD-10-CM

## 2020-10-27 PROCEDURE — 99999 PR PBB SHADOW E&M-EST. PATIENT-LVL I: CPT | Mod: PBBFAC,,, | Performed by: OTOLARYNGOLOGY

## 2020-10-27 PROCEDURE — 99999 PR PBB SHADOW E&M-EST. PATIENT-LVL I: ICD-10-PCS | Mod: PBBFAC,,, | Performed by: OTOLARYNGOLOGY

## 2020-10-27 PROCEDURE — 99024 PR POST-OP FOLLOW-UP VISIT: ICD-10-PCS | Mod: ,,, | Performed by: OTOLARYNGOLOGY

## 2020-10-27 PROCEDURE — 99024 POSTOP FOLLOW-UP VISIT: CPT | Mod: ,,, | Performed by: OTOLARYNGOLOGY

## 2020-10-27 PROCEDURE — 99211 OFF/OP EST MAY X REQ PHY/QHP: CPT | Mod: PBBFAC | Performed by: OTOLARYNGOLOGY

## 2020-10-27 NOTE — PROGRESS NOTES
Steri strips removed. No evidence of hematoma or seroma. No evidence of infection. Packing is dry and intact. Start ototopical drops and re check in three weeks.

## 2020-11-12 ENCOUNTER — OFFICE VISIT (OUTPATIENT)
Dept: OTOLARYNGOLOGY | Facility: CLINIC | Age: 60
End: 2020-11-12
Payer: MEDICAID

## 2020-11-12 VITALS
BODY MASS INDEX: 51.67 KG/M2 | SYSTOLIC BLOOD PRESSURE: 110 MMHG | TEMPERATURE: 99 F | DIASTOLIC BLOOD PRESSURE: 80 MMHG | WEIGHT: 280.75 LBS | HEIGHT: 62 IN

## 2020-11-12 DIAGNOSIS — H92.01 RIGHT EAR PAIN: ICD-10-CM

## 2020-11-12 PROCEDURE — 99024 POSTOP FOLLOW-UP VISIT: CPT | Mod: S$GLB,,, | Performed by: OTOLARYNGOLOGY

## 2020-11-12 PROCEDURE — 99024 PR POST-OP FOLLOW-UP VISIT: ICD-10-PCS | Mod: S$GLB,,, | Performed by: OTOLARYNGOLOGY

## 2020-11-12 RX ORDER — OFLOXACIN 3 MG/ML
SOLUTION/ DROPS OPHTHALMIC
Qty: 10 ML | Refills: 2 | Status: SHIPPED | OUTPATIENT
Start: 2020-11-12 | End: 2021-04-12 | Stop reason: ALTCHOICE

## 2020-11-12 NOTE — PROGRESS NOTES
Post auricular incision healing well.    Packing vacuumed out of ear with microscope.    Graft appears intact, however there is still an adherent layer of gelfoam    Patient tolerated well.    RTC 3 weeks.    Continue ear drops as directed.

## 2020-12-10 ENCOUNTER — OFFICE VISIT (OUTPATIENT)
Dept: OTOLARYNGOLOGY | Facility: CLINIC | Age: 60
End: 2020-12-10
Payer: MEDICAID

## 2020-12-10 VITALS
SYSTOLIC BLOOD PRESSURE: 126 MMHG | DIASTOLIC BLOOD PRESSURE: 82 MMHG | HEIGHT: 62 IN | WEIGHT: 275.81 LBS | TEMPERATURE: 98 F | BODY MASS INDEX: 50.76 KG/M2

## 2020-12-10 DIAGNOSIS — Z09 POSTOPERATIVE EXAMINATION: Primary | ICD-10-CM

## 2020-12-10 PROCEDURE — 99024 POSTOP FOLLOW-UP VISIT: CPT | Mod: S$GLB,,, | Performed by: OTOLARYNGOLOGY

## 2020-12-10 PROCEDURE — 99024 PR POST-OP FOLLOW-UP VISIT: ICD-10-PCS | Mod: S$GLB,,, | Performed by: OTOLARYNGOLOGY

## 2020-12-29 ENCOUNTER — PATIENT MESSAGE (OUTPATIENT)
Dept: FAMILY MEDICINE | Facility: CLINIC | Age: 60
End: 2020-12-29

## 2020-12-29 DIAGNOSIS — J45.40 MODERATE PERSISTENT ASTHMA, UNSPECIFIED WHETHER COMPLICATED: Primary | ICD-10-CM

## 2020-12-29 RX ORDER — PREDNISONE 20 MG/1
40 TABLET ORAL DAILY
Qty: 10 TABLET | Refills: 0 | Status: SHIPPED | OUTPATIENT
Start: 2020-12-29 | End: 2021-01-03

## 2021-01-04 ENCOUNTER — PATIENT MESSAGE (OUTPATIENT)
Dept: FAMILY MEDICINE | Facility: CLINIC | Age: 61
End: 2021-01-04

## 2021-01-05 ENCOUNTER — PATIENT MESSAGE (OUTPATIENT)
Dept: FAMILY MEDICINE | Facility: CLINIC | Age: 61
End: 2021-01-05

## 2021-01-06 ENCOUNTER — TELEPHONE (OUTPATIENT)
Dept: OTOLARYNGOLOGY | Facility: CLINIC | Age: 61
End: 2021-01-06

## 2021-01-06 DIAGNOSIS — Z12.31 OTHER SCREENING MAMMOGRAM: ICD-10-CM

## 2021-01-06 DIAGNOSIS — J45.40 MODERATE PERSISTENT ASTHMA, UNSPECIFIED WHETHER COMPLICATED: ICD-10-CM

## 2021-01-06 RX ORDER — ALBUTEROL SULFATE 0.63 MG/3ML
SOLUTION RESPIRATORY (INHALATION)
Qty: 90 ML | Refills: 0 | Status: SHIPPED | OUTPATIENT
Start: 2021-01-06 | End: 2021-04-12

## 2021-01-07 ENCOUNTER — HOSPITAL ENCOUNTER (EMERGENCY)
Facility: HOSPITAL | Age: 61
Discharge: HOME | End: 2021-01-07
Attending: EMERGENCY MEDICINE
Payer: MEDICAID

## 2021-01-07 ENCOUNTER — PATIENT MESSAGE (OUTPATIENT)
Dept: ADMINISTRATIVE | Facility: CLINIC | Age: 61
End: 2021-01-07

## 2021-01-07 ENCOUNTER — PATIENT MESSAGE (OUTPATIENT)
Dept: ADMINISTRATIVE | Facility: OTHER | Age: 61
End: 2021-01-07

## 2021-01-07 VITALS
HEART RATE: 70 BPM | RESPIRATION RATE: 18 BRPM | SYSTOLIC BLOOD PRESSURE: 142 MMHG | DIASTOLIC BLOOD PRESSURE: 99 MMHG | OXYGEN SATURATION: 97 % | TEMPERATURE: 98 F

## 2021-01-07 DIAGNOSIS — Z20.822 SUSPECTED COVID-19 VIRUS INFECTION: ICD-10-CM

## 2021-01-07 DIAGNOSIS — J44.1 COPD WITH ACUTE EXACERBATION: Primary | ICD-10-CM

## 2021-01-07 DIAGNOSIS — R05.9 COUGH: ICD-10-CM

## 2021-01-07 LAB
INFLUENZA A ANTIGEN, POC: NEGATIVE
INFLUENZA B ANTIGEN, POC: NEGATIVE

## 2021-01-07 PROCEDURE — 99284 EMERGENCY DEPT VISIT MOD MDM: CPT | Mod: 25,ER

## 2021-01-07 PROCEDURE — U0003 INFECTIOUS AGENT DETECTION BY NUCLEIC ACID (DNA OR RNA); SEVERE ACUTE RESPIRATORY SYNDROME CORONAVIRUS 2 (SARS-COV-2) (CORONAVIRUS DISEASE [COVID-19]), AMPLIFIED PROBE TECHNIQUE, MAKING USE OF HIGH THROUGHPUT TECHNOLOGIES AS DESCRIBED BY CMS-2020-01-R: HCPCS

## 2021-01-07 PROCEDURE — 87502 INFLUENZA DNA AMP PROBE: CPT | Mod: ER

## 2021-01-07 RX ORDER — LORATADINE 10 MG/1
10 TABLET ORAL DAILY
Qty: 60 TABLET | Refills: 0 | Status: SHIPPED | OUTPATIENT
Start: 2021-01-07 | End: 2023-02-23 | Stop reason: ALTCHOICE

## 2021-01-07 RX ORDER — ALBUTEROL SULFATE 90 UG/1
2 AEROSOL, METERED RESPIRATORY (INHALATION) EVERY 6 HOURS PRN
Qty: 18 G | Refills: 0 | Status: SHIPPED | OUTPATIENT
Start: 2021-01-07 | End: 2021-09-08 | Stop reason: SDUPTHER

## 2021-01-07 RX ORDER — ACETAMINOPHEN 500 MG
1000 TABLET ORAL EVERY 6 HOURS PRN
Qty: 30 TABLET | Refills: 0 | OUTPATIENT
Start: 2021-01-07 | End: 2022-12-10

## 2021-01-07 RX ORDER — FLUTICASONE PROPIONATE 50 MCG
1 SPRAY, SUSPENSION (ML) NASAL 2 TIMES DAILY
Qty: 16 G | Refills: 0 | Status: SHIPPED | OUTPATIENT
Start: 2021-01-07 | End: 2021-12-28 | Stop reason: SDUPTHER

## 2021-01-07 RX ORDER — AZITHROMYCIN 250 MG/1
250 TABLET, FILM COATED ORAL DAILY
Qty: 6 TABLET | Refills: 0 | Status: SHIPPED | OUTPATIENT
Start: 2021-01-07 | End: 2021-03-03 | Stop reason: ALTCHOICE

## 2021-01-07 RX ORDER — ONDANSETRON 8 MG/1
8 TABLET, ORALLY DISINTEGRATING ORAL EVERY 6 HOURS PRN
Qty: 30 TABLET | Refills: 0 | Status: SHIPPED | OUTPATIENT
Start: 2021-01-07 | End: 2021-01-09

## 2021-01-07 RX ORDER — METHYLPREDNISOLONE 4 MG/1
TABLET ORAL
Qty: 1 PACKAGE | Refills: 0 | Status: SHIPPED | OUTPATIENT
Start: 2021-01-07 | End: 2021-01-28

## 2021-01-08 ENCOUNTER — NURSE TRIAGE (OUTPATIENT)
Dept: ADMINISTRATIVE | Facility: CLINIC | Age: 61
End: 2021-01-08

## 2021-01-08 ENCOUNTER — TELEPHONE (OUTPATIENT)
Dept: ADMINISTRATIVE | Facility: CLINIC | Age: 61
End: 2021-01-08

## 2021-01-09 ENCOUNTER — NURSE TRIAGE (OUTPATIENT)
Dept: ADMINISTRATIVE | Facility: CLINIC | Age: 61
End: 2021-01-09

## 2021-01-09 ENCOUNTER — TELEPHONE (OUTPATIENT)
Dept: ADMINISTRATIVE | Facility: CLINIC | Age: 61
End: 2021-01-09

## 2021-01-10 ENCOUNTER — NURSE TRIAGE (OUTPATIENT)
Dept: ADMINISTRATIVE | Facility: CLINIC | Age: 61
End: 2021-01-10

## 2021-01-10 LAB — SARS-COV-2 RNA RESP QL NAA+PROBE: NOT DETECTED

## 2021-01-13 ENCOUNTER — PATIENT MESSAGE (OUTPATIENT)
Dept: ADMINISTRATIVE | Facility: OTHER | Age: 61
End: 2021-01-13

## 2021-01-14 ENCOUNTER — PATIENT MESSAGE (OUTPATIENT)
Dept: ADMINISTRATIVE | Facility: OTHER | Age: 61
End: 2021-01-14

## 2021-01-15 ENCOUNTER — PATIENT MESSAGE (OUTPATIENT)
Dept: ADMINISTRATIVE | Facility: OTHER | Age: 61
End: 2021-01-15

## 2021-01-16 ENCOUNTER — PATIENT MESSAGE (OUTPATIENT)
Dept: ADMINISTRATIVE | Facility: OTHER | Age: 61
End: 2021-01-16

## 2021-01-16 ENCOUNTER — PATIENT MESSAGE (OUTPATIENT)
Dept: ADMINISTRATIVE | Facility: CLINIC | Age: 61
End: 2021-01-16

## 2021-01-17 ENCOUNTER — PATIENT MESSAGE (OUTPATIENT)
Dept: ADMINISTRATIVE | Facility: OTHER | Age: 61
End: 2021-01-17

## 2021-01-18 ENCOUNTER — PATIENT MESSAGE (OUTPATIENT)
Dept: ADMINISTRATIVE | Facility: OTHER | Age: 61
End: 2021-01-18

## 2021-01-18 ENCOUNTER — PATIENT MESSAGE (OUTPATIENT)
Dept: ADMINISTRATIVE | Facility: CLINIC | Age: 61
End: 2021-01-18

## 2021-01-19 ENCOUNTER — PATIENT MESSAGE (OUTPATIENT)
Dept: ADMINISTRATIVE | Facility: OTHER | Age: 61
End: 2021-01-19

## 2021-01-20 ENCOUNTER — PATIENT MESSAGE (OUTPATIENT)
Dept: ADMINISTRATIVE | Facility: OTHER | Age: 61
End: 2021-01-20

## 2021-01-21 ENCOUNTER — PATIENT MESSAGE (OUTPATIENT)
Dept: ADMINISTRATIVE | Facility: OTHER | Age: 61
End: 2021-01-21

## 2021-01-28 ENCOUNTER — OFFICE VISIT (OUTPATIENT)
Dept: OTOLARYNGOLOGY | Facility: CLINIC | Age: 61
End: 2021-01-28
Payer: MEDICAID

## 2021-01-28 ENCOUNTER — CLINICAL SUPPORT (OUTPATIENT)
Dept: AUDIOLOGY | Facility: CLINIC | Age: 61
End: 2021-01-28
Payer: MEDICAID

## 2021-01-28 VITALS — WEIGHT: 280.25 LBS | HEIGHT: 62 IN | BODY MASS INDEX: 51.57 KG/M2

## 2021-01-28 DIAGNOSIS — H90.A31 MIXED CONDUCTIVE AND SENSORINEURAL HEARING LOSS OF RIGHT EAR WITH RESTRICTED HEARING OF LEFT EAR: ICD-10-CM

## 2021-01-28 DIAGNOSIS — H90.A31 MIXED CONDUCTIVE AND SENSORINEURAL HEARING LOSS OF RIGHT EAR WITH RESTRICTED HEARING OF LEFT EAR: Primary | ICD-10-CM

## 2021-01-28 DIAGNOSIS — Z09 POSTOPERATIVE EXAMINATION: Primary | ICD-10-CM

## 2021-01-28 PROCEDURE — 99024 POSTOP FOLLOW-UP VISIT: CPT | Mod: S$GLB,,, | Performed by: OTOLARYNGOLOGY

## 2021-01-28 PROCEDURE — 92557 PR COMPREHENSIVE HEARING TEST: ICD-10-PCS | Mod: S$GLB,,, | Performed by: AUDIOLOGIST

## 2021-01-28 PROCEDURE — 92550 TYMPANOMETRY & REFLEX THRESH: CPT | Mod: S$GLB,,, | Performed by: AUDIOLOGIST

## 2021-01-28 PROCEDURE — 99024 PR POST-OP FOLLOW-UP VISIT: ICD-10-PCS | Mod: S$GLB,,, | Performed by: OTOLARYNGOLOGY

## 2021-01-28 PROCEDURE — 92557 COMPREHENSIVE HEARING TEST: CPT | Mod: S$GLB,,, | Performed by: AUDIOLOGIST

## 2021-01-28 PROCEDURE — 92550 PR TYMPANOMETRY AND REFLEX THRESHOLD MEASUREMENTS: ICD-10-PCS | Mod: S$GLB,,, | Performed by: AUDIOLOGIST

## 2021-02-01 ENCOUNTER — TELEPHONE (OUTPATIENT)
Dept: OPTOMETRY | Facility: CLINIC | Age: 61
End: 2021-02-01

## 2021-02-01 ENCOUNTER — PATIENT MESSAGE (OUTPATIENT)
Dept: ENDOCRINOLOGY | Facility: CLINIC | Age: 61
End: 2021-02-01

## 2021-02-02 NOTE — PROGRESS NOTES
Subjective:       Patient ID: Paras Smith is a 57 y.o. female.    Chief Complaint: Establish Care    Est pcp    HPI: 58 y/o history of cervical spinal fusion in 2006 chronic neck, back and bilateral shoulder pain on daily opioid therapy follicular thyroid cancer s/p thryoidectomy with HEART chronic migraines presents to establish pcp. Recently established with [facility] for chronic bilateral UE parathesia and headaches MRI O fcspine noted enhancing mass of posterior spinous process at level of C7. She was admitted to hospital without focal neurological deficits and had CT of chest abdomen and pelvis which failed to show any other masses or primary neoplasm. Her thyroglobulin level was undetectable. Today she reports unchanged diffuse body pain with parathesia of all extremities. She does endorse frequent falls with feet catching. She denies loss of bowel or bladder function. She has been weaning of topiramate without significant change in her parathesia or cognition. Her daughter is present with her today and provides some collateral information      Review of Systems   Constitutional: Negative for activity change, appetite change, fatigue, fever and unexpected weight change.   HENT: Negative for ear pain, rhinorrhea and sore throat.    Eyes: Negative for discharge and visual disturbance.   Respiratory: Negative for chest tightness, shortness of breath and wheezing.    Cardiovascular: Negative for chest pain, palpitations and leg swelling.   Gastrointestinal: Positive for abdominal pain and nausea. Negative for constipation, diarrhea and vomiting.   Endocrine: Negative for cold intolerance and heat intolerance.   Genitourinary: Positive for pelvic pain. Negative for dysuria and hematuria.   Musculoskeletal: Positive for arthralgias, back pain and neck pain. Negative for joint swelling and neck stiffness.   Skin: Negative for rash.   Neurological: Positive for dizziness, speech difficulty, weakness,  "numbness and headaches. Negative for syncope.   Psychiatric/Behavioral: Positive for confusion and sleep disturbance. Negative for suicidal ideas. The patient is nervous/anxious.        Objective:     Vitals:    05/01/18 0851   BP: 110/76   BP Location: Right arm   Patient Position: Sitting   BP Method: Large (Manual)   Pulse: 62   Resp: 17   Temp: 97.8 °F (36.6 °C)   TempSrc: Oral   SpO2: 96%   Weight: 117 kg (257 lb 15 oz)   Height: 5' 4" (1.626 m)          Physical Exam   Constitutional: She is oriented to person, place, and time. She appears well-developed and well-nourished.   HENT:   Head: Normocephalic and atraumatic.   Eyes: Conjunctivae and EOM are normal. Pupils are equal, round, and reactive to light. No scleral icterus.   Neck: Normal range of motion. Neck supple. No JVD present. No thyromegaly present.   Cardiovascular: Normal rate and regular rhythm.  Exam reveals no gallop and no friction rub.    No murmur heard.  No LE edema   Pulmonary/Chest: Effort normal and breath sounds normal. She has no wheezes. She has no rales.   Abdominal: Soft. Bowel sounds are normal. There is no tenderness. There is no rebound and no guarding.   Musculoskeletal: Normal range of motion. She exhibits no edema or tenderness.   Lymphadenopathy:     She has no cervical adenopathy.   Neurological: She is alert and oriented to person, place, and time. No cranial nerve deficit.   Skin: Skin is warm and dry.   Psychiatric: She has a normal mood and affect.       Assessment and Plan   1. Chronic neck pain  Scheduled muscle relaxer nightly TCA to stop atypical antipsychotic. She is also on chronic diazepam. I will not renew opioids due to risk of respiratory depression. She is scheduled to see interventional pain management this month  - methocarbamol (ROBAXIN) 500 MG Tab; Take 1 tablet (500 mg total) by mouth every 8 (eight) hours.  Dispense: 60 tablet; Refill: 0  - amitriptyline (ELAVIL) 25 MG tablet; Take 1 tablet (25 mg total) " by mouth nightly as needed for Insomnia.  Dispense: 30 tablet; Refill: 0    2. Cervical spinal mass  Needs biopsyh reviewed images with patient no compression of spinal cord, question possible reactive granuloma from prior fusion  - Ambulatory Referral to Interventional Radiology  - Protime-INR; Future  - APTT; Future  - CBC auto differential; Future  - Basic metabolic panel; Future    3. S/P cervical spinal fusion  Muscle relaxer  - methocarbamol (ROBAXIN) 500 MG Tab; Take 1 tablet (500 mg total) by mouth every 8 (eight) hours.  Dispense: 60 tablet; Refill: 0    4. Chronic, continuous use of opioids  Risk of opioid use reviewed with patient with concurretn benzo use I would recommend weaning off. She is self decreasing her dosage    >30 minutes spent reviewing images with patient and discussing importance of medication control and risks of chronic continuous opioid use   Yes

## 2021-02-24 ENCOUNTER — PATIENT MESSAGE (OUTPATIENT)
Dept: ENDOCRINOLOGY | Facility: CLINIC | Age: 61
End: 2021-02-24

## 2021-02-25 ENCOUNTER — PATIENT MESSAGE (OUTPATIENT)
Dept: ENDOCRINOLOGY | Facility: CLINIC | Age: 61
End: 2021-02-25

## 2021-03-03 ENCOUNTER — OFFICE VISIT (OUTPATIENT)
Dept: FAMILY MEDICINE | Facility: CLINIC | Age: 61
End: 2021-03-03
Payer: MEDICAID

## 2021-03-03 VITALS
HEART RATE: 102 BPM | RESPIRATION RATE: 16 BRPM | SYSTOLIC BLOOD PRESSURE: 130 MMHG | OXYGEN SATURATION: 96 % | WEIGHT: 282.44 LBS | DIASTOLIC BLOOD PRESSURE: 80 MMHG | TEMPERATURE: 97 F | BODY MASS INDEX: 51.65 KG/M2

## 2021-03-03 DIAGNOSIS — I10 ESSENTIAL HYPERTENSION: Primary | ICD-10-CM

## 2021-03-03 DIAGNOSIS — E89.0 POSTSURGICAL HYPOTHYROIDISM: ICD-10-CM

## 2021-03-03 DIAGNOSIS — Z12.31 ENCOUNTER FOR SCREENING MAMMOGRAM FOR MALIGNANT NEOPLASM OF BREAST: ICD-10-CM

## 2021-03-03 DIAGNOSIS — E66.01 MORBID OBESITY DUE TO EXCESS CALORIES: ICD-10-CM

## 2021-03-03 DIAGNOSIS — G89.29 CHRONIC MIDLINE LOW BACK PAIN WITHOUT SCIATICA: ICD-10-CM

## 2021-03-03 DIAGNOSIS — M54.50 CHRONIC MIDLINE LOW BACK PAIN WITHOUT SCIATICA: ICD-10-CM

## 2021-03-03 PROCEDURE — 99214 PR OFFICE/OUTPT VISIT, EST, LEVL IV, 30-39 MIN: ICD-10-PCS | Mod: S$PBB,,, | Performed by: INTERNAL MEDICINE

## 2021-03-03 PROCEDURE — 99999 PR PBB SHADOW E&M-EST. PATIENT-LVL V: CPT | Mod: PBBFAC,,, | Performed by: INTERNAL MEDICINE

## 2021-03-03 PROCEDURE — 99999 PR PBB SHADOW E&M-EST. PATIENT-LVL V: ICD-10-PCS | Mod: PBBFAC,,, | Performed by: INTERNAL MEDICINE

## 2021-03-03 PROCEDURE — 99214 OFFICE O/P EST MOD 30 MIN: CPT | Mod: S$PBB,,, | Performed by: INTERNAL MEDICINE

## 2021-03-03 PROCEDURE — 99215 OFFICE O/P EST HI 40 MIN: CPT | Mod: PBBFAC,PN | Performed by: INTERNAL MEDICINE

## 2021-03-04 ENCOUNTER — TELEPHONE (OUTPATIENT)
Dept: ADMINISTRATIVE | Facility: HOSPITAL | Age: 61
End: 2021-03-04

## 2021-03-04 DIAGNOSIS — I10 ESSENTIAL HYPERTENSION: ICD-10-CM

## 2021-03-04 RX ORDER — LISINOPRIL 10 MG/1
TABLET ORAL
Qty: 30 TABLET | Refills: 3 | Status: SHIPPED | OUTPATIENT
Start: 2021-03-04 | End: 2021-07-09

## 2021-03-05 ENCOUNTER — LAB VISIT (OUTPATIENT)
Dept: LAB | Facility: HOSPITAL | Age: 61
End: 2021-03-05
Attending: INTERNAL MEDICINE
Payer: MEDICAID

## 2021-03-05 ENCOUNTER — PATIENT OUTREACH (OUTPATIENT)
Dept: ADMINISTRATIVE | Facility: OTHER | Age: 61
End: 2021-03-05

## 2021-03-05 DIAGNOSIS — E89.0 POSTSURGICAL HYPOTHYROIDISM: ICD-10-CM

## 2021-03-05 DIAGNOSIS — E55.9 VITAMIN D DEFICIENCY DISEASE: ICD-10-CM

## 2021-03-05 DIAGNOSIS — R74.8 ELEVATED ALKALINE PHOSPHATASE LEVEL: ICD-10-CM

## 2021-03-05 DIAGNOSIS — C73 THYROID CANCER: ICD-10-CM

## 2021-03-05 LAB
25(OH)D3+25(OH)D2 SERPL-MCNC: 34 NG/ML (ref 30–96)
T4 FREE SERPL-MCNC: 1.35 NG/DL (ref 0.71–1.51)
TSH SERPL DL<=0.005 MIU/L-ACNC: 0.01 UIU/ML (ref 0.4–4)

## 2021-03-05 PROCEDURE — 84443 ASSAY THYROID STIM HORMONE: CPT | Performed by: INTERNAL MEDICINE

## 2021-03-05 PROCEDURE — 82306 VITAMIN D 25 HYDROXY: CPT | Performed by: INTERNAL MEDICINE

## 2021-03-05 PROCEDURE — 36415 COLL VENOUS BLD VENIPUNCTURE: CPT | Mod: PO | Performed by: INTERNAL MEDICINE

## 2021-03-05 PROCEDURE — 84080 ASSAY ALKALINE PHOSPHATASES: CPT | Performed by: INTERNAL MEDICINE

## 2021-03-05 PROCEDURE — 84439 ASSAY OF FREE THYROXINE: CPT | Performed by: INTERNAL MEDICINE

## 2021-03-08 ENCOUNTER — TELEPHONE (OUTPATIENT)
Dept: FAMILY MEDICINE | Facility: CLINIC | Age: 61
End: 2021-03-08

## 2021-03-09 ENCOUNTER — OFFICE VISIT (OUTPATIENT)
Dept: ENDOCRINOLOGY | Facility: CLINIC | Age: 61
End: 2021-03-09
Payer: MEDICAID

## 2021-03-09 ENCOUNTER — OFFICE VISIT (OUTPATIENT)
Dept: OBSTETRICS AND GYNECOLOGY | Facility: CLINIC | Age: 61
End: 2021-03-09
Payer: MEDICAID

## 2021-03-09 ENCOUNTER — HOSPITAL ENCOUNTER (OUTPATIENT)
Dept: RADIOLOGY | Facility: HOSPITAL | Age: 61
Discharge: HOME | End: 2021-03-09
Attending: INTERNAL MEDICINE
Payer: MEDICAID

## 2021-03-09 VITALS — SYSTOLIC BLOOD PRESSURE: 138 MMHG | DIASTOLIC BLOOD PRESSURE: 82 MMHG | WEIGHT: 272.81 LBS | BODY MASS INDEX: 49.9 KG/M2

## 2021-03-09 DIAGNOSIS — N95.0 PMB (POSTMENOPAUSAL BLEEDING): ICD-10-CM

## 2021-03-09 DIAGNOSIS — R10.2 SUPRAPUBIC PRESSURE: ICD-10-CM

## 2021-03-09 DIAGNOSIS — Z01.419 VISIT FOR GYNECOLOGIC EXAMINATION: Primary | ICD-10-CM

## 2021-03-09 DIAGNOSIS — N95.9 MENOPAUSAL AND PERIMENOPAUSAL DISORDER: ICD-10-CM

## 2021-03-09 DIAGNOSIS — E89.0 POSTSURGICAL HYPOTHYROIDISM: ICD-10-CM

## 2021-03-09 DIAGNOSIS — R74.8 ELEVATED ALKALINE PHOSPHATASE LEVEL: ICD-10-CM

## 2021-03-09 DIAGNOSIS — Z12.31 ENCOUNTER FOR SCREENING MAMMOGRAM FOR MALIGNANT NEOPLASM OF BREAST: ICD-10-CM

## 2021-03-09 DIAGNOSIS — C73 THYROID CANCER: ICD-10-CM

## 2021-03-09 PROCEDURE — 88305 TISSUE EXAM BY PATHOLOGIST: CPT | Performed by: PATHOLOGY

## 2021-03-09 PROCEDURE — 99386 PREV VISIT NEW AGE 40-64: CPT | Mod: 25,S$PBB,, | Performed by: OBSTETRICS & GYNECOLOGY

## 2021-03-09 PROCEDURE — 87624 HPV HI-RISK TYP POOLED RSLT: CPT | Performed by: OBSTETRICS & GYNECOLOGY

## 2021-03-09 PROCEDURE — 99214 OFFICE O/P EST MOD 30 MIN: CPT | Mod: 95,,, | Performed by: INTERNAL MEDICINE

## 2021-03-09 PROCEDURE — 58100 PR BIOPSY OF UTERUS LINING: ICD-10-PCS | Mod: S$PBB,,, | Performed by: OBSTETRICS & GYNECOLOGY

## 2021-03-09 PROCEDURE — 58100 BIOPSY OF UTERUS LINING: CPT | Mod: S$PBB,,, | Performed by: OBSTETRICS & GYNECOLOGY

## 2021-03-09 PROCEDURE — 77067 MAMMO DIGITAL SCREENING BILAT WITH TOMO: ICD-10-PCS | Mod: 26,,, | Performed by: RADIOLOGY

## 2021-03-09 PROCEDURE — 87086 URINE CULTURE/COLONY COUNT: CPT | Performed by: OBSTETRICS & GYNECOLOGY

## 2021-03-09 PROCEDURE — 77067 SCR MAMMO BI INCL CAD: CPT | Mod: 26,,, | Performed by: RADIOLOGY

## 2021-03-09 PROCEDURE — 77063 MAMMO DIGITAL SCREENING BILAT WITH TOMO: ICD-10-PCS | Mod: 26,,, | Performed by: RADIOLOGY

## 2021-03-09 PROCEDURE — 77067 SCR MAMMO BI INCL CAD: CPT | Mod: TC

## 2021-03-09 PROCEDURE — 99999 PR PBB SHADOW E&M-EST. PATIENT-LVL IV: ICD-10-PCS | Mod: PBBFAC,,, | Performed by: OBSTETRICS & GYNECOLOGY

## 2021-03-09 PROCEDURE — 88305 TISSUE EXAM BY PATHOLOGIST: CPT | Mod: 26,,, | Performed by: PATHOLOGY

## 2021-03-09 PROCEDURE — 99386 PR PREVENTIVE VISIT,NEW,40-64: ICD-10-PCS | Mod: 25,S$PBB,, | Performed by: OBSTETRICS & GYNECOLOGY

## 2021-03-09 PROCEDURE — 99214 PR OFFICE/OUTPT VISIT, EST, LEVL IV, 30-39 MIN: ICD-10-PCS | Mod: 95,,, | Performed by: INTERNAL MEDICINE

## 2021-03-09 PROCEDURE — 77063 BREAST TOMOSYNTHESIS BI: CPT | Mod: 26,,, | Performed by: RADIOLOGY

## 2021-03-09 PROCEDURE — 88305 TISSUE EXAM BY PATHOLOGIST: ICD-10-PCS | Mod: 26,,, | Performed by: PATHOLOGY

## 2021-03-09 PROCEDURE — 99214 OFFICE O/P EST MOD 30 MIN: CPT | Mod: PBBFAC,25 | Performed by: OBSTETRICS & GYNECOLOGY

## 2021-03-09 PROCEDURE — 88175 CYTOPATH C/V AUTO FLUID REDO: CPT | Performed by: OBSTETRICS & GYNECOLOGY

## 2021-03-09 PROCEDURE — 58100 BIOPSY OF UTERUS LINING: CPT | Mod: PBBFAC | Performed by: OBSTETRICS & GYNECOLOGY

## 2021-03-09 PROCEDURE — 99999 PR PBB SHADOW E&M-EST. PATIENT-LVL IV: CPT | Mod: PBBFAC,,, | Performed by: OBSTETRICS & GYNECOLOGY

## 2021-03-09 RX ORDER — NITROFURANTOIN 25; 75 MG/1; MG/1
100 CAPSULE ORAL 2 TIMES DAILY
Qty: 10 CAPSULE | Refills: 0 | Status: SHIPPED | OUTPATIENT
Start: 2021-03-09 | End: 2021-03-14

## 2021-03-09 RX ORDER — ALPRAZOLAM 0.25 MG/1
0.25 TABLET ORAL NIGHTLY PRN
Qty: 10 TABLET | Refills: 0 | Status: SHIPPED | OUTPATIENT
Start: 2021-03-09 | End: 2021-09-08 | Stop reason: ALTCHOICE

## 2021-03-10 ENCOUNTER — TELEPHONE (OUTPATIENT)
Dept: FAMILY MEDICINE | Facility: CLINIC | Age: 61
End: 2021-03-10

## 2021-03-11 LAB
BACTERIA UR CULT: NORMAL
FINAL PATHOLOGIC DIAGNOSIS: NORMAL
GROSS: NORMAL

## 2021-03-12 LAB
ALP BONE CFR SERPL: 38 % (ref 28–66)
ALP INTEST CFR SERPL: 35 % (ref 1–24)
ALP ISOS SERPL-IMP: ABNORMAL
ALP LIVER CFR SERPL: 27 % (ref 25–69)
ALP MACROHEPATIC CFR SERPL: ABNORMAL %
ALP PLAC CFR SERPL: 0 %
ALP SERPL-CCNC: 158 U/L (ref 37–153)

## 2021-03-14 ENCOUNTER — PATIENT MESSAGE (OUTPATIENT)
Dept: OBSTETRICS AND GYNECOLOGY | Facility: CLINIC | Age: 61
End: 2021-03-14

## 2021-03-18 ENCOUNTER — PATIENT MESSAGE (OUTPATIENT)
Dept: ENDOCRINOLOGY | Facility: CLINIC | Age: 61
End: 2021-03-18

## 2021-03-18 ENCOUNTER — HOSPITAL ENCOUNTER (OUTPATIENT)
Dept: RADIOLOGY | Facility: HOSPITAL | Age: 61
Discharge: HOME | End: 2021-03-18
Attending: OBSTETRICS & GYNECOLOGY
Payer: MEDICAID

## 2021-03-18 DIAGNOSIS — R74.8 ELEVATED ALKALINE PHOSPHATASE LEVEL: Primary | ICD-10-CM

## 2021-03-18 DIAGNOSIS — N95.0 PMB (POSTMENOPAUSAL BLEEDING): ICD-10-CM

## 2021-03-18 LAB
CLINICAL INFO: NORMAL
CYTO CVX: NORMAL
CYTOLOGIST CVX/VAG CYTO: NORMAL
CYTOLOGY CMNT CVX/VAG CYTO-IMP: NORMAL
CYTOLOGY PAP THIN PREP EXPLANATION: NORMAL
DATE OF PREVIOUS PAP: NORMAL
DATE PREVIOUS BX: NO
HPV I/H RISK 4 DNA CVX QL NAA+PROBE: NOT DETECTED
LMP START DATE: NORMAL
SPECIMEN SOURCE CVX/VAG CYTO: NORMAL
STAT OF ADQ CVX/VAG CYTO-IMP: NORMAL

## 2021-03-18 PROCEDURE — 76830 US PELVIS COMP WITH TRANSVAG NON-OB (XPD): ICD-10-PCS | Mod: 26,,, | Performed by: RADIOLOGY

## 2021-03-18 PROCEDURE — 76856 US EXAM PELVIC COMPLETE: CPT | Mod: TC

## 2021-03-18 PROCEDURE — 76856 US EXAM PELVIC COMPLETE: CPT | Mod: 26,,, | Performed by: RADIOLOGY

## 2021-03-18 PROCEDURE — 76830 TRANSVAGINAL US NON-OB: CPT | Mod: 26,,, | Performed by: RADIOLOGY

## 2021-03-18 PROCEDURE — 76856 US PELVIS COMP WITH TRANSVAG NON-OB (XPD): ICD-10-PCS | Mod: 26,,, | Performed by: RADIOLOGY

## 2021-03-19 ENCOUNTER — PATIENT MESSAGE (OUTPATIENT)
Dept: FAMILY MEDICINE | Facility: CLINIC | Age: 61
End: 2021-03-19

## 2021-03-19 ENCOUNTER — PATIENT MESSAGE (OUTPATIENT)
Dept: ENDOCRINOLOGY | Facility: CLINIC | Age: 61
End: 2021-03-19

## 2021-03-25 ENCOUNTER — PATIENT MESSAGE (OUTPATIENT)
Dept: ENDOCRINOLOGY | Facility: CLINIC | Age: 61
End: 2021-03-25

## 2021-03-25 ENCOUNTER — PATIENT MESSAGE (OUTPATIENT)
Dept: OBSTETRICS AND GYNECOLOGY | Facility: CLINIC | Age: 61
End: 2021-03-25

## 2021-03-30 ENCOUNTER — PATIENT MESSAGE (OUTPATIENT)
Dept: ENDOCRINOLOGY | Facility: CLINIC | Age: 61
End: 2021-03-30

## 2021-03-30 ENCOUNTER — LAB VISIT (OUTPATIENT)
Dept: LAB | Facility: HOSPITAL | Age: 61
End: 2021-03-30
Attending: INTERNAL MEDICINE
Payer: MEDICAID

## 2021-03-30 ENCOUNTER — PATIENT MESSAGE (OUTPATIENT)
Dept: OBSTETRICS AND GYNECOLOGY | Facility: CLINIC | Age: 61
End: 2021-03-30

## 2021-03-30 DIAGNOSIS — E89.0 POSTSURGICAL HYPOTHYROIDISM: ICD-10-CM

## 2021-03-30 DIAGNOSIS — R74.8 ELEVATED ALKALINE PHOSPHATASE LEVEL: ICD-10-CM

## 2021-03-30 LAB — TSH SERPL DL<=0.005 MIU/L-ACNC: 0.51 UIU/ML (ref 0.4–4)

## 2021-03-30 PROCEDURE — 84443 ASSAY THYROID STIM HORMONE: CPT | Performed by: INTERNAL MEDICINE

## 2021-03-30 PROCEDURE — 84075 ASSAY ALKALINE PHOSPHATASE: CPT | Performed by: INTERNAL MEDICINE

## 2021-03-30 PROCEDURE — 36415 COLL VENOUS BLD VENIPUNCTURE: CPT | Mod: PO | Performed by: INTERNAL MEDICINE

## 2021-03-31 ENCOUNTER — PATIENT MESSAGE (OUTPATIENT)
Dept: FAMILY MEDICINE | Facility: CLINIC | Age: 61
End: 2021-03-31

## 2021-04-01 ENCOUNTER — PATIENT MESSAGE (OUTPATIENT)
Dept: FAMILY MEDICINE | Facility: CLINIC | Age: 61
End: 2021-04-01

## 2021-04-02 DIAGNOSIS — I10 ESSENTIAL HYPERTENSION: ICD-10-CM

## 2021-04-02 DIAGNOSIS — M54.2 CERVICALGIA: ICD-10-CM

## 2021-04-02 DIAGNOSIS — K21.00 GASTROESOPHAGEAL REFLUX DISEASE WITH ESOPHAGITIS WITHOUT HEMORRHAGE: Primary | ICD-10-CM

## 2021-04-02 LAB — ALP BONE SERPL-MCNC: 29.4 UG/L (ref 5.6–29)

## 2021-04-03 ENCOUNTER — PATIENT MESSAGE (OUTPATIENT)
Dept: ENDOCRINOLOGY | Facility: CLINIC | Age: 61
End: 2021-04-03

## 2021-04-05 ENCOUNTER — PATIENT MESSAGE (OUTPATIENT)
Dept: ENDOCRINOLOGY | Facility: CLINIC | Age: 61
End: 2021-04-05

## 2021-04-05 DIAGNOSIS — R74.8 ELEVATED ALKALINE PHOSPHATASE LEVEL: Primary | ICD-10-CM

## 2021-04-06 ENCOUNTER — PATIENT MESSAGE (OUTPATIENT)
Dept: ENDOCRINOLOGY | Facility: CLINIC | Age: 61
End: 2021-04-06

## 2021-04-06 RX ORDER — AMLODIPINE BESYLATE 5 MG/1
TABLET ORAL
Qty: 90 TABLET | Refills: 3 | Status: SHIPPED | OUTPATIENT
Start: 2021-04-06 | End: 2021-12-21

## 2021-04-06 RX ORDER — BACLOFEN 20 MG/1
TABLET ORAL
Qty: 90 TABLET | Refills: 1 | Status: SHIPPED | OUTPATIENT
Start: 2021-04-06 | End: 2021-05-31

## 2021-04-06 RX ORDER — PANTOPRAZOLE SODIUM 40 MG/1
TABLET, DELAYED RELEASE ORAL
Qty: 90 TABLET | Refills: 2 | Status: SHIPPED | OUTPATIENT
Start: 2021-04-06

## 2021-04-07 ENCOUNTER — PATIENT MESSAGE (OUTPATIENT)
Dept: ENDOCRINOLOGY | Facility: CLINIC | Age: 61
End: 2021-04-07

## 2021-04-12 ENCOUNTER — PATIENT MESSAGE (OUTPATIENT)
Dept: FAMILY MEDICINE | Facility: CLINIC | Age: 61
End: 2021-04-12

## 2021-04-12 DIAGNOSIS — F33.1 MODERATE EPISODE OF RECURRENT MAJOR DEPRESSIVE DISORDER: Primary | ICD-10-CM

## 2021-04-12 DIAGNOSIS — J45.40 MODERATE PERSISTENT ASTHMA, UNSPECIFIED WHETHER COMPLICATED: ICD-10-CM

## 2021-04-12 DIAGNOSIS — J44.1 COPD EXACERBATION: ICD-10-CM

## 2021-04-12 RX ORDER — PREDNISONE 20 MG/1
40 TABLET ORAL DAILY
Qty: 10 TABLET | Refills: 0 | Status: SHIPPED | OUTPATIENT
Start: 2021-04-12 | End: 2021-04-17

## 2021-04-12 RX ORDER — DOXYCYCLINE HYCLATE 100 MG
100 TABLET ORAL 2 TIMES DAILY
Qty: 14 TABLET | Refills: 0 | Status: SHIPPED | OUTPATIENT
Start: 2021-04-12 | End: 2021-04-19

## 2021-04-12 RX ORDER — ALBUTEROL SULFATE 0.63 MG/3ML
SOLUTION RESPIRATORY (INHALATION)
Qty: 90 ML | Refills: 0 | Status: SHIPPED | OUTPATIENT
Start: 2021-04-12 | End: 2021-05-14

## 2021-05-13 ENCOUNTER — PATIENT MESSAGE (OUTPATIENT)
Dept: FAMILY MEDICINE | Facility: CLINIC | Age: 61
End: 2021-05-13

## 2021-05-13 DIAGNOSIS — J44.1 COPD EXACERBATION: Primary | ICD-10-CM

## 2021-05-13 RX ORDER — DOXYCYCLINE HYCLATE 100 MG
100 TABLET ORAL 2 TIMES DAILY
Qty: 10 TABLET | Refills: 0 | Status: SHIPPED | OUTPATIENT
Start: 2021-05-13 | End: 2021-05-18

## 2021-05-13 RX ORDER — PREDNISONE 20 MG/1
40 TABLET ORAL DAILY
Qty: 10 TABLET | Refills: 0 | Status: SHIPPED | OUTPATIENT
Start: 2021-05-13 | End: 2021-05-18

## 2021-05-14 ENCOUNTER — PATIENT MESSAGE (OUTPATIENT)
Dept: FAMILY MEDICINE | Facility: CLINIC | Age: 61
End: 2021-05-14

## 2021-05-14 DIAGNOSIS — J44.1 COPD EXACERBATION: ICD-10-CM

## 2021-05-14 RX ORDER — ALBUTEROL SULFATE 0.63 MG/3ML
SOLUTION RESPIRATORY (INHALATION)
Qty: 90 ML | Refills: 0 | Status: SHIPPED | OUTPATIENT
Start: 2021-05-14 | End: 2021-07-20

## 2021-05-14 RX ORDER — ALBUTEROL SULFATE 0.63 MG/3ML
SOLUTION RESPIRATORY (INHALATION)
Qty: 90 ML | Refills: 0 | Status: CANCELLED | OUTPATIENT
Start: 2021-05-14

## 2021-05-17 ENCOUNTER — PATIENT OUTREACH (OUTPATIENT)
Dept: ADMINISTRATIVE | Facility: OTHER | Age: 61
End: 2021-05-17

## 2021-05-18 ENCOUNTER — TELEPHONE (OUTPATIENT)
Dept: HEPATOLOGY | Facility: CLINIC | Age: 61
End: 2021-05-18

## 2021-05-18 ENCOUNTER — PATIENT MESSAGE (OUTPATIENT)
Dept: FAMILY MEDICINE | Facility: CLINIC | Age: 61
End: 2021-05-18

## 2021-05-18 ENCOUNTER — TELEPHONE (OUTPATIENT)
Dept: FAMILY MEDICINE | Facility: CLINIC | Age: 61
End: 2021-05-18

## 2021-05-19 ENCOUNTER — OFFICE VISIT (OUTPATIENT)
Dept: FAMILY MEDICINE | Facility: CLINIC | Age: 61
End: 2021-05-19
Payer: MEDICAID

## 2021-05-19 ENCOUNTER — TELEPHONE (OUTPATIENT)
Dept: FAMILY MEDICINE | Facility: CLINIC | Age: 61
End: 2021-05-19

## 2021-05-19 VITALS
DIASTOLIC BLOOD PRESSURE: 80 MMHG | RESPIRATION RATE: 19 BRPM | HEIGHT: 62 IN | WEIGHT: 282.44 LBS | BODY MASS INDEX: 51.97 KG/M2 | HEART RATE: 58 BPM | SYSTOLIC BLOOD PRESSURE: 138 MMHG | OXYGEN SATURATION: 96 % | TEMPERATURE: 98 F

## 2021-05-19 DIAGNOSIS — F41.9 ANXIETY: ICD-10-CM

## 2021-05-19 DIAGNOSIS — R05.3 CHRONIC COUGH: Primary | ICD-10-CM

## 2021-05-19 DIAGNOSIS — J45.909 ASTHMA, UNSPECIFIED ASTHMA SEVERITY, UNSPECIFIED WHETHER COMPLICATED, UNSPECIFIED WHETHER PERSISTENT: ICD-10-CM

## 2021-05-19 DIAGNOSIS — R00.2 PALPITATIONS: ICD-10-CM

## 2021-05-19 DIAGNOSIS — B00.1 COLD SORE: ICD-10-CM

## 2021-05-19 PROCEDURE — 99214 OFFICE O/P EST MOD 30 MIN: CPT | Mod: S$PBB,,, | Performed by: NURSE PRACTITIONER

## 2021-05-19 PROCEDURE — 99215 OFFICE O/P EST HI 40 MIN: CPT | Mod: PBBFAC,25,PN | Performed by: NURSE PRACTITIONER

## 2021-05-19 PROCEDURE — 99999 PR PBB SHADOW E&M-EST. PATIENT-LVL V: ICD-10-PCS | Mod: PBBFAC,,, | Performed by: NURSE PRACTITIONER

## 2021-05-19 PROCEDURE — 99214 PR OFFICE/OUTPT VISIT, EST, LEVL IV, 30-39 MIN: ICD-10-PCS | Mod: S$PBB,,, | Performed by: NURSE PRACTITIONER

## 2021-05-19 PROCEDURE — 99999 PR PBB SHADOW E&M-EST. PATIENT-LVL V: CPT | Mod: PBBFAC,,, | Performed by: NURSE PRACTITIONER

## 2021-05-19 RX ORDER — VALACYCLOVIR HYDROCHLORIDE 1 G/1
2000 TABLET, FILM COATED ORAL 2 TIMES DAILY
Qty: 4 TABLET | Refills: 0 | Status: SHIPPED | OUTPATIENT
Start: 2021-05-19 | End: 2023-06-21 | Stop reason: CLARIF

## 2021-06-09 ENCOUNTER — PATIENT MESSAGE (OUTPATIENT)
Dept: ENDOCRINOLOGY | Facility: CLINIC | Age: 61
End: 2021-06-09

## 2021-07-19 DIAGNOSIS — K76.0 FATTY LIVER: Primary | ICD-10-CM

## 2021-08-12 ENCOUNTER — TELEPHONE (OUTPATIENT)
Dept: PULMONOLOGY | Facility: CLINIC | Age: 61
End: 2021-08-12

## 2021-09-07 ENCOUNTER — TELEPHONE (OUTPATIENT)
Dept: FAMILY MEDICINE | Facility: CLINIC | Age: 61
End: 2021-09-07

## 2021-09-08 ENCOUNTER — OFFICE VISIT (OUTPATIENT)
Dept: FAMILY MEDICINE | Facility: CLINIC | Age: 61
End: 2021-09-08
Payer: MEDICAID

## 2021-09-08 DIAGNOSIS — J44.1 COPD EXACERBATION: ICD-10-CM

## 2021-09-08 DIAGNOSIS — M54.50 CHRONIC MIDLINE LOW BACK PAIN WITHOUT SCIATICA: ICD-10-CM

## 2021-09-08 DIAGNOSIS — G89.29 CHRONIC MIDLINE LOW BACK PAIN WITHOUT SCIATICA: ICD-10-CM

## 2021-09-08 DIAGNOSIS — G89.4 CHRONIC PAIN SYNDROME: Primary | ICD-10-CM

## 2021-09-08 PROCEDURE — 99214 OFFICE O/P EST MOD 30 MIN: CPT | Mod: 95,,, | Performed by: INTERNAL MEDICINE

## 2021-09-08 PROCEDURE — 99214 PR OFFICE/OUTPT VISIT, EST, LEVL IV, 30-39 MIN: ICD-10-PCS | Mod: 95,,, | Performed by: INTERNAL MEDICINE

## 2021-09-08 RX ORDER — ALBUTEROL SULFATE 90 UG/1
2 AEROSOL, METERED RESPIRATORY (INHALATION) EVERY 6 HOURS PRN
Qty: 18 G | Refills: 0 | Status: SHIPPED | OUTPATIENT
Start: 2021-09-08 | End: 2022-02-18 | Stop reason: SDUPTHER

## 2021-09-08 RX ORDER — ALBUTEROL SULFATE 0.63 MG/3ML
SOLUTION RESPIRATORY (INHALATION)
Qty: 75 ML | Refills: 0 | Status: SHIPPED | OUTPATIENT
Start: 2021-09-08 | End: 2021-12-07 | Stop reason: SDUPTHER

## 2021-09-08 RX ORDER — ALBUTEROL SULFATE 90 UG/1
2 AEROSOL, METERED RESPIRATORY (INHALATION) EVERY 6 HOURS PRN
Qty: 18 G | Refills: 0 | Status: SHIPPED | OUTPATIENT
Start: 2021-09-08 | End: 2021-09-08

## 2021-09-09 ENCOUNTER — TELEPHONE (OUTPATIENT)
Dept: FAMILY MEDICINE | Facility: CLINIC | Age: 61
End: 2021-09-09

## 2021-09-14 ENCOUNTER — OFFICE VISIT (OUTPATIENT)
Dept: PULMONOLOGY | Facility: CLINIC | Age: 61
End: 2021-09-14
Payer: MEDICAID

## 2021-09-14 VITALS
HEART RATE: 70 BPM | TEMPERATURE: 99 F | OXYGEN SATURATION: 99 % | DIASTOLIC BLOOD PRESSURE: 78 MMHG | SYSTOLIC BLOOD PRESSURE: 115 MMHG | WEIGHT: 281.06 LBS | HEIGHT: 62 IN | BODY MASS INDEX: 51.72 KG/M2

## 2021-09-14 DIAGNOSIS — J45.40 MODERATE PERSISTENT ASTHMA, UNSPECIFIED WHETHER COMPLICATED: ICD-10-CM

## 2021-09-14 DIAGNOSIS — R05.3 CHRONIC COUGH: Primary | ICD-10-CM

## 2021-09-14 DIAGNOSIS — R06.02 SHORTNESS OF BREATH: ICD-10-CM

## 2021-09-14 DIAGNOSIS — L02.91 CUTANEOUS ABSCESS, UNSPECIFIED SITE: ICD-10-CM

## 2021-09-14 PROCEDURE — 99215 OFFICE O/P EST HI 40 MIN: CPT | Mod: PBBFAC | Performed by: NURSE PRACTITIONER

## 2021-09-14 PROCEDURE — 99999 PR PBB SHADOW E&M-EST. PATIENT-LVL V: ICD-10-PCS | Mod: PBBFAC,,, | Performed by: NURSE PRACTITIONER

## 2021-09-14 PROCEDURE — 99214 PR OFFICE/OUTPT VISIT, EST, LEVL IV, 30-39 MIN: ICD-10-PCS | Mod: S$PBB,,, | Performed by: NURSE PRACTITIONER

## 2021-09-14 PROCEDURE — 99214 OFFICE O/P EST MOD 30 MIN: CPT | Mod: S$PBB,,, | Performed by: NURSE PRACTITIONER

## 2021-09-14 PROCEDURE — 99999 PR PBB SHADOW E&M-EST. PATIENT-LVL V: CPT | Mod: PBBFAC,,, | Performed by: NURSE PRACTITIONER

## 2021-09-14 RX ORDER — DOXYCYCLINE 100 MG/1
CAPSULE ORAL
COMMUNITY
Start: 2021-04-12 | End: 2021-09-14

## 2021-09-14 RX ORDER — CLINDAMYCIN HYDROCHLORIDE 300 MG/1
300 CAPSULE ORAL EVERY 6 HOURS
Qty: 40 CAPSULE | Refills: 0 | Status: SHIPPED | OUTPATIENT
Start: 2021-09-14 | End: 2021-09-24

## 2021-09-14 RX ORDER — ATORVASTATIN CALCIUM 40 MG/1
40 TABLET, FILM COATED ORAL DAILY
COMMUNITY
Start: 2021-08-23 | End: 2023-10-25

## 2021-09-14 RX ORDER — METOPROLOL SUCCINATE 25 MG/1
25 TABLET, EXTENDED RELEASE ORAL DAILY
COMMUNITY
Start: 2021-08-20 | End: 2023-06-21 | Stop reason: CLARIF

## 2021-09-14 RX ORDER — CLINDAMYCIN HYDROCHLORIDE 150 MG/1
300 CAPSULE ORAL 3 TIMES DAILY PRN
COMMUNITY
Start: 2021-07-19 | End: 2021-09-14

## 2021-09-14 RX ORDER — CHLORHEXIDINE GLUCONATE 40 MG/ML
SOLUTION TOPICAL DAILY PRN
Qty: 946 ML | Refills: 0 | Status: SHIPPED | OUTPATIENT
Start: 2021-09-14 | End: 2023-05-11 | Stop reason: CLARIF

## 2021-09-14 RX ORDER — MOMETASONE FUROATE AND FORMOTEROL FUMARATE DIHYDRATE 200; 5 UG/1; UG/1
2 AEROSOL RESPIRATORY (INHALATION) 2 TIMES DAILY
Qty: 1 INHALER | Refills: 5 | Status: SHIPPED | OUTPATIENT
Start: 2021-09-14 | End: 2022-01-13 | Stop reason: SDUPTHER

## 2021-09-14 RX ORDER — ERENUMAB-AOOE 70 MG/ML
INJECTION SUBCUTANEOUS
Status: ON HOLD | COMMUNITY
Start: 2021-08-09 | End: 2023-05-16

## 2021-09-17 ENCOUNTER — LAB VISIT (OUTPATIENT)
Dept: FAMILY MEDICINE | Facility: CLINIC | Age: 61
End: 2021-09-17
Payer: MEDICAID

## 2021-09-17 DIAGNOSIS — J45.40 MODERATE PERSISTENT ASTHMA, UNSPECIFIED WHETHER COMPLICATED: ICD-10-CM

## 2021-09-17 PROBLEM — R05.3 CHRONIC COUGH: Status: ACTIVE | Noted: 2021-09-17

## 2021-09-17 PROBLEM — L02.91 ABSCESS OF SKIN: Status: ACTIVE | Noted: 2021-09-17

## 2021-09-17 PROCEDURE — U0005 INFEC AGEN DETEC AMPLI PROBE: HCPCS | Performed by: NURSE PRACTITIONER

## 2021-09-17 PROCEDURE — U0003 INFECTIOUS AGENT DETECTION BY NUCLEIC ACID (DNA OR RNA); SEVERE ACUTE RESPIRATORY SYNDROME CORONAVIRUS 2 (SARS-COV-2) (CORONAVIRUS DISEASE [COVID-19]), AMPLIFIED PROBE TECHNIQUE, MAKING USE OF HIGH THROUGHPUT TECHNOLOGIES AS DESCRIBED BY CMS-2020-01-R: HCPCS | Performed by: NURSE PRACTITIONER

## 2021-09-18 LAB
SARS-COV-2 RNA RESP QL NAA+PROBE: NOT DETECTED
SARS-COV-2- CYCLE NUMBER: NORMAL

## 2021-09-20 ENCOUNTER — HOSPITAL ENCOUNTER (OUTPATIENT)
Dept: RESPIRATORY THERAPY | Facility: HOSPITAL | Age: 61
Discharge: HOME | End: 2021-09-20
Attending: NURSE PRACTITIONER
Payer: MEDICAID

## 2021-09-20 VITALS — OXYGEN SATURATION: 97 % | HEART RATE: 66 BPM | RESPIRATION RATE: 18 BRPM

## 2021-09-20 DIAGNOSIS — R05.3 CHRONIC COUGH: ICD-10-CM

## 2021-09-20 DIAGNOSIS — R06.02 SHORTNESS OF BREATH: ICD-10-CM

## 2021-09-20 DIAGNOSIS — J45.40 MODERATE PERSISTENT ASTHMA, UNSPECIFIED WHETHER COMPLICATED: ICD-10-CM

## 2021-09-20 LAB
BRPFT: NORMAL
DLCO ADJ PRE: 19.05 ML/(MIN*MMHG)
DLCO SINGLE BREATH LLN: 15.51
DLCO SINGLE BREATH PRE REF: 92.1 %
DLCO SINGLE BREATH REF: 21.24
DLCOC SBVA LLN: 3.04
DLCOC SBVA PRE REF: 88.3 %
DLCOC SBVA REF: 4.65
DLCOC SINGLE BREATH LLN: 15.51
DLCOC SINGLE BREATH PRE REF: 89.7 %
DLCOC SINGLE BREATH REF: 21.24
DLCOVA LLN: 3.04
DLCOVA PRE REF: 90.6 %
DLCOVA PRE: 4.21 ML/(MIN*MMHG*L)
DLCOVA REF: 4.65
DLVAADJ PRE: 4.1 ML/(MIN*MMHG*L)
ERVN2 LLN: -16449.24
ERVN2 PRE REF: 37.8 %
ERVN2 PRE: 0.29 L
ERVN2 REF: 0.76
FEF 25 75 CHG: 12.1 %
FEF 25 75 LLN: 1.05
FEF 25 75 POST REF: 83.8 %
FEF 25 75 PRE REF: 74.8 %
FEF 25 75 REF: 2.09
FET100 CHG: -12.6 %
FEV1 CHG: -5.7 %
FEV1 FVC CHG: 2.3 %
FEV1 FVC LLN: 67
FEV1 FVC POST REF: 99 %
FEV1 FVC PRE REF: 96.8 %
FEV1 FVC REF: 79
FEV1 LLN: 1.71
FEV1 POST REF: 81.2 %
FEV1 PRE REF: 86.1 %
FEV1 REF: 2.27
FRCN2 LLN: 1.76
FRCN2 PRE REF: 70.6 %
FRCN2 REF: 2.58
FVC CHG: -7.8 %
FVC LLN: 2.16
FVC POST REF: 81.6 %
FVC PRE REF: 88.5 %
FVC REF: 2.87
IVC PRE: 2.83 L
IVC SINGLE BREATH LLN: 2.16
IVC SINGLE BREATH PRE REF: 98.3 %
IVC SINGLE BREATH REF: 2.87
PEF CHG: -7.9 %
PEF LLN: 4.31
PEF POST REF: 76.8 %
PEF PRE REF: 83.4 %
PEF REF: 5.91
POST FEF 25 75: 1.75 L/S
POST FET 100: 8.19 SEC
POST FEV1 FVC: 78.6 %
POST FEV1: 1.84 L
POST FVC: 2.35 L
POST PEF: 4.54 L/S
PRE DLCO: 19.56 ML/(MIN*MMHG)
PRE FEF 25 75: 1.56 L/S
PRE FET 100: 9.38 SEC
PRE FEV1 FVC: 76.86 %
PRE FEV1: 1.96 L
PRE FRC N2: 1.82 L
PRE FVC: 2.54 L
PRE PEF: 4.92 L/S
RVN2 LLN: 1.24
RVN2 PRE REF: 77.8 %
RVN2 PRE: 1.42 L
RVN2 REF: 1.82
RVN2TLCN2 LLN: 30.11
RVN2TLCN2 PRE REF: 90 %
RVN2TLCN2 PRE: 35.73 %
RVN2TLCN2 REF: 39.7
TLCN2 LLN: 3.58
TLCN2 PRE REF: 86.6 %
TLCN2 PRE: 3.96 L
TLCN2 REF: 4.57
VA PRE: 4.65 L
VA SINGLE BREATH LLN: 4.42
VA SINGLE BREATH PRE REF: 105.1 %
VA SINGLE BREATH REF: 4.42
VCMAXN2 LLN: 2.16
VCMAXN2 PRE REF: 88.5 %
VCMAXN2 PRE: 2.54 L
VCMAXN2 REF: 2.87

## 2021-09-20 PROCEDURE — 94727 PR PULM FUNCTION TEST BY GAS: ICD-10-PCS | Mod: 26,,, | Performed by: INTERNAL MEDICINE

## 2021-09-20 PROCEDURE — 94729 DIFFUSING CAPACITY: CPT

## 2021-09-20 PROCEDURE — 94727 GAS DIL/WSHOT DETER LNG VOL: CPT | Mod: 26,,, | Performed by: INTERNAL MEDICINE

## 2021-09-20 PROCEDURE — 94010 BREATHING CAPACITY TEST: CPT

## 2021-09-20 PROCEDURE — 94060 PR EVAL OF BRONCHOSPASM: ICD-10-PCS | Mod: 26,,, | Performed by: INTERNAL MEDICINE

## 2021-09-20 PROCEDURE — 25000242 PHARM REV CODE 250 ALT 637 W/ HCPCS: Performed by: NURSE PRACTITIONER

## 2021-09-20 PROCEDURE — 94729 DIFFUSING CAPACITY: CPT | Mod: 26,,, | Performed by: INTERNAL MEDICINE

## 2021-09-20 PROCEDURE — 94060 EVALUATION OF WHEEZING: CPT | Mod: 26,,, | Performed by: INTERNAL MEDICINE

## 2021-09-20 PROCEDURE — 94727 GAS DIL/WSHOT DETER LNG VOL: CPT

## 2021-09-20 PROCEDURE — 94729 PR C02/MEMBANE DIFFUSE CAPACITY: ICD-10-PCS | Mod: 26,,, | Performed by: INTERNAL MEDICINE

## 2021-09-20 RX ORDER — ALBUTEROL SULFATE 2.5 MG/.5ML
2.5 SOLUTION RESPIRATORY (INHALATION) ONCE
Status: COMPLETED | OUTPATIENT
Start: 2021-09-20 | End: 2021-09-20

## 2021-09-20 RX ADMIN — ALBUTEROL SULFATE 2.5 MG: 2.5 SOLUTION RESPIRATORY (INHALATION) at 08:09

## 2021-09-28 ENCOUNTER — TELEPHONE (OUTPATIENT)
Dept: PULMONOLOGY | Facility: CLINIC | Age: 61
End: 2021-09-28

## 2021-10-07 ENCOUNTER — TELEPHONE (OUTPATIENT)
Dept: ENDOCRINOLOGY | Facility: CLINIC | Age: 61
End: 2021-10-07

## 2021-10-08 ENCOUNTER — PATIENT MESSAGE (OUTPATIENT)
Dept: PULMONOLOGY | Facility: CLINIC | Age: 61
End: 2021-10-08

## 2021-10-11 ENCOUNTER — TELEPHONE (OUTPATIENT)
Dept: PULMONOLOGY | Facility: CLINIC | Age: 61
End: 2021-10-11

## 2021-10-15 ENCOUNTER — HOSPITAL ENCOUNTER (OUTPATIENT)
Dept: RADIOLOGY | Facility: HOSPITAL | Age: 61
Discharge: HOME | End: 2021-10-15
Attending: NURSE PRACTITIONER
Payer: MEDICAID

## 2021-10-15 DIAGNOSIS — K76.0 FATTY LIVER: ICD-10-CM

## 2021-10-15 PROCEDURE — 76705 US ABDOMEN LIMITED: ICD-10-PCS | Mod: 26,,, | Performed by: RADIOLOGY

## 2021-10-15 PROCEDURE — 76705 ECHO EXAM OF ABDOMEN: CPT | Mod: TC

## 2021-10-15 PROCEDURE — 76705 ECHO EXAM OF ABDOMEN: CPT | Mod: 26,,, | Performed by: RADIOLOGY

## 2021-10-19 ENCOUNTER — OFFICE VISIT (OUTPATIENT)
Dept: PULMONOLOGY | Facility: CLINIC | Age: 61
End: 2021-10-19
Payer: MEDICAID

## 2021-10-19 DIAGNOSIS — L02.91 CUTANEOUS ABSCESS, UNSPECIFIED SITE: ICD-10-CM

## 2021-10-19 DIAGNOSIS — G47.33 OSA ON CPAP: ICD-10-CM

## 2021-10-19 DIAGNOSIS — R06.00 DYSPNEA, UNSPECIFIED TYPE: ICD-10-CM

## 2021-10-19 DIAGNOSIS — J45.40 MODERATE PERSISTENT ASTHMA, UNSPECIFIED WHETHER COMPLICATED: Primary | ICD-10-CM

## 2021-10-19 DIAGNOSIS — R26.81 GAIT INSTABILITY: ICD-10-CM

## 2021-10-19 PROCEDURE — 99214 OFFICE O/P EST MOD 30 MIN: CPT | Mod: 95,,, | Performed by: NURSE PRACTITIONER

## 2021-10-19 PROCEDURE — 99214 PR OFFICE/OUTPT VISIT, EST, LEVL IV, 30-39 MIN: ICD-10-PCS | Mod: 95,,, | Performed by: NURSE PRACTITIONER

## 2021-10-19 RX ORDER — TIOTROPIUM BROMIDE 18 UG/1
18 CAPSULE ORAL; RESPIRATORY (INHALATION) DAILY
Qty: 30 CAPSULE | Refills: 5 | Status: SHIPPED | OUTPATIENT
Start: 2021-10-19 | End: 2022-01-28 | Stop reason: ALTCHOICE

## 2021-10-22 ENCOUNTER — TELEPHONE (OUTPATIENT)
Dept: ENDOCRINOLOGY | Facility: CLINIC | Age: 61
End: 2021-10-22
Payer: MEDICAID

## 2021-10-22 ENCOUNTER — OFFICE VISIT (OUTPATIENT)
Dept: HEPATOLOGY | Facility: CLINIC | Age: 61
End: 2021-10-22
Payer: MEDICAID

## 2021-10-22 DIAGNOSIS — K76.0 FATTY LIVER: Primary | ICD-10-CM

## 2021-10-22 DIAGNOSIS — R73.03 PRE-DIABETES: ICD-10-CM

## 2021-10-22 DIAGNOSIS — E66.01 MORBID OBESITY DUE TO EXCESS CALORIES: ICD-10-CM

## 2021-10-22 PROBLEM — E89.0 H/O THYROIDECTOMY: Status: ACTIVE | Noted: 2021-10-22

## 2021-10-22 PROBLEM — I25.2 OLD MI (MYOCARDIAL INFARCTION): Status: ACTIVE | Noted: 2021-10-22

## 2021-10-22 PROBLEM — R00.2 PALPITATIONS: Status: ACTIVE | Noted: 2021-03-01

## 2021-10-22 PROBLEM — R29.6 FALLING: Status: ACTIVE | Noted: 2021-03-01

## 2021-10-22 PROBLEM — K21.9 GASTROESOPHAGEAL REFLUX DISEASE WITHOUT ESOPHAGITIS: Status: ACTIVE | Noted: 2020-03-11

## 2021-10-22 PROBLEM — I49.3 PVC (PREMATURE VENTRICULAR CONTRACTION): Status: ACTIVE | Noted: 2021-10-04

## 2021-10-22 PROBLEM — Z95.5 HISTORY OF CORONARY ARTERY STENT PLACEMENT: Status: ACTIVE | Noted: 2020-08-20

## 2021-10-22 PROBLEM — Z86.73 H/O: CVA (CEREBROVASCULAR ACCIDENT): Status: ACTIVE | Noted: 2021-10-22

## 2021-10-22 PROCEDURE — 99214 OFFICE O/P EST MOD 30 MIN: CPT | Mod: 95,,, | Performed by: NURSE PRACTITIONER

## 2021-10-22 PROCEDURE — 99214 PR OFFICE/OUTPT VISIT, EST, LEVL IV, 30-39 MIN: ICD-10-PCS | Mod: 95,,, | Performed by: NURSE PRACTITIONER

## 2021-10-25 ENCOUNTER — PATIENT MESSAGE (OUTPATIENT)
Dept: ENDOCRINOLOGY | Facility: CLINIC | Age: 61
End: 2021-10-25
Payer: MEDICAID

## 2021-11-01 DIAGNOSIS — I10 ESSENTIAL HYPERTENSION: ICD-10-CM

## 2021-11-01 RX ORDER — DICYCLOMINE HYDROCHLORIDE 10 MG/1
CAPSULE ORAL
Qty: 120 CAPSULE | Refills: 1 | Status: SHIPPED | OUTPATIENT
Start: 2021-11-01 | End: 2022-02-03

## 2021-11-01 RX ORDER — LISINOPRIL 10 MG/1
TABLET ORAL
Qty: 30 TABLET | Refills: 5 | Status: SHIPPED | OUTPATIENT
Start: 2021-11-01 | End: 2022-03-24 | Stop reason: SDUPTHER

## 2021-11-04 ENCOUNTER — NURSE TRIAGE (OUTPATIENT)
Dept: ADMINISTRATIVE | Facility: CLINIC | Age: 61
End: 2021-11-04
Payer: MEDICAID

## 2021-11-17 DIAGNOSIS — M54.2 CERVICALGIA: ICD-10-CM

## 2021-11-17 RX ORDER — BACLOFEN 20 MG/1
TABLET ORAL
Qty: 90 TABLET | Refills: 1 | Status: SHIPPED | OUTPATIENT
Start: 2021-11-17 | End: 2022-01-13

## 2021-11-18 ENCOUNTER — TELEPHONE (OUTPATIENT)
Dept: DERMATOLOGY | Facility: CLINIC | Age: 61
End: 2021-11-18
Payer: MEDICAID

## 2021-12-06 ENCOUNTER — TELEPHONE (OUTPATIENT)
Dept: FAMILY MEDICINE | Facility: CLINIC | Age: 61
End: 2021-12-06
Payer: MEDICAID

## 2021-12-06 DIAGNOSIS — J45.41 MODERATE PERSISTENT ASTHMA WITH ACUTE EXACERBATION: Primary | ICD-10-CM

## 2021-12-06 RX ORDER — PREDNISONE 20 MG/1
40 TABLET ORAL DAILY
Qty: 10 TABLET | Refills: 0 | Status: SHIPPED | OUTPATIENT
Start: 2021-12-06 | End: 2021-12-11

## 2021-12-06 RX ORDER — BENZONATATE 200 MG/1
200 CAPSULE ORAL 3 TIMES DAILY PRN
Qty: 30 CAPSULE | Refills: 0 | Status: SHIPPED | OUTPATIENT
Start: 2021-12-06 | End: 2021-12-16

## 2021-12-07 ENCOUNTER — TELEPHONE (OUTPATIENT)
Dept: FAMILY MEDICINE | Facility: CLINIC | Age: 61
End: 2021-12-07
Payer: MEDICAID

## 2021-12-07 DIAGNOSIS — J44.1 COPD EXACERBATION: ICD-10-CM

## 2021-12-07 RX ORDER — ALBUTEROL SULFATE 0.83 MG/ML
2.5 SOLUTION RESPIRATORY (INHALATION) EVERY 6 HOURS PRN
Qty: 60 ML | Refills: 0 | Status: SHIPPED | OUTPATIENT
Start: 2021-12-07 | End: 2022-02-18 | Stop reason: SDUPTHER

## 2021-12-07 RX ORDER — DOXYCYCLINE HYCLATE 100 MG
100 TABLET ORAL 2 TIMES DAILY
Qty: 14 TABLET | Refills: 0 | Status: SHIPPED | OUTPATIENT
Start: 2021-12-07 | End: 2021-12-28 | Stop reason: SDUPTHER

## 2021-12-13 ENCOUNTER — TELEPHONE (OUTPATIENT)
Dept: FAMILY MEDICINE | Facility: CLINIC | Age: 61
End: 2021-12-13
Payer: MEDICAID

## 2021-12-16 DIAGNOSIS — M54.2 CERVICALGIA: ICD-10-CM

## 2021-12-16 DIAGNOSIS — I10 ESSENTIAL HYPERTENSION: ICD-10-CM

## 2021-12-16 DIAGNOSIS — G89.29 CHRONIC NECK PAIN: ICD-10-CM

## 2021-12-16 DIAGNOSIS — M54.2 CHRONIC NECK PAIN: ICD-10-CM

## 2021-12-20 ENCOUNTER — OFFICE VISIT (OUTPATIENT)
Dept: FAMILY MEDICINE | Facility: CLINIC | Age: 61
End: 2021-12-20
Payer: MEDICAID

## 2021-12-20 VITALS
TEMPERATURE: 98 F | HEIGHT: 62 IN | WEIGHT: 284.38 LBS | SYSTOLIC BLOOD PRESSURE: 122 MMHG | BODY MASS INDEX: 52.33 KG/M2 | OXYGEN SATURATION: 95 % | DIASTOLIC BLOOD PRESSURE: 72 MMHG | RESPIRATION RATE: 18 BRPM | HEART RATE: 70 BPM

## 2021-12-20 DIAGNOSIS — J10.1 INFLUENZA A: Primary | ICD-10-CM

## 2021-12-20 DIAGNOSIS — R04.2 HEMOPTYSIS: ICD-10-CM

## 2021-12-20 PROCEDURE — 99999 PR PBB SHADOW E&M-EST. PATIENT-LVL V: ICD-10-PCS | Mod: PBBFAC,,, | Performed by: NURSE PRACTITIONER

## 2021-12-20 PROCEDURE — 4010F ACE/ARB THERAPY RXD/TAKEN: CPT | Mod: CPTII,,, | Performed by: NURSE PRACTITIONER

## 2021-12-20 PROCEDURE — 99214 OFFICE O/P EST MOD 30 MIN: CPT | Mod: S$PBB,,, | Performed by: NURSE PRACTITIONER

## 2021-12-20 PROCEDURE — 99999 PR PBB SHADOW E&M-EST. PATIENT-LVL V: CPT | Mod: PBBFAC,,, | Performed by: NURSE PRACTITIONER

## 2021-12-20 PROCEDURE — 99214 PR OFFICE/OUTPT VISIT, EST, LEVL IV, 30-39 MIN: ICD-10-PCS | Mod: S$PBB,,, | Performed by: NURSE PRACTITIONER

## 2021-12-20 PROCEDURE — 99215 OFFICE O/P EST HI 40 MIN: CPT | Mod: PBBFAC,PN | Performed by: NURSE PRACTITIONER

## 2021-12-20 PROCEDURE — 4010F PR ACE/ARB THEARPY RXD/TAKEN: ICD-10-PCS | Mod: CPTII,,, | Performed by: NURSE PRACTITIONER

## 2021-12-21 RX ORDER — AMITRIPTYLINE HYDROCHLORIDE 50 MG/1
TABLET, FILM COATED ORAL
Qty: 30 TABLET | Refills: 5 | Status: SHIPPED | OUTPATIENT
Start: 2021-12-21 | End: 2022-04-07

## 2021-12-21 RX ORDER — AMLODIPINE BESYLATE 5 MG/1
TABLET ORAL
Qty: 90 TABLET | Refills: 2 | Status: SHIPPED | OUTPATIENT
Start: 2021-12-21 | End: 2022-03-24

## 2021-12-23 ENCOUNTER — PATIENT OUTREACH (OUTPATIENT)
Dept: ADMINISTRATIVE | Facility: OTHER | Age: 61
End: 2021-12-23
Payer: MEDICAID

## 2021-12-28 ENCOUNTER — PATIENT MESSAGE (OUTPATIENT)
Dept: PULMONOLOGY | Facility: CLINIC | Age: 61
End: 2021-12-28

## 2021-12-28 ENCOUNTER — OFFICE VISIT (OUTPATIENT)
Dept: PULMONOLOGY | Facility: CLINIC | Age: 61
End: 2021-12-28
Payer: MEDICAID

## 2021-12-28 VITALS
BODY MASS INDEX: 53.84 KG/M2 | HEIGHT: 62 IN | OXYGEN SATURATION: 95 % | WEIGHT: 292.56 LBS | SYSTOLIC BLOOD PRESSURE: 106 MMHG | HEART RATE: 75 BPM | TEMPERATURE: 97 F | DIASTOLIC BLOOD PRESSURE: 68 MMHG

## 2021-12-28 DIAGNOSIS — G93.31 POST VIRAL SYNDROME: ICD-10-CM

## 2021-12-28 DIAGNOSIS — R26.81 GAIT INSTABILITY: ICD-10-CM

## 2021-12-28 DIAGNOSIS — E66.01 CLASS 3 SEVERE OBESITY WITH SERIOUS COMORBIDITY AND BODY MASS INDEX (BMI) OF 50.0 TO 59.9 IN ADULT, UNSPECIFIED OBESITY TYPE: ICD-10-CM

## 2021-12-28 DIAGNOSIS — G47.33 OSA (OBSTRUCTIVE SLEEP APNEA): ICD-10-CM

## 2021-12-28 DIAGNOSIS — J45.40 MODERATE PERSISTENT ASTHMA, UNSPECIFIED WHETHER COMPLICATED: ICD-10-CM

## 2021-12-28 DIAGNOSIS — J96.11 CHRONIC RESPIRATORY FAILURE WITH HYPOXIA: ICD-10-CM

## 2021-12-28 DIAGNOSIS — R06.02 SOBOE (SHORTNESS OF BREATH ON EXERTION): Primary | ICD-10-CM

## 2021-12-28 DIAGNOSIS — Z98.61 CAD S/P PERCUTANEOUS CORONARY ANGIOPLASTY: ICD-10-CM

## 2021-12-28 DIAGNOSIS — R05.3 CHRONIC COUGH: ICD-10-CM

## 2021-12-28 DIAGNOSIS — I25.10 CAD S/P PERCUTANEOUS CORONARY ANGIOPLASTY: ICD-10-CM

## 2021-12-28 PROCEDURE — 3078F DIAST BP <80 MM HG: CPT | Mod: CPTII,,, | Performed by: NURSE PRACTITIONER

## 2021-12-28 PROCEDURE — 4010F PR ACE/ARB THEARPY RXD/TAKEN: ICD-10-PCS | Mod: CPTII,,, | Performed by: NURSE PRACTITIONER

## 2021-12-28 PROCEDURE — 99215 OFFICE O/P EST HI 40 MIN: CPT | Mod: PBBFAC | Performed by: NURSE PRACTITIONER

## 2021-12-28 PROCEDURE — 3008F BODY MASS INDEX DOCD: CPT | Mod: CPTII,,, | Performed by: NURSE PRACTITIONER

## 2021-12-28 PROCEDURE — 99999 PR PBB SHADOW E&M-EST. PATIENT-LVL V: ICD-10-PCS | Mod: PBBFAC,,, | Performed by: NURSE PRACTITIONER

## 2021-12-28 PROCEDURE — 99214 PR OFFICE/OUTPT VISIT, EST, LEVL IV, 30-39 MIN: ICD-10-PCS | Mod: S$PBB,,, | Performed by: NURSE PRACTITIONER

## 2021-12-28 PROCEDURE — 1159F MED LIST DOCD IN RCRD: CPT | Mod: CPTII,,, | Performed by: NURSE PRACTITIONER

## 2021-12-28 PROCEDURE — 3078F PR MOST RECENT DIASTOLIC BLOOD PRESSURE < 80 MM HG: ICD-10-PCS | Mod: CPTII,,, | Performed by: NURSE PRACTITIONER

## 2021-12-28 PROCEDURE — 3074F PR MOST RECENT SYSTOLIC BLOOD PRESSURE < 130 MM HG: ICD-10-PCS | Mod: CPTII,,, | Performed by: NURSE PRACTITIONER

## 2021-12-28 PROCEDURE — 3008F PR BODY MASS INDEX (BMI) DOCUMENTED: ICD-10-PCS | Mod: CPTII,,, | Performed by: NURSE PRACTITIONER

## 2021-12-28 PROCEDURE — 99999 PR PBB SHADOW E&M-EST. PATIENT-LVL V: CPT | Mod: PBBFAC,,, | Performed by: NURSE PRACTITIONER

## 2021-12-28 PROCEDURE — 99214 OFFICE O/P EST MOD 30 MIN: CPT | Mod: S$PBB,,, | Performed by: NURSE PRACTITIONER

## 2021-12-28 PROCEDURE — 1159F PR MEDICATION LIST DOCUMENTED IN MEDICAL RECORD: ICD-10-PCS | Mod: CPTII,,, | Performed by: NURSE PRACTITIONER

## 2021-12-28 PROCEDURE — 4010F ACE/ARB THERAPY RXD/TAKEN: CPT | Mod: CPTII,,, | Performed by: NURSE PRACTITIONER

## 2021-12-28 PROCEDURE — 3074F SYST BP LT 130 MM HG: CPT | Mod: CPTII,,, | Performed by: NURSE PRACTITIONER

## 2021-12-29 ENCOUNTER — TELEPHONE (OUTPATIENT)
Dept: INFECTIOUS DISEASES | Facility: CLINIC | Age: 61
End: 2021-12-29
Payer: MEDICAID

## 2022-01-03 ENCOUNTER — HOSPITAL ENCOUNTER (OUTPATIENT)
Dept: RESPIRATORY THERAPY | Facility: HOSPITAL | Age: 62
Discharge: HOME | End: 2022-01-03
Attending: NURSE PRACTITIONER
Payer: MEDICAID

## 2022-01-03 ENCOUNTER — PATIENT MESSAGE (OUTPATIENT)
Dept: PULMONOLOGY | Facility: CLINIC | Age: 62
End: 2022-01-03
Payer: MEDICAID

## 2022-01-03 DIAGNOSIS — J96.11 CHRONIC RESPIRATORY FAILURE WITH HYPOXIA: ICD-10-CM

## 2022-01-03 LAB
ALLENS TEST: NORMAL
DELSYS: NORMAL
FIO2: 21
HCO3 UR-SCNC: 24.7 MMOL/L (ref 24–28)
MODE: NORMAL
PCO2 BLDA: 41.6 MMHG (ref 35–45)
PH SMN: 7.38 [PH] (ref 7.35–7.45)
PO2 BLDA: 92 MMHG (ref 80–100)
POC BE: -1 MMOL/L
POC SATURATED O2: 97 % (ref 95–100)
POC TCO2: 26 MMOL/L (ref 23–27)
SAMPLE: NORMAL
SITE: NORMAL

## 2022-01-03 PROCEDURE — 36600 WITHDRAWAL OF ARTERIAL BLOOD: CPT

## 2022-01-03 PROCEDURE — 82803 BLOOD GASES ANY COMBINATION: CPT

## 2022-01-03 PROCEDURE — 99900035 HC TECH TIME PER 15 MIN (STAT)

## 2022-01-04 ENCOUNTER — PATIENT MESSAGE (OUTPATIENT)
Dept: PULMONOLOGY | Facility: CLINIC | Age: 62
End: 2022-01-04
Payer: MEDICAID

## 2022-01-04 ENCOUNTER — TELEPHONE (OUTPATIENT)
Dept: PULMONOLOGY | Facility: CLINIC | Age: 62
End: 2022-01-04
Payer: MEDICAID

## 2022-01-04 DIAGNOSIS — G47.33 OSA (OBSTRUCTIVE SLEEP APNEA): Primary | ICD-10-CM

## 2022-01-04 NOTE — TELEPHONE ENCOUNTER
Per DR. Donis CPAP re-approved . Per DME pt was noncompliant and initially refused to return machine and finally return it last year after recall so will need cpap titration study in order to resume cpap.

## 2022-01-04 NOTE — TELEPHONE ENCOUNTER
----- Message -----   From: Gail Ying   Sent: 1/4/2022   9:35 AM CST   To: Jeanette Garcia Staff   Subject: DENIAL                                           Clinical Rationale:  FOR PEER CALL [phone]       Your doctor told us you stop breathing for short periods of time while you are sleeping (sleep apnea). Your doctor also told us that you were tested for this condition in the past. Your doctor ordered a test to check your condition again. This repeat test is needed to see if you still need treatment after a weight loss. We reviewed the notes we have. The notes do not show that you have lost weight. Based on the information we have, this test is not medically necessary at this time. We used [facility] Guidelines titled Sleep Disorder Management, Polysomnography and Home Sleep Testing to make this decision. You may view this guideline at [email or web address].html.     Called for peer to peer. Placed on hold for over 20 minutes. No answer.

## 2022-01-04 NOTE — TELEPHONE ENCOUNTER
Per Dr. Pedersen that under patient's plan DME cannot refuse treatment once they have an authorization number. This number will be provided under provider portal to check tomorrow. Per Dr. Pedersen if DME refuse treatment to report this to the [place] plan. Per provider, plan only repeat study for 2 reason-weight loss or oral sx.

## 2022-01-04 NOTE — TELEPHONE ENCOUNTER
Per Duramed, they will follow insurance guidelines per policy that authorization number is not a guaranteed of payment and they do not have any equipment. Since pt has been noncompliant, will order cpap titration study to see if this gets approve.

## 2022-01-05 ENCOUNTER — TELEPHONE (OUTPATIENT)
Dept: PULMONOLOGY | Facility: CLINIC | Age: 62
End: 2022-01-05
Payer: MEDICAID

## 2022-01-05 ENCOUNTER — PATIENT MESSAGE (OUTPATIENT)
Dept: PULMONOLOGY | Facility: CLINIC | Age: 62
End: 2022-01-05
Payer: MEDICAID

## 2022-01-05 NOTE — TELEPHONE ENCOUNTER
Sent patient a portal message that she needed an appointment.                  ----- Message from Jillian Peng MA sent at 1/5/2022  9:07 AM CST -----  Patient needs an appointment to address and appeal this on a documented visit. Thanks-Radha

## 2022-01-06 ENCOUNTER — PATIENT MESSAGE (OUTPATIENT)
Dept: PULMONOLOGY | Facility: CLINIC | Age: 62
End: 2022-01-06
Payer: MEDICAID

## 2022-01-07 ENCOUNTER — TELEPHONE (OUTPATIENT)
Dept: PULMONOLOGY | Facility: CLINIC | Age: 62
End: 2022-01-07
Payer: MEDICAID

## 2022-01-07 DIAGNOSIS — J96.11 CHRONIC RESPIRATORY FAILURE WITH HYPOXIA: Primary | ICD-10-CM

## 2022-01-07 NOTE — TELEPHONE ENCOUNTER
----- Message from Jillian Peng MA sent at 1/4/2022  9:34 AM CST -----  Regarding: FW: Bea with [employer]    ----- Message -----  From: Bea Valdez  Sent: 12/30/2021   4:52 PM CST  To: Jeanette Garcia Staff  Subject: Bea with [employer]                                   I received the order for oxygen, but this patient's insurance will not cover a portable concentrator.  Please advise

## 2022-01-11 ENCOUNTER — PATIENT MESSAGE (OUTPATIENT)
Dept: PULMONOLOGY | Facility: CLINIC | Age: 62
End: 2022-01-11
Payer: MEDICAID

## 2022-01-12 ENCOUNTER — TELEPHONE (OUTPATIENT)
Dept: PULMONOLOGY | Facility: CLINIC | Age: 62
End: 2022-01-12
Payer: MEDICAID

## 2022-01-12 NOTE — TELEPHONE ENCOUNTER
Faxed paper back to [facility].                ----- Message from Chidi Hunt sent at 1/12/2022 11:26 AM CST -----   Name of Who is Calling:     What is the request in detail: request call back in reference to verify prescription was received  Please contact to further discuss and advise      Can the clinic reply by [facility]:     What Number to Call Back if not in [facility]:  [phone] / garcia /  Elizabeth portable · Oxygen

## 2022-01-13 DIAGNOSIS — J45.40 MODERATE PERSISTENT ASTHMA, UNSPECIFIED WHETHER COMPLICATED: ICD-10-CM

## 2022-01-13 RX ORDER — MOMETASONE FUROATE AND FORMOTEROL FUMARATE DIHYDRATE 200; 5 UG/1; UG/1
2 AEROSOL RESPIRATORY (INHALATION) 2 TIMES DAILY
Qty: 13 G | Refills: 11 | Status: SHIPPED | OUTPATIENT
Start: 2022-01-13 | End: 2023-01-10 | Stop reason: SDUPTHER

## 2022-01-13 NOTE — TELEPHONE ENCOUNTER
Message sent to Radha Reid NP.      Jillian, [facility]  Pulm/Sleep [facility]  [phone]

## 2022-01-24 ENCOUNTER — DOCUMENTATION ONLY (OUTPATIENT)
Dept: REHABILITATION | Facility: HOSPITAL | Age: 62
End: 2022-01-24
Payer: MEDICAID

## 2022-01-24 NOTE — PROGRESS NOTES
Missed Visit/Cancellation      Date: 1/24/2022         Canceled Number: 0  No Show Number: 1                                                                                                                Pt initially had visit scheduled for today for 1430.   Reason for cancellation: no show.    Pt's PT evaluation has not been rescheduled at this time.    Kenna Brown, PT, DPT  1/24/2022

## 2022-01-25 ENCOUNTER — TELEPHONE (OUTPATIENT)
Dept: PULMONOLOGY | Facility: CLINIC | Age: 62
End: 2022-01-25
Payer: MEDICAID

## 2022-01-25 ENCOUNTER — TELEPHONE (OUTPATIENT)
Dept: OTOLARYNGOLOGY | Facility: CLINIC | Age: 62
End: 2022-01-25
Payer: MEDICAID

## 2022-01-25 NOTE — TELEPHONE ENCOUNTER
----- Message from Germán Chen sent at 1/25/2022  8:08 AM CST -----  Regarding: request to move appt to a date in February  Type: Patient Call Back    Who called:[place]     What is the request in detail: the patient is requesting to reschedule her appt that is today at 1:30. She was diagnosed with the flu. She would like to move it to the first or second Wednesday in February if possible. Patient was originally given the next available appt in March, but declined due to not wanting to wait that long . Please return call at earliest convenience.    Can the clinic reply by [facility]?no    Would the patient rather a call back or a response via [facility]? Call back     Best call back number:[phone]

## 2022-01-25 NOTE — TELEPHONE ENCOUNTER
Left message to call office back.    TINO Walsh  Pulm/Sleep [facility] [place]  [phone]                    ----- Message from Jillian Peng MA sent at 1/25/2022  2:46 PM CST -----  Kandy Garcia Staff  Caller: Unspecified (Today,  2:43 PM)  Type: Patient Call Back     Who called: Mario with [employer]     What is the request in detail:wanting to know if there was authorization for the patients sleep study. Pateint needs to get re evaluated for new cpap     Can the clinic reply by [facility]? no     Would the patient rather a call back or a response via [facility]?  Call back     Best call back number: [phone]

## 2022-01-28 ENCOUNTER — OFFICE VISIT (OUTPATIENT)
Dept: PULMONOLOGY | Facility: CLINIC | Age: 62
End: 2022-01-28
Payer: MEDICAID

## 2022-01-28 DIAGNOSIS — J45.40 MODERATE PERSISTENT ASTHMA, UNSPECIFIED WHETHER COMPLICATED: ICD-10-CM

## 2022-01-28 DIAGNOSIS — G47.33 OSA (OBSTRUCTIVE SLEEP APNEA): Primary | ICD-10-CM

## 2022-01-28 DIAGNOSIS — Z20.828 EXPOSURE TO INFLUENZA: ICD-10-CM

## 2022-01-28 DIAGNOSIS — J96.11 CHRONIC RESPIRATORY FAILURE WITH HYPOXIA: ICD-10-CM

## 2022-01-28 DIAGNOSIS — R05.3 CHRONIC COUGH: ICD-10-CM

## 2022-01-28 PROCEDURE — 1159F MED LIST DOCD IN RCRD: CPT | Mod: CPTII,,, | Performed by: NURSE PRACTITIONER

## 2022-01-28 PROCEDURE — 3008F BODY MASS INDEX DOCD: CPT | Mod: CPTII,,, | Performed by: NURSE PRACTITIONER

## 2022-01-28 PROCEDURE — 3008F PR BODY MASS INDEX (BMI) DOCUMENTED: ICD-10-PCS | Mod: CPTII,,, | Performed by: NURSE PRACTITIONER

## 2022-01-28 PROCEDURE — 1160F PR REVIEW ALL MEDS BY PRESCRIBER/CLIN PHARMACIST DOCUMENTED: ICD-10-PCS | Mod: CPTII,,, | Performed by: NURSE PRACTITIONER

## 2022-01-28 PROCEDURE — 99214 PR OFFICE/OUTPT VISIT, EST, LEVL IV, 30-39 MIN: ICD-10-PCS | Mod: S$PBB,,, | Performed by: NURSE PRACTITIONER

## 2022-01-28 PROCEDURE — 1159F PR MEDICATION LIST DOCUMENTED IN MEDICAL RECORD: ICD-10-PCS | Mod: CPTII,,, | Performed by: NURSE PRACTITIONER

## 2022-01-28 PROCEDURE — 99214 OFFICE O/P EST MOD 30 MIN: CPT | Mod: S$PBB,,, | Performed by: NURSE PRACTITIONER

## 2022-01-28 PROCEDURE — 99999 PR PBB SHADOW E&M-EST. PATIENT-LVL IV: ICD-10-PCS | Mod: PBBFAC,,, | Performed by: NURSE PRACTITIONER

## 2022-01-28 PROCEDURE — 99214 OFFICE O/P EST MOD 30 MIN: CPT | Mod: PBBFAC | Performed by: NURSE PRACTITIONER

## 2022-01-28 PROCEDURE — 1160F RVW MEDS BY RX/DR IN RCRD: CPT | Mod: CPTII,,, | Performed by: NURSE PRACTITIONER

## 2022-01-28 PROCEDURE — 99999 PR PBB SHADOW E&M-EST. PATIENT-LVL IV: CPT | Mod: PBBFAC,,, | Performed by: NURSE PRACTITIONER

## 2022-01-28 RX ORDER — OSELTAMIVIR PHOSPHATE 75 MG/1
75 CAPSULE ORAL DAILY
Qty: 10 CAPSULE | Refills: 0 | Status: SHIPPED | OUTPATIENT
Start: 2022-01-28 | End: 2022-02-07
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
## 2022-01-29 VITALS
HEIGHT: 62 IN | BODY MASS INDEX: 53.45 KG/M2 | WEIGHT: 290.44 LBS | HEART RATE: 69 BPM | SYSTOLIC BLOOD PRESSURE: 125 MMHG | OXYGEN SATURATION: 96 % | DIASTOLIC BLOOD PRESSURE: 72 MMHG

## 2022-02-03 ENCOUNTER — HOSPITAL ENCOUNTER (OUTPATIENT)
Dept: PREADMISSION TESTING | Facility: HOSPITAL | Age: 62
Discharge: HOME | End: 2022-02-03
Attending: NURSE PRACTITIONER
Payer: MEDICAID

## 2022-02-03 DIAGNOSIS — Z11.52 ENCOUNTER FOR PREOPERATIVE SCREENING LABORATORY TESTING FOR COVID-19 VIRUS: Primary | ICD-10-CM

## 2022-02-03 DIAGNOSIS — Z01.812 ENCOUNTER FOR PREOPERATIVE SCREENING LABORATORY TESTING FOR COVID-19 VIRUS: Primary | ICD-10-CM

## 2022-02-03 LAB — SARS-COV-2 RDRP RESP QL NAA+PROBE: NEGATIVE

## 2022-02-03 PROCEDURE — U0002 COVID-19 LAB TEST NON-CDC: HCPCS | Performed by: NURSE PRACTITIONER

## 2022-02-04 ENCOUNTER — HOSPITAL ENCOUNTER (OUTPATIENT)
Dept: SLEEP MEDICINE | Facility: HOSPITAL | Age: 62
Discharge: HOME | End: 2022-02-04
Attending: NURSE PRACTITIONER
Payer: MEDICAID

## 2022-02-04 DIAGNOSIS — G47.33 OSA (OBSTRUCTIVE SLEEP APNEA): ICD-10-CM

## 2022-02-04 PROCEDURE — 95811 PR POLYSOMNOGRAPHY W/CPAP: ICD-10-PCS | Mod: 26,,, | Performed by: INTERNAL MEDICINE

## 2022-02-04 PROCEDURE — 95811 POLYSOM 6/>YRS CPAP 4/> PARM: CPT | Mod: 26,,, | Performed by: INTERNAL MEDICINE

## 2022-02-04 PROCEDURE — 95811 POLYSOM 6/>YRS CPAP 4/> PARM: CPT

## 2022-02-05 NOTE — PROGRESS NOTES
End of the night summary  Type of study performed on ([employer]-) SPlit  ?  Patient education/cpap information prior to study/setup  Pt was informed, of emergency cord in bathroom and given spectra link phone#  EKG Appears to be- NSR w PACS and PVCs  Low spo2 - 87%  Any difficulties recording: pt had trouble falling asleep in the beginning of the night  Optimal pressure# 10 or 11  MASK: nasal eson small  Pt reaction to CPAP: pt reports she used one before and said it was good to try cpap again  Tech summary Comments:  pt met criteria for split on cpap, soft to moderate snoring observed, pt had trouble falling asleep in the beginning of the night reports of strange environment and not her bed, pt finally went to sleep and seen some events on her side, pt has trouble sleeping supine, pt was restless with frequent movements at night, optimal pressures observed

## 2022-02-16 ENCOUNTER — OFFICE VISIT (OUTPATIENT)
Dept: OTOLARYNGOLOGY | Facility: CLINIC | Age: 62
End: 2022-02-16
Payer: MEDICAID

## 2022-02-16 VITALS
SYSTOLIC BLOOD PRESSURE: 126 MMHG | DIASTOLIC BLOOD PRESSURE: 90 MMHG | HEIGHT: 62 IN | WEIGHT: 283.81 LBS | BODY MASS INDEX: 52.23 KG/M2

## 2022-02-16 DIAGNOSIS — R04.2 HEMOPTYSIS: Primary | ICD-10-CM

## 2022-02-16 DIAGNOSIS — H02.9: ICD-10-CM

## 2022-02-16 DIAGNOSIS — Z85.850 HX OF THYROID CANCER: ICD-10-CM

## 2022-02-16 DIAGNOSIS — Z11.52 ENCOUNTER FOR SCREENING FOR COVID-19: ICD-10-CM

## 2022-02-16 DIAGNOSIS — C73 METASTASIS FROM THYROID CANCER: ICD-10-CM

## 2022-02-16 DIAGNOSIS — C79.9 METASTASIS FROM THYROID CANCER: ICD-10-CM

## 2022-02-16 LAB
CTP QC/QA: YES
SARS-COV-2 AG RESP QL IA.RAPID: NEGATIVE

## 2022-02-16 PROCEDURE — 99214 PR OFFICE/OUTPT VISIT, EST, LEVL IV, 30-39 MIN: ICD-10-PCS | Mod: 25,S$GLB,, | Performed by: OTOLARYNGOLOGY

## 2022-02-16 PROCEDURE — 3080F PR MOST RECENT DIASTOLIC BLOOD PRESSURE >= 90 MM HG: ICD-10-PCS | Mod: CPTII,S$GLB,, | Performed by: OTOLARYNGOLOGY

## 2022-02-16 PROCEDURE — 31575 PR LARYNGOSCOPY, FLEXIBLE; DIAGNOSTIC: ICD-10-PCS | Mod: S$GLB,,, | Performed by: OTOLARYNGOLOGY

## 2022-02-16 PROCEDURE — 3008F BODY MASS INDEX DOCD: CPT | Mod: CPTII,S$GLB,, | Performed by: OTOLARYNGOLOGY

## 2022-02-16 PROCEDURE — 3074F SYST BP LT 130 MM HG: CPT | Mod: CPTII,S$GLB,, | Performed by: OTOLARYNGOLOGY

## 2022-02-16 PROCEDURE — 4010F ACE/ARB THERAPY RXD/TAKEN: CPT | Mod: CPTII,S$GLB,, | Performed by: OTOLARYNGOLOGY

## 2022-02-16 PROCEDURE — 87811 SARS CORONAVIRUS 2 ANTIGEN POCT, MANUAL READ: ICD-10-PCS | Mod: S$GLB,,, | Performed by: OTOLARYNGOLOGY

## 2022-02-16 PROCEDURE — 3080F DIAST BP >= 90 MM HG: CPT | Mod: CPTII,S$GLB,, | Performed by: OTOLARYNGOLOGY

## 2022-02-16 PROCEDURE — 31575 DIAGNOSTIC LARYNGOSCOPY: CPT | Mod: S$GLB,,, | Performed by: OTOLARYNGOLOGY

## 2022-02-16 PROCEDURE — 3044F PR MOST RECENT HEMOGLOBIN A1C LEVEL <7.0%: ICD-10-PCS | Mod: CPTII,S$GLB,, | Performed by: OTOLARYNGOLOGY

## 2022-02-16 PROCEDURE — 3074F PR MOST RECENT SYSTOLIC BLOOD PRESSURE < 130 MM HG: ICD-10-PCS | Mod: CPTII,S$GLB,, | Performed by: OTOLARYNGOLOGY

## 2022-02-16 PROCEDURE — 3044F HG A1C LEVEL LT 7.0%: CPT | Mod: CPTII,S$GLB,, | Performed by: OTOLARYNGOLOGY

## 2022-02-16 PROCEDURE — 99214 OFFICE O/P EST MOD 30 MIN: CPT | Mod: 25,S$GLB,, | Performed by: OTOLARYNGOLOGY

## 2022-02-16 PROCEDURE — 4010F PR ACE/ARB THEARPY RXD/TAKEN: ICD-10-PCS | Mod: CPTII,S$GLB,, | Performed by: OTOLARYNGOLOGY

## 2022-02-16 PROCEDURE — 3008F PR BODY MASS INDEX (BMI) DOCUMENTED: ICD-10-PCS | Mod: CPTII,S$GLB,, | Performed by: OTOLARYNGOLOGY

## 2022-02-16 PROCEDURE — 87811 SARS-COV-2 COVID19 W/OPTIC: CPT | Mod: S$GLB,,, | Performed by: OTOLARYNGOLOGY

## 2022-02-16 NOTE — PROGRESS NOTES
OTOLARYNGOLOGY CLINIC NOTE  Date:  02/16/2022     Chief complaint:  Chief Complaint   Patient presents with    Other     Pain in the throat area, cough, blood when coughing, thyroid cancer 2008.       History of Present Illness  Paras Smith is a 61 y.o. female  presenting today for a new evaluation and treatment of hemoptysis. Referred by PCP Dr. Lizarraga  Blood comes out with coughing every once in a while   Does not recall what type of thyroid cancer she had but she states that she had to have surgery- had to have radiation and radioactive iodine    No nosebleeds. No throat pain. No issue with swallowing.   No recent mouth pain or dental issues  No lumps or bumps in neck     Hemoptysis started 2 years ago, has not been getting worse. Hemoptysis is daily.   Has postnasal drip  + GERD    + nasal congestion at times.     Sleep study done 2-4-22 with AHI of 23.7.recently started on oxygen by nasal cannula.    Per chart review of dr. layne , pt had invasive follicular carcinoma and underwent total thyrod with radiaoactive iodine.     Review of medical records and prior documentation  Past medical records were reviewed with data pertinent to the chief complaint summarized in the HPI. Information obtained from review of medical records is attributed to respective sources in the HPI with reference to sources of information at their mention. Records reviewed included all recent notes from referring provider, primary care, and related subspecialty evaluations as available. This review of records was performed and additional data obtained to supplement history obtained from the patient and further inform medical decision making involved in formulating a plan of care accounting for all history and treatment relevant to the issues addressed.    Past Medical History  Past Medical History:   Diagnosis Date    Asthma     CAD (coronary artery disease)     stent 2003    Cervical spine disease     Depression      Difficult intubation     POSSIBLE    Falls 2018    GERD (gastroesophageal reflux disease)     Glaucoma     left eye    Helicobacter pylori (H. pylori) infection     History of stomach ulcers     HTN (hypertension)     Muscle weakness     LEFT    Neck problem     LIMITED ROM    Nuclear cataract 2014    Postsurgical hypothyroidism     Stroke     mini strokes    Thyroid cancer     Vitamin D deficiency disease         Past Surgical History  Past Surgical History:   Procedure Laterality Date    APPENDECTOMY      CERVICAL SPINE SURGERY      vocal cord damage     SECTION, LOW TRANSVERSE      x3    CHOLECYSTECTOMY      COLONOSCOPY N/A 2018    Procedure: COLONOSCOPY;  Surgeon: Chavo Wray MD;  Location: [facility] ([address] [place] [address]);  Service: Endoscopy;  Laterality: N/A;    CORONARY ANGIOPLASTY WITH STENT PLACEMENT      x 3    ESOPHAGOGASTRODUODENOSCOPY N/A 2018    Procedure: EGD (ESOPHAGOGASTRODUODENOSCOPY);  Surgeon: Chavo Wray MD;  Location: [facility] ([address] [place] [address]);  Service: Endoscopy;  Laterality: N/A;  Hx of Anesthetic complications c spine surgery and thyroid surgery with known unilateral VC paralysis per patient, pt unsure of intubation history History of prior surgery of interest to airway management or planning - 2nd floor/not able to use current order,    PATELLA SURGERY  1969    THYROID SURGERY      total    TOTAL THYROIDECTOMY      TYMPANOPLASTY Right 10/19/2020    Procedure: TYMPANOPLASTY;  Surgeon: Shaan Garcia MD;  Location: [facility] OR [address] [place] [address];  Service: ENT;  Laterality: Right;        Medications  Current Outpatient Medications on File Prior to Visit   Medication Sig Dispense Refill    acetaminophen (TYLENOL) 500 MG tablet Take 2 tablets (1,000 mg total) by mouth every 6 (six) hours as needed for Pain (As needed for pain and fever). 30 tablet 0    AIMOVIG AUTOINJECTOR 70 mg/mL autoinjector every 30 days.      albuterol (PROVENTIL) 2.5 mg /3 mL (0.083 %) nebulizer  solution Take 3 mLs (2.5 mg total) by nebulization every 6 (six) hours as needed for Wheezing. Rescue 60 mL 0    albuterol (PROVENTIL/VENTOLIN HFA) 90 mcg/actuation inhaler Inhale 2 puffs into the lungs every 6 (six) hours as needed for Wheezing or Shortness of Breath (And cough). Use with spacer Dispense with 1 spacer 18 g 0    amitriptyline (ELAVIL) 50 MG tablet TAKE ONE TABLET BY MOUTH AT BEDTIME AS NEEDED 30 tablet 5    amLODIPine (NORVASC) 5 MG tablet TAKE ONE TABLET BY MOUTH ONCE A DAY FOR HIGH BLOOD PRESSURE 90 tablet 2    anthralin 1.2 % CmRR   0    aspirin 325 MG tablet Take 325 mg by mouth once daily.  11    atorvastatin (LIPITOR) 40 MG tablet Take 1 tablet by mouth once daily.      azelastine (ASTELIN) 137 mcg (0.1 %) nasal spray USE ONE SPRAY IN EACH NOSTRIL TWICE DAILY 30 mL 0    baclofen (LIORESAL) 20 MG tablet TAKE ONE TABLET BY MOUTH THREE TIMES DAILY 90 tablet 1    chlorhexidine (HIBICLENS) 4 % external liquid Apply topically daily as needed. 946 mL 0    clobetasoL (TEMOVATE) 0.05 % cream APPLY TOPICALLY ONCE A DAY FOR 5 DAYS 30 g 0    clotrimazole (LOTRIMIN) 1 % cream Apply topically 2 (two) times daily. 30 g 2    dicyclomine (BENTYL) 10 MG capsule TAKE ONE CAPSULE BY MOUTH TWICE DAILY BEFORE MEALS AND AT BEDTIME 120 capsule 1    DULoxetine (CYMBALTA) 30 MG capsule Take 90 mg by mouth once daily.      ergocalciferol (ERGOCALCIFEROL) 50,000 unit Cap TAKE ONE CAPSULE BY MOUTH once every week 12 capsule 3    fluticasone propionate (FLONASE) 50 mcg/actuation nasal spray INSTILL ONE SPRAY IN EACH NOSTRIL TWICE DAILY 16 g 3    gabapentin (NEURONTIN) 400 MG capsule Take 1,200 mg by mouth 3 (three) times daily.      lidocaine (LMX 4) 4 % cream Apply topically as needed. 30 g 1    lidocaine-prilocaine (EMLA) cream   0    lisinopriL 10 MG tablet TAKE ONE TABLET BY MOUTH ONCE A DAY 30 tablet 5    loratadine (CLARITIN) 10 mg tablet Take 1 tablet (10 mg total) by mouth once daily. 60 tablet  0    meclizine (ANTIVERT) 12.5 mg tablet Take 2 tablets (25 mg total) by mouth 3 (three) times daily as needed for Dizziness. 30 tablet 0    metoprolol succinate (TOPROL-XL) 25 MG 24 hr tablet Take 25 mg by mouth once daily.      mometasone-formoterol (DULERA) 200-5 mcg/actuation inhaler Inhale 2 puffs into the lungs 2 (two) times daily. Controller 13 g 11    multivitamin capsule Take 1 capsule by mouth.      naproxen (NAPROSYN) 375 MG tablet   0    nitroGLYCERIN 0.4 MG/HR TD PT24 (NITRODUR) 0.4 mg/hr apply 1 patch ONTO THE SKIN ONCE DAILY 30 patch 11    pantoprazole (PROTONIX) 40 MG tablet TAKE ONE TABLET BY MOUTH ONCE A DAY 90 tablet 2    primidone (MYSOLINE) 50 MG Tab Take 50 mg by mouth 2 (two) times daily.      pulse oximeter (PULSE OXIMETER) device by Apply Externally route 2 (two) times a day. Use twice daily at 8 AM and 3 PM and record the value in [facility] as directed. 1 each 0    REXULTI 0.5 mg Tab 0.5 mg.  0    sertraline (ZOLOFT) 100 MG tablet Take 100 mg by mouth.      sodium chloride (OCEAN NASAL) 0.65 % nasal spray 1 spray by Nasal route every 3 (three) hours as needed for Congestion. 1 Bottle 12    SYNTHROID 112 mcg tablet TAKE ONE TABLET BY MOUTH MONDAY THROUGH SATURDAY AND SKIP SUNDAYS 28 tablet 11    topiramate (TOPAMAX) 100 MG tablet Take 50 mg by mouth once daily.  0    valACYclovir (VALTREX) 1000 MG tablet Take 2 tablets (2,000 mg total) by mouth 2 (two) times daily. 4 tablet 0     Current Facility-Administered Medications on File Prior to Visit   Medication Dose Route Frequency Provider Last Rate Last Admin    onabotulinumtoxina injection 200 Units  200 Units Intramuscular Q90 Days Aundrea Pathak MD           Review of Systems  Review of Systems   Constitutional: Negative for fever.   HENT: Positive for congestion. Negative for nosebleeds.    Respiratory: Positive for hemoptysis.    Endo/Heme/Allergies: Positive for environmental allergies.        Social History   reports  "that she has never smoked. She has never used smokeless tobacco. She reports current drug use. Frequency: 6.00 times per week. Drug: Marijuana. She reports that she does not drink alcohol.     Family History  Family History   Problem Relation Age of Onset    Diabetes Father     Kidney failure Father     Cataracts Father     Asthma Father     Glaucoma Father     Heart disease Mother         has pacemaker    Hypertension Mother     Asthma Mother     Cancer Mother         lung stage 4    Ovarian cancer Mother     Breast cancer Mother     Hypertension Brother     Heart disease Brother     Cervical cancer Daughter     Scoliosis Son     Hypertension Son     Hypertension Daughter     Seizures Daughter     Lupus Daughter     Cervical cancer Daughter     Mental illness Son         bipolar    Cancer Sister     Liver disease Sister     No Known Problems Brother     No Known Problems Daughter     Breast cancer Maternal Aunt     No Known Problems Maternal Uncle     No Known Problems Paternal Aunt     No Known Problems Paternal Uncle     Stomach cancer Maternal Grandmother     No Known Problems Paternal Grandmother     No Known Problems Paternal Grandfather     Stomach cancer Maternal Aunt     Breast cancer Maternal Aunt     Breast cancer Maternal Aunt     Esophageal cancer Neg Hx         Physical Exam   Vitals:    02/16/22 1611   BP: (!) 126/90    Body mass index is 51.92 kg/m².  Weight: 128.8 kg (283 lb 13.5 oz)   Height: 5' 2" (157.5 cm)     GENERAL: no acute distress.  HEAD: normocephalic.   SKIN: erythematous lesion with heaped up edges on medial canthus on right eye concerning for malignancy ( pt states it has not been there long however)  EYES:no scleral icterus  EARS: external ear without lesion, normal pinna shape and position.  External auditory canal with normal cerumen, tympanic membrane fully visible, no perforation , no retraction. No middle ear effusion. Ossicles intact.   NOSE: " external nose without significant bony abnormality  ORAL CAVITY/OROPHARYNX: tongue midline and mobile. Symmetric palate rise. Uvula midline. No mass or lesion.  NECK: trachea midline.   LYMPH NODES:No cervical lymphadenopathy.  RESPIRATORY: no stridor, no stertor. Voice normal. Respirations nonlabored.  NEURO: alert, responds to questions appropriately.   Cranial nerve exam as indicated in above sections and additionally showed facial movement symmetric with good eye closure and symmetric smile.   PSYCH:mood appropriate    PROCEDURE NOTE  NAME OF PROCEDURE: Flexible Laryngoscopy, diagnostic  INDICATIONS: gag reflex precludes mirror exam, hemoptysis  FINDINGS: no evidence of malignancy nor source for bleeding    Consent: After procedure was explained in detail and all questions answered, verbal consent was obtained for performing flexible laryngoscopy.  Anesthesia: topical 4% lidocaine and neosynephrine  Procedure: With patient in seated position, the scope was inserted into the bilateral nasal passageway and advanced atraumatically into the nasopharynx to examine the following structures:  Nasal cavity: Turbinates with moderate  hypertrophy. No middle meatal edema. No purulent drainage.   Nasopharynx: no mass or lesion noted in nasopharynx. Velopharyngeal narrowing ( seen in patients with sleep apnea)  Oropharynx: base of tongue without  mass or ulceration. Lingual tonsils with symmetric hypertrophy, no mass nor ulceration  Hypopharynx: posterior pharyngeal wall without mass or lesion. No pooling of secretions. Pyriform sinuses visible without mass or lesion  Larynx: epiglottis normal without lesion. False vocal folds without edema/erythema/lesion. True vocal folds mobile and without lesion. Mild interarytenoid edema no erythema . Postcricoid region with mild edema no lesion ; supraglottic squeeze with adduction   Subglottis: visualized portion of subglottis normal in appearance    After examination performed, the  scope was removed atraumatically . The patient tolerated the procedure well. Photodocumentation obtained with representative images below, all images and/or videos uploaded in media section of epic.             Right base of tongue    Left base of tongue        postcricoid and pyriform sinuses          Imaging:  The patient does not have any pertinent and/or recent imaging of the head and neck.     Labs:  CBC  Recent Labs   Lab 02/10/20  1429 05/31/20 2000 09/18/20  0837   WBC 9.43 7.49 6.75   Hemoglobin 15.4 15.2 14.3   Hematocrit 46.9 46.0 44.8   MCV 92 94 97   Platelets 241 266 265     BMP  Recent Labs   Lab 02/10/20  1429 05/31/20 2000 09/18/20  0837   Glucose 96 102 118 H   Sodium 140 138 139   Potassium 4.0 4.0 4.3   Chloride 104 107 105   CO2 26 20 L 24   BUN 10 14 17   Creatinine 0.7 0.8 0.7   Calcium 9.6 9.8 9.2     COAGS        Assessment  1. Encounter for screening for COVID-19  - SARS Coronavirus 2 Antigen, POCT Manual Read    2. Hemoptysis  - CT Chest With Contrast; Future  - CREATININE, SERUM; Future    3. Hx of thyroid cancer  - CT Chest With Contrast; Future  - CREATININE, SERUM; Future    4. 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 Lesion of canthus of right eye  - Ambulatory referral/consult to Ophthalmology; Future       Plan:  Discussed plan of care with patient in detail and all questions answered. Patient reported understanding of plan of care.   Eye lesion concerning - refer to oculoplastics for eval.   no evidence of malignancy nor source for bleeding on flexible scope exam  She had a chest ct ordered by pulmonology in the past for workup of hemoptysis and this was never done. Order unable to be used therefore I placed a new order.   I also gave her contact info for pulm RADHA Garibay as patient needs to get set up for CPAP. I reviewed with the patient the results from her sleep study and it had appeared that she had tried to contact  pulm office to get results.     F/u 2-3 months to ensure nothing worsening and resolution for hemoptysis cause has been found.     I spent a total of 30  minutes on the day of the visit.  This includes face to face time and non-face to face time preparing to see the patient (eg, review of tests), obtaining and/or reviewing separately obtained history, documenting clinical information in the electronic or other health record, independently interpreting results and communicating results to the patient/family/caregiver, or care coordinator.  Please be aware that this note has been generated with the assistance of Cory voice-to-text.  Please excuse any spelling or grammatical errors.

## 2022-02-17 ENCOUNTER — TELEPHONE (OUTPATIENT)
Dept: PULMONOLOGY | Facility: CLINIC | Age: 62
End: 2022-02-17
Payer: MEDICAID

## 2022-02-17 NOTE — TELEPHONE ENCOUNTER
Spoke with patient scheduled an appointment to see provider.    TINO Walsh                      ----- Message from Meliza Mcnulty MA sent at 2/17/2022 10:37 AM CST -----  Regarding: appointment  Jillian Berg Dr. Moskovitz would like this patient to scheduled with FRED Garibay NP for CPAP. Dr. Velasco has went over results of sleep study with the patient.            Thank you,  KESHIA Gambino.

## 2022-02-18 ENCOUNTER — PATIENT MESSAGE (OUTPATIENT)
Dept: FAMILY MEDICINE | Facility: CLINIC | Age: 62
End: 2022-02-18
Payer: MEDICAID

## 2022-02-18 ENCOUNTER — HOSPITAL ENCOUNTER (OUTPATIENT)
Dept: ENDOCRINOLOGY | Facility: CLINIC | Age: 62
Discharge: HOME | End: 2022-02-18
Attending: INTERNAL MEDICINE
Payer: MEDICAID

## 2022-02-18 ENCOUNTER — OFFICE VISIT (OUTPATIENT)
Dept: PULMONOLOGY | Facility: CLINIC | Age: 62
End: 2022-02-18
Payer: MEDICAID

## 2022-02-18 VITALS
SYSTOLIC BLOOD PRESSURE: 100 MMHG | BODY MASS INDEX: 52.4 KG/M2 | HEIGHT: 62 IN | DIASTOLIC BLOOD PRESSURE: 70 MMHG | OXYGEN SATURATION: 97 % | HEART RATE: 78 BPM | WEIGHT: 284.75 LBS

## 2022-02-18 DIAGNOSIS — C73 THYROID CANCER: ICD-10-CM

## 2022-02-18 DIAGNOSIS — J96.11 CHRONIC RESPIRATORY FAILURE WITH HYPOXIA: ICD-10-CM

## 2022-02-18 DIAGNOSIS — J45.40 MODERATE PERSISTENT ASTHMA, UNSPECIFIED WHETHER COMPLICATED: ICD-10-CM

## 2022-02-18 DIAGNOSIS — R04.2 HEMOPTYSIS: ICD-10-CM

## 2022-02-18 DIAGNOSIS — E89.0 H/O THYROIDECTOMY: ICD-10-CM

## 2022-02-18 DIAGNOSIS — E89.0 POSTSURGICAL HYPOTHYROIDISM: Primary | ICD-10-CM

## 2022-02-18 DIAGNOSIS — B37.2 CANDIDIASIS OF SKIN: ICD-10-CM

## 2022-02-18 DIAGNOSIS — G47.33 OSA (OBSTRUCTIVE SLEEP APNEA): Primary | ICD-10-CM

## 2022-02-18 PROCEDURE — 76536 US EXAM OF HEAD AND NECK: CPT | Mod: 26,,, | Performed by: INTERNAL MEDICINE

## 2022-02-18 PROCEDURE — 99214 PR OFFICE/OUTPT VISIT, EST, LEVL IV, 30-39 MIN: ICD-10-PCS | Mod: S$PBB,,, | Performed by: NURSE PRACTITIONER

## 2022-02-18 PROCEDURE — 3074F SYST BP LT 130 MM HG: CPT | Mod: CPTII,,, | Performed by: NURSE PRACTITIONER

## 2022-02-18 PROCEDURE — 99214 OFFICE O/P EST MOD 30 MIN: CPT | Mod: S$PBB,,, | Performed by: NURSE PRACTITIONER

## 2022-02-18 PROCEDURE — 99999 PR PBB SHADOW E&M-EST. PATIENT-LVL V: ICD-10-PCS | Mod: PBBFAC,,, | Performed by: NURSE PRACTITIONER

## 2022-02-18 PROCEDURE — 1159F PR MEDICATION LIST DOCUMENTED IN MEDICAL RECORD: ICD-10-PCS | Mod: CPTII,,, | Performed by: NURSE PRACTITIONER

## 2022-02-18 PROCEDURE — 3078F PR MOST RECENT DIASTOLIC BLOOD PRESSURE < 80 MM HG: ICD-10-PCS | Mod: CPTII,,, | Performed by: NURSE PRACTITIONER

## 2022-02-18 PROCEDURE — 4010F PR ACE/ARB THEARPY RXD/TAKEN: ICD-10-PCS | Mod: CPTII,,, | Performed by: NURSE PRACTITIONER

## 2022-02-18 PROCEDURE — 3074F PR MOST RECENT SYSTOLIC BLOOD PRESSURE < 130 MM HG: ICD-10-PCS | Mod: CPTII,,, | Performed by: NURSE PRACTITIONER

## 2022-02-18 PROCEDURE — 1159F MED LIST DOCD IN RCRD: CPT | Mod: CPTII,,, | Performed by: NURSE PRACTITIONER

## 2022-02-18 PROCEDURE — 3008F PR BODY MASS INDEX (BMI) DOCUMENTED: ICD-10-PCS | Mod: CPTII,,, | Performed by: NURSE PRACTITIONER

## 2022-02-18 PROCEDURE — 3008F BODY MASS INDEX DOCD: CPT | Mod: CPTII,,, | Performed by: NURSE PRACTITIONER

## 2022-02-18 PROCEDURE — 76536 US SOFT TISSUE HEAD NECK THYROID: ICD-10-PCS | Mod: 26,,, | Performed by: INTERNAL MEDICINE

## 2022-02-18 PROCEDURE — 99215 OFFICE O/P EST HI 40 MIN: CPT | Mod: PBBFAC | Performed by: NURSE PRACTITIONER

## 2022-02-18 PROCEDURE — 4010F ACE/ARB THERAPY RXD/TAKEN: CPT | Mod: CPTII,,, | Performed by: NURSE PRACTITIONER

## 2022-02-18 PROCEDURE — 3078F DIAST BP <80 MM HG: CPT | Mod: CPTII,,, | Performed by: NURSE PRACTITIONER

## 2022-02-18 PROCEDURE — 99999 PR PBB SHADOW E&M-EST. PATIENT-LVL V: CPT | Mod: PBBFAC,,, | Performed by: NURSE PRACTITIONER

## 2022-02-18 RX ORDER — ALBUTEROL SULFATE 90 UG/1
2 AEROSOL, METERED RESPIRATORY (INHALATION) EVERY 6 HOURS PRN
Qty: 18 G | Refills: 1 | Status: SHIPPED | OUTPATIENT
Start: 2022-02-18 | End: 2023-02-18

## 2022-02-18 RX ORDER — ALBUTEROL SULFATE 0.83 MG/ML
2.5 SOLUTION RESPIRATORY (INHALATION) EVERY 6 HOURS PRN
Qty: 180 ML | Refills: 2 | Status: SHIPPED | OUTPATIENT
Start: 2022-02-18 | End: 2022-12-21 | Stop reason: SDUPTHER

## 2022-02-18 RX ORDER — FLUCONAZOLE 150 MG/1
150 TABLET ORAL WEEKLY
Qty: 5 TABLET | Refills: 0 | Status: SHIPPED | OUTPATIENT
Start: 2022-02-18 | End: 2023-05-11 | Stop reason: CLARIF

## 2022-02-18 RX ORDER — CLOTRIMAZOLE 1 %
CREAM (GRAM) TOPICAL 2 TIMES DAILY
Qty: 113 G | Refills: 1 | Status: SHIPPED | OUTPATIENT
Start: 2022-02-18 | End: 2022-04-07 | Stop reason: SDUPTHER

## 2022-02-18 NOTE — PATIENT INSTRUCTIONS
.Armen Smith     Your home sleep study confirmed sleep apnea  AHI 23.7 meaning you are having 23.7 apneas per hour. I recommend continuous positive air pressure to treat this. I will place the order. Please schedule a follow up appointment with myself  5-6 week after using the cpap to assess treatment effectiveness and make adjustments if needed. Bring your machine.     Someone from the medical equipment department will be contacting you to set you up with the CPAP.  If not please,  contact them to check on the status of your order at [id number] option 1 then option 2    Plan:   1. Start auto- CPAP therapy(the order will go to medical equipment and they will contact you after it gets processed through insurance which takes approximately 12 weeks)   2. There is a compliance requirement on machine. The requirement  is minimum 4 hours or more 70% nights (22/30 days) x 1 year or until machine paid off otherwise you risk not having the machine cover by your insurance company.   3. Pick a comfortable mask; but should you have problems with the mask, [facility] [place] has a 30 day mask exchangepolicy so exchange any mask within 30 days if the mask is uncomfortable. You will need to contact medical equipment to schedule an appointment with them to get another mask fitting. DME [id number] option 1 then option 2  4. We do not actively monitor you.  Please schedule a follow up appointment after using your cpap x 5-6 weeks to determine treatment effectiveness and address problems. This is also a requirement by some insurance in order to meet compliance.     The potential ramifications of untreated sleep apnea  could include hypoxia, daytime sleepiness, dementia, cognitive impairment, hypertension, heart disease and/or stroke. Potential treatment options, which could include weight loss, body positioning, oral appliances (OA), continuous positive airway pressure (CPAP), or referral for surgical  consideration. CPAP is the most effective and least invasive treatment and I would recommend this as a first line treatment.    Behavior modification which includes losing weight, exercising, changing the sleep position, abstaining from alcohol, and avoiding certain medications    Please  abstain from driving should you feel sleepy or drowsy      Please contact us if you have questions, persistent problems or concerns.    Thank you,    Radha DUBOIS, [facility]  [phone]
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
## 2022-02-20 NOTE — ASSESSMENT & PLAN NOTE
Key History and Diagnostic Findings  - Postsurgical hypothyroidism from total thyroidectomy performed in 2008  - On antidepressants (known to decrease therapeutic effect of hormone)  - Taking levothyroxine 112 mcg daily  (Weight based 200 mcg daily)  - Prior TFTs for comparison:  TSH of 1.16 on 02/18/2022 from 0.51 on 03/30/2021  - Takes medications properly     Plan  - Continue levothyroxine 112 mcg daily with repeat TFTs in 1 year or earlier if symptoms indicate  - Avoid hyperthyroidism due to Cardiovascular disease, rate/rhythm abnormalities, and bone effects

## 2022-02-20 NOTE — ASSESSMENT & PLAN NOTE
Key History and Diagnostic Findings  - Initially had left lobectomy and isthmusectomy that showed an invasive follicular carcinoma with vascular invasion in early 2008  - Status post total thyroidectomy in 2008 and radioactive iodine I 131 with 100 uCi on 11/18/2008  - Thyroglobulin has remained undetectable over time most recently measured on 02/18/2022 with a corresponding negative thyroglobulin antibody screen of less than 1.8    - Prior thyroid imaging:  Thyroid ultrasound 03/09/2021 demonstrates stability post thyroidectomy with a 1.0 x 0.78 x 0.8 cm focus again seen in the area of the left thyroid bed that likely represent scar tissue versus residual thyroid tissue  - This tissue underwent FNA on 05/03/2019 demonstrating acellular tissue and was negative for thyroglobulin    Plan  - Continue surveillance with thyroglobulin in 1 year  - Consider repeat thyroid ultrasound this year versus an 1 additional year     Interpolation Flap Text: A decision was made to reconstruct the defect utilizing an interpolation axial flap and a staged reconstruction.  A telfa template was made of the defect.  This telfa template was then used to outline the interpolation flap.  The donor area for the pedicle flap was then injected with anesthesia.  The flap was excised through the skin and subcutaneous tissue down to the layer of the underlying musculature.  The interpolation flap was carefully excised within this deep plane to maintain its blood supply.  The edges of the donor site were undermined.   The donor site was closed in a primary fashion.  The pedicle was then rotated into position and sutured.  Once the tube was sutured into place, adequate blood supply was confirmed with blanching and refill.  The pedicle was then wrapped with xeroform gauze and dressed appropriately with a telfa and gauze bandage to ensure continued blood supply and protect the attached pedicle.

## 2022-02-21 ENCOUNTER — PATIENT MESSAGE (OUTPATIENT)
Dept: FAMILY MEDICINE | Facility: CLINIC | Age: 62
End: 2022-02-21
Payer: MEDICAID

## 2022-02-22 ENCOUNTER — PATIENT MESSAGE (OUTPATIENT)
Dept: ENDOCRINOLOGY | Facility: CLINIC | Age: 62
End: 2022-02-22

## 2022-02-22 ENCOUNTER — PATIENT MESSAGE (OUTPATIENT)
Dept: FAMILY MEDICINE | Facility: CLINIC | Age: 62
End: 2022-02-22
Payer: MEDICAID

## 2022-02-22 ENCOUNTER — OFFICE VISIT (OUTPATIENT)
Dept: ENDOCRINOLOGY | Facility: CLINIC | Age: 62
End: 2022-02-22
Payer: MEDICAID

## 2022-02-22 ENCOUNTER — PATIENT MESSAGE (OUTPATIENT)
Dept: HEPATOLOGY | Facility: CLINIC | Age: 62
End: 2022-02-22
Payer: MEDICAID

## 2022-02-22 ENCOUNTER — LAB VISIT (OUTPATIENT)
Dept: LAB | Facility: HOSPITAL | Age: 62
End: 2022-02-22
Payer: MEDICAID

## 2022-02-22 VITALS
SYSTOLIC BLOOD PRESSURE: 100 MMHG | BODY MASS INDEX: 53.34 KG/M2 | DIASTOLIC BLOOD PRESSURE: 65 MMHG | WEIGHT: 289.88 LBS | HEIGHT: 62 IN

## 2022-02-22 DIAGNOSIS — E66.01 MORBID OBESITY: ICD-10-CM

## 2022-02-22 DIAGNOSIS — R73.03 PREDIABETES: Primary | ICD-10-CM

## 2022-02-22 DIAGNOSIS — E89.0 POSTSURGICAL HYPOTHYROIDISM: ICD-10-CM

## 2022-02-22 DIAGNOSIS — C73 THYROID CANCER: ICD-10-CM

## 2022-02-22 LAB
ESTIMATED AVG GLUCOSE: 120 MG/DL (ref 68–131)
HBA1C MFR BLD: 5.8 % (ref 4–5.6)

## 2022-02-22 PROCEDURE — 99999 PR PBB SHADOW E&M-EST. PATIENT-LVL III: ICD-10-PCS | Mod: PBBFAC,,, | Performed by: STUDENT IN AN ORGANIZED HEALTH CARE EDUCATION/TRAINING PROGRAM

## 2022-02-22 PROCEDURE — 99214 OFFICE O/P EST MOD 30 MIN: CPT | Mod: S$PBB,,, | Performed by: STUDENT IN AN ORGANIZED HEALTH CARE EDUCATION/TRAINING PROGRAM

## 2022-02-22 PROCEDURE — 99213 OFFICE O/P EST LOW 20 MIN: CPT | Mod: PBBFAC | Performed by: STUDENT IN AN ORGANIZED HEALTH CARE EDUCATION/TRAINING PROGRAM

## 2022-02-22 PROCEDURE — 36415 COLL VENOUS BLD VENIPUNCTURE: CPT | Performed by: STUDENT IN AN ORGANIZED HEALTH CARE EDUCATION/TRAINING PROGRAM

## 2022-02-22 PROCEDURE — 99999 PR PBB SHADOW E&M-EST. PATIENT-LVL III: CPT | Mod: PBBFAC,,, | Performed by: STUDENT IN AN ORGANIZED HEALTH CARE EDUCATION/TRAINING PROGRAM

## 2022-02-22 PROCEDURE — 83036 HEMOGLOBIN GLYCOSYLATED A1C: CPT | Performed by: STUDENT IN AN ORGANIZED HEALTH CARE EDUCATION/TRAINING PROGRAM

## 2022-02-22 PROCEDURE — 99214 PR OFFICE/OUTPT VISIT, EST, LEVL IV, 30-39 MIN: ICD-10-PCS | Mod: S$PBB,,, | Performed by: STUDENT IN AN ORGANIZED HEALTH CARE EDUCATION/TRAINING PROGRAM

## 2022-02-22 NOTE — ASSESSMENT & PLAN NOTE
- Provided physical therapy referral to start water aerobics as patient had expressed interest  - Will check A1c to screen for diabetes  - Will order DEXA scan given her history of postsurgical hypothyroidism, comorbidities, age, and reported chronic use of steroids though no oral steroid seen on medication list

## 2022-02-22 NOTE — PATIENT INSTRUCTIONS
- A1c today  - DEXA scan to check bone density  - Highly recommend having Covid vaccination  - Repeat thyroid ultrasound next year  - Return to clinic in 1 year

## 2022-02-22 NOTE — PROGRESS NOTES
Subjective:      Patient ID: Paras Smith is a 61 y.o. female.    Chief Complaint: History of follicular thyroid cancer    History of Present Illness  61-year-old  female with history of thyroid cancer, hypothyroidism status post total thyroidectomy, CAD status post stenting, vitamin-D deficiency, NAFLD, JENNIFER, chronic migraine, history of CVA, depression/anxiety presenting for follow-up    Regarding history of thyroid cancer:  - Initially had left lobectomy and isthmusectomy that showed an invasive follicular carcinoma with vascular invasion in early 2008  - Status post total thyroidectomy in 2008 and radioactive iodine I 131 with 100 uCi on 11/18/2008  - Thyroglobulin has remained undetectable over time most recently measured on 02/18/2022 with a corresponding negative thyroglobulin antibody screen of less than 1.8    - Prior thyroid imaging:  Thyroid ultrasound 03/09/2021 demonstrates stability post thyroidectomy with a 1.0 x 0.78 x 0.8 cm focus again seen in the area of the left thyroid bed that likely represent scar tissue versus residual thyroid tissue  - This tissue underwent FNA on 05/03/2019 demonstrating acellular tissue and was negative for thyroglobulin    - Patient has recently seen her pulmonologist Dr. Morgan and was started on oxygen 1 month prior to visit (workup ongoing regarding her hypoxemia).  She inquired about the utility of COVID vaccination and she was strongly encouraged to receive her COVID vaccine    Regarding postsurgical hypothyroidism:    - Duration:  Since total thyroidectomy in 2008  - Current relevant medications: levothyroxine T4 (Synthroid) 112 mcg daily  - Takes thyroid medication properly without food first thing in the morning     - TSH of 1.16 on 02/18/2022  - Last BMD: None    - Patient denies unexpected weight gain, fatigue, constipation, hair loss, brittle nails, mental fog, cold intolerance, memory impairment, muscle weakness, neck swelling/pain, hoarseness,  "periorbital edema, lithium/amiodarone use, chemotherapy, prior neck radiation, or recent severe illness     - Personal history of thyroid cancer as outlined above      Review of Systems   Constitutional: Positive for fatigue.   Endocrine: Negative for cold intolerance, heat intolerance, polydipsia, polyphagia and polyuria.       Social and family history reviewed  Current medications and allergies reviewed    Objective:   /65   Ht 5' 2" (1.575 m)   Wt 131.5 kg (289 lb 14.5 oz)   LMP 04/12/2014 (Approximate)   BMI 53.02 kg/m²   Physical Exam  Constitutional:       Appearance: She is obese.   Neurological:      General: No focal deficit present.      Mental Status: She is alert and oriented to person, place, and time.   Psychiatric:         Mood and Affect: Mood normal.         Behavior: Behavior normal.       BP Readings from Last 1 Encounters:   02/22/22 100/65      Wt Readings from Last 1 Encounters:   02/22/22 0920 131.5 kg (289 lb 14.5 oz)     Body mass index is 53.02 kg/m².    Lab Review:   Lab Results   Component Value Date    HGBA1C 5.8 (H) 02/10/2020     Lab Results   Component Value Date    CHOL 193 04/26/2019    HDL 57 04/26/2019    LDLCALC 108.2 04/26/2019    TRIG 139 04/26/2019    CHOLHDL 29.5 04/26/2019     Lab Results   Component Value Date     09/18/2020    K 4.3 09/18/2020     09/18/2020    CO2 24 09/18/2020     (H) 09/18/2020    BUN 17 09/18/2020    CREATININE 0.6 02/18/2022    CALCIUM 9.2 09/18/2020    PROT 7.4 10/15/2021    ALBUMIN 3.7 10/15/2021    BILITOT 0.8 10/15/2021    ALKPHOS 153 (H) 10/15/2021    AST 16 10/15/2021    ALT 23 10/15/2021    ANIONGAP 10 09/18/2020    ESTGFRAFRICA >60.0 02/18/2022    EGFRNONAA >60.0 02/18/2022    TSH 1.158 02/18/2022       All pertinent labs reviewed    Assessment and Plan     Thyroid cancer  Key History and Diagnostic Findings  - Initially had left lobectomy and isthmusectomy that showed an invasive follicular carcinoma with " vascular invasion in early 2008  - Status post total thyroidectomy in 2008 and radioactive iodine I 131 with 100 uCi on 11/18/2008  - Thyroglobulin has remained undetectable over time most recently measured on 02/18/2022 with a corresponding negative thyroglobulin antibody screen of less than 1.8    - Prior thyroid imaging:  Thyroid ultrasound 03/09/2021 demonstrates stability post thyroidectomy with a 1.0 x 0.78 x 0.8 cm focus again seen in the area of the left thyroid bed that likely represent scar tissue versus residual thyroid tissue  - This tissue underwent FNA on 05/03/2019 demonstrating acellular tissue and was negative for thyroglobulin    Plan  - Continue surveillance with thyroglobulin in 1 year (order placed)  - Repeat thyroid ultrasound next year (order placed)      Postsurgical hypothyroidism  Key History and Diagnostic Findings  - Postsurgical hypothyroidism from total thyroidectomy performed in 2008  - On antidepressants (known to decrease therapeutic effect of hormone)  - Taking levothyroxine 112 mcg daily 6 days a week  (Weight based 200 mcg daily)  - Prior TFTs for comparison:  TSH of 1.16 on 02/18/2022 from 0.51 on 03/30/2021  - Takes medications properly     Plan  - Continue levothyroxine 112 mcg daily 6 days a week with repeat TFTs in 1 year or earlier if symptoms indicate  - Avoid hyperthyroidism due to Cardiovascular disease, rate/rhythm abnormalities, and bone effects    Morbid obesity  - Provided physical therapy referral to start water aerobics as patient had expressed interest  - Will check A1c to screen for diabetes  - Will order DEXA scan given her history of postsurgical hypothyroidism, comorbidities, age, and reported chronic use of steroids though no oral steroid seen on medication list      Ac Jerez DO  [facility], 6th Floor  [address] [place], [place], [address]    Office: [phone]  Fax: [phone]    Disclaimer: This note has  been generated using voice-recognition software. There may be typographical errors that have been missed during proof-reading.    The above history labs imaging impression and plan were discussed with attending physician who is in agreement and also took part in this patient's care.  I personally reviewed all of the patients available medications, labs, imaging, vitals, allergies, medical history.

## 2022-02-23 ENCOUNTER — HOSPITAL ENCOUNTER (OUTPATIENT)
Dept: RADIOLOGY | Facility: HOSPITAL | Age: 62
Discharge: HOME | End: 2022-02-23
Attending: OTOLARYNGOLOGY
Payer: MEDICAID

## 2022-02-23 ENCOUNTER — PATIENT MESSAGE (OUTPATIENT)
Dept: FAMILY MEDICINE | Facility: CLINIC | Age: 62
End: 2022-02-23
Payer: MEDICAID

## 2022-02-23 DIAGNOSIS — C73 METASTASIS FROM THYROID CANCER: ICD-10-CM

## 2022-02-23 DIAGNOSIS — Z85.850 HX OF THYROID CANCER: ICD-10-CM

## 2022-02-23 DIAGNOSIS — C79.9 METASTASIS FROM THYROID CANCER: ICD-10-CM

## 2022-02-23 DIAGNOSIS — J44.1 COPD EXACERBATION: Primary | ICD-10-CM

## 2022-02-23 DIAGNOSIS — R04.2 HEMOPTYSIS: ICD-10-CM

## 2022-02-23 PROCEDURE — 71260 CT THORAX DX C+: CPT | Mod: 26,,, | Performed by: RADIOLOGY

## 2022-02-23 PROCEDURE — 71260 CT CHEST WITH CONTRAST: ICD-10-PCS | Mod: 26,,, | Performed by: RADIOLOGY

## 2022-02-23 PROCEDURE — 25500020 PHARM REV CODE 255: Performed by: OTOLARYNGOLOGY

## 2022-02-23 PROCEDURE — 71260 CT THORAX DX C+: CPT | Mod: TC

## 2022-02-23 RX ORDER — DOXYCYCLINE HYCLATE 100 MG
100 TABLET ORAL 2 TIMES DAILY
Qty: 14 TABLET | Refills: 0 | Status: SHIPPED | OUTPATIENT
Start: 2022-02-23 | End: 2022-03-24 | Stop reason: ALTCHOICE

## 2022-02-23 RX ADMIN — IOHEXOL 75 ML: 350 INJECTION, SOLUTION INTRAVENOUS at 12:02

## 2022-02-28 NOTE — PROCEDURES
"Dear Provider,     You have ordered sleep LAB services to perform the sleep study for Paras Smith.  The sleep study that you ordered is complete.      Please find Sleep Study result in "Chart Review" under the "Media tab."      As the ordering provider, you are responsible for reviewing the results and implementing a treatment plan with your patient.    If you need a Sleep Medicine provider to explain the sleep study findings and arrange treatment for the patient, please refer patient for consultation to our Sleep Clinic via [facility] with Ambulatory Consult Sleep.    To do that please place an order for an  "Ambulatory Consult Sleep" - it will go to our clinic work queue for our Medical Assistant to contact the patient for an appointment.     For any questions, please contact our clinic staff at [phone] to talk to clinical staff.   " Bexarotene Counseling:  I discussed with the patient the risks of bexarotene including but not limited to hair loss, dry lips/skin/eyes, liver abnormalities, hyperlipidemia, pancreatitis, depression/suicidal ideation, photosensitivity, drug rash/allergic reactions, hypothyroidism, anemia, leukopenia, infection, cataracts, and teratogenicity.  Patient understands that they will need regular blood tests to check lipid profile, liver function tests, white blood cell count, thyroid function tests and pregnancy test if applicable.

## 2022-03-02 ENCOUNTER — PATIENT MESSAGE (OUTPATIENT)
Dept: PULMONOLOGY | Facility: CLINIC | Age: 62
End: 2022-03-02
Payer: MEDICAID

## 2022-03-15 ENCOUNTER — TELEPHONE (OUTPATIENT)
Dept: FAMILY MEDICINE | Facility: CLINIC | Age: 62
End: 2022-03-15
Payer: MEDICAID

## 2022-03-15 NOTE — TELEPHONE ENCOUNTER
Spoke with patient and she informed me that provider had placed her on medication 2/28/22 with f/u ct result and was told to follow up if chest congestion did not resolved. Patient said that she has only one more day with medications and this has still not resolved. Patient denies any fever are shortness of breath. Denies any discoloration to her sputum. Scheduled office visit for 3/24/22.

## 2022-03-16 DIAGNOSIS — Z12.31 OTHER SCREENING MAMMOGRAM: ICD-10-CM

## 2022-03-24 ENCOUNTER — PATIENT MESSAGE (OUTPATIENT)
Dept: FAMILY MEDICINE | Facility: CLINIC | Age: 62
End: 2022-03-24
Payer: MEDICAID

## 2022-03-24 ENCOUNTER — OFFICE VISIT (OUTPATIENT)
Dept: FAMILY MEDICINE | Facility: CLINIC | Age: 62
End: 2022-03-24
Payer: MEDICAID

## 2022-03-24 ENCOUNTER — PATIENT MESSAGE (OUTPATIENT)
Dept: FAMILY MEDICINE | Facility: CLINIC | Age: 62
End: 2022-03-24

## 2022-03-24 VITALS
HEART RATE: 67 BPM | TEMPERATURE: 99 F | DIASTOLIC BLOOD PRESSURE: 74 MMHG | SYSTOLIC BLOOD PRESSURE: 112 MMHG | HEIGHT: 62 IN | BODY MASS INDEX: 52.74 KG/M2 | OXYGEN SATURATION: 97 % | WEIGHT: 286.63 LBS

## 2022-03-24 DIAGNOSIS — G47.33 OSA (OBSTRUCTIVE SLEEP APNEA): Primary | ICD-10-CM

## 2022-03-24 DIAGNOSIS — J96.11 CHRONIC RESPIRATORY FAILURE WITH HYPOXIA: ICD-10-CM

## 2022-03-24 DIAGNOSIS — H52.13 MYOPIA OF BOTH EYES: ICD-10-CM

## 2022-03-24 DIAGNOSIS — F41.9 ANXIETY: Primary | ICD-10-CM

## 2022-03-24 DIAGNOSIS — E66.01 MORBID OBESITY: ICD-10-CM

## 2022-03-24 DIAGNOSIS — I10 ESSENTIAL HYPERTENSION: ICD-10-CM

## 2022-03-24 PROCEDURE — 99999 PR PBB SHADOW E&M-EST. PATIENT-LVL V: CPT | Mod: PBBFAC,,, | Performed by: INTERNAL MEDICINE

## 2022-03-24 PROCEDURE — 4010F PR ACE/ARB THEARPY RXD/TAKEN: ICD-10-PCS | Mod: CPTII,,, | Performed by: INTERNAL MEDICINE

## 2022-03-24 PROCEDURE — 99999 PR PBB SHADOW E&M-EST. PATIENT-LVL V: ICD-10-PCS | Mod: PBBFAC,,, | Performed by: INTERNAL MEDICINE

## 2022-03-24 PROCEDURE — 99214 PR OFFICE/OUTPT VISIT, EST, LEVL IV, 30-39 MIN: ICD-10-PCS | Mod: S$PBB,,, | Performed by: INTERNAL MEDICINE

## 2022-03-24 PROCEDURE — 3074F PR MOST RECENT SYSTOLIC BLOOD PRESSURE < 130 MM HG: ICD-10-PCS | Mod: CPTII,,, | Performed by: INTERNAL MEDICINE

## 2022-03-24 PROCEDURE — 3078F PR MOST RECENT DIASTOLIC BLOOD PRESSURE < 80 MM HG: ICD-10-PCS | Mod: CPTII,,, | Performed by: INTERNAL MEDICINE

## 2022-03-24 PROCEDURE — 3008F PR BODY MASS INDEX (BMI) DOCUMENTED: ICD-10-PCS | Mod: CPTII,,, | Performed by: INTERNAL MEDICINE

## 2022-03-24 PROCEDURE — 3078F DIAST BP <80 MM HG: CPT | Mod: CPTII,,, | Performed by: INTERNAL MEDICINE

## 2022-03-24 PROCEDURE — 99214 OFFICE O/P EST MOD 30 MIN: CPT | Mod: S$PBB,,, | Performed by: INTERNAL MEDICINE

## 2022-03-24 PROCEDURE — 4010F ACE/ARB THERAPY RXD/TAKEN: CPT | Mod: CPTII,,, | Performed by: INTERNAL MEDICINE

## 2022-03-24 PROCEDURE — 3044F PR MOST RECENT HEMOGLOBIN A1C LEVEL <7.0%: ICD-10-PCS | Mod: CPTII,,, | Performed by: INTERNAL MEDICINE

## 2022-03-24 PROCEDURE — 3044F HG A1C LEVEL LT 7.0%: CPT | Mod: CPTII,,, | Performed by: INTERNAL MEDICINE

## 2022-03-24 PROCEDURE — 3074F SYST BP LT 130 MM HG: CPT | Mod: CPTII,,, | Performed by: INTERNAL MEDICINE

## 2022-03-24 PROCEDURE — 99215 OFFICE O/P EST HI 40 MIN: CPT | Mod: PBBFAC,PN | Performed by: INTERNAL MEDICINE

## 2022-03-24 PROCEDURE — 3008F BODY MASS INDEX DOCD: CPT | Mod: CPTII,,, | Performed by: INTERNAL MEDICINE

## 2022-03-24 RX ORDER — PREDNISONE 20 MG/1
40 TABLET ORAL DAILY
Qty: 10 TABLET | Refills: 0 | Status: SHIPPED | OUTPATIENT
Start: 2022-03-24 | End: 2022-03-29

## 2022-03-24 RX ORDER — AMLODIPINE BESYLATE 5 MG/1
TABLET ORAL
Qty: 90 TABLET | Refills: 2 | Status: SHIPPED | OUTPATIENT
Start: 2022-03-24 | End: 2023-04-17

## 2022-03-24 RX ORDER — LISINOPRIL 10 MG/1
10 TABLET ORAL DAILY
Qty: 90 TABLET | Refills: 3 | Status: SHIPPED | OUTPATIENT
Start: 2022-03-24 | End: 2023-02-10

## 2022-03-24 RX ORDER — HYDROXYZINE PAMOATE 50 MG/1
50 CAPSULE ORAL EVERY 8 HOURS PRN
Qty: 90 CAPSULE | Refills: 1 | Status: SHIPPED | OUTPATIENT
Start: 2022-03-24 | End: 2022-04-22

## 2022-03-24 RX ORDER — HYDROXYZINE HYDROCHLORIDE 50 MG/1
50 TABLET, FILM COATED ORAL 3 TIMES DAILY PRN
Qty: 90 TABLET | Refills: 1 | Status: SHIPPED | OUTPATIENT
Start: 2022-03-24 | End: 2022-03-24 | Stop reason: CLARIF

## 2022-03-24 NOTE — PROGRESS NOTES
"Subjective:       Patient ID: Paras Smith is a 61 y.o. female.    Chief Complaint: Follow-up and Chest Congestion    F/u cough    HPI: 60 y/o w/ JENNIFER (has not yet received new cpap) chronic hypoxia on supplemental oxygen obesity chronic neck pain presents to discuss one month of "chest congestion" treated three weeks ago with doxycyline without significant change no cough with PO intake no fevers/chills     Review of Systems   Constitutional: Negative for activity change, appetite change, fatigue, fever and unexpected weight change.   HENT: Positive for rhinorrhea. Negative for ear pain and sore throat.    Eyes: Negative for discharge and visual disturbance.   Respiratory: Positive for cough and shortness of breath. Negative for chest tightness and wheezing.    Cardiovascular: Negative for chest pain, palpitations and leg swelling.   Gastrointestinal: Negative for abdominal pain.   Endocrine: Negative for cold intolerance and heat intolerance.   Genitourinary: Negative for dysuria and hematuria.   Musculoskeletal: Positive for neck pain. Negative for joint swelling and neck stiffness.   Skin: Negative for rash.   Neurological: Negative for dizziness, syncope, weakness and headaches.   Psychiatric/Behavioral: Negative for suicidal ideas.       Objective:     Vitals:    03/24/22 1107   BP: 112/74   BP Location: Right arm   Patient Position: Sitting   BP Method: Large (Manual)   Pulse: 67   Temp: 98.6 °F (37 °C)   TempSrc: Oral   SpO2: 97%   Weight: 130 kg (286 lb 9.6 oz)   Height: 5' 2" (1.575 m)          Physical Exam  Constitutional:       General: She is not in acute distress.     Appearance: She is well-developed. She is obese.      Comments: Using 2L via NC from portable concentrator   HENT:      Head: Normocephalic and atraumatic.   Eyes:      Conjunctiva/sclera: Conjunctivae normal.   Cardiovascular:      Rate and Rhythm: Normal rate and regular rhythm.      Heart sounds: No murmur heard.    No friction " rub. No gallop.   Pulmonary:      Effort: Pulmonary effort is normal. No respiratory distress.      Breath sounds: Normal breath sounds. No wheezing or rales.   Abdominal:      Tenderness: There is no guarding.   Musculoskeletal:         General: No tenderness. Normal range of motion.      Cervical back: Normal range of motion.      Right lower leg: No edema.      Left lower leg: No edema.   Skin:     General: Skin is warm and dry.   Neurological:      Mental Status: She is alert and oriented to person, place, and time.      Cranial Nerves: No cranial nerve deficit.         Assessment and Plan   1. JENNIFER (obstructive sleep apnea)  Awaiting cpap    2. Chronic respiratory failure with hypoxia  Continue oxygen supplementation short steroid course in light of productive cough  - predniSONE (DELTASONE) 20 MG tablet; Take 2 tablets (40 mg total) by mouth once daily. for 5 days  Dispense: 10 tablet; Refill: 0    3. Morbid obesity  The patient is asked to make an attempt to improve diet and exercise patterns to aid in medical management of this problem.      4. Essential hypertension  Continue acie  - lisinopriL 10 MG tablet; Take 1 tablet (10 mg total) by mouth once daily.  Dispense: 90 tablet; Refill: 3  - amLODIPine (NORVASC) 5 MG tablet; TAKE ONE TABLET BY MOUTH ONCE A DAY FOR HIGH BLOOD PRESSURE  Dispense: 90 tablet; Refill: 2    5. Myopia of both eyes  Referral for optometric exam  - Ambulatory referral/consult to Optometry; Future

## 2022-04-02 ENCOUNTER — HOSPITAL ENCOUNTER (EMERGENCY)
Facility: HOSPITAL | Age: 62
Discharge: HOME | End: 2022-04-02
Attending: EMERGENCY MEDICINE
Payer: MEDICAID

## 2022-04-02 VITALS
OXYGEN SATURATION: 97 % | DIASTOLIC BLOOD PRESSURE: 89 MMHG | SYSTOLIC BLOOD PRESSURE: 144 MMHG | TEMPERATURE: 99 F | BODY MASS INDEX: 52.31 KG/M2 | WEIGHT: 286 LBS | HEART RATE: 62 BPM | RESPIRATION RATE: 19 BRPM

## 2022-04-02 DIAGNOSIS — G44.209 TENSION HEADACHE: ICD-10-CM

## 2022-04-02 DIAGNOSIS — R51.9 PAIN OF SCALP: Primary | ICD-10-CM

## 2022-04-02 LAB
ALBUMIN SERPL-MCNC: 3.5 G/DL (ref 3.3–5.5)
ALP SERPL-CCNC: 170 U/L (ref 42–141)
BILIRUB SERPL-MCNC: 0.6 MG/DL (ref 0.2–1.6)
BUN SERPL-MCNC: 15 MG/DL (ref 7–22)
CALCIUM SERPL-MCNC: 9.6 MG/DL (ref 8–10.3)
CHLORIDE SERPL-SCNC: 109 MMOL/L (ref 98–108)
CREAT SERPL-MCNC: 0.7 MG/DL (ref 0.6–1.2)
GLUCOSE SERPL-MCNC: 138 MG/DL (ref 73–118)
POC ALT (SGPT): 21 U/L (ref 10–47)
POC AST (SGOT): 33 U/L (ref 11–38)
POC TCO2: 28 MMOL/L (ref 18–33)
POTASSIUM BLD-SCNC: 4.4 MMOL/L (ref 3.6–5.1)
PROTEIN, POC: 6.8 G/DL (ref 6.4–8.1)
SODIUM BLD-SCNC: 149 MMOL/L (ref 128–145)

## 2022-04-02 PROCEDURE — 80053 COMPREHEN METABOLIC PANEL: CPT | Mod: ER

## 2022-04-02 PROCEDURE — 63600175 PHARM REV CODE 636 W HCPCS: Mod: ER | Performed by: EMERGENCY MEDICINE

## 2022-04-02 PROCEDURE — 25500020 PHARM REV CODE 255: Mod: ER | Performed by: EMERGENCY MEDICINE

## 2022-04-02 PROCEDURE — 85025 COMPLETE CBC W/AUTO DIFF WBC: CPT | Mod: ER

## 2022-04-02 PROCEDURE — 99285 EMERGENCY DEPT VISIT HI MDM: CPT | Mod: 25,ER

## 2022-04-02 PROCEDURE — 96374 THER/PROPH/DIAG INJ IV PUSH: CPT | Mod: ER

## 2022-04-02 PROCEDURE — 96375 TX/PRO/DX INJ NEW DRUG ADDON: CPT | Mod: ER

## 2022-04-02 RX ORDER — METHOCARBAMOL 500 MG/1
1000 TABLET, FILM COATED ORAL 3 TIMES DAILY
Qty: 30 TABLET | Refills: 0 | Status: SHIPPED | OUTPATIENT
Start: 2022-04-02 | End: 2022-04-07

## 2022-04-02 RX ORDER — LIDOCAINE 50 MG/G
1 PATCH TOPICAL DAILY
Qty: 15 PATCH | Refills: 0 | Status: SHIPPED | OUTPATIENT
Start: 2022-04-02 | End: 2023-05-11 | Stop reason: CLARIF

## 2022-04-02 RX ORDER — METHYLPREDNISOLONE 4 MG/1
TABLET ORAL
Qty: 1 TABLET | Refills: 0 | Status: SHIPPED | OUTPATIENT
Start: 2022-04-02 | End: 2022-04-23

## 2022-04-02 RX ORDER — KETOROLAC TROMETHAMINE 30 MG/ML
15 INJECTION, SOLUTION INTRAMUSCULAR; INTRAVENOUS
Status: COMPLETED | OUTPATIENT
Start: 2022-04-02 | End: 2022-04-02

## 2022-04-02 RX ORDER — DIAZEPAM 10 MG/2ML
2.5 INJECTION INTRAMUSCULAR
Status: COMPLETED | OUTPATIENT
Start: 2022-04-02 | End: 2022-04-02

## 2022-04-02 RX ORDER — IBUPROFEN 400 MG/1
400 TABLET ORAL EVERY 6 HOURS PRN
Qty: 20 TABLET | Refills: 0 | OUTPATIENT
Start: 2022-04-02 | End: 2022-12-10

## 2022-04-02 RX ORDER — MORPHINE SULFATE 4 MG/ML
4 INJECTION, SOLUTION INTRAMUSCULAR; INTRAVENOUS
Status: COMPLETED | OUTPATIENT
Start: 2022-04-02 | End: 2022-04-02

## 2022-04-02 RX ADMIN — IOHEXOL 100 ML: 350 INJECTION, SOLUTION INTRAVENOUS at 07:04

## 2022-04-02 RX ADMIN — DIAZEPAM 2.5 MG: 5 INJECTION, SOLUTION INTRAMUSCULAR; INTRAVENOUS at 09:04

## 2022-04-02 RX ADMIN — KETOROLAC TROMETHAMINE 15 MG: 30 INJECTION, SOLUTION INTRAMUSCULAR; INTRAVENOUS at 09:04

## 2022-04-02 RX ADMIN — MORPHINE SULFATE 4 MG: 4 INJECTION, SOLUTION INTRAMUSCULAR; INTRAVENOUS at 07:04

## 2022-04-02 NOTE — ED PROVIDER NOTES
"Encounter Date: 2022       History     Chief Complaint   Patient presents with    Neck Pain     Pt reports right sided "lumP' to the nape of neck x's 3 days. No other complaints with sx. No home tx. No contact to PCP.     61 y.o. female with multiple medical problems including HTN, CAD, thyroid cancer, post surgical hypothyroidism, elevated BMI, chronic respiratory failure (on home O2), JENNIFER, asthma and others presents to ED c/o pain and swelling of the left occipital scalp that began three days ago. She denies trauma and states pain is worse with touching the area and interferes with ability to sleep. She denies fever but mentions intermittent nausea, vomiting and sinus pressure.          Review of patient's allergies indicates:   Allergen Reactions    Vioxx [rofecoxib] Rash     Past Medical History:   Diagnosis Date    Asthma     CAD (coronary artery disease)     stent     Cervical spine disease     Depression     Difficult intubation     POSSIBLE    Falls 2018    GERD (gastroesophageal reflux disease)     Glaucoma     left eye    Helicobacter pylori (H. pylori) infection     History of stomach ulcers     HTN (hypertension)     Muscle weakness     LEFT    Neck problem     LIMITED ROM    Nuclear cataract 2014    Postsurgical hypothyroidism     Stroke     mini strokes    Thyroid cancer     Vitamin D deficiency disease      Past Surgical History:   Procedure Laterality Date    APPENDECTOMY      CERVICAL SPINE SURGERY      vocal cord damage     SECTION, LOW TRANSVERSE      x3    CHOLECYSTECTOMY      COLONOSCOPY N/A 2018    Procedure: COLONOSCOPY;  Surgeon: Chavo Wray MD;  Location: [facility] ([address] [place] [address]);  Service: Endoscopy;  Laterality: N/A;    CORONARY ANGIOPLASTY WITH STENT PLACEMENT      x 3    ESOPHAGOGASTRODUODENOSCOPY N/A 2018    Procedure: EGD (ESOPHAGOGASTRODUODENOSCOPY);  Surgeon: Chavo Wray MD;  Location: [facility] ([address] [place] [address]);  Service: Endoscopy; "  Laterality: N/A;  Hx of Anesthetic complications c spine surgery and thyroid surgery with known unilateral VC paralysis per patient, pt unsure of intubation history History of prior surgery of interest to airway management or planning - 2nd floor/not able to use current order,    PATELLA SURGERY  1969    THYROID SURGERY      total    TOTAL THYROIDECTOMY      TYMPANOPLASTY Right 10/19/2020    Procedure: TYMPANOPLASTY;  Surgeon: Shaan Garcia MD;  Location: [facility] [address] [place] [address];  Service: ENT;  Laterality: Right;     Family History   Problem Relation Age of Onset    Diabetes Father     Kidney failure Father     Cataracts Father     Asthma Father     Glaucoma Father     Heart disease Mother         has pacemaker    Hypertension Mother     Asthma Mother     Cancer Mother         lung stage 4    Ovarian cancer Mother     Breast cancer Mother     Hypertension Brother     Heart disease Brother     Cervical cancer Daughter     Scoliosis Son     Hypertension Son     Hypertension Daughter     Seizures Daughter     Lupus Daughter     Cervical cancer Daughter     Mental illness Son         bipolar    Cancer Sister     Liver disease Sister     No Known Problems Brother     No Known Problems Daughter     Breast cancer Maternal Aunt     No Known Problems Maternal Uncle     No Known Problems Paternal Aunt     No Known Problems Paternal Uncle     Stomach cancer Maternal Grandmother     No Known Problems Paternal Grandmother     No Known Problems Paternal Grandfather     Stomach cancer Maternal Aunt     Breast cancer Maternal Aunt     Breast cancer Maternal Aunt     Esophageal cancer Neg Hx      Social History     Tobacco Use    Smoking status: Never Smoker    Smokeless tobacco: Never Used    Tobacco comment: No cigarrettes, only marijuana occasionally   Substance Use Topics    Alcohol use: No     Comment: recovering alcoholic    Drug use: Yes     Frequency: 6.0 times per week     Types:  Marijuana     Comment: ocasional     Review of Systems   Constitutional: Negative for fever.   HENT: Positive for sinus pressure.    Eyes: Negative for photophobia and visual disturbance.   Gastrointestinal: Positive for nausea and vomiting.   Musculoskeletal: Positive for neck pain. Negative for gait problem.   Skin: Negative for rash and wound.   Neurological: Positive for dizziness (chronic) and headaches (chronic). Negative for syncope and weakness.   All other systems reviewed and are negative.      Physical Exam     Initial Vitals [04/02/22 0559]   BP Pulse Resp Temp SpO2   123/86 86 17 98.5 °F (36.9 °C) 99 %      MAP       --         Physical Exam    Nursing note and vitals reviewed.  Constitutional: She appears well-developed and well-nourished. She is not diaphoretic. She is Obese . She is cooperative.  Non-toxic appearance. No distress.   HENT:   Head: Normocephalic and atraumatic.       Eyes: Conjunctivae are normal. Pupils are equal, round, and reactive to light.   Neck: Phonation normal. Neck supple. No stridor present.   Normal range of motion.  Cardiovascular: Normal rate and intact distal pulses.   Pulmonary/Chest: No accessory muscle usage. No tachypnea. No respiratory distress.   Abdominal: She exhibits no distension. There is no abdominal tenderness.   Musculoskeletal:         General: No tenderness. Normal range of motion.      Cervical back: Normal range of motion and neck supple. No erythema. No spinous process tenderness. Normal range of motion.     Neurological: She is alert and oriented to person, place, and time. She has normal strength. Gait normal. GCS eye subscore is 4. GCS verbal subscore is 5. GCS motor subscore is 6.   Skin: Skin is warm and intact. Capillary refill takes less than 2 seconds. No abrasion, no bruising, no ecchymosis, no laceration and no rash noted. No erythema.   Psychiatric: She has a normal mood and affect.         ED Course   Procedures  Labs Reviewed   POCT CMP -  Abnormal; Notable for the following components:       Result Value    Alkaline Phosphatase,  (*)     POC Chloride 109 (*)     POC Glucose 138 (*)     POC Sodium 149 (*)     All other components within normal limits   POCT CBC   POCT CMP          Imaging Results          CT Soft Tissue Neck With Contrast (Final result)  Result time 04/02/22 08:27:18    Final result by Pepe Polk MD (04/02/22 08:27:18)                 Impression:      No significant abnormality.  Specifically, no evidence of retropharyngeal or peritonsillar abscess.    Status post thyroidectomy.    Additional findings as above.      Electronically signed by: Pepe Polk MD  Date:    04/02/2022  Time:    08:27             Narrative:    EXAMINATION:  CT SOFT TISSUE NECK WITH CONTRAST    CLINICAL HISTORY:  Soft tissue swelling, infection suspected, neck xray done;    TECHNIQUE:  Low dose axial images, coronal and sagittal reformations were obtained from the skull base to the lung apices following the intravenous administration of 100 mL of Omnipaque 350.    COMPARISON:  Thyroid ultrasound 02/18/2022, CT thorax 02/23/2022; MRI cervical spine 04/18/2018    FINDINGS:  Beam hardening with streak artifact from dental hardware somewhat limits evaluation.    Orbits, paranasal sinuses, and skull base: Small retention cyst versus polyp at the floor of the right maxillary sinus.  Remaining partially imaged paranasal sinuses and mastoid air cells are clear.  Orbits appear symmetric and within normal limits.    Nasopharynx: Normal.    Suprahyoid neck: Normal oropharynx, oral cavity, parapharyngeal space, and retropharyngeal space.  Effacement of the left piriform recess and to a lesser extent bilateral valleculae, presumably related to pooling of secretions, without discrete mass seen.    Infrahyoid neck: Normal larynx, hypopharynx, and supraglottis.    Thyroid: Surgically absent.  No discrete mass, lymphadenopathy or fluid collection seen at the  surgical bed.    Thoracic inlet: Minimal dependent atelectasis.  No apical pneumothorax.  No discrete mass.    Lymph nodes: No lymphadenopathy by CT criteria.    Vascular structures: Minimal calcific atherosclerosis at the bilateral carotid bifurcations without large vessel occlusion, high-grade stenosis or dissection definitively seen, allowing for non CTA evaluation.    Other findings:Remote postoperative change of ACDF at C6-7 level.  Degenerative related grade 1 anterolisthesis of C2 on 3.  Chronic appearing minimal anterior wedge deformity of C4 and C5.  Age-related degenerative change not simply progressed from prior.  No acute or destructive osseous process seen.  Bilateral parotid and submandibular glands are relatively symmetric and within normal limits.                                 Medications   iohexoL (OMNIPAQUE 350) injection 100 mL (100 mLs Intravenous Given 4/2/22 7620)   morphine injection 4 mg (4 mg Intravenous Given 4/2/22 3785)   diazePAM injection 2.5 mg (2.5 mg Intravenous Given 4/2/22 0919)   ketorolac injection 15 mg (15 mg Intravenous Given 4/2/22 0918)     Medical Decision Making:   ED Management:  Care turned over to Dr Triplett pending CT results. Sridhar Beck MD, Emergency Medicine Staff 7:05 AM 4/2/2022      Assumed care patient from Dr. Beck, pending CT scan at this time.  CT scan resulting unremarkable findings.  Patient noting some improvement in symptoms upon reassessment, will give additional Valium Toradol here.  Suspect based on location of tenderness in reported history of migraines in similar tenderness along her right trapezius border that this is a tension headache with some cervical strain associated with this.  Will treat as such going forward.  At this point time based on physical exam evaluation not suspect malignancy, cervical fracture, dislocation, abscess, atypical angina, carotid or vertebral dissection, cellulitis, folliculitis, or any further surgical  medical emergency. Discussed diagnosis and further treatment with patient and family at bedside, including f/u with PCP in the next week.  Return precautions given and all questions answered.  Patient and family in understanding of plan.  Pt discharged to home improved and stable.                           Clinical Impression:   Final diagnoses:  [R51.9] Pain of scalp (Primary)  [G44.209] Tension headache          ED Disposition Condition    Discharge Stable        ED Prescriptions     Medication Sig Dispense Start Date End Date Auth. Provider    ibuprofen (ADVIL,MOTRIN) 400 MG tablet Take 1 tablet (400 mg total) by mouth every 6 (six) hours as needed. 20 tablet 4/2/2022  Prosper Triplett MD    LIDOcaine (LIDODERM) 5 % Place 1 patch onto the skin once daily. Remove & Discard patch within 12 hours or as directed by MD 15 patch 4/2/2022  Prosper Triplett MD    methylPREDNISolone (MEDROL DOSEPACK) 4 mg tablet Taper as directed 1 tablet 4/2/2022 4/23/2022 Prosper Triplett MD    methocarbamoL (ROBAXIN) 500 MG Tab Take 2 tablets (1,000 mg total) by mouth 3 (three) times daily. for 5 days 30 tablet 4/2/2022 4/7/2022 Prosper Triplett MD        Follow-up Information     Follow up With Specialties Details Why Contact Info    [facility] ED Emergency Medicine Go to  If symptoms worsen [address] [address] [phone]    Guicho Lizarraga MD Internal Medicine, Wound Care Go in 1 week As needed [address] [facility] [address]  [phone]             Prosper Triplett MD  04/02/22 5559

## 2022-04-03 ENCOUNTER — PATIENT MESSAGE (OUTPATIENT)
Dept: FAMILY MEDICINE | Facility: CLINIC | Age: 62
End: 2022-04-03
Payer: MEDICAID

## 2022-04-04 ENCOUNTER — TELEPHONE (OUTPATIENT)
Dept: FAMILY MEDICINE | Facility: CLINIC | Age: 62
End: 2022-04-04
Payer: MEDICAID

## 2022-04-04 NOTE — TELEPHONE ENCOUNTER
----- Message from Guicho Lizarraga MD sent at 4/4/2022  3:40 PM CDT -----  Regarding: RE: self [phone]  Okay to overbook for Thursday at 10 a.m. (new patient slot) or 240pm thank yo u  ----- Message -----  From: Lorna Esqueda LPN  Sent: 4/4/2022   3:32 PM CDT  To: Guicho Lizarraga MD  Subject: FW: self [phone]                            Would you like me to overbook for appointment. No available appointments with connie as well please advise   ----- Message -----  From: Kandy Pickering  Sent: 4/4/2022   3:26 PM CDT  To: [facility] Staff  Subject: self [phone]                                Type:  Sooner Appointment Request    Patient is requesting a sooner appointment.  Patient declined first available appointment listed as well as another facility and provider .  Patient will not accept being placed on the waitlist and is requesting a message be sent to doctor.    Name of Caller:  self    When is the first available appointment? June 8    Symptoms: Patient went to the ER due to having a bump on her head and it bothering her.    Would the patient rather a call back or a response via [facility]?  Call back    Best Call Back Number: [phone]    Patient stated if she didn't  please leave a message with appt.

## 2022-04-06 ENCOUNTER — OFFICE VISIT (OUTPATIENT)
Dept: OPTOMETRY | Facility: CLINIC | Age: 62
End: 2022-04-06
Payer: MEDICAID

## 2022-04-06 DIAGNOSIS — H52.7 REFRACTIVE ERROR: ICD-10-CM

## 2022-04-06 DIAGNOSIS — H25.13 NUCLEAR SCLEROSIS OF BOTH EYES: Primary | ICD-10-CM

## 2022-04-06 DIAGNOSIS — H40.023 OAG (OPEN ANGLE GLAUCOMA) SUSPECT, HIGH RISK, BILATERAL: ICD-10-CM

## 2022-04-06 PROCEDURE — 1160F PR REVIEW ALL MEDS BY PRESCRIBER/CLIN PHARMACIST DOCUMENTED: ICD-10-PCS | Mod: CPTII,,, | Performed by: OPTOMETRIST

## 2022-04-06 PROCEDURE — 99999 PR PBB SHADOW E&M-EST. PATIENT-LVL III: ICD-10-PCS | Mod: PBBFAC,,, | Performed by: OPTOMETRIST

## 2022-04-06 PROCEDURE — 1159F PR MEDICATION LIST DOCUMENTED IN MEDICAL RECORD: ICD-10-PCS | Mod: CPTII,,, | Performed by: OPTOMETRIST

## 2022-04-06 PROCEDURE — 4010F PR ACE/ARB THEARPY RXD/TAKEN: ICD-10-PCS | Mod: CPTII,,, | Performed by: OPTOMETRIST

## 2022-04-06 PROCEDURE — 3044F HG A1C LEVEL LT 7.0%: CPT | Mod: CPTII,,, | Performed by: OPTOMETRIST

## 2022-04-06 PROCEDURE — 99213 OFFICE O/P EST LOW 20 MIN: CPT | Mod: PBBFAC,PO | Performed by: OPTOMETRIST

## 2022-04-06 PROCEDURE — 92014 COMPRE OPH EXAM EST PT 1/>: CPT | Mod: S$PBB,,, | Performed by: OPTOMETRIST

## 2022-04-06 PROCEDURE — 92014 PR EYE EXAM, EST PATIENT,COMPREHESV: ICD-10-PCS | Mod: S$PBB,,, | Performed by: OPTOMETRIST

## 2022-04-06 PROCEDURE — 92015 PR REFRACTION: ICD-10-PCS | Mod: ,,, | Performed by: OPTOMETRIST

## 2022-04-06 PROCEDURE — 1160F RVW MEDS BY RX/DR IN RCRD: CPT | Mod: CPTII,,, | Performed by: OPTOMETRIST

## 2022-04-06 PROCEDURE — 99999 PR PBB SHADOW E&M-EST. PATIENT-LVL III: CPT | Mod: PBBFAC,,, | Performed by: OPTOMETRIST

## 2022-04-06 PROCEDURE — 1159F MED LIST DOCD IN RCRD: CPT | Mod: CPTII,,, | Performed by: OPTOMETRIST

## 2022-04-06 PROCEDURE — 92015 DETERMINE REFRACTIVE STATE: CPT | Mod: ,,, | Performed by: OPTOMETRIST

## 2022-04-06 PROCEDURE — 3044F PR MOST RECENT HEMOGLOBIN A1C LEVEL <7.0%: ICD-10-PCS | Mod: CPTII,,, | Performed by: OPTOMETRIST

## 2022-04-06 PROCEDURE — 4010F ACE/ARB THERAPY RXD/TAKEN: CPT | Mod: CPTII,,, | Performed by: OPTOMETRIST

## 2022-04-06 NOTE — PROGRESS NOTES
Subjective:       Patient ID: Paras Smith is a 61 y.o. female      Chief Complaint   Patient presents with    Concerns About Ocular Health    Glaucoma Suspect     History of Present Illness  Dls; 5/7/19 Dr. Hunt    60 y/o female presents today for ocular health check and glaucoma ck.   Pt c/o pressure behind ou. Pt c/o blurry vision at distance and near ou.  Pt lost bifocal glasses using otc readers.     + tearing  No itching  No burning  + pain  + ha's  + floaters  No flashes    Eye meds  Otc gtts ou prn         Assessment/Plan:     1. Nuclear sclerosis of both eyes  Educated pt on presence of cataracts and effects on vision. No surgery at this time. Recheck in one year, sooner PRN.    2. OAG (open angle glaucoma) suspect, high risk, bilateral  Seen by Dr. Lyman in 2014, monitor as suspect. OCT 2019 with no defect per old notes. Monitor as high risk due to enlarged CDR and +FHx (father). Return for HVF/OCT next available.   - Posterior Segment OCT Optic Nerve- Both eyes; Future  - Aponte Visual Field - OU - Extended - Both Eyes; Future    3. Refractive error  Educated patient on refractive error and discussed lens options. Dispensed updated spectacle Rx. Educated about adaptation period to new specs.    Eyeglass Final Rx     Eyeglass Final Rx       Sphere Cylinder Axis Add    Right +0.50 +0.75 030 +2.50    Left +0.50 +0.25 130 +2.50    Expiration Date: 4/6/2023                  Follow up for HVF 24-2, OCT optic nerve next available; yearly for annual exam.

## 2022-04-07 ENCOUNTER — OFFICE VISIT (OUTPATIENT)
Dept: FAMILY MEDICINE | Facility: CLINIC | Age: 62
End: 2022-04-07
Payer: MEDICAID

## 2022-04-07 VITALS
DIASTOLIC BLOOD PRESSURE: 84 MMHG | TEMPERATURE: 98 F | HEIGHT: 62 IN | WEIGHT: 284.38 LBS | SYSTOLIC BLOOD PRESSURE: 118 MMHG | OXYGEN SATURATION: 96 % | BODY MASS INDEX: 52.33 KG/M2 | HEART RATE: 67 BPM

## 2022-04-07 DIAGNOSIS — Z98.1 S/P CERVICAL SPINAL FUSION: ICD-10-CM

## 2022-04-07 DIAGNOSIS — G89.4 CHRONIC PAIN SYNDROME: Primary | ICD-10-CM

## 2022-04-07 DIAGNOSIS — M50.90 CERVICAL DISC DISEASE: ICD-10-CM

## 2022-04-07 PROCEDURE — 3008F BODY MASS INDEX DOCD: CPT | Mod: CPTII,,, | Performed by: INTERNAL MEDICINE

## 2022-04-07 PROCEDURE — 1160F RVW MEDS BY RX/DR IN RCRD: CPT | Mod: CPTII,,, | Performed by: INTERNAL MEDICINE

## 2022-04-07 PROCEDURE — 1159F MED LIST DOCD IN RCRD: CPT | Mod: CPTII,,, | Performed by: INTERNAL MEDICINE

## 2022-04-07 PROCEDURE — 3079F DIAST BP 80-89 MM HG: CPT | Mod: CPTII,,, | Performed by: INTERNAL MEDICINE

## 2022-04-07 PROCEDURE — 99213 PR OFFICE/OUTPT VISIT, EST, LEVL III, 20-29 MIN: ICD-10-PCS | Mod: S$PBB,,, | Performed by: INTERNAL MEDICINE

## 2022-04-07 PROCEDURE — 3079F PR MOST RECENT DIASTOLIC BLOOD PRESSURE 80-89 MM HG: ICD-10-PCS | Mod: CPTII,,, | Performed by: INTERNAL MEDICINE

## 2022-04-07 PROCEDURE — 4010F ACE/ARB THERAPY RXD/TAKEN: CPT | Mod: CPTII,,, | Performed by: INTERNAL MEDICINE

## 2022-04-07 PROCEDURE — 3074F SYST BP LT 130 MM HG: CPT | Mod: CPTII,,, | Performed by: INTERNAL MEDICINE

## 2022-04-07 PROCEDURE — 99213 OFFICE O/P EST LOW 20 MIN: CPT | Mod: S$PBB,,, | Performed by: INTERNAL MEDICINE

## 2022-04-07 PROCEDURE — 4010F PR ACE/ARB THEARPY RXD/TAKEN: ICD-10-PCS | Mod: CPTII,,, | Performed by: INTERNAL MEDICINE

## 2022-04-07 PROCEDURE — 99999 PR PBB SHADOW E&M-EST. PATIENT-LVL V: CPT | Mod: PBBFAC,,, | Performed by: INTERNAL MEDICINE

## 2022-04-07 PROCEDURE — 1159F PR MEDICATION LIST DOCUMENTED IN MEDICAL RECORD: ICD-10-PCS | Mod: CPTII,,, | Performed by: INTERNAL MEDICINE

## 2022-04-07 PROCEDURE — 3008F PR BODY MASS INDEX (BMI) DOCUMENTED: ICD-10-PCS | Mod: CPTII,,, | Performed by: INTERNAL MEDICINE

## 2022-04-07 PROCEDURE — 3074F PR MOST RECENT SYSTOLIC BLOOD PRESSURE < 130 MM HG: ICD-10-PCS | Mod: CPTII,,, | Performed by: INTERNAL MEDICINE

## 2022-04-07 PROCEDURE — 99999 PR PBB SHADOW E&M-EST. PATIENT-LVL V: ICD-10-PCS | Mod: PBBFAC,,, | Performed by: INTERNAL MEDICINE

## 2022-04-07 PROCEDURE — 3044F HG A1C LEVEL LT 7.0%: CPT | Mod: CPTII,,, | Performed by: INTERNAL MEDICINE

## 2022-04-07 PROCEDURE — 1160F PR REVIEW ALL MEDS BY PRESCRIBER/CLIN PHARMACIST DOCUMENTED: ICD-10-PCS | Mod: CPTII,,, | Performed by: INTERNAL MEDICINE

## 2022-04-07 PROCEDURE — 3044F PR MOST RECENT HEMOGLOBIN A1C LEVEL <7.0%: ICD-10-PCS | Mod: CPTII,,, | Performed by: INTERNAL MEDICINE

## 2022-04-07 PROCEDURE — 99215 OFFICE O/P EST HI 40 MIN: CPT | Mod: PBBFAC,PN | Performed by: INTERNAL MEDICINE

## 2022-04-07 NOTE — PROGRESS NOTES
"Subjective:       Patient ID: Paras Smith is a 61 y.o. female.    Chief Complaint: Headache and bump on head    ED follow up    HPI: 62 y/o presents for follow up of ED visit 4/2/2022 for right posterior neck pain. Patient reports pain has been progressively worsening over the last three weeks. It has evlovled to be a constant pain that radiates to right forearm with intermittent numbness of fingers two and three. She had CT of the neck done 4/2 which did not show any abcess or lymphadenopathy with chronic degenerative changes. She has been using topical lidocaine patches to posterior shoulder and robaxin muscle relaxer without relief she reports not being able to sleep at all for the last five days due to the pain. She tells me the ED informed her she would have to see me in order to get relief from the pain. She is here "to make the pain go away"     Review of Systems   Constitutional: Negative for activity change, appetite change, fatigue, fever and unexpected weight change.   HENT: Negative for ear pain, rhinorrhea and sore throat.    Eyes: Negative for discharge and visual disturbance.   Respiratory: Negative for chest tightness, shortness of breath and wheezing.    Cardiovascular: Negative for chest pain, palpitations and leg swelling.   Gastrointestinal: Negative for abdominal pain, constipation and diarrhea.   Endocrine: Negative for cold intolerance and heat intolerance.   Genitourinary: Negative for dysuria and hematuria.   Musculoskeletal: Positive for arthralgias, back pain, neck pain and neck stiffness. Negative for joint swelling.   Skin: Negative for rash.   Neurological: Negative for dizziness, syncope, weakness and headaches.   Psychiatric/Behavioral: Positive for dysphoric mood and sleep disturbance. Negative for suicidal ideas.       Objective:     Vitals:    04/07/22 1002   BP: 118/84   BP Location: Left arm   Patient Position: Sitting   BP Method: Large (Manual)   Pulse: 67   Temp: 98 °F " "(36.7 °C)   TempSrc: Oral   SpO2: 96%   Weight: 129 kg (284 lb 6.3 oz)   Height: 5' 2" (1.575 m)          Physical Exam  Constitutional:       General: She is in acute distress.      Appearance: She is obese.      Comments: tearful   HENT:      Head: Normocephalic and atraumatic.   Neck:      Comments: Right paraspinous tenderness, no overlying erythema  Musculoskeletal:         General: Tenderness present.      Cervical back: Tenderness present.      Comments: She is able to full abduct right shoulder   Neurological:      Mental Status: She is alert.         Assessment and Plan   1. Chronic pain syndrome  She has been referred previously to [facility] management at [facility] due to insurance she is not eligbile for pain management services through [facility]. She has been prescribed amitriptyline in past but feels this is not helpful. She is very concerned abotu becoming dependent on pain medication and this has prevented her from even scheduling with pain clinic at [facility]. We had discussion today that given the chronicitiy of her pain I do not have any modality that will make her pain "go away" and that she would benefit from consultation with pain clinic she will contact them to schedule today. Clinically there is no evidence of motor weakness or radiculopathy her area of parathesia does not correspond to either a dermatomal or peripherial nerve pattern. I would recommend topical heat and muscle relaxer. She does not want to try higher doses of amitriptyline or a different TCA    2. Cervical disc disease  stable    3. S/P cervical spinal fusion  No evidnece of hardware movement or impingement to nerve root or spinal cord    "

## 2022-04-13 NOTE — TELEPHONE ENCOUNTER
Message sent to Radha Reid NP.      Jillian, [facility]  Pulm/Sleep [facility]  [phone]

## 2022-04-22 DIAGNOSIS — F41.9 ANXIETY: ICD-10-CM

## 2022-04-22 RX ORDER — HYDROXYZINE PAMOATE 50 MG/1
CAPSULE ORAL
Qty: 90 CAPSULE | Refills: 1 | Status: SHIPPED | OUTPATIENT
Start: 2022-04-22 | End: 2022-06-21

## 2022-04-27 ENCOUNTER — TELEPHONE (OUTPATIENT)
Dept: OTOLARYNGOLOGY | Facility: CLINIC | Age: 62
End: 2022-04-27
Payer: MEDICAID

## 2022-04-27 NOTE — TELEPHONE ENCOUNTER
----- Message from Samuel Singh sent at 4/27/2022  1:25 PM CDT -----  Type: Patient Call Back    Who called: Self    What is the request in detail: Pt was called by Lorraine    Can the clinic reply by [facility]? no    Would the patient rather a call back or a response via [facility]? Call back    Best call back number: [phone] (home)

## 2022-04-28 ENCOUNTER — HOSPITAL ENCOUNTER (EMERGENCY)
Facility: HOSPITAL | Age: 62
Discharge: HOME | End: 2022-04-28
Attending: INTERNAL MEDICINE
Payer: MEDICAID

## 2022-04-28 VITALS
HEART RATE: 70 BPM | BODY MASS INDEX: 52.26 KG/M2 | SYSTOLIC BLOOD PRESSURE: 116 MMHG | HEIGHT: 62 IN | OXYGEN SATURATION: 96 % | DIASTOLIC BLOOD PRESSURE: 60 MMHG | TEMPERATURE: 98 F | RESPIRATION RATE: 18 BRPM | WEIGHT: 284 LBS

## 2022-04-28 DIAGNOSIS — G89.4 CHRONIC PAIN SYNDROME: Primary | ICD-10-CM

## 2022-04-28 DIAGNOSIS — R51.9 PAIN OF SCALP: ICD-10-CM

## 2022-04-28 PROCEDURE — 99282 EMERGENCY DEPT VISIT SF MDM: CPT | Mod: ER

## 2022-04-28 NOTE — ED PROVIDER NOTES
Encounter Date: 2022       History     Chief Complaint   Patient presents with    KNOT TO SCALP     PT C/O LUMP TO POSTERIOR HEAD TO RIGHT SIDE FOR A MONTH, REPORTS SEEN HERE AT BEGINNING OF MONTH FOR SAME, REPORTS H/A ALL OVER, PT REPORTS IS ON HOME OXYGEN PRN BUT LEFT IT AT HOME, ALSO REQUESTING LEFT KNEE BE CHECKED SECONDARY TO PAIN     61-year-old female with past medical history significant for chronic pain syndrome presents to the emergency department complaining of recurrent pain to the right posterior scalp.  Patient has been seen in the emergency department and in her PCPs clinic with similar complaints this month.  Extensive workup (including CT of the soft tissues of the neck) was performed and revealed no acute disease.  Patient denies fever/chills/nausea/vomiting/chest pain/shortness of breath.  She was advised to follow-up in chronic pain clinic at [facility], but states she did not comply with her PCPs referral because I do not want to get hooked on pills.    The history is provided by the patient. No  was used.     Review of patient's allergies indicates:   Allergen Reactions    Vioxx [rofecoxib] Rash     Past Medical History:   Diagnosis Date    Asthma     CAD (coronary artery disease)     stent     Cervical spine disease     Depression     Difficult intubation     POSSIBLE    Falls 2018    GERD (gastroesophageal reflux disease)     Glaucoma     left eye    Helicobacter pylori (H. pylori) infection     History of stomach ulcers     HTN (hypertension)     Muscle weakness     LEFT    Neck problem     LIMITED ROM    Nuclear cataract 2014    Postsurgical hypothyroidism     Stroke     mini strokes    Thyroid cancer     Vitamin D deficiency disease      Past Surgical History:   Procedure Laterality Date    APPENDECTOMY      CERVICAL SPINE SURGERY      vocal cord damage     SECTION, LOW TRANSVERSE      x3    CHOLECYSTECTOMY       COLONOSCOPY N/A 12/31/2018    Procedure: COLONOSCOPY;  Surgeon: Chavo Wray MD;  Location: [facility] ENDO (2ND FLR);  Service: Endoscopy;  Laterality: N/A;    CORONARY ANGIOPLASTY WITH STENT PLACEMENT      x 3    ESOPHAGOGASTRODUODENOSCOPY N/A 12/31/2018    Procedure: EGD (ESOPHAGOGASTRODUODENOSCOPY);  Surgeon: Chavo Wray MD;  [facility]  Service: Endoscopy;  Laterality: N/A;  Hx of Anesthetic complications c spine surgery and thyroid surgery with known unilateral VC paralysis per patient, pt unsure of intubation history History of prior surgery of interest to airway management or planning - 2nd floor/not able to use current order,    PATELLA SURGERY  1969    THYROID SURGERY      total    TOTAL THYROIDECTOMY      TYMPANOPLASTY Right 10/19/2020    Procedure: TYMPANOPLASTY;  Surgeon: Shaan Garcia MD;  Location: [facility] OR [address] [place] [address];  Service: ENT;  Laterality: Right;     Family History   Problem Relation Age of Onset    Diabetes Father     Kidney failure Father     Cataracts Father     Asthma Father     Glaucoma Father     Heart disease Mother         has pacemaker    Hypertension Mother     Asthma Mother     Cancer Mother         lung stage 4    Ovarian cancer Mother     Breast cancer Mother     Cataracts Mother     Hypertension Brother     Heart disease Brother     Cervical cancer Daughter     Scoliosis Son     Hypertension Son     Hypertension Daughter     Seizures Daughter     Lupus Daughter     Cervical cancer Daughter     Mental illness Son         bipolar    Cancer Sister     Liver disease Sister     No Known Problems Brother     No Known Problems Daughter     Breast cancer Maternal Aunt     No Known Problems Maternal Uncle     No Known Problems Paternal Aunt     No Known Problems Paternal Uncle     Stomach cancer Maternal Grandmother     No Known Problems Paternal Grandmother     No Known Problems Paternal Grandfather     Stomach cancer Maternal Aunt     Breast  cancer Maternal Aunt     Breast cancer Maternal Aunt     No Known Problems Maternal Grandfather     Esophageal cancer Neg Hx      Social History     Tobacco Use    Smoking status: Never Smoker    Smokeless tobacco: Never Used    Tobacco comment: No cigarrettes, only marijuana occasionally   Substance Use Topics    Alcohol use: No     Comment: recovering alcoholic    Drug use: Yes     Frequency: 6.0 times per week     Types: Marijuana     Comment: ocasional     Review of Systems   Constitutional: Negative for chills and fever.   Respiratory: Negative for shortness of breath.    Cardiovascular: Negative for chest pain.   Gastrointestinal: Negative for abdominal pain, nausea and vomiting.   Neurological: Negative for dizziness, facial asymmetry and headaches.   All other systems reviewed and are negative.      Physical Exam     Initial Vitals [04/28/22 0325]   BP Pulse Resp Temp SpO2   116/60 70 (!) 22 97.9 °F (36.6 °C) 96 %      MAP       --         Physical Exam    Nursing note and vitals reviewed.  Constitutional: She is not diaphoretic. No distress.   Obese   HENT:   Head: Normocephalic and atraumatic.       Right Ear: External ear normal.   Left Ear: External ear normal.   Nose: Nose normal.   Mouth/Throat: Oropharynx is clear and moist. No oropharyngeal exudate.   Eyes: Conjunctivae and EOM are normal. Pupils are equal, round, and reactive to light.   Neck: Trachea normal. Neck supple. No stridor present. No tracheal tenderness present. No tracheal deviation present.   Normal range of motion.  Cardiovascular: Normal rate and regular rhythm.   Pulmonary/Chest: Breath sounds normal. No stridor. No respiratory distress.   Abdominal: Abdomen is soft. Bowel sounds are normal.   Musculoskeletal:      Cervical back: Normal range of motion and neck supple. No erythema or rigidity. No spinous process tenderness. Normal range of motion.     Neurological: She is alert. She has normal strength.   Skin: Skin is warm.  "        ED Course   Procedures  Labs Reviewed - No data to display       Imaging Results    None          Medications - No data to display  Medical Decision Making:   Initial Assessment:   61-year-old female with past medical history significant for chronic pain syndrome presents to the emergency department complaining of recurrent pain to the right posterior scalp.  Patient has been seen in the emergency department and in her PCPs clinic with similar complaints this month.  Extensive workup (including CT of the soft tissues of the neck) was performed and revealed no acute disease.  Patient denies fever/chills/nausea/vomiting/chest pain/shortness of breath.  She was advised to follow-up in chronic pain clinic at [facility], but states she did not comply with her PCPs referral because "I do not want to get hooked on pills."  ED Management:  Patient's recent emergency department and clinic visits were reviewed, including laboratory tests and imaging.  Patient was counseled on the need to follow up with pain clinic as previously advised by her PCP and states she will go to pain clinic for further evaluation/treatment.  She was given strict instructions to return to the emergency department if condition worsens.                      Clinical Impression:   Final diagnoses:  [G89.4] Chronic pain syndrome (Primary)  [R51.9] Pain of scalp          ED Disposition Condition    Discharge Stable        ED Prescriptions     None        Follow-up Information     Follow up With Specialties Details Why Contact Info    Guicho Lizarraga MD Internal Medicine, Wound Care Schedule an appointment as soon as possible for a visit in 1 day For reevaluation [address] [facility] [address]  [phone]             Cody Gaines MD  04/28/22 0400    "

## 2022-05-13 ENCOUNTER — TELEPHONE (OUTPATIENT)
Dept: OTOLARYNGOLOGY | Facility: CLINIC | Age: 62
End: 2022-05-13
Payer: MEDICAID

## 2022-05-13 NOTE — TELEPHONE ENCOUNTER
Spoke with patient in regards to rescheduling her upcoming appointment. Patient is now schedule for 6/30/22.

## 2022-05-13 NOTE — TELEPHONE ENCOUNTER
----- Message from Brigid Davis sent at 5/13/2022  9:52 AM CDT -----  Type:  Sooner Appointment Request    Patient is requesting a sooner appointment.  Patient declined first available appointment listed as well as another facility and provider .  Patient will not accept being placed on the waitlist and is requesting a message be sent to doctor.    Name of Caller:  Self     When is the first available appointment? System is not offering any appt's     Symptoms: follow up Ct results     Would the patient rather a call back or a response via [facility]? Call     Best Call Back Number: .[phone] (home)

## 2022-05-24 DIAGNOSIS — J30.89 NON-SEASONAL ALLERGIC RHINITIS DUE TO OTHER ALLERGIC TRIGGER: ICD-10-CM

## 2022-05-24 RX ORDER — AZELASTINE 1 MG/ML
SPRAY, METERED NASAL
Qty: 30 ML | Refills: 5 | Status: SHIPPED | OUTPATIENT
Start: 2022-05-24 | End: 2023-06-21 | Stop reason: CLARIF

## 2022-05-24 NOTE — TELEPHONE ENCOUNTER
No new care gaps identified.  [facility] Embedded Care Gaps. Reference number: [id number]. 5/24/2022   4:10:38 PM CDT

## 2022-05-24 NOTE — TELEPHONE ENCOUNTER
Refill Routing Note   Medication(s) are not appropriate for processing by [facility] for the following reason(s):      - Patient has been seen in the ED/Hospital since the last PCP visit    [facility] action(s):  Defer          Medication reconciliation completed: No     Appointments  past 12m or future 3m with PCP    Date Provider   Last Visit   4/7/2022 Guicho Lizarraga MD   Next Visit   Visit date not found Guicho Lizarraga MD   ED visits in past 90 days: 2        Note composed:4:47 PM 05/24/2022

## 2022-05-25 DIAGNOSIS — J30.89 NON-SEASONAL ALLERGIC RHINITIS DUE TO OTHER ALLERGIC TRIGGER: ICD-10-CM

## 2022-05-25 RX ORDER — FLUTICASONE PROPIONATE 50 MCG
SPRAY, SUSPENSION (ML) NASAL
Qty: 48 G | Refills: 3 | Status: SHIPPED | OUTPATIENT
Start: 2022-05-25 | End: 2023-06-21 | Stop reason: CLARIF

## 2022-05-25 NOTE — TELEPHONE ENCOUNTER
Refill Routing Note   Medication(s) are not appropriate for processing by [facility] for the following reason(s):      - Patient has been seen in the ED/Hospital since the last PCP visit    [facility] action(s):  Defer          Medication reconciliation completed: No     Appointments  past 12m or future 3m with PCP    Date Provider   Last Visit   4/7/2022 Guicho Lizarraga MD   Next Visit   Visit date not found Guicho Lizarraga MD   ED visits in past 90 days: 2        Note composed:11:50 AM 05/25/2022

## 2022-05-25 NOTE — TELEPHONE ENCOUNTER
No new care gaps identified.  [facility] Embedded Care Gaps. Reference number: [id number]. 5/25/2022   8:51:23 AM CDT

## 2022-05-27 ENCOUNTER — PATIENT MESSAGE (OUTPATIENT)
Dept: FAMILY MEDICINE | Facility: CLINIC | Age: 62
End: 2022-05-27
Payer: MEDICAID

## 2022-05-27 DIAGNOSIS — J45.41 MODERATE PERSISTENT ASTHMA WITH ACUTE EXACERBATION: Primary | ICD-10-CM

## 2022-05-27 RX ORDER — PREDNISONE 20 MG/1
40 TABLET ORAL DAILY
Qty: 10 TABLET | Refills: 0 | Status: SHIPPED | OUTPATIENT
Start: 2022-05-27 | End: 2022-06-01

## 2022-05-27 RX ORDER — DOXYCYCLINE HYCLATE 100 MG
100 TABLET ORAL 2 TIMES DAILY
Qty: 14 TABLET | Refills: 0 | Status: SHIPPED | OUTPATIENT
Start: 2022-05-27 | End: 2023-05-11 | Stop reason: CLARIF

## 2022-05-31 ENCOUNTER — PATIENT MESSAGE (OUTPATIENT)
Dept: ADMINISTRATIVE | Facility: HOSPITAL | Age: 62
End: 2022-05-31
Payer: MEDICAID

## 2022-07-06 ENCOUNTER — PATIENT MESSAGE (OUTPATIENT)
Dept: FAMILY MEDICINE | Facility: CLINIC | Age: 62
End: 2022-07-06
Payer: MEDICAID

## 2022-07-07 RX ORDER — DICYCLOMINE HYDROCHLORIDE 10 MG/1
10 CAPSULE ORAL 3 TIMES DAILY
Qty: 120 CAPSULE | Refills: 0 | Status: SHIPPED | OUTPATIENT
Start: 2022-07-07 | End: 2022-09-06

## 2022-08-24 ENCOUNTER — PATIENT MESSAGE (OUTPATIENT)
Dept: ADMINISTRATIVE | Facility: HOSPITAL | Age: 62
End: 2022-08-24
Payer: MEDICAID

## 2022-08-29 ENCOUNTER — TELEPHONE (OUTPATIENT)
Dept: OTOLARYNGOLOGY | Facility: CLINIC | Age: 62
End: 2022-08-29
Payer: MEDICAID

## 2022-08-29 NOTE — TELEPHONE ENCOUNTER
----- Message from Gem Perez sent at 8/29/2022 10:02 AM CDT -----  Contact: HAMILTON MIN [[id number]] [phone]  GENERAL CALL BACK    Patient got a confusing email reminder about an upcoming procedure on 11/16/22 and requests a phone call with clarification.     Contact Preference: Phone call [phone]

## 2022-08-29 NOTE — TELEPHONE ENCOUNTER
Spoke with patient in regards to her upcoming appointment she want to verify her appointment time.

## 2022-09-27 RX ORDER — TIOTROPIUM BROMIDE 18 UG/1
18 CAPSULE ORAL; RESPIRATORY (INHALATION) DAILY
Refills: 0 | OUTPATIENT
Start: 2022-09-27 | End: 2023-09-27

## 2022-09-27 NOTE — TELEPHONE ENCOUNTER
Message sent to Radha Reid NP.      Jillian, [facility]  Pulm/Sleep [facility]  [phone]

## 2022-10-24 ENCOUNTER — PATIENT MESSAGE (OUTPATIENT)
Dept: FAMILY MEDICINE | Facility: CLINIC | Age: 62
End: 2022-10-24
Payer: MEDICAID

## 2022-10-24 RX ORDER — DICYCLOMINE HYDROCHLORIDE 10 MG/1
10 CAPSULE ORAL 3 TIMES DAILY
Qty: 120 CAPSULE | Refills: 0 | Status: SHIPPED | OUTPATIENT
Start: 2022-10-24 | End: 2022-11-21 | Stop reason: SDUPTHER

## 2022-11-10 ENCOUNTER — OFFICE VISIT (OUTPATIENT)
Dept: OTOLARYNGOLOGY | Facility: CLINIC | Age: 62
End: 2022-11-10
Payer: MEDICAID

## 2022-11-10 ENCOUNTER — TELEPHONE (OUTPATIENT)
Dept: PULMONOLOGY | Facility: CLINIC | Age: 62
End: 2022-11-10
Payer: MEDICAID

## 2022-11-10 VITALS
BODY MASS INDEX: 47.32 KG/M2 | HEIGHT: 65 IN | DIASTOLIC BLOOD PRESSURE: 72 MMHG | SYSTOLIC BLOOD PRESSURE: 106 MMHG | WEIGHT: 284 LBS

## 2022-11-10 DIAGNOSIS — M26.622 ARTHRALGIA OF LEFT TEMPOROMANDIBULAR JOINT: ICD-10-CM

## 2022-11-10 DIAGNOSIS — H69.93 DYSFUNCTION OF BOTH EUSTACHIAN TUBES: ICD-10-CM

## 2022-11-10 DIAGNOSIS — Z85.850 HX OF THYROID CANCER: Primary | ICD-10-CM

## 2022-11-10 DIAGNOSIS — G47.33 OSA (OBSTRUCTIVE SLEEP APNEA): ICD-10-CM

## 2022-11-10 DIAGNOSIS — J34.3 HYPERTROPHY OF INFERIOR NASAL TURBINATE: ICD-10-CM

## 2022-11-10 DIAGNOSIS — J30.2 SEASONAL ALLERGIC RHINITIS, UNSPECIFIED TRIGGER: ICD-10-CM

## 2022-11-10 DIAGNOSIS — H90.A31 MIXED CONDUCTIVE AND SENSORINEURAL HEARING LOSS OF RIGHT EAR WITH RESTRICTED HEARING OF LEFT EAR: ICD-10-CM

## 2022-11-10 PROCEDURE — 99214 OFFICE O/P EST MOD 30 MIN: CPT | Mod: S$GLB,,, | Performed by: OTOLARYNGOLOGY

## 2022-11-10 PROCEDURE — 4010F ACE/ARB THERAPY RXD/TAKEN: CPT | Mod: CPTII,S$GLB,, | Performed by: OTOLARYNGOLOGY

## 2022-11-10 PROCEDURE — 3008F BODY MASS INDEX DOCD: CPT | Mod: CPTII,S$GLB,, | Performed by: OTOLARYNGOLOGY

## 2022-11-10 PROCEDURE — 3044F HG A1C LEVEL LT 7.0%: CPT | Mod: CPTII,S$GLB,, | Performed by: OTOLARYNGOLOGY

## 2022-11-10 PROCEDURE — 3008F PR BODY MASS INDEX (BMI) DOCUMENTED: ICD-10-PCS | Mod: CPTII,S$GLB,, | Performed by: OTOLARYNGOLOGY

## 2022-11-10 PROCEDURE — 3074F PR MOST RECENT SYSTOLIC BLOOD PRESSURE < 130 MM HG: ICD-10-PCS | Mod: CPTII,S$GLB,, | Performed by: OTOLARYNGOLOGY

## 2022-11-10 PROCEDURE — 3078F PR MOST RECENT DIASTOLIC BLOOD PRESSURE < 80 MM HG: ICD-10-PCS | Mod: CPTII,S$GLB,, | Performed by: OTOLARYNGOLOGY

## 2022-11-10 PROCEDURE — 99214 PR OFFICE/OUTPT VISIT, EST, LEVL IV, 30-39 MIN: ICD-10-PCS | Mod: S$GLB,,, | Performed by: OTOLARYNGOLOGY

## 2022-11-10 PROCEDURE — 3074F SYST BP LT 130 MM HG: CPT | Mod: CPTII,S$GLB,, | Performed by: OTOLARYNGOLOGY

## 2022-11-10 PROCEDURE — 3078F DIAST BP <80 MM HG: CPT | Mod: CPTII,S$GLB,, | Performed by: OTOLARYNGOLOGY

## 2022-11-10 PROCEDURE — 3044F PR MOST RECENT HEMOGLOBIN A1C LEVEL <7.0%: ICD-10-PCS | Mod: CPTII,S$GLB,, | Performed by: OTOLARYNGOLOGY

## 2022-11-10 PROCEDURE — 1159F MED LIST DOCD IN RCRD: CPT | Mod: CPTII,S$GLB,, | Performed by: OTOLARYNGOLOGY

## 2022-11-10 PROCEDURE — 1159F PR MEDICATION LIST DOCUMENTED IN MEDICAL RECORD: ICD-10-PCS | Mod: CPTII,S$GLB,, | Performed by: OTOLARYNGOLOGY

## 2022-11-10 PROCEDURE — 4010F PR ACE/ARB THEARPY RXD/TAKEN: ICD-10-PCS | Mod: CPTII,S$GLB,, | Performed by: OTOLARYNGOLOGY

## 2022-11-10 RX ORDER — FLUTICASONE PROPIONATE 50 MCG
2 SPRAY, SUSPENSION (ML) NASAL 2 TIMES DAILY
Qty: 18.2 ML | Refills: 3 | Status: SHIPPED | OUTPATIENT
Start: 2022-11-10

## 2022-11-10 RX ORDER — AZELASTINE 1 MG/ML
1 SPRAY, METERED NASAL 2 TIMES DAILY
Qty: 30 ML | Refills: 3 | Status: SHIPPED | OUTPATIENT
Start: 2022-11-10 | End: 2023-10-12

## 2022-11-10 NOTE — TELEPHONE ENCOUNTER
Scheduled an appointment to see provider.    KESHIA Walsh                   ----- Message from Sandy Kenny LPN sent at 11/10/2022  3:00 PM CST -----  Regarding: f/u appt.  Good afternoon        Please assist patient with scheduling a follow up with Jeanette.        Thanks  Sandy

## 2022-11-10 NOTE — PROGRESS NOTES
OTOLARYNGOLOGY CLINIC NOTE  Date:  11/10/2022     Chief complaint:  Chief Complaint   Patient presents with    Follow-up     Ct Results, been feeling dizziness at sometimes and falling. Needs nasal spray refills       History of Present Illness  Paras Smith is a 61 y.o. female  presenting today for a followup. I did not order a ct of her neck , this was ordered by another provider. I had her follow up with me out of abundance of precaution and to ensure etiology of hemoptysis had been elucidated. I did a flex scope at initial visit which was unrevealing. Her 2 month follow up is just now occurring due to scheduling conflicts with patient.     Coughing up dark stuff now that is gray . No longer coughing upblood  Needs refill o n inhaler not nasal sprays as she had told my nurse and is documented in chief complaint section.   Has some post nasal drainage having congestion in the nose as well   Having Left ear pain       Ear pain started when she stopped nasal sprays ( rx'd by someone else)   Someone else ordered ct neck but it is ok   Ct chest completed needs to follow up with sadie     I originally saw the patient on 2-16 - 22. Below text is copied from initial note on that date describing history of present illness at time of presentation.   treatment of hemoptysis. Referred by PCP Dr. Lizarraga  Blood comes out with coughing every once in a while   Does not recall what type of thyroid cancer she had but she states that she had to have surgery- had to have radiation and radioactive iodine     No nosebleeds. No throat pain. No issue with swallowing.   No recent mouth pain or dental issues  No lumps or bumps in neck      Hemoptysis started 2 years ago, has not been getting worse. Hemoptysis is daily.   Has postnasal drip  + GERD     + nasal congestion at times.      Sleep study done 2-4-22 with AHI of 23.7.recently started on oxygen by nasal cannula.     Per chart review of dr. layne , pt had invasive follicular  carcinoma and underwent total thyrod with radiaoactive iodine.        Past Medical History  Past Medical History:   Diagnosis Date    Asthma     CAD (coronary artery disease)     stent     Cervical spine disease     Depression     Difficult intubation     POSSIBLE    Falls 2018    GERD (gastroesophageal reflux disease)     Glaucoma     left eye    Helicobacter pylori (H. pylori) infection     History of stomach ulcers     HTN (hypertension)     Muscle weakness     LEFT    Neck problem     LIMITED ROM    Nuclear cataract 2014    Postsurgical hypothyroidism     Stroke     mini strokes    Thyroid cancer     Vitamin D deficiency disease         Past Surgical History  Past Surgical History:   Procedure Laterality Date    APPENDECTOMY      CERVICAL SPINE SURGERY      vocal cord damage     SECTION, LOW TRANSVERSE      x3    CHOLECYSTECTOMY      COLONOSCOPY N/A 2018    Procedure: COLONOSCOPY;  Surgeon: Chavo Wray MD;  Location: [facility] ([address] [place] [address]);  Service: Endoscopy;  Laterality: N/A;    CORONARY ANGIOPLASTY WITH STENT PLACEMENT      x 3    ESOPHAGOGASTRODUODENOSCOPY N/A 2018    Procedure: EGD (ESOPHAGOGASTRODUODENOSCOPY);  Surgeon: Chavo Wray MD;  Location: [facility] ([address] [place] [address]);  Service: Endoscopy;  Laterality: N/A;  Hx of Anesthetic complications c spine surgery and thyroid surgery with known unilateral VC paralysis per patient, pt unsure of intubation history History of prior surgery of interest to airway management or planning - 2nd floor/not able to use current order,    PATELLA SURGERY  1969    THYROID SURGERY      total    TOTAL THYROIDECTOMY      TYMPANOPLASTY Right 10/19/2020    Procedure: TYMPANOPLASTY;  Surgeon: Shaan Garcia MD;  Location: [facility] OR [address] [place] [address];  Service: ENT;  Laterality: Right;        Medications  Current Outpatient Medications on File Prior to Visit   Medication Sig Dispense Refill    acetaminophen (TYLENOL) 500 MG tablet Take 2 tablets (1,000 mg total) by mouth  every 6 (six) hours as needed for Pain (As needed for pain and fever). 30 tablet 0    AIMOVIG AUTOINJECTOR 70 mg/mL autoinjector every 30 days.      albuterol (PROVENTIL) 2.5 mg /3 mL (0.083 %) nebulizer solution Take 3 mLs (2.5 mg total) by nebulization every 6 (six) hours as needed for Wheezing or Shortness of Breath. Rescue 180 mL 2    albuterol (PROVENTIL/VENTOLIN HFA) 90 mcg/actuation inhaler Inhale 2 puffs into the lungs every 6 (six) hours as needed for Wheezing or Shortness of Breath (And cough). Use with spacer Dispense with 1 spacer 18 g 1    amLODIPine (NORVASC) 5 MG tablet TAKE ONE TABLET BY MOUTH ONCE A DAY FOR HIGH BLOOD PRESSURE 90 tablet 2    anthralin 1.2 % CmRR   0    aspirin 325 MG tablet Take 325 mg by mouth once daily.  11    atorvastatin (LIPITOR) 40 MG tablet Take 1 tablet by mouth once daily.      azelastine (ASTELIN) 137 mcg (0.1 %) nasal spray USE ONE SPRAY in each nostril TWICE DAILY 30 mL 5    baclofen (LIORESAL) 20 MG tablet TAKE ONE TABLET BY MOUTH THREE TIMES DAILY 90 tablet 1    chlorhexidine (HIBICLENS) 4 % external liquid Apply topically daily as needed. 946 mL 0    clobetasoL (TEMOVATE) 0.05 % cream APPLY TOPICALLY ONCE A DAY FOR 5 DAYS 30 g 0    clotrimazole (LOTRIMIN) 1 % cream Apply topically 2 (two) times daily. 30 g 2    dicyclomine (BENTYL) 10 MG capsule Take 1 capsule (10 mg total) by mouth 3 (three) times daily. 120 capsule 0    doxycycline (VIBRA-TABS) 100 MG tablet Take 1 tablet (100 mg total) by mouth 2 (two) times daily. 14 tablet 0    DULoxetine (CYMBALTA) 30 MG capsule Take 90 mg by mouth once daily.      ergocalciferol (ERGOCALCIFEROL) 50,000 unit Cap TAKE ONE CAPSULE BY MOUTH once every week 12 capsule 3    fluconazole (DIFLUCAN) 150 MG Tab Take 1 tablet (150 mg total) by mouth once a week. X 5 weeks 5 tablet 0    fluticasone propionate (FLONASE) 50 mcg/actuation nasal spray INSTILL ONE SPRAY IN EACH NOSTRIL TWICE DAILY 48 g 3    gabapentin (NEURONTIN) 400 MG  capsule Take 1,200 mg by mouth 3 (three) times daily.      hydrOXYzine pamoate (VISTARIL) 50 MG Cap TAKE ONE CAPSULE BY MOUTH EVERY 8 HOURS AS NEEDED FOR ANXIETY 90 capsule 1    ibuprofen (ADVIL,MOTRIN) 400 MG tablet Take 1 tablet (400 mg total) by mouth every 6 (six) hours as needed. 20 tablet 0    LIDOcaine (LIDODERM) 5 % Place 1 patch onto the skin once daily. Remove & Discard patch within 12 hours or as directed by MD 15 patch 0    lidocaine (LMX 4) 4 % cream Apply topically as needed. 30 g 1    lidocaine-prilocaine (EMLA) cream   0    lisinopriL 10 MG tablet Take 1 tablet (10 mg total) by mouth once daily. 90 tablet 3    meclizine (ANTIVERT) 12.5 mg tablet Take 2 tablets (25 mg total) by mouth 3 (three) times daily as needed for Dizziness. 30 tablet 0    metoprolol succinate (TOPROL-XL) 25 MG 24 hr tablet Take 25 mg by mouth once daily.      mometasone-formoterol (DULERA) 200-5 mcg/actuation inhaler Inhale 2 puffs into the lungs 2 (two) times daily. Controller 13 g 11    multivitamin capsule Take 1 capsule by mouth.      naproxen (NAPROSYN) 375 MG tablet   0    nitroGLYCERIN 0.4 MG/HR TD PT24 (NITRODUR) 0.4 mg/hr apply 1 patch ONTO THE SKIN ONCE DAILY 30 patch 11    pantoprazole (PROTONIX) 40 MG tablet TAKE ONE TABLET BY MOUTH ONCE A DAY 90 tablet 2    primidone (MYSOLINE) 50 MG Tab Take 50 mg by mouth 2 (two) times daily.      pulse oximeter (PULSE OXIMETER) device by Apply Externally route 2 (two) times a day. Use twice daily at 8 AM and 3 PM and record the value in [employer] as directed. 1 each 0    REXULTI 0.5 mg Tab 0.5 mg.  0    sertraline (ZOLOFT) 100 MG tablet Take 100 mg by mouth.      sodium chloride (OCEAN NASAL) 0.65 % nasal spray 1 spray by Nasal route every 3 (three) hours as needed for Congestion. 1 Bottle 12    SYNTHROID 112 mcg tablet TAKE ONE TABLET BY MOUTH MONDAY THROUGH SATURDAY AND SKIP SUNDAYS 28 tablet 11    topiramate (TOPAMAX) 100 MG tablet Take 50 mg by mouth once daily.  0     loratadine (CLARITIN) 10 mg tablet Take 1 tablet (10 mg total) by mouth once daily. 60 tablet 0    valACYclovir (VALTREX) 1000 MG tablet Take 2 tablets (2,000 mg total) by mouth 2 (two) times daily. 4 tablet 0     Current Facility-Administered Medications on File Prior to Visit   Medication Dose Route Frequency Provider Last Rate Last Admin    onabotulinumtoxina injection 200 Units  200 Units Intramuscular Q90 Days Aundrea Pathak MD           Review of Systems  Review of Systems   Constitutional: Negative.    HENT:  Positive for ear pain.    Eyes:  Positive for photophobia.   Respiratory: Negative.     Gastrointestinal:  Positive for abdominal pain and constipation.   Musculoskeletal:  Positive for back pain and neck pain.   Skin:  Positive for rash.   Neurological:  Positive for tremors, seizures and headaches.   Endo/Heme/Allergies:  Bruises/bleeds easily.   Psychiatric/Behavioral:  Positive for depression. The patient is nervous/anxious.       Social History   reports that she has never smoked. She has never used smokeless tobacco. She reports current drug use. Frequency: 6.00 times per week. Drug: Marijuana. She reports that she does not drink alcohol.     Family History  Family History   Problem Relation Age of Onset    Diabetes Father     Kidney failure Father     Cataracts Father     Asthma Father     Glaucoma Father     Heart disease Mother         has pacemaker    Hypertension Mother     Asthma Mother     Cancer Mother         lung stage 4    Ovarian cancer Mother     Breast cancer Mother     Cataracts Mother     Hypertension Brother     Heart disease Brother     Cervical cancer Daughter     Scoliosis Son     Hypertension Son     Hypertension Daughter     Seizures Daughter     Lupus Daughter     Cervical cancer Daughter     Mental illness Son         bipolar    Cancer Sister     Liver disease Sister     No Known Problems Brother     No Known Problems Daughter     Breast cancer Maternal Aunt     No  "Known Problems Maternal Uncle     No Known Problems Paternal Aunt     No Known Problems Paternal Uncle     Stomach cancer Maternal Grandmother     No Known Problems Paternal Grandmother     No Known Problems Paternal Grandfather     Stomach cancer Maternal Aunt     Breast cancer Maternal Aunt     Breast cancer Maternal Aunt     No Known Problems Maternal Grandfather     Esophageal cancer Neg Hx         Physical Exam   Vitals:    11/10/22 1427   BP: 106/72    Body mass index is 47.26 kg/m².  Weight: 128.8 kg (284 lb)   Height: 5' 5" (165.1 cm)     GENERAL: no acute distress.  HEAD: normocephalic.   EYES: No scleral icterus  EARS: external ear without lesion, normal pinna shape and position.  External auditory canal with normal cerumen, tympanic membrane fully visible, no perforation , no retraction. No middle ear effusion. Ossicles intact.   Had tympanoplasty on right   NOSE: external nose without significant bony abnormality, moderate turbinate hypertrophy  ORAL CAVITY/OROPHARYNX: tongue mobile. Tmj crepitus on left   NECK: trachea midline.   LYMPH NODES:No cervical lymphadenopathy.  RESPIRATORY: no stridor, no stertor.  Respirations nonlabored.  NEURO: alert, responds to questions appropriately.    PSYCH:mood appropriate      Imaging:  The patient does have any new imaging of the head and neck since last visit.   Pyriform sinus normal, no pathologically enlarged lymph nodes.     Ct chest report "Constellation of findings suggestive mild pulmonary edema versus bronchitis.     No evidence of metastatic disease."  Labs:  CBC  Recent Labs   Lab 05/31/20 2000 09/18/20  0837 09/20/22  1121   WBC 7.49 6.75 6.23   Hemoglobin 15.2 14.3 15.6   Hematocrit 46.0 44.8 45.9   MCV 94 97 91   Platelets 266 265 184     BMP  Recent Labs   Lab 05/31/20 2000 09/18/20  0837 02/18/22  1055 09/20/22  1244   Glucose 102 118 H  --  106   Sodium 138 139  --  139   Potassium 4.0 4.3  --  5.4 H   Chloride 107 105  --  102   CO2 20 L 24  --  " "24   BUN 14 17  --  14   Creatinine 0.8 0.7 0.6 0.59   Calcium 9.8 9.2  --  9.2   Magnesium  --   --   --  2.0     COAGS        Assessment  1. Hx of thyroid cancer    2. Mixed conductive and sensorineural hearing loss of right ear with restricted hearing of left ear    3. Dysfunction of both eustachian tubes    4. Arthralgia of left temporomandibular joint    5. Seasonal allergic rhinitis, unspecified trigger    6. Hypertrophy of inferior nasal turbinate    7. JENNIFER (obstructive sleep apnea)     Plan:  Discussed plan of care with patient in detail and all questions answered. Patient reported understanding of plan of care.   H/o tympanoplasty on right, recommend updated hearing test when any hearing changes and every 1-2 years. Last test 1-28-21  No evidence of recurrence of thyroid cancer by exam   I had ordered her a ct chest with contrast for the second time ( I did not place initial order)  because it had initially been ordered by pulmonology and was not scheduled or was canceleld and the initial order was unable to be used and I discussed with the patient and had my office staff reach out to pulm office to get a follow up regarding this and about getting cpap set up as this also had not been done for the patient. This was done for the patient to help her facilitate these things as everything had been lost to follow up and patient was having issues getting in touch with anyone therefore I tried to help her although this is not my area of expertise ( ct chest) and my office does not do cpap ordering.   I did review her ct of her neck that was completed in April of 2022 for " suspect infection" ordered by emanuel galan MD from what I can tell in epic system. No pathology noted - disagree about effacement of pyriform sinus   Suspect either etd or tmj regarding left ear pain. No pathology on prior scope or ct in left pharynx/pyriform sinus. Ear pain had been controlled in past with nasal sprays and started back with " cessation of nasal sprays.   I discussed with her about malignancy potential within differential diagnosis of unilateral ear discomfort. She does not have dysphagia. Hemoptysis sounds like has resolved from what I can discern from her description. Again needs pulm follow up for review of everything discussed above. I offered scheduled follow up, she prefers prn and will let me know if ear pain not improving on nasal sprays as it has in the past as I would like to rescope her if it does not  Saline, flonase and astelin recommended and prescribed rx sprays where indicated. Gave info on where/how to get saline spray and how to administer nasal spray regimen   F/u prn     Please be aware that this note has been generated with the assistance of MModal voice-to-text.  Please excuse any spelling or grammatical errors.

## 2022-11-10 NOTE — PATIENT INSTRUCTIONS
"Information and instructions from your visit with me today:    Start using the following medication nasal sprays:   Fluticasone spray:    This medication is a steroid spray. It stays within the nose and does not have absorption into the body that leads to side effects that one has with oral steroid medication. Fluticasone nasal spray is the same as the Flonase brand nasal spray. Discuss with your pharmacist if the price is lower over the counter or with a prescription ( this varies depending on insurance). The medication that is over the counter is the same as the prescription medication. Use this medication as instructed on the prescription, 1-2 sprays on each side of your nose twice daily.     Azelastine  spray:  This medication is an antihistamine used to treat nasal symptoms of allergy, which works specifically in the nose unlike antihistamine pills which have more of an effect on the whole body. Use this medication as instructed on the prescription, 1 spray on each side of your nose twice daily.     Additional instructions for medication sprays  Place the tip of the medication bottle in your nose and aim slightly up and out on each side to get medication high and deep into your nose and sinuses, and not have it all deposit in the very front of your nose. Aim the tip of the nozzle towards the outer corner of your eye . You can imagine aiming towards the back of your eyeball on each side for this, as opposed to straight back to the center of your nose and head.     You need to use this medication every day regardless of symptoms, as it takes time ( a few weeks) to work and get the benefits. It does not work on an "as needed" basis like taking a decongestant. If your symptoms only occur in a particular season, then the medication can be used seasonally instead of year long. For seasonal symptoms, you should start using the spray twice daily a month before when you normally have symptoms ( for example, if symptoms " start in August, should start at the end of June).     Start nasal irrigations with saline solution- you can either use a rinse or a mist spray:        NASAL SALINE SPRAY ( simply saline and arm and hammer are examples) There are several different brands found in the cold and flu aisle of the pharmacy. You can use any brand of saline spray - this will deliver the saline by a gentle mist ( if you have difficulty or discomfort with nasal rinse/ a lot of fluid in the nose, this will be more comfortable).       Always rinse your nose with saline prior to using medication sprays and wait a couple of hours before using again. You can use the saline throughout the day to help with stuffy nose or dry nose.    Do not use nasal decongestant sprays such as Afrin or similar products long term ( over 3 days) .  This can cause long term physical nasal addiction. Afrin should only be used if having nose bleeds, severe nasal congestion , or severe ear pain/fullness and should not be used for more than 2-3 days in a row . It is a not a medication that should be used for a long period of time.     It was nice meeting you today, and I look forward to helping you feel better soon. Please don't hesitate to call if you have any other questions or concerns, or if I can be of any assistance in the meantime.      Lizett Velasco MD    [facility]     Phone  [phone]    Fax      [phone]        Lizett Velasco MD  Otorhinolaryngology

## 2022-11-21 ENCOUNTER — PATIENT MESSAGE (OUTPATIENT)
Dept: FAMILY MEDICINE | Facility: CLINIC | Age: 62
End: 2022-11-21
Payer: MEDICAID

## 2022-11-21 RX ORDER — DICYCLOMINE HYDROCHLORIDE 10 MG/1
10 CAPSULE ORAL 3 TIMES DAILY
Qty: 120 CAPSULE | Refills: 0 | Status: SHIPPED | OUTPATIENT
Start: 2022-11-21 | End: 2023-01-10

## 2022-11-21 NOTE — TELEPHONE ENCOUNTER
Clinic hours for Dr. Vasquez:  Monday 8:20am - 4:30pm  Tuesday 8:20am - 4:30pm  Wednesday 8:20am - 4:30pm  Thursday 8:20am - 4:30pm  Friday           Not in    If you need a refill on your prescription please call your pharmacy and let them know. Please be proactive and call before your medication runs out. The pharmacy will then contact us for the refill. Please allow 24-48 hours for the refill to be processed.     If your physician has ordered additional laboratory or radiology testing the results will be discussed at your next visit. This will allow you the opportunity to go over the results in person with your provider. If your results require immediate intervention, you will be contacted sooner by phone call.     You may be receiving a patient satisfaction survey in the mail. Please take the time to complete, as your feedback is very important to us. We strive to make your experience exceptional and your comments help us with that goal. We look forward to hearing from you.       Continue daily calcium and vitamin D.  Repeat DXA after January 26th of next year.  Recommend fall precautions as discussed in office visit today.  Recommend weight bearing exercises as discussed in office visit today.  Follow up for office visit in February of next year.   Last office visit 4/7/22

## 2022-12-09 ENCOUNTER — PATIENT MESSAGE (OUTPATIENT)
Dept: OTOLARYNGOLOGY | Facility: CLINIC | Age: 62
End: 2022-12-09
Payer: MEDICAID

## 2022-12-10 ENCOUNTER — HOSPITAL ENCOUNTER (EMERGENCY)
Facility: HOSPITAL | Age: 62
Discharge: HOME | End: 2022-12-10
Attending: EMERGENCY MEDICINE
Payer: MEDICAID

## 2022-12-10 VITALS
DIASTOLIC BLOOD PRESSURE: 73 MMHG | SYSTOLIC BLOOD PRESSURE: 107 MMHG | OXYGEN SATURATION: 100 % | WEIGHT: 267 LBS | TEMPERATURE: 98 F | HEART RATE: 62 BPM | BODY MASS INDEX: 50.41 KG/M2 | HEIGHT: 61 IN | RESPIRATION RATE: 18 BRPM

## 2022-12-10 DIAGNOSIS — H92.01 EAR PAIN, RIGHT: Primary | ICD-10-CM

## 2022-12-10 DIAGNOSIS — S03.40XA TMJ (SPRAIN OF TEMPOROMANDIBULAR JOINT), INITIAL ENCOUNTER: ICD-10-CM

## 2022-12-10 PROCEDURE — 99284 EMERGENCY DEPT VISIT MOD MDM: CPT | Mod: ER

## 2022-12-10 RX ORDER — ACETAMINOPHEN 500 MG
500 TABLET ORAL EVERY 6 HOURS PRN
Qty: 30 TABLET | Refills: 0 | Status: SHIPPED | OUTPATIENT
Start: 2022-12-10 | End: 2023-05-25

## 2022-12-10 RX ORDER — AMOXICILLIN AND CLAVULANATE POTASSIUM 875; 125 MG/1; MG/1
1 TABLET, FILM COATED ORAL 2 TIMES DAILY
Qty: 20 TABLET | Refills: 0 | Status: SHIPPED | OUTPATIENT
Start: 2022-12-10 | End: 2023-05-11 | Stop reason: CLARIF

## 2022-12-10 RX ORDER — PREDNISONE 50 MG/1
50 TABLET ORAL DAILY
Qty: 5 TABLET | Refills: 0 | Status: SHIPPED | OUTPATIENT
Start: 2022-12-10 | End: 2022-12-15

## 2022-12-10 NOTE — DISCHARGE INSTRUCTIONS
Thank you for coming to our Emergency Department today. It is important to remember that some problems or medical conditions are difficult to diagnose and may not be found or addressed during your Emergency Department visit.     Be sure to follow up with your primary care doctor and review all labs/imaging/tests that were performed during your ER visit with them. Some labs/imaging/tests may be outside of the normal range, and require non-emergent follow-up and/or further investigation/treatment/procedures/testing to help diagnose/exclude/prevent complications or other potentially serious medical conditions that were not discussed or addressed during your ER visit.    If you do not have a primary care doctor, you may contact the one listed on your discharge paperwork or you may also call the [facility] Appointment Desk at [phone] to schedule an appointment and establish care with one. It is important to your health that you have a primary care doctor.    Please take all medications as directed. All medications may potentially have side-effects and it is impossible to predict which medications may give you side-effects or what side-effects (if any) they will give you.. If you feel that you are having a negative effect or side-effect of any medication you should immediately stop taking them and seek medical attention. If you feel that you are having a life-threatening reaction call 911.    Return to the ER with any questions/concerns, new/concerning symptoms, worsening or failure to improve.     Do not drive, swim, climb to height, take a bath, operate heavy machinery, drink alcohol or take potentially sedating medications, sign any legal documents or make any important decisions for 24 hours if you have received any pain medications, sedatives or mood altering drugs during your ER visit or within 24 hours of taking them if they have been prescribed to you.     You can find additional resources for Dentists,  hearing aids, durable medical equipment, low cost pharmacies and other resources at https://[facility].org    BELOW THIS LINE ONLY APPLIES IF YOU HAVE A COVID TEST PENDING OR IF YOU HAVE BEEN DIAGNOSED WITH COVID:  Please access [facility] to review the results of your test. Until the results of your COVID test return, you should isolate yourself so as not to potentially spread illness to others.   If your COVID test returns positive, you should isolate yourself so as not to spread illness to others. After five full days, if you are feeling better and you have not had fever for 24 hours, you can return to your typical daily activities, but you must wear a mask around others for an additional 5 days.   If your COVID test returns negative and you are either unvaccinated or more than six months out from your two-dose vaccine and are not yet boosted, you should still quarantine for 5 full days followed by strict mask use for an additional 5 full days.   If your COVID test returns negative and you have received your 2-dose initial vaccine as well as a booster, you should continue strict mask use for 10 full days after the exposure.  For all those exposed, best practice includes a test at day 5 after the exposure. This can be a home test or a test through one of the many testing centers throughout our community.   Masking is always advised to limit the spread of COVID. Cdc.gov is an excellent site to obtain the latest up to date recommendations regarding COVID and COVID testing.     CDC Testing and Quarantine Guidelines for patients with exposure to a known-positive COVID-19 person:  A close exposure is defined as anyone who has had an exposure (masked or unmasked) to a known COVID -19 positive person within 6 feet of someone for a cumulative total of 15 minutes or more over a 24-hour period.   Vaccinated and/or if you recently had a positive covid test within 90 days do NOT need to quarantine after contact with someone  who had COVID-19 unless you develop symptoms.   Fully vaccinated people who have not had a positive test within 90 days, should get tested 3-5 days after their exposure, even if they don't have symptoms and wear a mask indoors in public for 14 days following exposure or until their test result is negative.      Unvaccinated and/or NOT had a positive test within 90 days and meet close exposure  You are required by CDC guidelines to quarantine for at least 5 days from time of exposure followed by 5 days of strict masking. It is recommended, but not required to test after 5 days, unless you develop symptoms, in which case you should test at that time.  If you get tested after 5 days and your test is positive, your 5 day period of isolation starts the day of the positive test.    If your exposure does not meet the above definition, you can return to your normal daily activities to include social distancing, wearing a mask and frequent handwashing.      Here is a link to guidance from the CDC:  https://www.cdc.gov/media/releases/2021/s1227-isolation-quarantine-guidance.html      [facility] Testing Sites:  https://ldh.la.gov/page/3934      [facility] website with testing locations and guidance:  [email or web address]

## 2022-12-10 NOTE — ED PROVIDER NOTES
Encounter Date: 12/10/2022       History     Chief Complaint   Patient presents with    Otalgia     Pt presents to ed with c/o right ear pain x 1 week. States pain started radiating to her right jaw area 3 days ago. Denies any drainage from ear at this time.      62 y.o. female Past Medical History:  No date: Asthma  No date: CAD (coronary artery disease)      Comment:  stent 2003  No date: Cervical spine disease  No date: Depression  No date: Difficult intubation      Comment:  POSSIBLE  4/11/2018: Falls  No date: GERD (gastroesophageal reflux disease)  No date: Glaucoma      Comment:  left eye  No date: Helicobacter pylori (H. pylori) infection  No date: History of stomach ulcers  No date: HTN (hypertension)  No date: Muscle weakness      Comment:  LEFT  No date: Neck problem      Comment:  LIMITED ROM  5/28/2014: Nuclear cataract  No date: Postsurgical hypothyroidism  No date: Stroke      Comment:  mini strokes  No date: Thyroid cancer  No date: Vitamin D deficiency disease     Presents for evaluation of R ear pain x 1 week, notes prior TM reconstruction surgery. Notes at baseline she does not hear out of R ear. Also notes jaw pain/clicking, no dental pain    Review of patient's allergies indicates:   Allergen Reactions    Vioxx [rofecoxib] Rash     Past Medical History:   Diagnosis Date    Asthma     CAD (coronary artery disease)     stent 2003    Cervical spine disease     Depression     Difficult intubation     POSSIBLE    Falls 4/11/2018    GERD (gastroesophageal reflux disease)     Glaucoma     left eye    Helicobacter pylori (H. pylori) infection     History of stomach ulcers     HTN (hypertension)     Muscle weakness     LEFT    Neck problem     LIMITED ROM    Nuclear cataract 5/28/2014    Postsurgical hypothyroidism     Stroke     mini strokes    Thyroid cancer     Vitamin D deficiency disease      Past Surgical History:   Procedure Laterality Date    APPENDECTOMY      CERVICAL SPINE SURGERY      vocal  cord damage     SECTION, LOW TRANSVERSE      x3    CHOLECYSTECTOMY      COLONOSCOPY N/A 2018    Procedure: COLONOSCOPY;  Surgeon: Chavo Wray MD;  Location: [facility] (2ND FLR);  Service: Endoscopy;  Laterality: N/A;    CORONARY ANGIOPLASTY WITH STENT PLACEMENT      x 3    ESOPHAGOGASTRODUODENOSCOPY N/A 2018    Procedure: EGD (ESOPHAGOGASTRODUODENOSCOPY);  Surgeon: Chavo Wray MD;  Location: [facility] ENDO (2ND FLR);  Service: Endoscopy;  Laterality: N/A;  Hx of Anesthetic complications c spine surgery and thyroid surgery with known unilateral VC paralysis per patient, pt unsure of intubation history History of prior surgery of interest to airway management or planning - 2nd floor/not able to use current order,    PATELLA SURGERY  1969    THYROID SURGERY      total    TOTAL THYROIDECTOMY      TYMPANOPLASTY Right 10/19/2020    Procedure: TYMPANOPLASTY;  Surgeon: Shaan Garcia MD;  [facility];  Service: ENT;  Laterality: Right;     Family History   Problem Relation Age of Onset    Diabetes Father     Kidney failure Father     Cataracts Father     Asthma Father     Glaucoma Father     Heart disease Mother         has pacemaker    Hypertension Mother     Asthma Mother     Cancer Mother         lung stage 4    Ovarian cancer Mother     Breast cancer Mother     Cataracts Mother     Hypertension Brother     Heart disease Brother     Cervical cancer Daughter     Scoliosis Son     Hypertension Son     Hypertension Daughter     Seizures Daughter     Lupus Daughter     Cervical cancer Daughter     Mental illness Son         bipolar    Cancer Sister     Liver disease Sister     No Known Problems Brother     No Known Problems Daughter     Breast cancer Maternal Aunt     No Known Problems Maternal Uncle     No Known Problems Paternal Aunt     No Known Problems Paternal Uncle     Stomach cancer Maternal Grandmother     No Known Problems Paternal Grandmother     No Known Problems Paternal Grandfather      Stomach cancer Maternal Aunt     Breast cancer Maternal Aunt     Breast cancer Maternal Aunt     No Known Problems Maternal Grandfather     Esophageal cancer Neg Hx      Social History     Tobacco Use    Smoking status: Never    Smokeless tobacco: Never    Tobacco comments:     No cigarrettes, only marijuana occasionally   Substance Use Topics    Alcohol use: No     Comment: recovering alcoholic    Drug use: Yes     Frequency: 6.0 times per week     Types: Marijuana     Comment: ocasional     Review of Systems   Constitutional: Negative.    HENT: Negative.     Eyes: Negative.    Respiratory: Negative.     Cardiovascular: Negative.    Gastrointestinal: Negative.    Endocrine: Negative.    Genitourinary: Negative.    Musculoskeletal: Negative.    Skin: Negative.    Allergic/Immunologic: Negative.    Hematological: Negative.    Psychiatric/Behavioral: Negative.     All other systems reviewed and are negative.    Physical Exam     Initial Vitals [12/10/22 0929]   BP Pulse Resp Temp SpO2   107/73 62 18 97.7 °F (36.5 °C) 100 %      MAP       --         Physical Exam    Nursing note and vitals reviewed.  Constitutional: She appears well-developed and well-nourished.   HENT:   Head: Normocephalic and atraumatic.   Eyes: Conjunctivae and EOM are normal. Pupils are equal, round, and reactive to light.   Neck:   Normal range of motion.  Cardiovascular:  Normal rate.           Pulmonary/Chest: No respiratory distress.   Abdominal: She exhibits no distension.   Musculoskeletal:         General: Normal range of motion.      Cervical back: Normal range of motion.     Neurological: She is alert. No cranial nerve deficit. GCS score is 15. GCS eye subscore is 4. GCS verbal subscore is 5. GCS motor subscore is 6.   Skin: Skin is warm and dry.   Psychiatric: She has a normal mood and affect. Thought content normal.   R TMJ noted with clicking/popping when opening mouth  R tm occluded by wax.     ED Course   Procedures  Labs Reviewed -  No data to display       Imaging Results    None          Medications - No data to display                    Empirically start on augmentin, have advised soft diet. Pt allergic to Vioxx, will treat with steroids instead of nsaids.       Clinical Impression:   Final diagnoses:  [H92.01] Ear pain, right (Primary)  [S03.40XA] TMJ (sprain of temporomandibular joint), initial encounter      ED Disposition Condition    Discharge Stable          ED Prescriptions    None       Follow-up Information       Follow up With Specialties Details Why Contact Info    Guicho Lizarraga MD Internal Medicine, Wound Care   [address] [place] [id number]  [phone]               Jennifer Pillai MD  12/10/22 2570

## 2022-12-21 DIAGNOSIS — B37.2 CANDIDIASIS OF SKIN: ICD-10-CM

## 2022-12-22 RX ORDER — FLUCONAZOLE 150 MG/1
150 TABLET ORAL WEEKLY
Qty: 5 TABLET | Refills: 0 | OUTPATIENT
Start: 2022-12-22

## 2023-01-10 ENCOUNTER — OFFICE VISIT (OUTPATIENT)
Dept: PULMONOLOGY | Facility: CLINIC | Age: 63
End: 2023-01-10
Payer: MEDICAID

## 2023-01-10 VITALS
HEART RATE: 69 BPM | BODY MASS INDEX: 51.16 KG/M2 | WEIGHT: 270.94 LBS | SYSTOLIC BLOOD PRESSURE: 121 MMHG | HEIGHT: 61 IN | DIASTOLIC BLOOD PRESSURE: 68 MMHG | OXYGEN SATURATION: 98 %

## 2023-01-10 DIAGNOSIS — G47.33 OSA (OBSTRUCTIVE SLEEP APNEA): Primary | ICD-10-CM

## 2023-01-10 DIAGNOSIS — J45.998 PERSISTENT ASTHMA WITH UNDETERMINED SEVERITY: ICD-10-CM

## 2023-01-10 DIAGNOSIS — J45.40 MODERATE PERSISTENT ASTHMA, UNSPECIFIED WHETHER COMPLICATED: ICD-10-CM

## 2023-01-10 PROBLEM — J45.909 ASTHMATIC BRONCHITIS WITHOUT COMPLICATION: Status: ACTIVE | Noted: 2023-01-10

## 2023-01-10 PROCEDURE — 99214 OFFICE O/P EST MOD 30 MIN: CPT | Mod: S$PBB,,, | Performed by: NURSE PRACTITIONER

## 2023-01-10 PROCEDURE — 1159F MED LIST DOCD IN RCRD: CPT | Mod: CPTII,,, | Performed by: NURSE PRACTITIONER

## 2023-01-10 PROCEDURE — 99214 PR OFFICE/OUTPT VISIT, EST, LEVL IV, 30-39 MIN: ICD-10-PCS | Mod: S$PBB,,, | Performed by: NURSE PRACTITIONER

## 2023-01-10 PROCEDURE — 3078F PR MOST RECENT DIASTOLIC BLOOD PRESSURE < 80 MM HG: ICD-10-PCS | Mod: CPTII,,, | Performed by: NURSE PRACTITIONER

## 2023-01-10 PROCEDURE — 3074F PR MOST RECENT SYSTOLIC BLOOD PRESSURE < 130 MM HG: ICD-10-PCS | Mod: CPTII,,, | Performed by: NURSE PRACTITIONER

## 2023-01-10 PROCEDURE — 3078F DIAST BP <80 MM HG: CPT | Mod: CPTII,,, | Performed by: NURSE PRACTITIONER

## 2023-01-10 PROCEDURE — 99215 OFFICE O/P EST HI 40 MIN: CPT | Mod: PBBFAC | Performed by: NURSE PRACTITIONER

## 2023-01-10 PROCEDURE — 3008F BODY MASS INDEX DOCD: CPT | Mod: CPTII,,, | Performed by: NURSE PRACTITIONER

## 2023-01-10 PROCEDURE — 3008F PR BODY MASS INDEX (BMI) DOCUMENTED: ICD-10-PCS | Mod: CPTII,,, | Performed by: NURSE PRACTITIONER

## 2023-01-10 PROCEDURE — 3074F SYST BP LT 130 MM HG: CPT | Mod: CPTII,,, | Performed by: NURSE PRACTITIONER

## 2023-01-10 PROCEDURE — 99999 PR PBB SHADOW E&M-EST. PATIENT-LVL V: CPT | Mod: PBBFAC,,, | Performed by: NURSE PRACTITIONER

## 2023-01-10 PROCEDURE — 1159F PR MEDICATION LIST DOCUMENTED IN MEDICAL RECORD: ICD-10-PCS | Mod: CPTII,,, | Performed by: NURSE PRACTITIONER

## 2023-01-10 PROCEDURE — 99999 PR PBB SHADOW E&M-EST. PATIENT-LVL V: ICD-10-PCS | Mod: PBBFAC,,, | Performed by: NURSE PRACTITIONER

## 2023-01-10 RX ORDER — DICYCLOMINE HYDROCHLORIDE 10 MG/1
CAPSULE ORAL
Qty: 120 CAPSULE | Refills: 2 | Status: SHIPPED | OUTPATIENT
Start: 2023-01-10 | End: 2023-03-28

## 2023-01-10 RX ORDER — TIOTROPIUM BROMIDE 18 UG/1
18 CAPSULE ORAL; RESPIRATORY (INHALATION) DAILY
Qty: 90 CAPSULE | Refills: 4 | Status: SHIPPED | OUTPATIENT
Start: 2023-01-10 | End: 2024-01-11 | Stop reason: SDUPTHER

## 2023-01-10 RX ORDER — MOMETASONE FUROATE AND FORMOTEROL FUMARATE DIHYDRATE 200; 5 UG/1; UG/1
2 AEROSOL RESPIRATORY (INHALATION) 2 TIMES DAILY
Qty: 13 G | Refills: 11 | Status: SHIPPED | OUTPATIENT
Start: 2023-01-10 | End: 2024-02-23
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
## 2023-01-10 NOTE — PATIENT INSTRUCTIONS
Information regarding testing ordered by your provider today:    IN LAB, CPAP TITRATION:  Your provider has ordered an in lab sleep study for CPAP titration. This order has been sent to our billing/pre-service department to be approved by your insurance company. Once the study has been approved (this can take up to 14 business days depending on your insurance), we will call you to schedule an appointment. Once the appointment is scheduled, our Financial Clearance Call Center will be able to provide you with an anticipated out of pocket cost such as a co-payment or unmet deductible amount. The Financial Clearance Call Center can be reached at [phone]. You may also call your insurance company directly and give them the procedure code CPT 62659 to receive an estimate of your out of pocket cost.

## 2023-01-12 ENCOUNTER — TELEPHONE (OUTPATIENT)
Dept: PULMONOLOGY | Facility: CLINIC | Age: 63
End: 2023-01-12
Payer: MEDICAID

## 2023-01-12 ENCOUNTER — TELEPHONE (OUTPATIENT)
Dept: SLEEP MEDICINE | Facility: OTHER | Age: 63
End: 2023-01-12
Payer: MEDICAID

## 2023-01-12 NOTE — TELEPHONE ENCOUNTER
Left message to call office back.    KESHIA Walsh  Pulm/Sleep [facility] [place]  [phone]             ----- Message from Jillian Peng MA sent at 1/12/2023  8:35 AM CST -----  KESHIA Bello Staff  Caller: Unspecified (Today,  8:34 AM)  Type: Patient Call Back     Who called:Self     What is the request in detail:pt. Is asking to reschedule her appt.     Can the clinic reply by [facility]?No     Would the patient rather a call back or a response via [facility]? yes     Best call back number: [phone] (home)

## 2023-01-18 ENCOUNTER — PATIENT MESSAGE (OUTPATIENT)
Dept: ADMINISTRATIVE | Facility: HOSPITAL | Age: 63
End: 2023-01-18
Payer: MEDICAID

## 2023-01-20 ENCOUNTER — TELEPHONE (OUTPATIENT)
Dept: PULMONOLOGY | Facility: CLINIC | Age: 63
End: 2023-01-20
Payer: MEDICAID

## 2023-01-20 ENCOUNTER — TELEPHONE (OUTPATIENT)
Dept: SLEEP MEDICINE | Facility: OTHER | Age: 63
End: 2023-01-20
Payer: MEDICAID

## 2023-01-20 NOTE — TELEPHONE ENCOUNTER
Returned call no answer.                      ----- Message from Morales Esqueda sent at 1/20/2023  9:29 AM CST -----      Name of Who is Calling: HAMILTON MIN [[id number]]      What is the request in detail: Pt called to speak with the office regarding sleep study and weather tomorrow.Please contact to further discuss and advise.          Can the clinic reply by [facility]: N      What Number to Call Back [facility] not [facility]: [phone]

## 2023-01-23 ENCOUNTER — TELEPHONE (OUTPATIENT)
Dept: FAMILY MEDICINE | Facility: CLINIC | Age: 63
End: 2023-01-23
Payer: MEDICAID

## 2023-01-23 NOTE — TELEPHONE ENCOUNTER
----- Message from Dara Yang sent at 1/23/2023  1:44 PM CST -----  Regarding: Request for Sooner Apt  Type:  Sooner Appointment Request    Patient is requesting a sooner appointment.  Patient declined first available appointment listed as well as another facility and provider .  Patient will not accept being placed on the waitlist and is requesting a message be sent to doctor.    Name of Caller: Self     When is the first available appointment? 2/23/23    Symptoms: Pain in her jaw and goes to ear. Has swelling knees as well     Would the patient rather a call back or a response via [facility]? Call     Best Call Back Number: .[phone]

## 2023-01-24 ENCOUNTER — TELEPHONE (OUTPATIENT)
Dept: SLEEP MEDICINE | Facility: OTHER | Age: 63
End: 2023-01-24
Payer: MEDICAID

## 2023-01-24 ENCOUNTER — TELEPHONE (OUTPATIENT)
Dept: PULMONOLOGY | Facility: CLINIC | Age: 63
End: 2023-01-24
Payer: MEDICAID

## 2023-01-24 ENCOUNTER — HOSPITAL ENCOUNTER (OUTPATIENT)
Dept: RESPIRATORY THERAPY | Facility: HOSPITAL | Age: 63
Discharge: HOME | End: 2023-01-24
Attending: NURSE PRACTITIONER
Payer: MEDICAID

## 2023-01-24 DIAGNOSIS — J45.40 MODERATE PERSISTENT ASTHMA, UNSPECIFIED WHETHER COMPLICATED: ICD-10-CM

## 2023-01-24 DIAGNOSIS — J45.40 MODERATE PERSISTENT ASTHMATIC BRONCHITIS WITHOUT COMPLICATION: Primary | ICD-10-CM

## 2023-01-24 DIAGNOSIS — R06.02 SOBOE (SHORTNESS OF BREATH ON EXERTION): ICD-10-CM

## 2023-01-24 DIAGNOSIS — R04.2 HEMOPTYSIS: ICD-10-CM

## 2023-01-24 DIAGNOSIS — R05.3 CHRONIC COUGH: ICD-10-CM

## 2023-01-24 DIAGNOSIS — J96.11 CHRONIC RESPIRATORY FAILURE WITH HYPOXIA: ICD-10-CM

## 2023-01-24 DIAGNOSIS — J45.40 MODERATE PERSISTENT ASTHMATIC BRONCHITIS WITHOUT COMPLICATION: ICD-10-CM

## 2023-01-24 RX ORDER — ALBUTEROL SULFATE 0.83 MG/ML
2.5 SOLUTION RESPIRATORY (INHALATION) ONCE
Status: DISCONTINUED | OUTPATIENT
Start: 2023-01-24 | End: 2023-01-24

## 2023-01-24 RX ORDER — ALBUTEROL SULFATE 2.5 MG/.5ML
2.5 SOLUTION RESPIRATORY (INHALATION) ONCE
Status: DISCONTINUED | OUTPATIENT
Start: 2023-01-24 | End: 2023-01-24

## 2023-01-24 RX ORDER — ALBUTEROL SULFATE 2.5 MG/.5ML
2.5 SOLUTION RESPIRATORY (INHALATION) ONCE
Status: DISCONTINUED | OUTPATIENT
Start: 2023-01-24 | End: 2023-01-24 | Stop reason: RX

## 2023-01-24 RX ORDER — ALBUTEROL SULFATE 0.83 MG/ML
2.5 SOLUTION RESPIRATORY (INHALATION) ONCE
Status: DISCONTINUED | OUTPATIENT
Start: 2023-01-24 | End: 2023-01-25 | Stop reason: HOSPADM

## 2023-01-24 NOTE — RESPIRATORY THERAPY
Pt arrived for PFT testing. Pt was not feeling well. Pt stated she was very nauseated and needed to vomit. Pt will reschedule PFT and 6MW.

## 2023-01-24 NOTE — TELEPHONE ENCOUNTER
R/S PFT appt and sent to patients portal.                    ----- Message from Jillian Peng MA sent at 1/24/2023  9:26 AM CST -----  Tiffanie Garcia Staff  Caller: Unspecified (Today,  9:01 AM)  Good morning,        This pt had came in this morning for her PFT and 6 min walk test, unfortunately the pt started feeling sick before the test could begin so she called to get it rescheduled. Before I could the system showed the orders had already been released and needed new orders. Would it be possible to get a new set of orders put in so I could get her rescheduled?                        Thank you,    Tiffanie Davidson

## 2023-01-25 PROBLEM — J45.998 PERSISTENT ASTHMA WITH UNDETERMINED SEVERITY: Status: ACTIVE | Noted: 2023-01-10

## 2023-01-31 ENCOUNTER — HOSPITAL ENCOUNTER (OUTPATIENT)
Dept: RADIOLOGY | Facility: HOSPITAL | Age: 63
Discharge: HOME | End: 2023-01-31
Attending: INTERNAL MEDICINE
Payer: MEDICAID

## 2023-01-31 VITALS — BODY MASS INDEX: 51.16 KG/M2 | WEIGHT: 270.94 LBS | HEIGHT: 61 IN

## 2023-01-31 DIAGNOSIS — Z12.31 OTHER SCREENING MAMMOGRAM: ICD-10-CM

## 2023-01-31 PROCEDURE — 77067 SCR MAMMO BI INCL CAD: CPT | Mod: 26,,, | Performed by: RADIOLOGY

## 2023-01-31 PROCEDURE — 77063 MAMMO DIGITAL SCREENING BILAT WITH TOMO: ICD-10-PCS | Mod: 26,,, | Performed by: RADIOLOGY

## 2023-01-31 PROCEDURE — 77063 BREAST TOMOSYNTHESIS BI: CPT | Mod: 26,,, | Performed by: RADIOLOGY

## 2023-01-31 PROCEDURE — 77067 SCR MAMMO BI INCL CAD: CPT | Mod: TC

## 2023-01-31 PROCEDURE — 77067 MAMMO DIGITAL SCREENING BILAT WITH TOMO: ICD-10-PCS | Mod: 26,,, | Performed by: RADIOLOGY

## 2023-02-15 ENCOUNTER — PATIENT MESSAGE (OUTPATIENT)
Dept: PULMONOLOGY | Facility: CLINIC | Age: 63
End: 2023-02-15
Payer: MEDICAID

## 2023-02-15 DIAGNOSIS — G47.33 OBSTRUCTIVE SLEEP APNEA: Primary | ICD-10-CM

## 2023-03-13 DIAGNOSIS — M54.2 CERVICALGIA: ICD-10-CM

## 2023-03-13 RX ORDER — BACLOFEN 20 MG/1
TABLET ORAL
Qty: 90 TABLET | Refills: 1 | Status: SHIPPED | OUTPATIENT
Start: 2023-03-13 | End: 2023-05-15

## 2023-03-13 NOTE — TELEPHONE ENCOUNTER
Refill Routing Note   Medication(s) are not appropriate for processing by [facility] for the following reason(s):         Medication outside of protocol  Responsible provider unclear    [facility] action(s):  Route         Appointments  past 12m or future 3m with PCP    Date Provider   Last Visit   4/7/2022 Guicho Lizarraga MD   Next Visit   Visit date not found Guicho Lizarraga MD   ED visits in past 90 days: 0        Note composed:10:05 AM 03/13/2023       F

## 2023-03-31 ENCOUNTER — CLINICAL SUPPORT (OUTPATIENT)
Dept: OPHTHALMOLOGY | Facility: CLINIC | Age: 63
End: 2023-03-31
Payer: MEDICAID

## 2023-03-31 ENCOUNTER — OFFICE VISIT (OUTPATIENT)
Dept: OPTOMETRY | Facility: CLINIC | Age: 63
End: 2023-03-31
Payer: MEDICAID

## 2023-03-31 DIAGNOSIS — H52.7 REFRACTIVE ERROR: ICD-10-CM

## 2023-03-31 DIAGNOSIS — H40.023 OAG (OPEN ANGLE GLAUCOMA) SUSPECT, HIGH RISK, BILATERAL: ICD-10-CM

## 2023-03-31 DIAGNOSIS — H25.13 NUCLEAR SCLEROSIS OF BOTH EYES: Primary | ICD-10-CM

## 2023-03-31 PROCEDURE — 1159F PR MEDICATION LIST DOCUMENTED IN MEDICAL RECORD: ICD-10-PCS | Mod: CPTII,,, | Performed by: OPTOMETRIST

## 2023-03-31 PROCEDURE — 1160F PR REVIEW ALL MEDS BY PRESCRIBER/CLIN PHARMACIST DOCUMENTED: ICD-10-PCS | Mod: CPTII,,, | Performed by: OPTOMETRIST

## 2023-03-31 PROCEDURE — 4010F ACE/ARB THERAPY RXD/TAKEN: CPT | Mod: CPTII,,, | Performed by: OPTOMETRIST

## 2023-03-31 PROCEDURE — 99214 OFFICE O/P EST MOD 30 MIN: CPT | Mod: S$PBB,,, | Performed by: OPTOMETRIST

## 2023-03-31 PROCEDURE — 4010F PR ACE/ARB THEARPY RXD/TAKEN: ICD-10-PCS | Mod: CPTII,,, | Performed by: OPTOMETRIST

## 2023-03-31 PROCEDURE — 99214 PR OFFICE/OUTPT VISIT, EST, LEVL IV, 30-39 MIN: ICD-10-PCS | Mod: S$PBB,,, | Performed by: OPTOMETRIST

## 2023-03-31 PROCEDURE — 1159F MED LIST DOCD IN RCRD: CPT | Mod: CPTII,,, | Performed by: OPTOMETRIST

## 2023-03-31 PROCEDURE — 92015 PR REFRACTION: ICD-10-PCS | Mod: ,,, | Performed by: OPTOMETRIST

## 2023-03-31 PROCEDURE — 92015 DETERMINE REFRACTIVE STATE: CPT | Mod: ,,, | Performed by: OPTOMETRIST

## 2023-03-31 PROCEDURE — 1160F RVW MEDS BY RX/DR IN RCRD: CPT | Mod: CPTII,,, | Performed by: OPTOMETRIST

## 2023-03-31 NOTE — PROGRESS NOTES
Subjective:       Patient ID: Paras Smith is a 62 y.o. female      Chief Complaint   Patient presents with    Concerns About Ocular Health    Glaucoma Suspect     History of Present Illness   Dls 4/6/22 Dr. Carrillo    63 Y/o female is here for ocular health check.  C/o blurry vision pt states she cannot hardly drive due to blurry vision   Pt wearing otc readers. Did not get rx glasses filled   Pt denies pain and discomfort   Pt say's she see's floaters all the times     Eye med: Systane OU TID          Assessment/Plan:     1. Nuclear sclerosis of both eyes  Pt advised that blurry vision is due to uncorrected MRx - needs to get glasses. NSC not VS at this time. Educated pt on presence of cataracts and effects on vision. No surgery at this time. Recheck in one year, sooner PRN.    2. OAG (open angle glaucoma) suspect, high risk, bilateral  Seen by Dr. Lyman in 2014, monitor as suspect. OCT 2019 with no defect per old notes. OCT RNFL today stable compared to 2019 scan. Monitor as high risk due to enlarged CDR and +FHx (father).     3. Refractive error  Needs MRx for driving. Educated patient on refractive error and discussed lens options. Dispensed updated spectacle Rx. Educated about adaptation period to new specs.    Eyeglass Final Rx       Eyeglass Final Rx         Sphere Cylinder Axis Add    Right +0.75 +0.75 035 +2.50    Left +0.50 +0.50 160 +2.50      Expiration Date: 3/31/2024                      Follow up in about 1 year (around 3/31/2024).

## 2023-04-03 NOTE — PROGRESS NOTES
Visual field test done.  Patient stated no latex allergies used coverlet       Sphere Cylinder Axis Add   Right +0.75 +0.75 035 +2.50   Left +0.50 +0.50 160 +2.50   Expiration Date: 3/31/2024

## 2023-04-27 RX ORDER — ERGOCALCIFEROL 1.25 MG/1
CAPSULE ORAL
Qty: 12 CAPSULE | Refills: 3 | Status: SHIPPED | OUTPATIENT
Start: 2023-04-27

## 2023-04-27 NOTE — TELEPHONE ENCOUNTER
Refill Routing Note   Medication(s) are not appropriate for processing by [facility] for the following reason(s):      - Outside of protocol  - [facility] REQUIREMENT; PARTICIPATING PROVIDER ESTABLISHED AS PCP  - Unclear if patient follows you    [facility] action(s):  Route          Medication reconciliation completed: No     Appointments  past 12m or future 3m with PCP    Date Provider   Last Visit   4/7/2022 Guicho Lizarraga MD   Next Visit   Visit date not found Guicho Lizarraga MD   ED visits in past 90 days: 1        Note composed:9:04 PM 04/26/2023

## 2023-05-15 DIAGNOSIS — M54.2 CERVICALGIA: ICD-10-CM

## 2023-05-15 RX ORDER — BACLOFEN 20 MG/1
TABLET ORAL
Qty: 90 TABLET | Refills: 1 | Status: SHIPPED | OUTPATIENT
Start: 2023-05-15

## 2023-05-15 NOTE — TELEPHONE ENCOUNTER
Refill Routing Note   Medication(s) are not appropriate for processing by [facility] for the following reason(s):      Medication outside of protocol    [facility] action(s):  Route None identified          Appointments  past 12m or future 3m with PCP    Date Provider   Last Visit   4/7/2022 Guicho Lizarraga MD   Next Visit   Visit date not found Guicho Lizarraga MD   ED visits in past 90 days: 1        Note composed:9:04 AM 05/15/2023

## 2023-05-31 DIAGNOSIS — F41.9 ANXIETY: ICD-10-CM

## 2023-05-31 RX ORDER — HYDROXYZINE PAMOATE 50 MG/1
CAPSULE ORAL
Qty: 90 CAPSULE | Refills: 1 | Status: SHIPPED | OUTPATIENT
Start: 2023-05-31

## 2023-05-31 NOTE — TELEPHONE ENCOUNTER
Refill Routing Note   Medication(s) are not appropriate for processing by [facility] for the following reason(s):      Medication outside of protocol    [facility] action(s):  Route None identified          Appointments  past 12m or future 3m with PCP    Date Provider   Last Visit   4/7/2022 Guicho Lizarraga MD   Next Visit   Visit date not found Guicho Lizarraga MD   ED visits in past 90 days: 1        Note composed:11:09 AM 05/31/2023

## 2023-06-21 PROBLEM — Z71.89 ACP (ADVANCE CARE PLANNING): Status: ACTIVE | Noted: 2023-06-21

## 2023-06-21 PROBLEM — I25.110 CORONARY ARTERY DISEASE INVOLVING NATIVE CORONARY ARTERY OF NATIVE HEART WITH UNSTABLE ANGINA PECTORIS: Status: ACTIVE | Noted: 2023-06-21

## 2023-10-12 RX ORDER — AZELASTINE 1 MG/ML
1 SPRAY, METERED NASAL 2 TIMES DAILY
Qty: 30 ML | Refills: 11 | Status: SHIPPED | OUTPATIENT
Start: 2023-10-12

## 2024-01-11 DIAGNOSIS — J45.40 MODERATE PERSISTENT ASTHMA, UNSPECIFIED WHETHER COMPLICATED: ICD-10-CM

## 2024-01-11 RX ORDER — TIOTROPIUM BROMIDE 18 UG/1
18 CAPSULE ORAL; RESPIRATORY (INHALATION) DAILY
Qty: 90 CAPSULE | Refills: 4 | Status: SHIPPED | OUTPATIENT
Start: 2024-01-11 | End: 2025-01-10

## 2024-02-01 NOTE — TELEPHONE ENCOUNTER
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

## 2024-07-25 PROBLEM — I10 ESSENTIAL HYPERTENSION: Status: ACTIVE | Noted: 2024-07-25

## 2024-07-25 PROBLEM — R73.9 HYPERGLYCEMIA: Status: ACTIVE | Noted: 2024-07-25

## 2024-09-20 RX ORDER — AZELASTINE 1 MG/ML
1 SPRAY, METERED NASAL 2 TIMES DAILY
Qty: 30 ML | Refills: 11 | Status: SHIPPED | OUTPATIENT
Start: 2024-09-20

## (undated) DEVICE — BLADE SCALP OPHTL RND TIP

## (undated) DEVICE — SEE MEDLINE ITEM 146373

## (undated) DEVICE — ELECTRODE REM PLYHSV RETURN 9

## (undated) DEVICE — COTTONBALL LG ST

## (undated) DEVICE — SEE MEDLINE ITEM 157128

## (undated) DEVICE — DRAPE MICROSCOPE OPMI

## (undated) DEVICE — NDL HYPO 27G X 1 1/2

## (undated) DEVICE — SEE MEDLINE ITEM 157103

## (undated) DEVICE — SEE MEDLINE ITEM 154981

## (undated) DEVICE — SUT BONE WAX 2.5 GRMS 12/BX

## (undated) DEVICE — SOL IRR WATER STRL 3000 ML

## (undated) DEVICE — BLADE SCALP OPTHL NDL TIP 3MM

## (undated) DEVICE — KIT EAR EAOFE

## (undated) DEVICE — BLADE SCALP BEAVER 2.5MM ANG

## (undated) DEVICE — BURR ROUND DMND CRS ELT 4.0MM

## (undated) DEVICE — SUCTION SURGICAL FRAZR

## (undated) DEVICE — Device

## (undated) DEVICE — DRAPE SURGICAL STERI IRRG PCH

## (undated) DEVICE — SUT 3/0 18IN COATED VICRYLP

## (undated) DEVICE — PAD CUSTOM COTTON 2 X 2

## (undated) DEVICE — SEE MEDLINE ITEM 152622

## (undated) DEVICE — DRAPE STERI 32 X 50

## (undated) DEVICE — SYR 3CC LUER LOC

## (undated) DEVICE — BLADE OTOLOGY BEAVER 5X84MM

## (undated) DEVICE — KIT DRESS GLASSCK EAR ADLT ST

## (undated) DEVICE — BLADE SURG CARBON STEEL SZ11

## (undated) DEVICE — CLOSURE SKIN STERI STRIP 1/2X4

## (undated) DEVICE — CLIPPER BLADE MOD 4406 (CAREF)

## (undated) DEVICE — SUT CTD VICRYL 3-0 PS-2 UND

## (undated) DEVICE — SOL IRR NACL .9% 3000ML

## (undated) DEVICE — KIT SUCTION IRRIGATION